# Patient Record
Sex: FEMALE | Race: WHITE | NOT HISPANIC OR LATINO | Employment: OTHER | URBAN - METROPOLITAN AREA
[De-identification: names, ages, dates, MRNs, and addresses within clinical notes are randomized per-mention and may not be internally consistent; named-entity substitution may affect disease eponyms.]

---

## 2018-06-25 ENCOUNTER — OFFICE VISIT CONVERTED (OUTPATIENT)
Dept: OTHER | Facility: HOSPITAL | Age: 68
End: 2018-06-25
Attending: INTERNAL MEDICINE

## 2018-08-23 ENCOUNTER — OFFICE VISIT CONVERTED (OUTPATIENT)
Dept: ORTHOPEDIC SURGERY | Facility: CLINIC | Age: 68
End: 2018-08-23
Attending: ORTHOPAEDIC SURGERY

## 2018-08-23 ENCOUNTER — CONVERSION ENCOUNTER (OUTPATIENT)
Dept: ORTHOPEDIC SURGERY | Facility: CLINIC | Age: 68
End: 2018-08-23

## 2018-11-15 ENCOUNTER — CONVERSION ENCOUNTER (OUTPATIENT)
Dept: GASTROENTEROLOGY | Facility: CLINIC | Age: 68
End: 2018-11-15

## 2018-11-15 ENCOUNTER — OFFICE VISIT CONVERTED (OUTPATIENT)
Dept: GASTROENTEROLOGY | Facility: CLINIC | Age: 68
End: 2018-11-15
Attending: NURSE PRACTITIONER

## 2018-12-18 ENCOUNTER — OFFICE VISIT CONVERTED (OUTPATIENT)
Dept: OTHER | Facility: HOSPITAL | Age: 68
End: 2018-12-18
Attending: INTERNAL MEDICINE

## 2019-01-15 ENCOUNTER — HOSPITAL ENCOUNTER (OUTPATIENT)
Dept: GENERAL RADIOLOGY | Facility: HOSPITAL | Age: 69
Discharge: HOME | End: 2019-01-15
Attending: INTERNAL MEDICINE

## 2019-01-15 LAB
CREAT BLD-MCNC: 0.7 MG/DL (ref 0.6–1.4)
GFR SERPLBLD BASED ON 1.73 SQ M-ARVRAT: >60 ML/MIN/{1.73_M2}

## 2019-01-21 ENCOUNTER — HOSPITAL ENCOUNTER (OUTPATIENT)
Dept: GASTROENTEROLOGY | Facility: HOSPITAL | Age: 69
Setting detail: HOSPITAL OUTPATIENT SURGERY
Discharge: HOME | End: 2019-01-21
Attending: INTERNAL MEDICINE

## 2019-01-21 LAB — GLUCOSE BLD-MCNC: 177 MG/DL (ref 65–99)

## 2019-01-22 ENCOUNTER — TELEPHONE (OUTPATIENT)
Dept: GENETICS | Facility: HOSPITAL | Age: 69
End: 2019-01-22

## 2019-02-11 ENCOUNTER — OFFICE VISIT CONVERTED (OUTPATIENT)
Dept: GASTROENTEROLOGY | Facility: CLINIC | Age: 69
End: 2019-02-11
Attending: NURSE PRACTITIONER

## 2019-02-11 ENCOUNTER — CONVERSION ENCOUNTER (OUTPATIENT)
Dept: GASTROENTEROLOGY | Facility: CLINIC | Age: 69
End: 2019-02-11

## 2019-02-25 ENCOUNTER — OFFICE VISIT CONVERTED (OUTPATIENT)
Dept: OTHER | Facility: HOSPITAL | Age: 69
End: 2019-02-25
Attending: INTERNAL MEDICINE

## 2019-04-03 ENCOUNTER — HOSPITAL ENCOUNTER (OUTPATIENT)
Dept: LAB | Facility: HOSPITAL | Age: 69
Discharge: HOME | End: 2019-04-03
Attending: INTERNAL MEDICINE

## 2019-04-03 LAB
25(OH)D3 SERPL-MCNC: 33 NG/ML (ref 30–100)
ALBUMIN SERPL-MCNC: 4.2 G/DL (ref 3.5–5)
ALBUMIN/GLOB SERPL: 2 {RATIO} (ref 1.4–2.6)
ALP SERPL-CCNC: 61 U/L (ref 43–160)
ALT SERPL-CCNC: 20 U/L (ref 10–40)
ANION GAP SERPL CALC-SCNC: 17 MMOL/L (ref 8–19)
AST SERPL-CCNC: 21 U/L (ref 15–50)
BILIRUB SERPL-MCNC: 0.51 MG/DL (ref 0.2–1.3)
BUN SERPL-MCNC: 19 MG/DL (ref 5–25)
BUN/CREAT SERPL: 26 {RATIO} (ref 6–20)
CALCIUM SERPL-MCNC: 8.8 MG/DL (ref 8.7–10.4)
CHLORIDE SERPL-SCNC: 104 MMOL/L (ref 99–111)
CHOLEST SERPL-MCNC: 135 MG/DL (ref 107–200)
CHOLEST/HDLC SERPL: 2.9 {RATIO} (ref 3–6)
CONV CO2: 25 MMOL/L (ref 22–32)
CONV TOTAL PROTEIN: 6.3 G/DL (ref 6.3–8.2)
CREAT UR-MCNC: 0.72 MG/DL (ref 0.5–0.9)
EST. AVERAGE GLUCOSE BLD GHB EST-MCNC: 169 MG/DL
GFR SERPLBLD BASED ON 1.73 SQ M-ARVRAT: >60 ML/MIN/{1.73_M2}
GLOBULIN UR ELPH-MCNC: 2.1 G/DL (ref 2–3.5)
GLUCOSE SERPL-MCNC: 151 MG/DL (ref 65–99)
HBA1C MFR BLD: 7.5 % (ref 3.5–5.7)
HDLC SERPL-MCNC: 47 MG/DL (ref 40–60)
LDLC SERPL CALC-MCNC: 55 MG/DL (ref 70–100)
OSMOLALITY SERPL CALC.SUM OF ELEC: 299 MOSM/KG (ref 273–304)
POTASSIUM SERPL-SCNC: 4.4 MMOL/L (ref 3.5–5.3)
SODIUM SERPL-SCNC: 142 MMOL/L (ref 135–147)
TRIGL SERPL-MCNC: 165 MG/DL (ref 40–150)
VLDLC SERPL-MCNC: 33 MG/DL (ref 5–37)

## 2019-06-13 ENCOUNTER — HOSPITAL ENCOUNTER (OUTPATIENT)
Dept: LAB | Facility: HOSPITAL | Age: 69
Discharge: HOME | End: 2019-06-13
Attending: INTERNAL MEDICINE

## 2019-06-13 LAB
ALBUMIN SERPL-MCNC: 4.4 G/DL (ref 3.5–5)
ALBUMIN/GLOB SERPL: 2.2 {RATIO} (ref 1.4–2.6)
ALP SERPL-CCNC: 60 U/L (ref 43–160)
ALT SERPL-CCNC: 31 U/L (ref 10–40)
ANION GAP SERPL CALC-SCNC: 18 MMOL/L (ref 8–19)
AST SERPL-CCNC: 28 U/L (ref 15–50)
BASOPHILS # BLD AUTO: 0.06 10*3/UL (ref 0–0.2)
BASOPHILS NFR BLD AUTO: 0.9 % (ref 0–3)
BILIRUB SERPL-MCNC: 0.45 MG/DL (ref 0.2–1.3)
BUN SERPL-MCNC: 19 MG/DL (ref 5–25)
BUN/CREAT SERPL: 23 {RATIO} (ref 6–20)
CALCIUM SERPL-MCNC: 8.7 MG/DL (ref 8.7–10.4)
CHLORIDE SERPL-SCNC: 101 MMOL/L (ref 99–111)
CONV ABS IMM GRAN: 0.02 10*3/UL (ref 0–0.2)
CONV CO2: 23 MMOL/L (ref 22–32)
CONV IMMATURE GRAN: 0.3 % (ref 0–1.8)
CONV TOTAL PROTEIN: 6.4 G/DL (ref 6.3–8.2)
CREAT UR-MCNC: 0.81 MG/DL (ref 0.5–0.9)
DEPRECATED RDW RBC AUTO: 48.6 FL (ref 36.4–46.3)
EOSINOPHIL # BLD AUTO: 0.12 10*3/UL (ref 0–0.7)
EOSINOPHIL # BLD AUTO: 1.8 % (ref 0–7)
ERYTHROCYTE [DISTWIDTH] IN BLOOD BY AUTOMATED COUNT: 15.9 % (ref 11.7–14.4)
GFR SERPLBLD BASED ON 1.73 SQ M-ARVRAT: >60 ML/MIN/{1.73_M2}
GLOBULIN UR ELPH-MCNC: 2 G/DL (ref 2–3.5)
GLUCOSE SERPL-MCNC: 179 MG/DL (ref 65–99)
HBA1C MFR BLD: 11.2 G/DL (ref 12–16)
HCT VFR BLD AUTO: 37.1 % (ref 37–47)
LYMPHOCYTES # BLD AUTO: 1.92 10*3/UL (ref 1–5)
MCH RBC QN AUTO: 25.5 PG (ref 27–31)
MCHC RBC AUTO-ENTMCNC: 30.2 G/DL (ref 33–37)
MCV RBC AUTO: 84.3 FL (ref 81–99)
MONOCYTES # BLD AUTO: 0.8 10*3/UL (ref 0.2–1.2)
MONOCYTES NFR BLD AUTO: 12 % (ref 3–10)
NEUTROPHILS # BLD AUTO: 3.73 10*3/UL (ref 2–8)
NEUTROPHILS NFR BLD AUTO: 56.1 % (ref 30–85)
NRBC CBCN: 0 % (ref 0–0.7)
OSMOLALITY SERPL CALC.SUM OF ELEC: 293 MOSM/KG (ref 273–304)
PLATELET # BLD AUTO: 382 10*3/UL (ref 130–400)
PMV BLD AUTO: 10.5 FL (ref 9.4–12.3)
POTASSIUM SERPL-SCNC: 4.2 MMOL/L (ref 3.5–5.3)
RBC # BLD AUTO: 4.4 10*6/UL (ref 4.2–5.4)
SODIUM SERPL-SCNC: 138 MMOL/L (ref 135–147)
VARIANT LYMPHS NFR BLD MANUAL: 28.9 % (ref 20–45)
WBC # BLD AUTO: 6.65 10*3/UL (ref 4.8–10.8)

## 2019-06-14 LAB — CANCER AG27-29 SERPL-ACNC: 16.5 U/ML (ref 0–38.6)

## 2019-06-24 ENCOUNTER — OFFICE VISIT CONVERTED (OUTPATIENT)
Dept: OTHER | Facility: HOSPITAL | Age: 69
End: 2019-06-24
Attending: INTERNAL MEDICINE

## 2019-07-09 ENCOUNTER — HOSPITAL ENCOUNTER (OUTPATIENT)
Dept: CT IMAGING | Facility: HOSPITAL | Age: 69
Discharge: HOME | End: 2019-07-09
Attending: INTERNAL MEDICINE

## 2019-07-11 ENCOUNTER — HOSPITAL ENCOUNTER (OUTPATIENT)
Dept: LAB | Facility: HOSPITAL | Age: 69
Discharge: HOME | End: 2019-07-11
Attending: INTERNAL MEDICINE

## 2019-07-11 LAB
ALBUMIN SERPL-MCNC: 4.2 G/DL (ref 3.5–5)
ANION GAP SERPL CALC-SCNC: 20 MMOL/L (ref 8–19)
BUN SERPL-MCNC: 19 MG/DL (ref 5–25)
BUN/CREAT SERPL: 24 {RATIO} (ref 6–20)
CALCIUM SERPL-MCNC: 8.6 MG/DL (ref 8.7–10.4)
CHLORIDE SERPL-SCNC: 101 MMOL/L (ref 99–111)
CONV CO2: 20 MMOL/L (ref 22–32)
CREAT UR-MCNC: 0.79 MG/DL (ref 0.5–0.9)
GFR SERPLBLD BASED ON 1.73 SQ M-ARVRAT: >60 ML/MIN/{1.73_M2}
GLUCOSE SERPL-MCNC: 230 MG/DL (ref 65–99)
PHOSPHATE SERPL-MCNC: 3.6 MG/DL (ref 2.4–4.5)
POTASSIUM SERPL-SCNC: 4.2 MMOL/L (ref 3.5–5.3)
SODIUM SERPL-SCNC: 137 MMOL/L (ref 135–147)

## 2019-07-25 ENCOUNTER — HOSPITAL ENCOUNTER (OUTPATIENT)
Dept: LAB | Facility: HOSPITAL | Age: 69
Discharge: HOME | End: 2019-07-25
Attending: INTERNAL MEDICINE

## 2019-07-25 LAB
25(OH)D3 SERPL-MCNC: 50.3 NG/ML (ref 30–100)
ANION GAP SERPL CALC-SCNC: 22 MMOL/L (ref 8–19)
BUN SERPL-MCNC: 19 MG/DL (ref 5–25)
BUN/CREAT SERPL: 26 {RATIO} (ref 6–20)
CALCIUM SERPL-MCNC: 9 MG/DL (ref 8.7–10.4)
CHLORIDE SERPL-SCNC: 104 MMOL/L (ref 99–111)
CONV CO2: 20 MMOL/L (ref 22–32)
CONV CREATININE URINE, RANDOM: 250.8 MG/DL (ref 10–300)
CONV MICROALBUM.,U,RANDOM: <12 MG/L (ref 0–20)
CREAT UR-MCNC: 0.73 MG/DL (ref 0.5–0.9)
EST. AVERAGE GLUCOSE BLD GHB EST-MCNC: 166 MG/DL
GFR SERPLBLD BASED ON 1.73 SQ M-ARVRAT: >60 ML/MIN/{1.73_M2}
GLUCOSE SERPL-MCNC: 146 MG/DL (ref 65–99)
HBA1C MFR BLD: 7.4 % (ref 3.5–5.7)
MICROALBUMIN/CREAT UR: 4.8 MG/G{CRE} (ref 0–35)
OSMOLALITY SERPL CALC.SUM OF ELEC: 297 MOSM/KG (ref 273–304)
POTASSIUM SERPL-SCNC: 4.6 MMOL/L (ref 3.5–5.3)
SODIUM SERPL-SCNC: 141 MMOL/L (ref 135–147)

## 2019-08-12 ENCOUNTER — OFFICE VISIT CONVERTED (OUTPATIENT)
Dept: OTHER | Facility: HOSPITAL | Age: 69
End: 2019-08-12
Attending: INTERNAL MEDICINE

## 2019-08-16 ENCOUNTER — HOSPITAL ENCOUNTER (OUTPATIENT)
Dept: OTHER | Facility: HOSPITAL | Age: 69
Discharge: HOME | End: 2019-08-16
Attending: INTERNAL MEDICINE

## 2019-08-16 LAB
APTT BLD: 23.5 S (ref 22.2–34.2)
BASOPHILS # BLD AUTO: 0.06 10*3/UL (ref 0–0.2)
BASOPHILS NFR BLD AUTO: 0.6 % (ref 0–3)
CONV ABS IMM GRAN: 0.04 10*3/UL (ref 0–0.2)
CONV IMMATURE GRAN: 0.4 % (ref 0–1.8)
DEPRECATED RDW RBC AUTO: 47.7 FL (ref 36.4–46.3)
EOSINOPHIL # BLD AUTO: 0.12 10*3/UL (ref 0–0.7)
EOSINOPHIL # BLD AUTO: 1.2 % (ref 0–7)
ERYTHROCYTE [DISTWIDTH] IN BLOOD BY AUTOMATED COUNT: 15.8 % (ref 11.7–14.4)
HCT VFR BLD AUTO: 37.1 % (ref 37–47)
HGB BLD-MCNC: 11.2 G/DL (ref 12–16)
INR PPP: 1.06 (ref 2–3)
LYMPHOCYTES # BLD AUTO: 2.41 10*3/UL (ref 1–5)
LYMPHOCYTES NFR BLD AUTO: 23.3 % (ref 20–45)
MCH RBC QN AUTO: 24.9 PG (ref 27–31)
MCHC RBC AUTO-ENTMCNC: 30.2 G/DL (ref 33–37)
MCV RBC AUTO: 82.4 FL (ref 81–99)
MONOCYTES # BLD AUTO: 1.17 10*3/UL (ref 0.2–1.2)
MONOCYTES NFR BLD AUTO: 11.3 % (ref 3–10)
NEUTROPHILS # BLD AUTO: 6.54 10*3/UL (ref 2–8)
NEUTROPHILS NFR BLD AUTO: 63.2 % (ref 30–85)
NRBC CBCN: 0 % (ref 0–0.7)
PLATELET # BLD AUTO: 412 10*3/UL (ref 130–400)
PMV BLD AUTO: 10.4 FL (ref 9.4–12.3)
PROTHROMBIN TIME: 10.9 S (ref 9.4–12)
RBC # BLD AUTO: 4.5 10*6/UL (ref 4.2–5.4)
WBC # BLD AUTO: 10.34 10*3/UL (ref 4.8–10.8)

## 2019-08-19 ENCOUNTER — HOSPITAL ENCOUNTER (OUTPATIENT)
Dept: CT IMAGING | Facility: HOSPITAL | Age: 69
Discharge: HOME | End: 2019-08-19
Attending: INTERNAL MEDICINE

## 2019-08-21 LAB — CONV LYMPHOMA/LEUKEMIA PROFILE SOLID TISSUE OR FLUID: NORMAL

## 2019-08-27 ENCOUNTER — OFFICE VISIT CONVERTED (OUTPATIENT)
Dept: OTHER | Facility: HOSPITAL | Age: 69
End: 2019-08-27
Attending: INTERNAL MEDICINE

## 2019-12-05 ENCOUNTER — HOSPITAL ENCOUNTER (OUTPATIENT)
Dept: LAB | Facility: HOSPITAL | Age: 69
Discharge: HOME | End: 2019-12-05
Attending: INTERNAL MEDICINE

## 2019-12-05 LAB
ALBUMIN SERPL-MCNC: 4.1 G/DL (ref 3.5–5)
ALBUMIN/GLOB SERPL: 1.6 {RATIO} (ref 1.4–2.6)
ALP SERPL-CCNC: 57 U/L (ref 43–160)
ALT SERPL-CCNC: 19 U/L (ref 10–40)
ANION GAP SERPL CALC-SCNC: 19 MMOL/L (ref 8–19)
AST SERPL-CCNC: 24 U/L (ref 15–50)
BILIRUB SERPL-MCNC: 0.28 MG/DL (ref 0.2–1.3)
BUN SERPL-MCNC: 14 MG/DL (ref 5–25)
BUN/CREAT SERPL: 21 {RATIO} (ref 6–20)
CALCIUM SERPL-MCNC: 9 MG/DL (ref 8.7–10.4)
CHLORIDE SERPL-SCNC: 102 MMOL/L (ref 99–111)
CHOLEST SERPL-MCNC: 124 MG/DL (ref 107–200)
CHOLEST/HDLC SERPL: 3.2 {RATIO} (ref 3–6)
CONV CO2: 24 MMOL/L (ref 22–32)
CONV TOTAL PROTEIN: 6.6 G/DL (ref 6.3–8.2)
CREAT UR-MCNC: 0.66 MG/DL (ref 0.5–0.9)
EST. AVERAGE GLUCOSE BLD GHB EST-MCNC: 148 MG/DL
GFR SERPLBLD BASED ON 1.73 SQ M-ARVRAT: >60 ML/MIN/{1.73_M2}
GLOBULIN UR ELPH-MCNC: 2.5 G/DL (ref 2–3.5)
GLUCOSE SERPL-MCNC: 151 MG/DL (ref 65–99)
HBA1C MFR BLD: 6.8 % (ref 3.5–5.7)
HDLC SERPL-MCNC: 39 MG/DL (ref 40–60)
LDLC SERPL CALC-MCNC: 57 MG/DL (ref 70–100)
OSMOLALITY SERPL CALC.SUM OF ELEC: 295 MOSM/KG (ref 273–304)
POTASSIUM SERPL-SCNC: 4.3 MMOL/L (ref 3.5–5.3)
SODIUM SERPL-SCNC: 141 MMOL/L (ref 135–147)
TRIGL SERPL-MCNC: 138 MG/DL (ref 40–150)
VLDLC SERPL-MCNC: 28 MG/DL (ref 5–37)

## 2020-03-31 ENCOUNTER — HOSPITAL ENCOUNTER (OUTPATIENT)
Dept: LAB | Facility: HOSPITAL | Age: 70
Discharge: HOME | End: 2020-03-31
Attending: INTERNAL MEDICINE

## 2020-03-31 LAB
25(OH)D3 SERPL-MCNC: 43.5 NG/ML (ref 30–100)
ANION GAP SERPL CALC-SCNC: 21 MMOL/L (ref 8–19)
BASOPHILS # BLD AUTO: 0.05 10*3/UL (ref 0–0.2)
BASOPHILS NFR BLD AUTO: 0.7 % (ref 0–3)
BUN SERPL-MCNC: 17 MG/DL (ref 5–25)
BUN/CREAT SERPL: 22 {RATIO} (ref 6–20)
CALCIUM SERPL-MCNC: 9.2 MG/DL (ref 8.7–10.4)
CHLORIDE SERPL-SCNC: 102 MMOL/L (ref 99–111)
CONV ABS IMM GRAN: 0.02 10*3/UL (ref 0–0.2)
CONV CO2: 22 MMOL/L (ref 22–32)
CONV IMMATURE GRAN: 0.3 % (ref 0–1.8)
CREAT UR-MCNC: 0.76 MG/DL (ref 0.5–0.9)
DEPRECATED RDW RBC AUTO: 51.2 FL (ref 36.4–46.3)
EOSINOPHIL # BLD AUTO: 0.18 10*3/UL (ref 0–0.7)
EOSINOPHIL # BLD AUTO: 2.5 % (ref 0–7)
ERYTHROCYTE [DISTWIDTH] IN BLOOD BY AUTOMATED COUNT: 16.7 % (ref 11.7–14.4)
EST. AVERAGE GLUCOSE BLD GHB EST-MCNC: 148 MG/DL
GFR SERPLBLD BASED ON 1.73 SQ M-ARVRAT: >60 ML/MIN/{1.73_M2}
GLUCOSE SERPL-MCNC: 155 MG/DL (ref 65–99)
HBA1C MFR BLD: 6.8 % (ref 3.5–5.7)
HCT VFR BLD AUTO: 39.1 % (ref 37–47)
HGB BLD-MCNC: 11.7 G/DL (ref 12–16)
LYMPHOCYTES # BLD AUTO: 1.84 10*3/UL (ref 1–5)
LYMPHOCYTES NFR BLD AUTO: 25.4 % (ref 20–45)
MCH RBC QN AUTO: 25.3 PG (ref 27–31)
MCHC RBC AUTO-ENTMCNC: 29.9 G/DL (ref 33–37)
MCV RBC AUTO: 84.4 FL (ref 81–99)
MONOCYTES # BLD AUTO: 0.78 10*3/UL (ref 0.2–1.2)
MONOCYTES NFR BLD AUTO: 10.8 % (ref 3–10)
NEUTROPHILS # BLD AUTO: 4.38 10*3/UL (ref 2–8)
NEUTROPHILS NFR BLD AUTO: 60.3 % (ref 30–85)
NRBC CBCN: 0 % (ref 0–0.7)
OSMOLALITY SERPL CALC.SUM OF ELEC: 295 MOSM/KG (ref 273–304)
PLATELET # BLD AUTO: 405 10*3/UL (ref 130–400)
PMV BLD AUTO: 10.1 FL (ref 9.4–12.3)
POTASSIUM SERPL-SCNC: 4.6 MMOL/L (ref 3.5–5.3)
RBC # BLD AUTO: 4.63 10*6/UL (ref 4.2–5.4)
SODIUM SERPL-SCNC: 140 MMOL/L (ref 135–147)
T4 FREE SERPL-MCNC: 1.2 NG/DL (ref 0.9–1.8)
TSH SERPL-ACNC: 4.38 M[IU]/L (ref 0.27–4.2)
WBC # BLD AUTO: 7.25 10*3/UL (ref 4.8–10.8)

## 2020-04-27 ENCOUNTER — HOSPITAL ENCOUNTER (OUTPATIENT)
Dept: GENERAL RADIOLOGY | Facility: HOSPITAL | Age: 70
Discharge: HOME | End: 2020-04-27
Attending: INTERNAL MEDICINE

## 2020-05-19 ENCOUNTER — HOSPITAL ENCOUNTER (OUTPATIENT)
Dept: LAB | Facility: HOSPITAL | Age: 70
Discharge: HOME | End: 2020-05-19
Attending: INTERNAL MEDICINE

## 2020-05-19 LAB
ALBUMIN SERPL-MCNC: 4.6 G/DL (ref 3.5–5)
ALBUMIN/GLOB SERPL: 2.1 {RATIO} (ref 1.4–2.6)
ALP SERPL-CCNC: 60 U/L (ref 43–160)
ALT SERPL-CCNC: 25 U/L (ref 10–40)
ANION GAP SERPL CALC-SCNC: 17 MMOL/L (ref 8–19)
AST SERPL-CCNC: 26 U/L (ref 15–50)
BASOPHILS # BLD AUTO: 0.05 10*3/UL (ref 0–0.2)
BASOPHILS NFR BLD AUTO: 0.5 % (ref 0–3)
BILIRUB SERPL-MCNC: 0.32 MG/DL (ref 0.2–1.3)
BUN SERPL-MCNC: 12 MG/DL (ref 5–25)
BUN/CREAT SERPL: 16 {RATIO} (ref 6–20)
CALCIUM SERPL-MCNC: 9.2 MG/DL (ref 8.7–10.4)
CHLORIDE SERPL-SCNC: 104 MMOL/L (ref 99–111)
CONV ABS IMM GRAN: 0.02 10*3/UL (ref 0–0.2)
CONV CO2: 21 MMOL/L (ref 22–32)
CONV IMMATURE GRAN: 0.2 % (ref 0–1.8)
CONV TOTAL PROTEIN: 6.8 G/DL (ref 6.3–8.2)
CREAT UR-MCNC: 0.75 MG/DL (ref 0.5–0.9)
DEPRECATED RDW RBC AUTO: 50.1 FL (ref 36.4–46.3)
EOSINOPHIL # BLD AUTO: 0.12 10*3/UL (ref 0–0.7)
EOSINOPHIL # BLD AUTO: 1.3 % (ref 0–7)
ERYTHROCYTE [DISTWIDTH] IN BLOOD BY AUTOMATED COUNT: 16.4 % (ref 11.7–14.4)
ERYTHROCYTE [SEDIMENTATION RATE] IN BLOOD: 7 MM/H (ref 0–30)
GFR SERPLBLD BASED ON 1.73 SQ M-ARVRAT: >60 ML/MIN/{1.73_M2}
GLOBULIN UR ELPH-MCNC: 2.2 G/DL (ref 2–3.5)
GLUCOSE SERPL-MCNC: 85 MG/DL (ref 65–99)
HCT VFR BLD AUTO: 38.7 % (ref 37–47)
HGB BLD-MCNC: 11.7 G/DL (ref 12–16)
LYMPHOCYTES # BLD AUTO: 2.02 10*3/UL (ref 1–5)
LYMPHOCYTES NFR BLD AUTO: 21.9 % (ref 20–45)
MCH RBC QN AUTO: 25.3 PG (ref 27–31)
MCHC RBC AUTO-ENTMCNC: 30.2 G/DL (ref 33–37)
MCV RBC AUTO: 83.6 FL (ref 81–99)
MONOCYTES # BLD AUTO: 0.99 10*3/UL (ref 0.2–1.2)
MONOCYTES NFR BLD AUTO: 10.7 % (ref 3–10)
NEUTROPHILS # BLD AUTO: 6.02 10*3/UL (ref 2–8)
NEUTROPHILS NFR BLD AUTO: 65.4 % (ref 30–85)
NRBC CBCN: 0 % (ref 0–0.7)
OSMOLALITY SERPL CALC.SUM OF ELEC: 283 MOSM/KG (ref 273–304)
PLATELET # BLD AUTO: 422 10*3/UL (ref 130–400)
PMV BLD AUTO: 10.3 FL (ref 9.4–12.3)
POTASSIUM SERPL-SCNC: 4.5 MMOL/L (ref 3.5–5.3)
RBC # BLD AUTO: 4.63 10*6/UL (ref 4.2–5.4)
SODIUM SERPL-SCNC: 137 MMOL/L (ref 135–147)
TSH SERPL-ACNC: 1.64 M[IU]/L (ref 0.27–4.2)
WBC # BLD AUTO: 9.22 10*3/UL (ref 4.8–10.8)

## 2020-05-21 LAB
ASO AB SERPL-ACNC: 42 [IU]/ML (ref 0–200)
CONV RHEUMATOID FACTOR IGM: <10 [IU]/ML (ref 0–14)
CRP SERPL-MCNC: 0.5 MG/L (ref 0–5)
DSDNA AB SER-ACNC: NEGATIVE [IU]/ML
ENA AB SER IA-ACNC: NEGATIVE {RATIO}
URATE SERPL-MCNC: 4.9 MG/DL (ref 2.5–7.5)

## 2020-06-23 ENCOUNTER — HOSPITAL ENCOUNTER (OUTPATIENT)
Dept: LAB | Facility: HOSPITAL | Age: 70
Discharge: HOME | End: 2020-06-23
Attending: INTERNAL MEDICINE

## 2020-06-23 LAB
ALBUMIN SERPL-MCNC: 4.5 G/DL (ref 3.5–5)
ALBUMIN/GLOB SERPL: 2.3 {RATIO} (ref 1.4–2.6)
ALP SERPL-CCNC: 65 U/L (ref 43–160)
ALT SERPL-CCNC: 22 U/L (ref 10–40)
ANION GAP SERPL CALC-SCNC: 17 MMOL/L (ref 8–19)
AST SERPL-CCNC: 24 U/L (ref 15–50)
BASOPHILS # BLD AUTO: 0.08 10*3/UL (ref 0–0.2)
BASOPHILS NFR BLD AUTO: 0.9 % (ref 0–3)
BILIRUB SERPL-MCNC: 0.23 MG/DL (ref 0.2–1.3)
BUN SERPL-MCNC: 21 MG/DL (ref 5–25)
BUN/CREAT SERPL: 29 {RATIO} (ref 6–20)
CALCIUM SERPL-MCNC: 9 MG/DL (ref 8.7–10.4)
CHLORIDE SERPL-SCNC: 104 MMOL/L (ref 99–111)
CONV ABS IMM GRAN: 0.03 10*3/UL (ref 0–0.2)
CONV CO2: 22 MMOL/L (ref 22–32)
CONV IMMATURE GRAN: 0.3 % (ref 0–1.8)
CONV TOTAL PROTEIN: 6.5 G/DL (ref 6.3–8.2)
CREAT UR-MCNC: 0.73 MG/DL (ref 0.5–0.9)
DEPRECATED RDW RBC AUTO: 49.6 FL (ref 36.4–46.3)
EOSINOPHIL # BLD AUTO: 0.15 10*3/UL (ref 0–0.7)
EOSINOPHIL # BLD AUTO: 1.6 % (ref 0–7)
ERYTHROCYTE [DISTWIDTH] IN BLOOD BY AUTOMATED COUNT: 16.2 % (ref 11.7–14.4)
GFR SERPLBLD BASED ON 1.73 SQ M-ARVRAT: >60 ML/MIN/{1.73_M2}
GLOBULIN UR ELPH-MCNC: 2 G/DL (ref 2–3.5)
GLUCOSE SERPL-MCNC: 88 MG/DL (ref 65–99)
HCT VFR BLD AUTO: 36.5 % (ref 37–47)
HGB BLD-MCNC: 10.8 G/DL (ref 12–16)
LYMPHOCYTES # BLD AUTO: 2.16 10*3/UL (ref 1–5)
LYMPHOCYTES NFR BLD AUTO: 23.6 % (ref 20–45)
MCH RBC QN AUTO: 24.9 PG (ref 27–31)
MCHC RBC AUTO-ENTMCNC: 29.6 G/DL (ref 33–37)
MCV RBC AUTO: 84.3 FL (ref 81–99)
MONOCYTES # BLD AUTO: 0.97 10*3/UL (ref 0.2–1.2)
MONOCYTES NFR BLD AUTO: 10.6 % (ref 3–10)
NEUTROPHILS # BLD AUTO: 5.75 10*3/UL (ref 2–8)
NEUTROPHILS NFR BLD AUTO: 63 % (ref 30–85)
NRBC CBCN: 0 % (ref 0–0.7)
OSMOLALITY SERPL CALC.SUM OF ELEC: 288 MOSM/KG (ref 273–304)
PLATELET # BLD AUTO: 407 10*3/UL (ref 130–400)
PMV BLD AUTO: 10.3 FL (ref 9.4–12.3)
POTASSIUM SERPL-SCNC: 4.6 MMOL/L (ref 3.5–5.3)
RBC # BLD AUTO: 4.33 10*6/UL (ref 4.2–5.4)
SODIUM SERPL-SCNC: 138 MMOL/L (ref 135–147)
WBC # BLD AUTO: 9.14 10*3/UL (ref 4.8–10.8)

## 2020-06-25 LAB — CANCER AG27-29 SERPL-ACNC: 13.5 U/ML (ref 0–38.6)

## 2020-06-30 ENCOUNTER — OFFICE VISIT CONVERTED (OUTPATIENT)
Dept: OTHER | Facility: HOSPITAL | Age: 70
End: 2020-06-30
Attending: INTERNAL MEDICINE

## 2020-07-06 ENCOUNTER — HOSPITAL ENCOUNTER (OUTPATIENT)
Dept: LAB | Facility: HOSPITAL | Age: 70
Discharge: HOME | End: 2020-07-06
Attending: INTERNAL MEDICINE

## 2020-07-06 LAB
ALBUMIN SERPL-MCNC: 4.6 G/DL (ref 3.5–5)
ALBUMIN/GLOB SERPL: 2.2 {RATIO} (ref 1.4–2.6)
ALP SERPL-CCNC: 65 U/L (ref 43–160)
ALT SERPL-CCNC: 25 U/L (ref 10–40)
ANION GAP SERPL CALC-SCNC: 19 MMOL/L (ref 8–19)
AST SERPL-CCNC: 24 U/L (ref 15–50)
BASOPHILS # BLD AUTO: 0.05 10*3/UL (ref 0–0.2)
BASOPHILS NFR BLD AUTO: 0.6 % (ref 0–3)
BILIRUB SERPL-MCNC: 0.46 MG/DL (ref 0.2–1.3)
BUN SERPL-MCNC: 14 MG/DL (ref 5–25)
BUN/CREAT SERPL: 16 {RATIO} (ref 6–20)
CALCIUM SERPL-MCNC: 9 MG/DL (ref 8.7–10.4)
CHLORIDE SERPL-SCNC: 103 MMOL/L (ref 99–111)
CHOLEST SERPL-MCNC: 134 MG/DL (ref 107–200)
CHOLEST/HDLC SERPL: 2.9 {RATIO} (ref 3–6)
CONV ABS IMM GRAN: 0.03 10*3/UL (ref 0–0.2)
CONV CO2: 21 MMOL/L (ref 22–32)
CONV IMMATURE GRAN: 0.3 % (ref 0–1.8)
CONV TOTAL PROTEIN: 6.7 G/DL (ref 6.3–8.2)
CREAT UR-MCNC: 0.9 MG/DL (ref 0.5–0.9)
DEPRECATED RDW RBC AUTO: 48.7 FL (ref 36.4–46.3)
EOSINOPHIL # BLD AUTO: 0.15 10*3/UL (ref 0–0.7)
EOSINOPHIL # BLD AUTO: 1.7 % (ref 0–7)
ERYTHROCYTE [DISTWIDTH] IN BLOOD BY AUTOMATED COUNT: 15.9 % (ref 11.7–14.4)
EST. AVERAGE GLUCOSE BLD GHB EST-MCNC: 154 MG/DL
GFR SERPLBLD BASED ON 1.73 SQ M-ARVRAT: >60 ML/MIN/{1.73_M2}
GLOBULIN UR ELPH-MCNC: 2.1 G/DL (ref 2–3.5)
GLUCOSE SERPL-MCNC: 158 MG/DL (ref 65–99)
HBA1C MFR BLD: 7 % (ref 3.5–5.7)
HCT VFR BLD AUTO: 37.6 % (ref 37–47)
HDLC SERPL-MCNC: 46 MG/DL (ref 40–60)
HGB BLD-MCNC: 11.2 G/DL (ref 12–16)
LDLC SERPL CALC-MCNC: 58 MG/DL (ref 70–100)
LYMPHOCYTES # BLD AUTO: 2.1 10*3/UL (ref 1–5)
LYMPHOCYTES NFR BLD AUTO: 24.2 % (ref 20–45)
MCH RBC QN AUTO: 25 PG (ref 27–31)
MCHC RBC AUTO-ENTMCNC: 29.8 G/DL (ref 33–37)
MCV RBC AUTO: 83.9 FL (ref 81–99)
MONOCYTES # BLD AUTO: 0.87 10*3/UL (ref 0.2–1.2)
MONOCYTES NFR BLD AUTO: 10 % (ref 3–10)
NEUTROPHILS # BLD AUTO: 5.49 10*3/UL (ref 2–8)
NEUTROPHILS NFR BLD AUTO: 63.2 % (ref 30–85)
NRBC CBCN: 0 % (ref 0–0.7)
OSMOLALITY SERPL CALC.SUM OF ELEC: 290 MOSM/KG (ref 273–304)
PLATELET # BLD AUTO: 435 10*3/UL (ref 130–400)
PMV BLD AUTO: 10.3 FL (ref 9.4–12.3)
POTASSIUM SERPL-SCNC: 4.8 MMOL/L (ref 3.5–5.3)
RBC # BLD AUTO: 4.48 10*6/UL (ref 4.2–5.4)
SODIUM SERPL-SCNC: 138 MMOL/L (ref 135–147)
TRIGL SERPL-MCNC: 152 MG/DL (ref 40–150)
TSH SERPL-ACNC: 1.5 M[IU]/L (ref 0.27–4.2)
VLDLC SERPL-MCNC: 30 MG/DL (ref 5–37)
WBC # BLD AUTO: 8.69 10*3/UL (ref 4.8–10.8)

## 2020-07-07 LAB
FERRITIN SERPL-MCNC: 8 NG/ML (ref 10–200)
FOLATE SERPL-MCNC: 12.7 NG/ML (ref 4.8–20)
IRON SATN MFR SERPL: 8 % (ref 20–55)
IRON SERPL-MCNC: 42 UG/DL (ref 60–170)
TIBC SERPL-MCNC: 535 UG/DL (ref 245–450)
TRANSFERRIN SERPL-MCNC: 374 MG/DL (ref 250–380)
VIT B12 SERPL-MCNC: 233 PG/ML (ref 211–911)

## 2020-07-08 ENCOUNTER — HOSPITAL ENCOUNTER (OUTPATIENT)
Dept: GENERAL RADIOLOGY | Facility: HOSPITAL | Age: 70
Discharge: HOME | End: 2020-07-08
Attending: INTERNAL MEDICINE

## 2020-07-14 ENCOUNTER — HOSPITAL ENCOUNTER (OUTPATIENT)
Dept: GENERAL RADIOLOGY | Facility: HOSPITAL | Age: 70
Discharge: HOME | End: 2020-07-14
Attending: INTERNAL MEDICINE

## 2020-10-06 ENCOUNTER — HOSPITAL ENCOUNTER (OUTPATIENT)
Dept: LAB | Facility: HOSPITAL | Age: 70
Discharge: HOME | End: 2020-10-06
Attending: INTERNAL MEDICINE

## 2020-10-06 LAB
ALBUMIN SERPL-MCNC: 4.3 G/DL (ref 3.5–5)
ALBUMIN/GLOB SERPL: 2.2 {RATIO} (ref 1.4–2.6)
ALP SERPL-CCNC: 63 U/L (ref 43–160)
ALT SERPL-CCNC: 27 U/L (ref 10–40)
ANION GAP SERPL CALC-SCNC: 19 MMOL/L (ref 8–19)
AST SERPL-CCNC: 29 U/L (ref 15–50)
BASOPHILS # BLD AUTO: 0.06 10*3/UL (ref 0–0.2)
BASOPHILS NFR BLD AUTO: 0.9 % (ref 0–3)
BILIRUB SERPL-MCNC: 0.34 MG/DL (ref 0.2–1.3)
BUN SERPL-MCNC: 16 MG/DL (ref 5–25)
BUN/CREAT SERPL: 26 {RATIO} (ref 6–20)
CALCIUM SERPL-MCNC: 8.7 MG/DL (ref 8.7–10.4)
CHLORIDE SERPL-SCNC: 102 MMOL/L (ref 99–111)
CHOLEST SERPL-MCNC: 143 MG/DL (ref 107–200)
CHOLEST/HDLC SERPL: 3 {RATIO} (ref 3–6)
CONV ABS IMM GRAN: 0.02 10*3/UL (ref 0–0.2)
CONV CO2: 20 MMOL/L (ref 22–32)
CONV CREATININE URINE, RANDOM: 392.7 MG/DL (ref 10–300)
CONV IMMATURE GRAN: 0.3 % (ref 0–1.8)
CONV MICROALBUM.,U,RANDOM: 31.8 MG/L (ref 0–20)
CONV TOTAL PROTEIN: 6.3 G/DL (ref 6.3–8.2)
CREAT UR-MCNC: 0.62 MG/DL (ref 0.5–0.9)
DEPRECATED RDW RBC AUTO: 54 FL (ref 36.4–46.3)
EOSINOPHIL # BLD AUTO: 0.2 10*3/UL (ref 0–0.7)
EOSINOPHIL # BLD AUTO: 2.9 % (ref 0–7)
ERYTHROCYTE [DISTWIDTH] IN BLOOD BY AUTOMATED COUNT: 16.5 % (ref 11.7–14.4)
EST. AVERAGE GLUCOSE BLD GHB EST-MCNC: 169 MG/DL
FERRITIN SERPL-MCNC: 18 NG/ML (ref 10–200)
GFR SERPLBLD BASED ON 1.73 SQ M-ARVRAT: >60 ML/MIN/{1.73_M2}
GLOBULIN UR ELPH-MCNC: 2 G/DL (ref 2–3.5)
GLUCOSE SERPL-MCNC: 176 MG/DL (ref 65–99)
HBA1C MFR BLD: 7.5 % (ref 3.5–5.7)
HCT VFR BLD AUTO: 41.9 % (ref 37–47)
HDLC SERPL-MCNC: 47 MG/DL (ref 40–60)
HGB BLD-MCNC: 12.8 G/DL (ref 12–16)
IRON SATN MFR SERPL: 13 % (ref 20–55)
IRON SERPL-MCNC: 60 UG/DL (ref 60–170)
LDLC SERPL CALC-MCNC: 57 MG/DL (ref 70–100)
LYMPHOCYTES # BLD AUTO: 1.76 10*3/UL (ref 1–5)
LYMPHOCYTES NFR BLD AUTO: 25.8 % (ref 20–45)
MCH RBC QN AUTO: 27.1 PG (ref 27–31)
MCHC RBC AUTO-ENTMCNC: 30.5 G/DL (ref 33–37)
MCV RBC AUTO: 88.8 FL (ref 81–99)
MICROALBUMIN/CREAT UR: 8.1 MG/G{CRE} (ref 0–35)
MONOCYTES # BLD AUTO: 0.73 10*3/UL (ref 0.2–1.2)
MONOCYTES NFR BLD AUTO: 10.7 % (ref 3–10)
NEUTROPHILS # BLD AUTO: 4.05 10*3/UL (ref 2–8)
NEUTROPHILS NFR BLD AUTO: 59.4 % (ref 30–85)
NRBC CBCN: 0 % (ref 0–0.7)
OSMOLALITY SERPL CALC.SUM OF ELEC: 287 MOSM/KG (ref 273–304)
PLATELET # BLD AUTO: 344 10*3/UL (ref 130–400)
PMV BLD AUTO: 10.5 FL (ref 9.4–12.3)
POTASSIUM SERPL-SCNC: 4.5 MMOL/L (ref 3.5–5.3)
RBC # BLD AUTO: 4.72 10*6/UL (ref 4.2–5.4)
SODIUM SERPL-SCNC: 136 MMOL/L (ref 135–147)
TIBC SERPL-MCNC: 449 UG/DL (ref 245–450)
TRANSFERRIN SERPL-MCNC: 314 MG/DL (ref 250–380)
TRIGL SERPL-MCNC: 193 MG/DL (ref 40–150)
VLDLC SERPL-MCNC: 39 MG/DL (ref 5–37)
WBC # BLD AUTO: 6.82 10*3/UL (ref 4.8–10.8)

## 2021-01-21 ENCOUNTER — HOSPITAL ENCOUNTER (OUTPATIENT)
Dept: LAB | Facility: HOSPITAL | Age: 71
Discharge: HOME | End: 2021-01-21
Attending: INTERNAL MEDICINE

## 2021-01-21 LAB
ANION GAP SERPL CALC-SCNC: 20 MMOL/L (ref 8–19)
BASOPHILS # BLD AUTO: 0.05 10*3/UL (ref 0–0.2)
BASOPHILS NFR BLD AUTO: 0.6 % (ref 0–3)
BUN SERPL-MCNC: 24 MG/DL (ref 5–25)
BUN/CREAT SERPL: 29 {RATIO} (ref 6–20)
CALCIUM SERPL-MCNC: 8.9 MG/DL (ref 8.7–10.4)
CHLORIDE SERPL-SCNC: 102 MMOL/L (ref 99–111)
CONV ABS IMM GRAN: 0.02 10*3/UL (ref 0–0.2)
CONV CO2: 20 MMOL/L (ref 22–32)
CONV IMMATURE GRAN: 0.2 % (ref 0–1.8)
CREAT UR-MCNC: 0.82 MG/DL (ref 0.5–0.9)
DEPRECATED RDW RBC AUTO: 47.4 FL (ref 36.4–46.3)
EOSINOPHIL # BLD AUTO: 0.18 10*3/UL (ref 0–0.7)
EOSINOPHIL # BLD AUTO: 2.2 % (ref 0–7)
ERYTHROCYTE [DISTWIDTH] IN BLOOD BY AUTOMATED COUNT: 14.2 % (ref 11.7–14.4)
EST. AVERAGE GLUCOSE BLD GHB EST-MCNC: 174 MG/DL
FERRITIN SERPL-MCNC: 36 NG/ML (ref 10–200)
GFR SERPLBLD BASED ON 1.73 SQ M-ARVRAT: >60 ML/MIN/{1.73_M2}
GLUCOSE SERPL-MCNC: 191 MG/DL (ref 65–99)
HBA1C MFR BLD: 7.7 % (ref 3.5–5.7)
HCT VFR BLD AUTO: 41.6 % (ref 37–47)
HGB BLD-MCNC: 13.1 G/DL (ref 12–16)
IRON SATN MFR SERPL: 12 % (ref 20–55)
IRON SERPL-MCNC: 54 UG/DL (ref 60–170)
LYMPHOCYTES # BLD AUTO: 1.82 10*3/UL (ref 1–5)
LYMPHOCYTES NFR BLD AUTO: 22.1 % (ref 20–45)
MCH RBC QN AUTO: 28.7 PG (ref 27–31)
MCHC RBC AUTO-ENTMCNC: 31.5 G/DL (ref 33–37)
MCV RBC AUTO: 91 FL (ref 81–99)
MONOCYTES # BLD AUTO: 0.89 10*3/UL (ref 0.2–1.2)
MONOCYTES NFR BLD AUTO: 10.8 % (ref 3–10)
NEUTROPHILS # BLD AUTO: 5.28 10*3/UL (ref 2–8)
NEUTROPHILS NFR BLD AUTO: 64.1 % (ref 30–85)
NRBC CBCN: 0 % (ref 0–0.7)
OSMOLALITY SERPL CALC.SUM OF ELEC: 293 MOSM/KG (ref 273–304)
PLATELET # BLD AUTO: 339 10*3/UL (ref 130–400)
PMV BLD AUTO: 10.5 FL (ref 9.4–12.3)
POTASSIUM SERPL-SCNC: 4.7 MMOL/L (ref 3.5–5.3)
RBC # BLD AUTO: 4.57 10*6/UL (ref 4.2–5.4)
SODIUM SERPL-SCNC: 137 MMOL/L (ref 135–147)
TIBC SERPL-MCNC: 459 UG/DL (ref 245–450)
TRANSFERRIN SERPL-MCNC: 321 MG/DL (ref 250–380)
TSH SERPL-ACNC: 2.33 M[IU]/L (ref 0.27–4.2)
WBC # BLD AUTO: 8.24 10*3/UL (ref 4.8–10.8)

## 2021-04-08 ENCOUNTER — HOSPITAL ENCOUNTER (OUTPATIENT)
Dept: LAB | Facility: HOSPITAL | Age: 71
Discharge: HOME | End: 2021-04-08
Attending: INTERNAL MEDICINE

## 2021-04-08 LAB
25(OH)D3 SERPL-MCNC: 51.6 NG/ML (ref 30–100)
ALBUMIN SERPL-MCNC: 4 G/DL (ref 3.5–5)
ALBUMIN/GLOB SERPL: 1.8 {RATIO} (ref 1.4–2.6)
ALP SERPL-CCNC: 59 U/L (ref 43–160)
ALT SERPL-CCNC: 28 U/L (ref 10–40)
ANION GAP SERPL CALC-SCNC: 19 MMOL/L (ref 8–19)
AST SERPL-CCNC: 33 U/L (ref 15–50)
BASOPHILS # BLD AUTO: 0.06 10*3/UL (ref 0–0.2)
BASOPHILS NFR BLD AUTO: 0.9 % (ref 0–3)
BILIRUB SERPL-MCNC: 0.39 MG/DL (ref 0.2–1.3)
BUN SERPL-MCNC: 12 MG/DL (ref 5–25)
BUN/CREAT SERPL: 17 {RATIO} (ref 6–20)
CALCIUM SERPL-MCNC: 8.1 MG/DL (ref 8.7–10.4)
CHLORIDE SERPL-SCNC: 102 MMOL/L (ref 99–111)
CHOLEST SERPL-MCNC: 123 MG/DL (ref 107–200)
CHOLEST/HDLC SERPL: 3.1 {RATIO} (ref 3–6)
CONV ABS IMM GRAN: 0.03 10*3/UL (ref 0–0.2)
CONV CO2: 20 MMOL/L (ref 22–32)
CONV CREATININE URINE, RANDOM: 159.4 MG/DL (ref 10–300)
CONV IMMATURE GRAN: 0.5 % (ref 0–1.8)
CONV MICROALBUM.,U,RANDOM: 29.5 MG/L (ref 0–20)
CONV TOTAL PROTEIN: 6.2 G/DL (ref 6.3–8.2)
CREAT UR-MCNC: 0.72 MG/DL (ref 0.5–0.9)
DEPRECATED RDW RBC AUTO: 45 FL (ref 36.4–46.3)
EOSINOPHIL # BLD AUTO: 0.26 10*3/UL (ref 0–0.7)
EOSINOPHIL # BLD AUTO: 4 % (ref 0–7)
ERYTHROCYTE [DISTWIDTH] IN BLOOD BY AUTOMATED COUNT: 13.3 % (ref 11.7–14.4)
EST. AVERAGE GLUCOSE BLD GHB EST-MCNC: 212 MG/DL
FERRITIN SERPL-MCNC: 37 NG/ML (ref 10–200)
GFR SERPLBLD BASED ON 1.73 SQ M-ARVRAT: >60 ML/MIN/{1.73_M2}
GLOBULIN UR ELPH-MCNC: 2.2 G/DL (ref 2–3.5)
GLUCOSE SERPL-MCNC: 208 MG/DL (ref 65–99)
HBA1C MFR BLD: 9 % (ref 3.5–5.7)
HCT VFR BLD AUTO: 40.7 % (ref 37–47)
HDLC SERPL-MCNC: 40 MG/DL (ref 40–60)
HGB BLD-MCNC: 12.9 G/DL (ref 12–16)
IRON SATN MFR SERPL: 17 % (ref 20–55)
IRON SERPL-MCNC: 67 UG/DL (ref 60–170)
LDLC SERPL CALC-MCNC: 53 MG/DL (ref 70–100)
LYMPHOCYTES # BLD AUTO: 1.81 10*3/UL (ref 1–5)
LYMPHOCYTES NFR BLD AUTO: 27.5 % (ref 20–45)
MCH RBC QN AUTO: 29.1 PG (ref 27–31)
MCHC RBC AUTO-ENTMCNC: 31.7 G/DL (ref 33–37)
MCV RBC AUTO: 91.7 FL (ref 81–99)
MICROALBUMIN/CREAT UR: 18.5 MG/G{CRE} (ref 0–35)
MONOCYTES # BLD AUTO: 0.72 10*3/UL (ref 0.2–1.2)
MONOCYTES NFR BLD AUTO: 11 % (ref 3–10)
NEUTROPHILS # BLD AUTO: 3.69 10*3/UL (ref 2–8)
NEUTROPHILS NFR BLD AUTO: 56.1 % (ref 30–85)
NRBC CBCN: 0 % (ref 0–0.7)
OSMOLALITY SERPL CALC.SUM OF ELEC: 290 MOSM/KG (ref 273–304)
PLATELET # BLD AUTO: 299 10*3/UL (ref 130–400)
PMV BLD AUTO: 10.5 FL (ref 9.4–12.3)
POTASSIUM SERPL-SCNC: 4.4 MMOL/L (ref 3.5–5.3)
RBC # BLD AUTO: 4.44 10*6/UL (ref 4.2–5.4)
SODIUM SERPL-SCNC: 137 MMOL/L (ref 135–147)
TIBC SERPL-MCNC: 405 UG/DL (ref 245–450)
TRANSFERRIN SERPL-MCNC: 283 MG/DL (ref 250–380)
TRIGL SERPL-MCNC: 152 MG/DL (ref 40–150)
VLDLC SERPL-MCNC: 30 MG/DL (ref 5–37)
WBC # BLD AUTO: 6.57 10*3/UL (ref 4.8–10.8)

## 2021-05-16 VITALS — BODY MASS INDEX: 27.23 KG/M2 | BODY MASS INDEX: 27.07 KG/M2 | WEIGHT: 148 LBS | HEIGHT: 62 IN | HEIGHT: 62 IN

## 2021-05-16 VITALS
HEART RATE: 80 BPM | TEMPERATURE: 98.8 F | HEIGHT: 62 IN | DIASTOLIC BLOOD PRESSURE: 70 MMHG | BODY MASS INDEX: 29.63 KG/M2 | SYSTOLIC BLOOD PRESSURE: 131 MMHG | WEIGHT: 161 LBS

## 2021-05-16 VITALS
WEIGHT: 166 LBS | SYSTOLIC BLOOD PRESSURE: 126 MMHG | TEMPERATURE: 97.6 F | HEART RATE: 76 BPM | HEIGHT: 62 IN | DIASTOLIC BLOOD PRESSURE: 61 MMHG | BODY MASS INDEX: 30.55 KG/M2

## 2021-05-28 VITALS
OXYGEN SATURATION: 97 % | HEART RATE: 78 BPM | SYSTOLIC BLOOD PRESSURE: 119 MMHG | OXYGEN SATURATION: 97 % | RESPIRATION RATE: 16 BRPM | RESPIRATION RATE: 16 BRPM | TEMPERATURE: 98.5 F | TEMPERATURE: 98.4 F | WEIGHT: 167.12 LBS | OXYGEN SATURATION: 98 % | RESPIRATION RATE: 16 BRPM | HEART RATE: 91 BPM | SYSTOLIC BLOOD PRESSURE: 125 MMHG | HEIGHT: 62 IN | BODY MASS INDEX: 31.12 KG/M2 | WEIGHT: 172.8 LBS | WEIGHT: 168.6 LBS | HEART RATE: 85 BPM | SYSTOLIC BLOOD PRESSURE: 110 MMHG | DIASTOLIC BLOOD PRESSURE: 65 MMHG | HEIGHT: 62 IN | HEART RATE: 92 BPM | DIASTOLIC BLOOD PRESSURE: 62 MMHG | BODY MASS INDEX: 31.8 KG/M2 | DIASTOLIC BLOOD PRESSURE: 69 MMHG | SYSTOLIC BLOOD PRESSURE: 109 MMHG | DIASTOLIC BLOOD PRESSURE: 85 MMHG | RESPIRATION RATE: 16 BRPM | WEIGHT: 169.12 LBS | RESPIRATION RATE: 20 BRPM | SYSTOLIC BLOOD PRESSURE: 115 MMHG | OXYGEN SATURATION: 95 % | HEART RATE: 77 BPM | HEIGHT: 62 IN | SYSTOLIC BLOOD PRESSURE: 116 MMHG | OXYGEN SATURATION: 97 % | BODY MASS INDEX: 31.03 KG/M2 | BODY MASS INDEX: 30.75 KG/M2 | DIASTOLIC BLOOD PRESSURE: 63 MMHG | OXYGEN SATURATION: 98 % | WEIGHT: 144.5 LBS | DIASTOLIC BLOOD PRESSURE: 70 MMHG | DIASTOLIC BLOOD PRESSURE: 74 MMHG | TEMPERATURE: 98.1 F | HEIGHT: 62 IN | TEMPERATURE: 98.3 F | BODY MASS INDEX: 31.17 KG/M2 | BODY MASS INDEX: 26.59 KG/M2 | OXYGEN SATURATION: 97 % | HEART RATE: 82 BPM | HEIGHT: 62 IN | TEMPERATURE: 98.3 F | BODY MASS INDEX: 30.14 KG/M2 | HEIGHT: 62 IN | WEIGHT: 169.37 LBS | HEIGHT: 62 IN | TEMPERATURE: 97.5 F | SYSTOLIC BLOOD PRESSURE: 128 MMHG | RESPIRATION RATE: 20 BRPM | WEIGHT: 163.8 LBS | RESPIRATION RATE: 18 BRPM | HEART RATE: 84 BPM | TEMPERATURE: 97.9 F

## 2021-05-28 NOTE — PROGRESS NOTES
Patient: CORY GUADALUPE     Acct: [id number]     Report: #[id number]  UNIT #: [id number]     : 1950    Encounter Date:2018  PRIMARY CARE: JAIDA MONTEMAYOR  ***Signed***  --------------------------------------------------------------------------------------------------------------------  Progress Note      DATE: 18      Primary Care Provider:  JAIDA MONTEMAYOR      Referring Provider:  COOPER SAM      Chief Complaint      Per request of Dr. Sam -  pelvic lymphadenopathy            Subjective      68-year-old white female was referred by Dr. Sam because of pelvic     lymphadenopathy which was found on CAT scan of the abdomen and pelvis.      According to history patient claims that she was started on Bydureon last April     for her diabetes mellitus and in May she developed right mid quadrant pain and     diarrhea.  She thought that she was having pancreatitis and had consulted with     Dr. Romo.  A CAT scan of the abdomen and pelvis obtained showed pelvic     lymphadenopathy.  Lymph nodes measure slightly larger than on exam from 2017     findings could be consistent with a chronic infectious or inflammatory process     however lymphoma or metastatic disease cannot be excluded.  MRI of the abdomen     with and without contrast post MRCP came back negative.            Patient had a bout of bronchitis and was put on methylprednisolone Dosepak and     cefuroxime.  According to her her diarrhea improved.  Diarrhea initially was 7 7    times a day occasionally watery.  There was no blood nor mucus.            Past Med/Surg History            Past Med/Surg History:   Hypertension             Diabetes Mellitus             Cancer (breast cancer status post bilateral mastectomies)             Other (hyperlipidemia, asthma, vitamin D deficiency)            Social History      Social History:  No Tobacco Use, No Alcohol Use, No Recreational Drug use            Allergies      Coded  Allergies:             SULFA (SULFONAMIDE ANTIBIOTICS) (Verified  Allergy, Severe, SWELLING,     6/25/18)           GABAPENTIN (Verified  Allergy, Unknown, OCULAR MIGRAINES, 6/25/18)           PAROXETINE (Verified  Allergy, Unknown, ITCHING, 6/25/18)           QUINOLONES (Verified  Allergy, Unknown, ITCHING, 6/25/18)            Medications      Medications    Last Reconciled on 12/18/18 17:45 by SANTOS DE LA FUENTE MD      Metronidazole (Flagyl*) 500 Mg Tablet      500 MG PO TID for 7 Days, #21 TAB 0 Refills         Prov: Luna De La Fuente         12/18/18       Atorvastatin (Atorvastatin) 20 Mg Tablet      20 MG PO HS, #30 TAB 0 Refills         Reported         12/18/18       amLODIPine (amLODIPine) 5 Mg Tablet      5 MG PO QDAY, #30 TAB         Reported         12/18/18       Cholecalciferol (Vitamin D3) 50,000 Unit Capsule      43211 UNITS PO Q7D, CAP         Reported         6/25/18       Glimepiride (Glimepiride*) 4 Mg Tablet      4 MG PO QDAY, TAB         Reported         6/25/18       Fluocinonide (Lidex 0.05% Cr 15 Gm) 15 Gm Cream.gm.      1 APL TOP BID PRN for ITCHING, APL         Reported         4/20/16       Albuterol (Proair HFA) 8.5 Gm Inh      2 PUFFS INH RTQ4H PRN for SHORTNESS OF BREATH, #1 INH 0 Refills         Reported         4/20/16       Ibuprofen (Ibuprofen) 200 Mg Tab      600 MG PO Q8, #100 TAB 0 Refills         Reported         4/20/16       Metformin HCl (Glucophage) 1,000 Mg Tablet      1000 MG PO BID, TAB 0 Refills         Reported         8/24/10       Metoprolol Succinate (Toprol XL*) 100 Mg Tabcr      100 MG PO DAILY, 0 Refills         Reported         8/24/10       Acetaminophen (Tylenol Extra-Strength 500 Mg) 500 Mg Tab      2 TAB PO Q6H PRN         Reported         6/12/08       Fluticasone/Salmeterol (Advair 500/50 Diskus*) 28 Puff/Inh Inh      1 PUFF INH BID         Reported         6/12/08       Pantoprazole (Protonix*) 40 Mg Tablet      40 MG PO QDAY         Reported         6/12/08        Montelukast Sodium (Singulair*) 10 Mg Tab      10 MG PO QDAY         Reported         6/12/08       Aspirin (Aspirin Low-Strength 81 Mg) 81 Mg Tab      81 TAB PO QDAY         Reported         6/12/08      Current Medications      Current Medications Reviewed 12/18/18            Pain Assessment      Pain Intensity:  2 (right upper abdomen)      Description:  Aching, Sore      Duration:  Intermittent            Review of Systems      General:  Fatigue Scale: (3), Pain Scale: (2)      HEENT:  No Dysphagia, No Hearing Changes, No Visual Changes; Other (Cataracts     removed in past)      Respiratory:  Cough; No Shortness of Air, No Wheezing; Other (Bronchitis)      Cardiovascular:  No Chest Pain, No Palpitations      Gastrointestinal:  No Nausea, No Vomiting, No Constipation; Appetite Good      Genitourinary:  No Nocturia, No Dysuria      Musculoskeletal:  No Aches; Pains      Endocrine:  No Heat Intolerance; Fatigue      Hematologic:  No Bleeding, No Bruising      Allergic/Immunologic:  No Hives, No Nasal drip      Psychological:  No Anxiety, No Depression      Neurological:  No Headaches, No Dizziness      Skin:  No Rash, No Edema            Vitals      Height 5 ft 2 in / 157.48 cm      Weight 167 lbs 2 oz / 75.08526 kg      BSA 1.77 m2      BMI 30.6 kg/m2      Temperature 98.4 F / 36.89 C      Pulse 85      Respirations 16      Blood Pressure 119/65      Pulse Oximetry 97%            Exam      Constitutional:  No acute distress, Conversant, Pleasant; No Weakness      Eyes:  Anicteric sclerae, Palpebral Conjunctivae (Pink)      HENT:  Oropharynx clear; No Erythema; Buccal mucosae (Pink)      Neck:  Supple, Full Range of Motion      Cardiovascular:  RRR; No Murmurs; Normal PMI; No Peripheral Edema      Lungs:  Clear to Ausculation, Normal Respiratory Effort; No Rhonchi      Abdomen:  Soft, NABS; No Masses, No Tenderness; Other (Obese)      Chest:  Other (Obese symmetrical and equal)      Lymphatic:  No Neck       Extremities:  No digital cyanosis, No digital ischemia, Normal gait, Other (No     deformity)      Neurological:  Cranial Nerve II-XII; No Focal Sensory deficits      Psychological:  Appropriate affect, Appropriate mood, Intact judgement, Alert      Skin:  Normal temperature, Other (No dermatosis)            Lab Results      Laboratory Tests      11/15/18 13:18            Laboratory Tests            Test       11/1/18      14:01 11/15/18      13:18 11/17/18      21:56             Bedside Creatinine       0.6 mg/dL      (0.6-1.4)                           Bedside Glomerular Filtration      Rate >60      mL/min/{1.73_m2}                           Sodium Level              139 mmol/L      (135-147)                    Potassium Level              4.4 mmol/L      (3.5-5.3)                    Chloride Level              101 mmol/L      ()                    Carbon Dioxide Level              24 mmol/L      (22-32)                    Anion Gap              18 mmol/L      (8-19)                    Blood Urea Nitrogen              22 mg/dL      (5-25)                    Creatinine              0.64 mg/dL      (0.50-0.90)                    Glomerular Filtration Rate      Calc        >60      mL/min/{1.73_m2}                    BUN/Creatinine Ratio              34 {ratio}      (6-20)                    Glucose Level              83 mg/dL      (65-99)                    Hemoglobin A1c              6.8 %      (3.5-5.7)                    Estimated Average Glucose      (eAG)        148 mg/dL                           Serum Osmolality  290 (273-304)               Calcium Level              9.2 mg/dL      (8.7-10.4)                    Ionized Calcium (Measured)              5.0 mg/dL      (4.5-5.6)                    Triglycerides Level              161 mg/dL      ()                    Amylase Level              78 U/L      ()                    Lipase  39 U/L (5-51)               Immunoglobulin G               344 mg/dL      (700-1600)                    Immunoglobulin G1              249 mg/dL      (248-810)                    Immunoglobulin G2              49 mg/dL      (130-555)                    Immunoglobulin G3              18 mg/dL      ()                    Immunoglobulin G4              <1 mg/dL      (2-96)                    Lab Scanned Report              LAB FINAL      CUMULATIVES -            Radiology Impressions      MRI of the abdomen with MRCP-negative      CT scan of the abdomen and pelvis with and without contrast showed: Pelvic     lymphadenopathy , incomplete distention of the distal colon and rectum which     limits evaluation.  No definite acute colonic abnormality is seen,  fatty liver,     coronary artery and aortic atherosclerosis, small hiatal hernia            Impression/Problem List      Right breast carcinoma status post bilateral mastectomies and reconstructions,     status post hormonal treatment      Bronchial asthma      Diabetes mellitus       Hypertension      Hyperlipidemia      Vitamin D deficiency      Status post bilateral laparoscopic oophorectomies      Pelvic lymphadenopathy may be infectious in etiology.      Diarrhea may be infectious in etiology since it has gotten better with an    tibiotics.      Notes      New Medications      * amLODIPine 5 MG TABLET: 5 MG PO QDAY #30      * Atorvastatin 20 MG TABLET: 20 MG PO HS #30      * Metronidazole (Flagyl*) 500 MG TABLET: 500 MG PO TID 7 Days #21            Plan      Patient will continue her cefuroxime.      We will add Flagyl 500 mg 3 times a day for 7 days for possible intra-abdominal     infection that could be responsible for her pelvic lymphadenopathy.      Repeat CAT scan of the abdomen and pelvis about a week or 2 after antibiotics is    finished.            Patient Education:        DI for Acute Pain -- Adult            PREVENTION      Hx Influenza Vaccination:  Yes      2 or More Falls Past Year?:  No      Fall Past  Year with Injury?:  No      Hx Pneumococcal Vaccination:  Yes (2003)      Encouraged to follow-up with:  PCP regarding preventative exams.      Chart initiated by      BAUDLIIO Prado RN                 Disclaimer: Converted document may not contain table formatting or lab diagrams. Please see [facility] for the authenticated document.

## 2021-05-28 NOTE — PROGRESS NOTES
Patient: CORY GUADALUPE     Acct: [id number]     Report: #[id number]  UNIT #: [id number]     : 1950    Encounter Date:2019  PRIMARY CARE: JAIDA MONTEMAYOR  ***Signed***  --------------------------------------------------------------------------------------------------------------------  Progress Note      DATE: 19      Primary Care Provider:  JAIDA MONTEMAYOR      Referring Provider:  COOPER SAM      Chief Complaint      FU Pelvic Lymphadenopathy, (R) Breast Cancer            Subjective      68-year-old white female with a history of right breast carcinoma with BRCA1     mutation.  Patient had a prophylactic bilateral mastectomy as well as     prophylactic laparoscopic bilateral oophorectomy.  Patient had finished hormonal    treatment utilizing Arimidex for 5 years.      Patient was referred earlier because of pelvic lymphadenopathy on last visit.      Patient was given cefuroxime to which Flagy was added.  Repeat CAT scan was     obtained.  .            Patient had a recent colonoscopy on  which showed tubular adenomas she    also had an endoscopy that showed mild glandular dilatation suggestive of     nascent fundic gland permission and gastric body mucosa and reactive gastropathy    and gastric antral mucosa.            Patient was advised to have repeat genetic testing which she is hesitant to do.            Past Med/Surg History            Past Med/Surg History:   Hypertension             Diabetes Mellitus             Cancer (breast cancer status post bilateral mastectomies)             Other (hyperlipidemia, asthma, vitamin D deficiency)            Social History      Social History:  No Tobacco Use, No Alcohol Use, No Recreational Drug use            Allergies      Coded Allergies:             SULFA (SULFONAMIDE ANTIBIOTICS) (Verified  Allergy, Severe, SWELLING,     19)           EMPAGLIFLOZIN (Verified  Allergy, Unknown, UTI, DIGESTIVE ISSUES, 19)            GABAPENTIN (Verified  Allergy, Unknown, OCULAR MIGRAINES, 1/21/19)           PAROXETINE (Verified  Allergy, Unknown, ITCHING, 1/21/19)           QUINOLONES (Verified  Allergy, Unknown, ITCHING, 1/21/19)           EXENATIDE (Verified  Adverse Reaction, Unknown, BELCHING, INDIGESTION, ABD.    PAIN, 1/21/19)            Medications      Medications    Last Reconciled on 2/26/19 13:43 by SANTOS JACKSON MD      Moreno-Fluticasone (Fluticasone 50 mcg) 16 Gm Spray.susp      1 PUFFS NARE EACH QDAY, #1 BOTTLE 0 Refills         Reported         2/25/19       sitaGLIPtin (Januvia*) 50 Mg Tablet      50 MG PO QDAY, TAB         Reported         2/25/19       Naproxen Sodium (Aleve) 220 Mg Tablet      220 MG PO BID PRN for PAIN/CRAMPS, #100 TAB 0 Refills         Reported         2/25/19       Glimepiride (Glimepiride*) 2 Mg Tablet      2 MG PO QPM, TAB         Reported         1/18/19       Atorvastatin (Atorvastatin) 20 Mg Tablet      20 MG PO QDAY, #30 TAB 0 Refills         Reported         12/18/18       amLODIPine (amLODIPine) 5 Mg Tablet      5 MG PO QDAY, #30 TAB         Reported         12/18/18       Cholecalciferol (Vitamin D3) 50,000 Unit Capsule      73875 UNITS PO Q7D, CAP         Reported         6/25/18       Glimepiride (Glimepiride*) 4 Mg Tablet      4 MG PO QAM, TAB         Reported         6/25/18       Fluocinonide (Lidex 0.05% Cr 15 Gm) 15 Gm Cream.gm.      1 APL TOP BID PRN for ITCHING, APL         Reported         4/20/16       Albuterol (Proair HFA) 8.5 Gm Inh      2 PUFFS INH RTQ4H PRN for SHORTNESS OF BREATH, #1 INH 0 Refills         Reported         4/20/16       Ibuprofen (Ibuprofen) 200 Mg Tab      600 MG PO Q8, #100 TAB 0 Refills         Reported         4/20/16       Metformin HCl (Glucophage) 1,000 Mg Tablet      1000 MG PO BID, TAB 0 Refills         Reported         8/24/10       Metoprolol Succinate (Toprol XL*) 100 Mg Tabcr      100 MG PO DAILY, 0 Refills         Reported         8/24/10        Acetaminophen (Tylenol Extra-Strength 500 Mg) 500 Mg Tab      2 TAB PO Q6H PRN         Reported         6/12/08       Fluticasone/Salmeterol (Advair 500/50 Diskus*) 28 Puff/Inh Inh      1 PUFF INH BID         Reported         6/12/08       Pantoprazole (Protonix*) 40 Mg Tablet      40 MG PO QDAY         Reported         6/12/08       Montelukast Sodium (Singulair*) 10 Mg Tab      10 MG PO QDAY         Reported         6/12/08       Aspirin (Aspirin Low-Strength 81 Mg) 81 Mg Tab      81 TAB PO QDAY         Reported         6/12/08      Current Medications      Current Medications Reviewed 2/25/19            Pain Assessment      Pain Intensity:  2 (right upper abdomen)      Description:  Aching, Sore      Duration:  Intermittent            Review of Systems      General:  No Anxiety; Fatigue Scale: (4), Pain Scale: (2); No Fever      HEENT:  No Dysphagia      Respiratory:  No Cough; Shortness of Air      Cardiovascular:  No Chest Pain      Gastrointestinal:  No Nausea, No Vomiting; Appetite Fair (fair)      Genitourinary:  No Nocturia      Musculoskeletal:  No Joint Effusions; Aches, Pains ((R) lower abdomen, (R) Knee)      Endocrine:  No Heat Intolerance, No Cold Intolerance      Hematologic:  No Bleeding      Allergic/Immunologic:  No Hives      Psychological:  No Anxiety, No Depression      Neurological:  No Headaches      Skin:  No Rash            Vitals      Height 5 ft 2 in / 157.48 cm      Weight 168 lbs 9.6 oz / 76.093273 kg      BSA 1.78 m2      BMI 30.8 kg/m2      Temperature 98.3 F / 36.83 C      Pulse 78      Respirations 20      Blood Pressure 116/69      Pulse Oximetry 97%            Exam      Constitutional:  No acute distress, Conversant, Pleasant      Eyes:  Anicteric sclerae, Palpebral Conjunctivae (Pink), MIRYAM      HENT:  Oropharynx clear; No Erythema; Buccal mucosae (Pain)      Neck:  Supple, Full Range of Motion      Cardiovascular:  RRR; No Murmurs; Normal PMI; No Peripheral Edema      Lungs:   Clear to Ausculation, Normal Respiratory Effort      Abdomen:  Soft, NABS; No Masses, No Tenderness      Chest:  Other (Symmetrical)      Breast:  No Masses, No Tenderness, No Drainage      Lymphatic:  Other (1 cm mobile nontender left supraclavicular lymph node)      Extremities:  No digital cyanosis, No digital ischemia, Normal gait, Other (No     deformity)      Neurological:  Cranial Nerve II-XII (Intact); No Focal Sensory deficits      Psychological:  Appropriate affect, Appropriate mood, Intact judgement, Alert      Skin:  Other (No dermatosis)            Lab Results      Laboratory Tests      11/15/18 13:18            Laboratory Tests            Test       11/15/18      13:18 1/15/19      15:03 1/21/19      13:08 1/23/19      21:56             Sodium Level       139 mmol/L      (135-147)                           Potassium Level       4.4 mmol/L      (3.5-5.3)                           Chloride Level       101 mmol/L      ()                           Carbon Dioxide Level       24 mmol/L      (22-32)                           Anion Gap       18 mmol/L      (8-19)                           Blood Urea Nitrogen       22 mg/dL      (5-25)                           Creatinine       0.64 mg/dL      (0.50-0.90)                           Glomerular Filtration Rate      Calc >60      mL/min/{1.73_m2}                           BUN/Creatinine Ratio       34 {ratio}      (6-20)                           Glucose Level       83 mg/dL      (65-99)                           Hemoglobin A1c       6.8 %      (3.5-5.7)                           Estimated Average Glucose      (eAG) 148 mg/dL                           Serum Osmolality 290 (273-304)                 Calcium Level       9.2 mg/dL      (8.7-10.4)                           Ionized Calcium (Measured)       5.0 mg/dL      (4.5-5.6)                           Triglycerides Level       161 mg/dL      ()                           Amylase Level       78 U/L       ()                           Lipase 39 U/L (5-51)                 Immunoglobulin G       344 mg/dL      (700-1600)                           Immunoglobulin G1       249 mg/dL      (248-810)                           Immunoglobulin G2       49 mg/dL      (130-555)                           Immunoglobulin G3       18 mg/dL      ()                           Immunoglobulin G4       <1 mg/dL      (2-96)                           Bedside Creatinine              0.7 mg/dL      (0.6-1.4)                           Bedside Glomerular Filtration      Rate        >60      mL/min/{1.73_m2}                           Capillary Blood Glucose (Chem)              177 mg/dL      (65-99)                    Lab Scanned Report              LAB FINAL      CUMULATIVES -            Radiology Impressions      CT scan of the abdomen and pelvis done on January 15, 2019 showed the pelvic     lymphadenopathy to be smaller.  Patient also has hepatic steatosis            Impression/Problem List      Right breast carcinoma status post bilateral mastectomies and reconstructions,     status post hormonal treatment BRCA1 positive      Bronchial asthma      Diabetes mellitus       Hypertension      Hyperlipidemia      Vitamin D deficiency      Status post bilateral laparoscopic oophorectomies      Pelvic lymphadenopathy, improved      Notes      New Medications      * NAPROXEN SODIUM (Aleve) 220 MG TABLET: 220 MG PO BID PRN PAIN/CRAMPS #100      * sitaGLIPtin (Januvia*) 50 MG TABLET: 50 MG PO QDAY      * Moreno-Fluticasone (Fluticasone 50 mcg) 16 GM SPRAY.SUSP: 1 PUFFS NARE EACH QDAY       #1            Plan      For supraclavicular lymphadenopathy patient was instructed to come back should     it start getting better.      Discussed hepatic steatosis and diet      Discussed genetic testing.  Encouraged to have it done especially if the     insurance is going to pay for it.  Otherwise patient to do all necessary     screening procedures for  neoplasm.      Return in 4 months with CBC, CMP, CA-27-29.            Carbon Copy:      Copies To 2:   COOPER SAM ;            Patient Education:        Breast Cancer in Women            PREVENTION      2 or More Falls Past Year?:  No      Fall Past Year with Injury?:  No      Encouraged to follow-up with:  PCP regarding preventative exams.      Chart initiated by      PRASANTH Ibrahim MA                 Disclaimer: Converted document may not contain table formatting or lab diagrams. Please see [facility] for the authenticated document.

## 2021-05-28 NOTE — PROGRESS NOTES
Patient: CORY GUADALUPE     Acct: [id number]     Report: #[id number]  UNIT #: [id number]     : 1950    Encounter Date:2018  PRIMARY CARE: JAIDA MONTEMAYOR  ***Signed***  --------------------------------------------------------------------------------------------------------------------  Progress Note      DATE: 18      Primary Care Provider:  JAIDA MONTEMAYOR      Referring Provider:  JAIDA MONTEMAYOR      Chief Complaint      Follow up Right Breast Cancer            Subjective      68-year-old white female comes every year for checkup for right breast cancer     which was diagnosed in .  Patient had a BRCA1 mutation which prompted her     to have prophylactic bilateral mastectomy and prophylactic laparoscopic     bilateral oophorectomy.  Patient finished 5 year course of Arimidex.      Unfortunately daughter had the same problem and underwent same procedures.      Patient has visible weight loss which she attributes to her hypoglycemic     medications i.e. Jardiance  and Bydureon.            Past Med/Surg History            Past Med/Surg History:   Hypertension             Diabetes Mellitus             Cancer (breast cancer status post bilateral mastectomies)             Other (hyperlipidemia, asthma, vitamin D deficiency)            Social History      Social History:  No Tobacco Use, No Alcohol Use, No Recreational Drug use            Allergies      Coded Allergies:             SULFA (SULFONAMIDE ANTIBIOTICS) (Verified  Allergy, Severe, SWELLING, )           GABAPENTIN (Verified  Allergy, Unknown, OCULAR MIGRAINES, 18)           PAROXETINE (Verified  Allergy, Unknown, ITCHING, 18)           QUINOLONES (Verified  Allergy, Unknown, ITCHING, 18)            Medications      Medications    Last Reconciled on 18 17:22 by SANTOS JACKSON MD      Cholecalciferol (Vitamin D3) 50,000 Unit Capsule      58693 UNITS PO Q7D, CAP         Reported         18        Glimepiride (Glimepiride*) 4 Mg Tablet      4 MG PO QDAY, TAB         Reported         6/25/18       Fluocinonide (Lidex 0.05% Cr 15 Gm) 15 Gm Cream.gm.      1 APL TOP BID Y for ITCHING, APL         Reported         4/20/16       Albuterol (Proair HFA*) 8.5 Gm Inh      2 PUFFS INH RTQ4H Y for SHORTNESS OF BREATH, #1 INH 0 Refills         Reported         4/20/16       Repaglinide (Prandin) 2 Mg Tablet      4 MG PO TID, #180 TAB 0 Refills         Reported         4/20/16       sitaGLIPtin Phosphate (Januvia *) 100 Mg Tablet      100 MG PO QDAY, #30 TAB 0 Refills         Reported         4/20/16       Amlodipine/Atorvastatin (Amlodipine/Atorvastatin 5/20 Mg) 1 Tab Tab      1 TAB PO QDAY, #30 TAB         Reported         4/20/16       Ibuprofen (Ibuprofen) 200 Mg Tab      600 MG PO Q8, #100 TAB 0 Refills         Reported         4/20/16       Hydrocodone/Acetaminophen 7.5/300 MG (Xodol 7.5/300 Mg) 1 Tab Tab      0.5 TAB PO Q4HR Y, TAB         Reported         4/20/16       Cholecalciferol (Vitamin D3*) 1,000 Unit Tab      1000 UNIT PO BID         Reported         11/2/10       Moreno-Fluticasone (Flonase*) 16 Gm Naspr      2 PUFF NA QDAY, EA 0 Refills         Reported         8/24/10       metFORMIN HCl (Glucophage*) 1,000 Mg Tablet      1000 MG PO BID, TAB 0 Refills         Reported         8/24/10       Metoprolol Succinate (Toprol XL*) 100 Mg Tabcr      100 MG PO DAILY, 0 Refills         Reported         8/24/10       Acetaminophen (Tylenol Extra-Strength 500 Mg) 500 Mg Tab      2 TAB PO Q6H Y         Reported         6/12/08       Fluticasone/Salmeterol (Advair 500/50 Diskus*) 28 Puff/Inh Inh      1 PUFF INH BID         Reported         6/12/08       Pantoprazole (Protonix*) 40 Mg Tablet      40 MG PO QDAY         Reported         6/12/08       Montelukast Sodium (Singulair*) 10 Mg Tab      10 MG PO QDAY         Reported         6/12/08       Aspirin (Aspirin Low-Strength 81 Mg) 81 Mg Tab      81 TAB PO QDAY          Reported         6/12/08      Current Medications      Current Medications Reviewed 6/25/18            Pain Assessment      Pain Intensity:  3 (right side)            Review of Systems      General:  No Anxiety, Fatigue Scale: (2), Pain Scale: (3), No Fever, No Other      HEENT:  No Dysphagia, No Hearing Changes, No Rhinorrhea, No Tinnitus, No Visual     Changes, No Nasal Congestion, No Epistaxis, No Other      Respiratory:  No Cough, No Shortness of Air, No Sputum Changes, No Wheezing, No     Hemoptysis, No Congestion, No Other      Cardiovascular:  No Chest Pain, No Pedal Edema, No Orthopnea, No Palpitations,     No Chest Pressure, No Dizziness, No Other      Gastrointestinal:  Nausea, No Vomiting, No Dysphagia, No Constipation, Diarrhea    , No Appetite Good, No Appetite Fair, Appetite Poor, No Early Satiety, No Other      Genitourinary:  No Nocturia, No Dysuria, No Other      Musculoskeletal:  No Joint Effusions, Joint Tenderness, Joint Stiffness, No     Myalgias, Aches, Pains, No Other      Endocrine:  No Heat Intolerance, No Cold Intolerance, No Fatigue, No Blood     Sugar Control, No Other      Hematologic:  No Bleeding, Bruising, No Swollen Glands, No Other      Allergic/Immunologic:  No Hives, No Throat closing off, No Nasal drip, No Itchy     eyes, No Hay fever, No Other      Psychological:  No Anxiety, No Depression, No Other      Neurological:  No Headaches, Dizziness, No Weakness, No Numbness, No Other      Skin:  No Rash, No Open Wounds, No Edema, No Other            Vitals      Height 5 ft 2 in / 157.48 cm      Weight 144 lbs 8 oz / 65.885956 kg      BSA 1.71 m2      BMI 26.4 kg/m2      Temperature 98.1 F / 36.72 C - Oral      Pulse 91      Respirations 18      Blood Pressure 115/63, Left Arm      Pulse Oximetry 97%, room air            Exam      Constitutional:  No acute distress, Conversant, Pleasant      Eyes:  Anicteric sclerae, Palpebral Conjunctivae (pink), MIRYAM      HENT:  Oropharynx clear, No  Erythema, Buccal mucosae      Neck:  Supple, Full Range of Motion, No Masses      Cardiovascular:  RRR, Normal PMI, No Peripheral Edema      Lungs:  Clear to Ausculation, Normal Respiratory Effort      Abdomen:  NABS, No Masses, No Tenderness      Chest:  Other (symmetrical and equal)      Breast:  Other (negative nodules)      Lymphatic:  No Neck, No Axillae      Extremities:  No digital cyanosis, Normal gait, Other      Neurological:  Cranial Nerve II-XII (no deformity, no mutation range of motion)    , No Focal Sensory deficits      Psychological:  Appropriate affect, Intact judgement, Alert      Skin:  Normal temperature, Other (no dermatosis)            Lab Results      Laboratory Tests      5/29/18 11:43            Laboratory Tests            Test        3/6/18      15:39 5/29/18      11:43 5/31/18      21:56             Urine Collection Type Clean Catch NA                Urine Color YELLOW NA                Urine Appearance TURBID NA                Urine pH        5.0 (pH)      (5.0-8.0)                      Urine Specific Gravity        1.042 NA      (1.005-1.030)                      Urine Protein        NEGATIVE mg/dL      (NEGATIVE)                      Urine Glucose (UA)        >=1000 mg/dL      (NEGATIVE)                      Urine Ketones        TRACE mg/dL      (NEGATIVE)                      Urine Occult Blood        NEGATIVE NA      (NEGATIVE)                      Urine Nitrite        NEGATIVE NA      (NEGATIVE)                      Urine Bilirubin        NEGATIVE NA      (NEGATIVE)                      Urine Urobilinogen        0.2      {EhrlichU}/dL                      Urine Leukocyte Esterase        NEGATIVE NA      (NEGATIVE)                      Urine RBC        OCC(2-5)      /(HPF)                      Urine WBC        MANY(21-50)      /(HPF)                      Urine Bacteria NEGATIVE NA                Urine Hyaline Casts 2-5 NA                White Blood Count                8.22 10*3/uL       (4.80-10.80)              Red Blood Count                4.85 10*6/uL      (4.20-5.40)              Hemoglobin                12.60 g/dL      (12.00-16.00)              Hematocrit                40.6 %      (37.0-47.0)              Mean Corpuscular Volume                83.7 fL      (81.0-99.0)              Mean Corpuscular Hemoglobin                26.1 pg      (27.0-31.0)              Mean Corpuscular Hemoglobin      Concent         31.1 %      (33.0-37.0)              Red Cell Distribution Width                17.1 %      (11.5-14.5)              Platelet Count                336.00 10*3/uL      (130..00)              Mean Platelet Volume                8.0 fL      (7.4-10.4)              Granulocytes (%)                59.9 %      (30.0-85.0)              Lymphocytes (%) (Auto)                26.70 %      (20.00-45.00)              Monocytes (%) (Auto)                9.02 %      (3.00-10.00)              Eosinophils (%) (Auto)                3.53 %      (0.00-7.00)              Basophils (%) (Auto)                0.87 %      (0.00-3.00)              Granulocytes #                4.93 10*3/uL      (2.00-8.00)              Lymphocytes # (Auto)                2.19 10*3/uL      (1.00-5.00)              Monocytes # (Auto)                0.74 10*3/uL      (0.20-1.20)              Eosinophils # (Auto)                0.29 10*3/uL      (0.00-0.70)              Basophils # (Auto)                0.07 10*3/uL      (0.00-0.20)              Nucleated Red Blood Cells %                0.00 %      (0.00-0.01)              Sodium Level                137 mmol/L      (135-147)              Potassium Level                4.2 mmol/L      (3.5-5.3)              Chloride Level                98 mmol/L      ()              Carbon Dioxide Level                21 mmol/L      (22-32)              Anion Gap                22 mmol/L      (8-19)              Blood Urea Nitrogen                16 mg/dL      (5-25)               Creatinine                0.97 mg/dL      (0.50-0.90)              Glomerular Filtration Rate      Calc         59      mL/min/{1.73_m2}              BUN/Creatinine Ratio                16 {ratio}      (6-20)              Glucose Level                88 mg/dL      (65-99)              Serum Osmolality  285 (273-304)               Calcium Level                9.4 mg/dL      (8.7-10.4)              Total Bilirubin                0.40 mg/dL      (0.20-1.30)              Aspartate Amino Transf      (AST/SGOT)         22 U/L (15-50)                      Alanine Aminotransferase      (ALT/SGPT)         16 U/L (10-40)                      Alkaline Phosphatase                62 U/L      ()              Total Protein                6.8 g/dL      (6.3-8.2)              Albumin                4.4 g/dL      (3.5-5.0)              Globulin                2.4 g/dL      (2.0-3.5)              Albumin/Globulin Ratio                1.8 {ratio}      (1.4-2.6)              CA 27.29                9.9 U/mL      (0.0-38.6)              Lab Scanned Report                LAB FINAL      CUMULATIVES -            Impression/Problem List      Right breast carcinoma status post bilateral mastectomies and reconstructions,     status post hormonal treatment      Bronchial asthma      Diabetes mellitus       Hypertension      Hyperlipidemia      Vitamin D deficiency      Status post bilateral laparoscopic oophorectomies      Notes      New Medications      * Glimepiride (Glimepiride*) 4 MG TABLET: 4 MG PO QDAY      * Cholecalciferol (Vitamin D3) 50,000 UNIT CAPSULE: 50,000 UNITS PO Q7D            Plan      Annual checkup with a CBC CMP CA-27-29      DEXA scan this year            Patient Education:        Breast Cancer in Women            PREVENTION      Hx Influenza Vaccination:  Yes (2015)      2 or More Falls Past Year?:  No      Fall Past Year with Injury?:  No      Hx Pneumococcal Vaccination:  Yes (2003)      Encouraged to follow-up  with:  PCP regarding preventative exams.      Chart initiated by      Mary Dimas MA                 Disclaimer: Converted document may not contain table formatting or lab diagrams. Please see [employer] for the authenticated document.

## 2021-05-28 NOTE — PROGRESS NOTES
Patient: CORY GUADALUPE     Acct: [id number]     Report: #[id number]  UNIT #: [id number]     : 1950    Encounter Date:2019  PRIMARY CARE: JAIDA MONTEMAYOR  ***Signed***  --------------------------------------------------------------------------------------------------------------------  Progress Note      DATE: 19      Primary Care Provider:  JAIDA MONTEMAYOR      Referring Provider:  COOPER SAM      Chief Complaint      Pelvic Lymphadenopathy, Right Breast Ca. Follow-up            Subjective      69-year-old white female had nonspecific left lower quadrant abdominal pains and    CAT scan of the abdomen pelvis had shown some pelvic lymphadenopathy.  This is     been followed up since last January.  She had a CAT scan of the abdomen and     pelvis in January 15, 2019 that showed that her lymphadenopathy was improving     i.e. they were getting smaller.            Patient had a repeat CAT scan of the abdomen and pelvis  and is here for     results.            Past Med/Surg History            Past Med/Surg History:   Hypertension             Diabetes Mellitus             Cancer (breast cancer status post bilateral mastectomies)             Other (hyperlipidemia, asthma, vitamin D deficiency)            Social History      Social History:  No Tobacco Use, No Alcohol Use, No Recreational Drug use            Allergies      Coded Allergies:             SULFA (SULFONAMIDE ANTIBIOTICS) (Verified  Allergy, Severe, SWELLING,     19)           EMPAGLIFLOZIN (Verified  Allergy, Unknown, UTI, DIGESTIVE ISSUES, 19)           GABAPENTIN (Verified  Allergy, Unknown, OCULAR MIGRAINES, 19)           PAROXETINE (Verified  Allergy, Unknown, ITCHING, 19)           QUINOLONES (Verified  Allergy, Unknown, ITCHING, 19)           EXENATIDE (Verified  Adverse Reaction, Unknown, BELCHING, INDIGESTION, ABD.    PAIN, 19)            Medications      Medications    Last Reconciled  on 8/12/19 18:06 by SANTOS JACKSON MD      Moreno-Fluticasone (Fluticasone 50 mcg) 16 Gm Spray.susp      1 PUFFS NARE EACH QDAY, #1 BOTTLE 0 Refills         Reported         2/25/19       sitaGLIPtin (Januvia*) 50 Mg Tablet      50 MG PO QDAY, TAB         Reported         2/25/19       Naproxen Sodium (Aleve) 220 Mg Tablet      220 MG PO BID PRN for PAIN/CRAMPS, #100 TAB 0 Refills         Reported         2/25/19       Glimepiride (Glimepiride*) 2 Mg Tablet      2 MG PO QPM, TAB         Reported         1/18/19       Atorvastatin (Atorvastatin) 20 Mg Tablet      20 MG PO QDAY, #30 TAB 0 Refills         Reported         12/18/18       amLODIPine (amLODIPine) 5 Mg Tablet      5 MG PO QDAY, #30 TAB         Reported         12/18/18       Cholecalciferol (Vitamin D3) 50,000 Unit Capsule      18505 UNITS PO 3x/mo., CAP         Reported         6/25/18       Glimepiride (Glimepiride*) 4 Mg Tablet      4 MG PO QAM, TAB         Reported         6/25/18       Fluocinonide (Lidex 0.05% Cr 15 Gm) 15 Gm Cream.gm.      1 APL TOP BID PRN for ITCHING, APL         Reported         4/20/16       Albuterol (Proair HFA) 8.5 Gm Inh      2 PUFFS INH RTQ4H PRN for SHORTNESS OF BREATH, #1 INH 0 Refills         Reported         4/20/16       Metformin HCl (Glucophage) 1,000 Mg Tablet      1000 MG PO BID, TAB 0 Refills         Reported         8/24/10       Metoprolol Succinate (Metoprolol Succinate) 100 Mg Tabcr      100 MG PO DAILY, 0 Refills         Reported         8/24/10       Acetaminophen (Tylenol Extra-Strength 500 Mg) 500 Mg Tab      2 TAB PO Q6H PRN         Reported         6/12/08       Fluticasone/Salmeterol (Advair 500/50 Diskus*) 28 Puff/Inh Inh      1 PUFF INH BID         Reported         6/12/08       Pantoprazole (Protonix*) 40 Mg Tablet      40 MG PO QDAY         Reported         6/12/08       Montelukast Sodium (Singulair*) 10 Mg Tab      10 MG PO QDAY         Reported         6/12/08       Aspirin (Aspirin Low-Strength 81 Mg)  81 Mg Tab      81 TAB PO QDAY         Reported         6/12/08      Current Medications      Current Medications Reviewed 8/12/19            Pain Assessment      Pain Intensity:  0      Description:  None            Review of Systems      General:  Fatigue Scale: (5); No Pain Scale:      HEENT:  No Dysphagia, No Visual Changes; Other (Asthma)      Respiratory:  No Cough, No Shortness of Air      Cardiovascular:  No Chest Pain, No Palpitations      Gastrointestinal:  No Nausea, No Vomiting, No Constipation, No Diarrhea;     Appetite Good      Genitourinary:  No Nocturia, No Dysuria      Musculoskeletal:  Aches, Pains      Endocrine:  No Heat Intolerance; Fatigue      Hematologic:  No Bleeding, No Bruising      Allergic/Immunologic:  No Hives, No Nasal drip      Psychological:  No Anxiety, No Depression      Neurological:  No Headaches, No Dizziness      Skin:  No Rash, No Edema            Vitals      Height 5 ft 2 in / 157.48 cm      Weight 169 lbs 2 oz / 76.749174 kg      BSA 1.78 m2      BMI 30.9 kg/m2      Temperature 98.3 F / 36.83 C      Pulse 82      Respirations 20      Blood Pressure 109/62      Pulse Oximetry 97%            Exam      Constitutional:  No acute distress, Conversant      Psychological:  Appropriate affect, Appropriate mood, Intact judgement, Alert            Lab Results      Laboratory Tests      6/13/19 14:06            7/25/19 07:38            Laboratory Tests            Test       6/13/19      14:06 7/11/19      15:20 7/25/19      07:38 7/25/19      08:06             White Blood Count       6.65 10*3/uL      (4.80-10.80)                           Red Blood Count       4.40 10*6/uL      (4.20-5.40)                           Hemoglobin       11.20 g/dL      (12.00-16.00)                           Hematocrit       37.1 %      (37.0-47.0)                           Mean Corpuscular Volume       84.3 fL      (81.0-99.0)                           Mean Corpuscular Hemoglobin       25.5 pg       (27.0-31.0)                           Mean Corpuscular Hemoglobin      Concent 30.2      (33.0-37.0)                           Red Cell Distribution Width       15.9 %      (11.7-14.4)                           RDW Standard Deviation       48.6 fL      (36.4-46.3)                           Platelet Count       382.00 10*3/uL      (130..00)                           Mean Platelet Volume       10.5 fL      (9.4-12.3)                           Granulocytes (%)       56.1 %      (30.0-85.0)                           Lymphocytes (%) (Auto)       28.90 %      (20.00-45.00)                           Monocytes (%) (Auto)       12.00 %      (3.00-10.00)                           Eosinophils (%) (Auto)       1.80 %      (0.00-7.00)                           Basophils (%) (Auto)       0.90 %      (0.00-3.00)                           Granulocytes #       3.73 10*3/uL      (2.00-8.00)                           Lymphocytes # (Auto)       1.92 10*3/uL      (1.00-5.00)                           Monocytes # (Auto)       0.80 10*3/uL      (0.20-1.20)                           Eosinophils # (Auto)       0.12 10*3/uL      (0.00-0.70)                           Basophils # (Auto)       0.06 10*3/uL      (0.00-0.20)                           Immature Granulocytes %       0.3 %      (0.0-1.8)                           Nucleated Red Blood Cells %       0.00 %      (0.00-0.70)                           Immature Granulocytes #       0.02 10*3/uL      (0.00-0.20)                           Total Bilirubin       0.45 mg/dL      (0.20-1.30)                           Aspartate Amino Transf      (AST/SGOT) 28 U/L (15-50)                           Alanine Aminotransferase      (ALT/SGPT) 31 U/L (10-40)                           Alkaline Phosphatase       60 U/L      ()                           Total Protein       6.4 g/dL      (6.3-8.2)                           Globulin       2.0 g/dL      (2.0-3.5)                            Albumin/Globulin Ratio       2.2 {ratio}      (1.4-2.6)                           CA 27.29       16.5 U/mL      (0.0-38.6)                           Phosphorus Level              3.6 mg/dL      (2.4-4.5)                           Albumin              4.2 g/dL      (3.5-5.0)                           Sodium Level              141 mmol/L      (135-147)                    Potassium Level              4.6 mmol/L      (3.5-5.3)                    Chloride Level              104 mmol/L      ()                    Carbon Dioxide Level              20 mmol/L      (22-32)                    Anion Gap              22 mmol/L      (8-19)                    Blood Urea Nitrogen              19 mg/dL      (5-25)                    Creatinine              0.73 mg/dL      (0.50-0.90)                    Glomerular Filtration Rate      Calc               >60      mL/min/{1.73_m2}                    BUN/Creatinine Ratio              26 {ratio}      (6-20)                    Glucose Level              146 mg/dL      (65-99)                    Hemoglobin A1c              7.4 %      (3.5-5.7)                    Estimated Average Glucose      (eAG)               166 mg/dL                           Serum Osmolality   297 (273-304)               Calcium Level              9.0 mg/dL      (8.7-10.4)                    Vitamin D 25-Hydroxy              50.3 ng/mL      (30.0-100.0)                    Urine Random Microalbumin              <12.0 mg/L      (0.0-20.0)             Urine Creatinine              250.8 mg/dL      (10.0-300.0)             Urine Microalbumin/Creatinine      Ratio               4.8 mg/g{Cre}      (0.0-35.0)             Test       7/27/19      21:56                           Lab Scanned Report       LAB FINAL      CUMULATIVES -                    Radiology Impressions      CAT scan of the abdomen and pelvis done on January 9, 2019 showed fatty     infiltration of the liver.  No adenopathy in the upper abdomen.       High-grade central stenosis at L4-L5.      Slight interval increase in the right common iliac chain adenopathy.  Adenopathy    in the internal iliac chains is increased in size measuring up to 2.9 cm x 1.5     cm with no evidence of colitis.            Impression/Problem List      Right breast carcinoma status post bilateral mastectomies and reconstructions,     status post hormonal treatment BRCA1 positive      Bronchial asthma      Diabetes mellitus       Hypertension      Hyperlipidemia      Vitamin D deficiency      Status post bilateral laparoscopic oophorectomies      Pelvic lymphadenopathy - lymph nodes are slightly bigger than that of January 2019.      Notes      Changed Medications      * Cholecalciferol (Vitamin D3) 50,000 UNIT CAPSULE: 50,000 UNITS PO 3x/mo.            Plan      Discussed findings of the CAT scan of the abdomen and pelvis with Zaira.      Bottom-line we have agreed to get a biopsy of that lymph node under CT guidance.     This is been discussed with Dr. Ricketts and he agreed to the plan.  Patient will     return after biopsy is performed.            Patient Education:        DI for Fatigue            PREVENTION      Hx Influenza Vaccination:  Yes      Influenza Vaccine Declined:  No      2 or More Falls Past Year?:  No      Fall Past Year with Injury?:  No      Encouraged to follow-up with:  PCP regarding preventative exams.      Chart initiated by      BAUDILIO Prado RN                 Disclaimer: Converted document may not contain table formatting or lab diagrams. Please see [employer] for the authenticated document.

## 2021-05-28 NOTE — PROGRESS NOTES
Patient: CORY GUADALUPE     Acct: [id number]     Report: #[id number]  UNIT #: [id number]     : 1950    Encounter Date:2019  PRIMARY CARE: JAIDA MONTEMAYOR  ***Signed***  --------------------------------------------------------------------------------------------------------------------  Progress Note      DATE: 19      Primary Care Provider:  JAIDA MONTEMAYOR      Referring Provider:  COOPER SAM      Chief Complaint      FU Pelvic Lymphadenopathy, (R) Breast Cancer            Subjective      69-year-old white female has been followed up in my office for right breast     adenocarcinoma annually until this past December when she was referred for     pelvic lymphadenopathy by Dr. Sam.            Patient had colonoscopy 2019 at which time 2 polyps were removed     that did not show any cancer.  This will be repeated in 3 months.      She also had an endoscopy that showed a hiatal hernia and biopsies showed no H.     pylori.            Patient continues to complain of pain described as a.  Feeling of achiness in     the right lower quadrant, sharp to dull pain on the right upper quadrant.  This     was the reason for CAT scan.            Past Med/Surg History            Past Med/Surg History:   Hypertension             Diabetes Mellitus             Cancer (breast cancer status post bilateral mastectomies)             Other (hyperlipidemia, asthma, vitamin D deficiency)            Social History      Social History:  No Tobacco Use, No Alcohol Use, No Recreational Drug use            Allergies      Coded Allergies:             SULFA (SULFONAMIDE ANTIBIOTICS) (Verified  Allergy, Severe, SWELLING,     19)           EMPAGLIFLOZIN (Verified  Allergy, Unknown, UTI, DIGESTIVE ISSUES, 19)           GABAPENTIN (Verified  Allergy, Unknown, OCULAR MIGRAINES, 19)           PAROXETINE (Verified  Allergy, Unknown, ITCHING, 19)           QUINOLONES (Verified  Allergy,  Unknown, ITCHING, 1/21/19)           EXENATIDE (Verified  Adverse Reaction, Unknown, BELCHING, INDIGESTION, ABD.    PAIN, 1/21/19)            Medications      Medications    Last Reconciled on 6/25/19 10:18 by SANTOS JACKSON MD      Moreno-Fluticasone (Fluticasone 50 mcg) 16 Gm Spray.susp      1 PUFFS NARE EACH QDAY, #1 BOTTLE 0 Refills         Reported         2/25/19       sitaGLIPtin (Januvia*) 50 Mg Tablet      50 MG PO QDAY, TAB         Reported         2/25/19       Naproxen Sodium (Aleve) 220 Mg Tablet      220 MG PO BID PRN for PAIN/CRAMPS, #100 TAB 0 Refills         Reported         2/25/19       Glimepiride (Glimepiride*) 2 Mg Tablet      2 MG PO QPM, TAB         Reported         1/18/19       Atorvastatin (Atorvastatin) 20 Mg Tablet      20 MG PO QDAY, #30 TAB 0 Refills         Reported         12/18/18       amLODIPine (amLODIPine) 5 Mg Tablet      5 MG PO QDAY, #30 TAB         Reported         12/18/18       Cholecalciferol (Vitamin D3) 50,000 Unit Capsule      65942 UNITS PO ASDIR, CAP         Reported         6/25/18       Glimepiride (Glimepiride*) 4 Mg Tablet      4 MG PO QAM, TAB         Reported         6/25/18       Fluocinonide (Lidex 0.05% Cr 15 Gm) 15 Gm Cream.gm.      1 APL TOP BID PRN for ITCHING, APL         Reported         4/20/16       Albuterol (Proair HFA) 8.5 Gm Inh      2 PUFFS INH RTQ4H PRN for SHORTNESS OF BREATH, #1 INH 0 Refills         Reported         4/20/16       Ibuprofen (Ibuprofen) 200 Mg Tab      600 MG PO Q8 PRN for PAIN/CRAMPS, #100 TAB 0 Refills         Reported         4/20/16       Metformin HCl (Glucophage) 1,000 Mg Tablet      1000 MG PO BID, TAB 0 Refills         Reported         8/24/10       Metoprolol Succinate (Metoprolol Succinate) 100 Mg Tabcr      100 MG PO DAILY, 0 Refills         Reported         8/24/10       Acetaminophen (Tylenol Extra-Strength 500 Mg) 500 Mg Tab      2 TAB PO Q6H PRN         Reported         6/12/08       Fluticasone/Salmeterol (Advair  500/50 Diskus*) 28 Puff/Inh Inh      1 PUFF INH BID         Reported         6/12/08       Pantoprazole (Protonix*) 40 Mg Tablet      40 MG PO QDAY         Reported         6/12/08       Montelukast Sodium (Singulair*) 10 Mg Tab      10 MG PO QDAY         Reported         6/12/08       Aspirin (Aspirin Low-Strength 81 Mg) 81 Mg Tab      81 TAB PO QDAY         Reported         6/12/08      Current Medications      Current Medications Reviewed 6/24/19            Pain Assessment      Pain Intensity:  2 (right upper abdomen)      Description:  Aching, Sore      Duration:  Intermittent            Review of Systems      General:  No Anxiety; Fatigue Scale: (7), Pain Scale: (2)      HEENT:  No Dysphagia      Respiratory:  Cough (dry), Shortness of Air, Wheezing      Cardiovascular:  No Chest Pain, No Pedal Edema      Gastrointestinal:  No Nausea; Appetite Good (good)      Genitourinary:  No Nocturia      Musculoskeletal:  No Joint Effusions; Myalgias, Aches, Pains (Arthritis, (R)     side of abdomen)      Endocrine:  No Heat Intolerance      Hematologic:  No Bleeding      Allergic/Immunologic:  No Hives      Psychological:  No Anxiety      Neurological:  No Headaches; Dizziness, Weakness, Numbness ((R) Foot)      Skin:  No Rash            Vitals      Height 5 ft 2 in / 157.48 cm      Weight 172 lbs 12.8 oz / 78.650043 kg      BSA 1.80 m2      BMI 31.6 kg/m2      Temperature 97.9 F / 36.61 C      Pulse 84      Respirations 16      Blood Pressure 125/74      Pulse Oximetry 98%            Exam      Constitutional:  No acute distress, Conversant, Pleasant      Eyes:  Anicteric sclerae, Palpebral Conjunctivae (Pink), MIRYAM      HENT:  Oropharynx clear; No Erythema      Neck:  Supple, Full Range of Motion; No Masses      Cardiovascular:  RRR; No Murmurs; Normal PMI; No Peripheral Edema      Lungs:  Clear to Ausculation, Normal Respiratory Effort      Abdomen:  Soft, NABS; No Masses, No Tenderness      Chest:  Other (Symmetrical)       Breast:  Other (Bilateral reconstruction)      Lymphatic:  No Neck, No Axillae, No Groin      Extremities:  No digital cyanosis, No digital ischemia, Normal gait, Other (No     deformity)      Neurological:  Cranial Nerve II-XII (Intact); No Focal Sensory deficits      Psychological:  Appropriate affect, Appropriate mood, Intact judgement, Alert      Skin:  Other (No dermatoses)            Lab Results      Laboratory Tests      6/13/19 14:06            Laboratory Tests            Test       4/3/19      09:33 6/13/19      14:06 6/15/19      21:56             Hemoglobin A1c       7.5 %      (3.5-5.7)                           Estimated Average Glucose      (eAG) 169 mg/dL                           Triglycerides Level       165 mg/dL      ()                           Cholesterol Level       135 mg/dL      (107-200)                           LDL Cholesterol       55 mg/dL      ()                           VLDL Cholesterol       33 mg/dL      (5-37)                           HDL Cholesterol       47 mg/dL      (40-60)                           Cholesterol/HDL Ratio       2.9 NA      (3.0-6.0)                           Vitamin D 25-Hydroxy       33.0 ng/mL      (30.0-100.0)                           White Blood Count              6.65 10*3/uL      (4.80-10.80)                    Red Blood Count              4.40 10*6/uL      (4.20-5.40)                    Hemoglobin              11.20 g/dL      (12.00-16.00)                    Hematocrit              37.1 %      (37.0-47.0)                    Mean Corpuscular Volume              84.3 fL      (81.0-99.0)                    Mean Corpuscular Hemoglobin              25.5 pg      (27.0-31.0)                    Mean Corpuscular Hemoglobin      Concent        30.2      (33.0-37.0)                    Red Cell Distribution Width              15.9 %      (11.7-14.4)                    RDW Standard Deviation              48.6 fL      (36.4-46.3)                     Platelet Count              382.00 10*3/uL      (130..00)                    Mean Platelet Volume              10.5 fL      (9.4-12.3)                    Granulocytes (%)              56.1 %      (30.0-85.0)                    Lymphocytes (%) (Auto)              28.90 %      (20.00-45.00)                    Monocytes (%) (Auto)              12.00 %      (3.00-10.00)                    Eosinophils (%) (Auto)              1.80 %      (0.00-7.00)                    Basophils (%) (Auto)              0.90 %      (0.00-3.00)                    Granulocytes #              3.73 10*3/uL      (2.00-8.00)                    Lymphocytes # (Auto)              1.92 10*3/uL      (1.00-5.00)                    Monocytes # (Auto)              0.80 10*3/uL      (0.20-1.20)                    Eosinophils # (Auto)              0.12 10*3/uL      (0.00-0.70)                    Basophils # (Auto)              0.06 10*3/uL      (0.00-0.20)                    Immature Granulocytes %              0.3 %      (0.0-1.8)                    Nucleated Red Blood Cells %              0.00 %      (0.00-0.70)                    Immature Granulocytes #              0.02 10*3/uL      (0.00-0.20)                    Sodium Level              138 mmol/L      (135-147)                    Potassium Level              4.2 mmol/L      (3.5-5.3)                    Chloride Level              101 mmol/L      ()                    Carbon Dioxide Level              23 mmol/L      (22-32)                    Anion Gap              18 mmol/L      (8-19)                    Blood Urea Nitrogen              19 mg/dL      (5-25)                    Creatinine              0.81 mg/dL      (0.50-0.90)                    Glomerular Filtration Rate      Calc        >60      mL/min/{1.73_m2}                    BUN/Creatinine Ratio              23 {ratio}      (6-20)                    Glucose Level              179 mg/dL      (65-99)                    Serum  Osmolality  293 (273-304)               Calcium Level              8.7 mg/dL      (8.7-10.4)                    Total Bilirubin              0.45 mg/dL      (0.20-1.30)                    Aspartate Amino Transf      (AST/SGOT)        28 U/L (15-50)                           Alanine Aminotransferase      (ALT/SGPT)        31 U/L (10-40)                           Alkaline Phosphatase              60 U/L      ()                    Total Protein              6.4 g/dL      (6.3-8.2)                    Albumin              4.4 g/dL      (3.5-5.0)                    Globulin              2.0 g/dL      (2.0-3.5)                    Albumin/Globulin Ratio              2.2 {ratio}      (1.4-2.6)                    CA 27.29              16.5 U/mL      (0.0-38.6)                    Lab Scanned Report              LAB FINAL      CUMULATIVES -            Impression/Problem List      Right breast carcinoma status post bilateral mastectomies and reconstructions,     status post hormonal treatment BRCA1 positive      Bronchial asthma      Diabetes mellitus       Hypertension      Hyperlipidemia      Vitamin D deficiency      Status post bilateral laparoscopic oophorectomies      Pelvic lymphadenopathy, improved on last PET scan of January 15, 2019      Notes      Changed Medications      * Ibuprofen 200 MG TAB: 600 MG PO Q8 PRN PAIN/CRAMPS #100         Instructions: 3 TABS      * Cholecalciferol (Vitamin D3) 50,000 UNIT CAPSULE: 50,000 UNITS PO ASDIR            Plan      Because of complaints of fatigue patient was advised to take her Toprol at     nighttime.        CAT scan of the abdomen and pelvis      Return to clinic in 1 year with a CBC, CMP, CA-27-29            Patient Education:        Breast Cancer in Women            PREVENTION      2 or More Falls Past Year?:  No      Fall Past Year with Injury?:  No      Encouraged to follow-up with:  PCP regarding preventative exams.      Chart initiated by      PRASANTH Ibrahim MA                  Disclaimer: Converted document may not contain table formatting or lab diagrams. Please see [employer] for the authenticated document.

## 2021-05-28 NOTE — PROGRESS NOTES
Patient: CORY GUADALUPE     Acct: [id number]     Report: #[id number]  UNIT #: [id number]     : 1950    Encounter Date:2019  PRIMARY CARE: JAIDA MONTEMAYOR  ***Signed***  --------------------------------------------------------------------------------------------------------------------  Progress Note      DATE: 19      Primary Care Provider:  JAIDA MONTEMAYOR      Referring Provider:  COOPER SAM      Chief Complaint      Pelvic Lymphadenopathy, Right Breast Ca Follow-up            Subjective      69-year-old white female was initially followed on my office for right breast     carcinoma.  Patient has been coming every year.  Patient has been seen earlier     because review of her records show that in 2018 patient had enlarged lymph     nodes.  A repeat one was performed January 15, 2019 which showed that the lymph     nodes were smaller.  In 2019 she had a repeat CAT scan of the abdomen     and pelvis and this time her pelvic lymphadenopathy increased to 2.9 cm x 1.5     cm.  Patient had wanted a biopsy and this was done on 2019 and     patient and  is here for results.            Past Med/Surg History            Past Med/Surg History:   Hypertension             Diabetes Mellitus             Cancer (breast cancer status post bilateral mastectomies)             Other (hyperlipidemia, asthma, vitamin D deficiency)            Social History      Social History:  No Tobacco Use, No Alcohol Use, No Recreational Drug use            Allergies      Coded Allergies:             SULFA (SULFONAMIDE ANTIBIOTICS) (Verified  Allergy, Severe, SWELLING,     19)           EMPAGLIFLOZIN (Verified  Allergy, Unknown, UTI, DIGESTIVE ISSUES, 19)           GABAPENTIN (Verified  Allergy, Unknown, OCULAR MIGRAINES, 19)           PAROXETINE (Verified  Allergy, Unknown, ITCHING, 19)           QUINOLONES (Verified  Allergy, Unknown, ITCHING, 19)            EXENATIDE (Verified  Adverse Reaction, Unknown, BELCHING, INDIGESTION, ABD.    PAIN, 1/21/19)            Medications      Medications    Last Reconciled on 8/27/19 18:19 by SANTOS JACKSON MD      Moreno-Fluticasone (Fluticasone 50 mcg) 16 Gm Spray.susp      1 PUFFS NARE EACH QDAY, #1 BOTTLE 0 Refills         Reported         2/25/19       sitaGLIPtin (Januvia*) 50 Mg Tablet      50 MG PO QDAY, TAB         Reported         2/25/19       Naproxen Sodium (Aleve) 220 Mg Tablet      220 MG PO BID PRN for PAIN/CRAMPS, #100 TAB 0 Refills         Reported         2/25/19       Glimepiride (Glimepiride*) 2 Mg Tablet      2 MG PO QPM, TAB         Reported         1/18/19       Atorvastatin (Atorvastatin) 20 Mg Tablet      20 MG PO QDAY, #30 TAB 0 Refills         Reported         12/18/18       amLODIPine (amLODIPine) 5 Mg Tablet      5 MG PO QDAY, #30 TAB         Reported         12/18/18       Cholecalciferol (Vitamin D3) 50,000 Unit Capsule      77296 UNITS PO 3x/mo., CAP         Reported         6/25/18       Glimepiride (Glimepiride*) 4 Mg Tablet      4 MG PO QAM, TAB         Reported         6/25/18       Fluocinonide (Lidex 0.05% Cr 15 Gm) 15 Gm Cream.gm.      1 APL TOP BID PRN for ITCHING, APL         Reported         4/20/16       Albuterol (Proair HFA) 8.5 Gm Inh      2 PUFFS INH RTQ4H PRN for SHORTNESS OF BREATH, #1 INH 0 Refills         Reported         4/20/16       Metformin HCl (Glucophage) 1,000 Mg Tablet      1000 MG PO BID, TAB 0 Refills         Reported         8/24/10       Metoprolol Succinate (Metoprolol Succinate) 100 Mg Tabcr      100 MG PO DAILY, 0 Refills         Reported         8/24/10       Acetaminophen (Tylenol Extra-Strength 500 Mg) 500 Mg Tab      2 TAB PO Q6H PRN         Reported         6/12/08       Fluticasone/Salmeterol (Advair 500/50 Diskus*) 28 Puff/Inh Inh      1 PUFF INH BID         Reported         6/12/08       Pantoprazole (Protonix*) 40 Mg Tablet      40 MG PO QDAY         Reported          6/12/08       Montelukast Sodium (Singulair*) 10 Mg Tab      10 MG PO QDAY         Reported         6/12/08       Aspirin (Aspirin Low-Strength 81 Mg) 81 Mg Tab      81 TAB PO QDAY         Reported         6/12/08      Current Medications      Current Medications Reviewed 8/27/19            Pain Assessment      Pain Intensity:  3 (Right lower abd.)      Description:  Aching      Duration:  Intermittent            Review of Systems      General:  No Fatigue Scale:; Pain Scale: (3)      HEENT:  No Dysphagia, No Hearing Changes, No Visual Changes      Respiratory:  No Cough, No Shortness of Air      Cardiovascular:  No Chest Pain, No Palpitations      Gastrointestinal:  No Nausea, No Vomiting, No Constipation, No Diarrhea;     Appetite Good      Genitourinary:  No Nocturia, No Dysuria      Musculoskeletal:  Aches, Pains      Endocrine:  No Heat Intolerance, No Fatigue      Hematologic:  No Bleeding, No Bruising      Allergic/Immunologic:  No Hives, No Itchy eyes      Psychological:  No Anxiety, No Depression      Neurological:  No Headaches, No Dizziness      Skin:  No Rash, No Edema            Vitals      Height 5 ft 2 in / 157.48 cm      Weight 169 lbs 6 oz / 76.841357 kg      BSA 1.78 m2      BMI 31.0 kg/m2      Temperature 98.5 F / 36.94 C      Pulse 92      Respirations 16      Blood Pressure 110/70      Pulse Oximetry 98%            Exam      Constitutional:  No acute distress, Conversant, Pleasant      Cardiovascular:  RRR; No Murmurs; Normal PMI; No Peripheral Edema      Lungs:  Clear to Ausculation, Normal Respiratory Effort      Psychological:  Appropriate affect, Appropriate mood, Intact judgement, Alert      Exam      Conference done            Lab Results      Laboratory Tests      7/25/19 07:38            8/16/19 15:01            Laboratory Tests            Test       6/13/19      14:06 7/11/19      15:20 7/25/19      07:38 7/25/19      08:06             Lymphocytes (%) (Auto)       28.90 %       (20.00-45.00)                           Monocytes (%) (Auto)       12.00 %      (3.00-10.00)                           Eosinophils (%) (Auto)       1.80 %      (0.00-7.00)                           Basophils (%) (Auto)       0.90 %      (0.00-3.00)                           Total Bilirubin       0.45 mg/dL      (0.20-1.30)                           Aspartate Amino Transf      (AST/SGOT) 28 U/L (15-50)                           Alanine Aminotransferase      (ALT/SGPT) 31 U/L (10-40)                           Alkaline Phosphatase       60 U/L      ()                           Total Protein       6.4 g/dL      (6.3-8.2)                           Globulin       2.0 g/dL      (2.0-3.5)                           Albumin/Globulin Ratio       2.2 {ratio}      (1.4-2.6)                           CA 27.29       16.5 U/mL      (0.0-38.6)                           Phosphorus Level              3.6 mg/dL      (2.4-4.5)                           Albumin              4.2 g/dL      (3.5-5.0)                           Sodium Level              141 mmol/L      (135-147)                    Potassium Level              4.6 mmol/L      (3.5-5.3)                    Chloride Level              104 mmol/L      ()                    Carbon Dioxide Level              20 mmol/L      (22-32)                    Anion Gap              22 mmol/L      (8-19)                    Blood Urea Nitrogen              19 mg/dL      (5-25)                    Creatinine              0.73 mg/dL      (0.50-0.90)                    Glomerular Filtration Rate      Calc               >60      mL/min/{1.73_m2}                    BUN/Creatinine Ratio              26 {ratio}      (6-20)                    Glucose Level              146 mg/dL      (65-99)                    Hemoglobin A1c              7.4 %      (3.5-5.7)                    Estimated Average Glucose      (eAG)               166 mg/dL                           Serum Osmolality   297  (273-304)               Calcium Level              9.0 mg/dL      (8.7-10.4)                    Vitamin D 25-Hydroxy              50.3 ng/mL      (30.0-100.0)                    Urine Random Microalbumin              <12.0 mg/L      (0.0-20.0)             Urine Creatinine              250.8 mg/dL      (10.0-300.0)             Urine Microalbumin/Creatinine      Ratio               4.8 mg/g{Cre}      (0.0-35.0)             Test       8/16/19      15:01 8/19/19      10:19 8/21/19      21:55                    White Blood Count       10.34 10*3/uL      (4.80-10.80)                           Red Blood Count       4.50 10*6/uL      (4.20-5.40)                           Hemoglobin       11.2 g/dL      (12.0-16.0)                           Hematocrit       37.1 %      (37.0-47.0)                           Mean Corpuscular Volume       82.4 fL      (81.0-99.0)                           Mean Corpuscular Hemoglobin       24.9 pg      (27.0-31.0)                           Mean Corpuscular Hemoglobin      Concent 30.2      (33.0-37.0)                           Red Cell Distribution Width       15.8 %      (11.7-14.4)                           RDW Standard Deviation       47.7 fL      (36.4-46.3)                           Platelet Count       412 10*3/uL      (130-400)                           Mean Platelet Volume       10.4 fL      (9.4-12.3)                           Granulocytes (%)       63.2 %      (30.0-85.0)                           Granulocytes #       6.54 10*3/uL      (2.00-8.00)                           Lymphocytes # (Auto)       2.41 10*3/uL      (1.00-5.00)                           Monocytes # (Auto)       1.17 10*3/uL      (0.20-1.20)                           Eosinophils # (Auto)       0.12 10*3/uL      (0.00-0.70)                           Basophils # (Auto)       0.06 10*3/uL      (0.00-0.20)                           Immature Granulocytes %       0.4 %      (0.0-1.8)                           Lymphocytes %        23.3 %      (20.0-45.0)                           Monocytes %       11.3 %      (3.0-10.0)                           Eosinophils %       1.2 %      (0.0-7.0)                           Basophils %       0.6 %      (0.0-3.0)                           Nucleated Red Blood Cells %       0.00 %      (0.00-0.70)                           Immature Granulocytes #       0.04 10*3/uL      (0.00-0.20)                           Prothrombin Time       10.9 s      (9.4-12.0)                           International Ratio (Anticoag      Ther) 1.06 NA      (2.00-3.00)                           Activated Partial      Thromboplast Time 23.5      (22.2-34.2)                           Lymphoma Panel  SEE BELOW NA                Lab Scanned Report              LAB FINAL      CUMULATIVES -             Impression/Problem List      Right breast carcinoma status post bilateral mastectomies and reconstructions,     status post hormonal treatment, BRCA1 positive      Bronchial asthma      Diabetes mellitus       Hypertension      Hyperlipidemia      Vitamin D deficiency      Status post bilateral laparoscopic oophorectomies      Pelvic lymphadenopathy -benign            Plan      CT-guided needle biopsy of the lymph node done on 8/19/2019 showed benign-    appearing lymph node tissue with no evidence of metastatic carcinoma.  No     granuloma seen.  Flow cytometry showed no B-cell clone detected in the meta-    analysis.      Patient was instructed to come see me if she has any palpable lymphadenopathy.      Return to clinic in 1 year's time.            Patient Education:        DI for Abdominal Pain-Adult            PREVENTION      Hx Influenza Vaccination:  Yes      Influenza Vaccine Declined:  No      2 or More Falls Past Year?:  No      Fall Past Year with Injury?:  No      Encouraged to follow-up with:  PCP regarding preventative exams.      Chart initiated by      BAUDILIO Prado RN                 Disclaimer: Converted document may  not contain table formatting or lab diagrams. Please see [employer] for the authenticated document.

## 2021-05-28 NOTE — PROGRESS NOTES
Patient: CORY GUADALUPE     Acct: [id number]     Report: #[id number]  UNIT #: [id number]     : 1950    Encounter Date:2020  PRIMARY CARE: JAIDA MONTEMAYOR  ***Signed***  --------------------------------------------------------------------------------------------------------------------  Progress Note      DATE: 20      Primary Care Provider:  JAIDA MONTEMAYOR      Referring Provider:  COOPER SAM      Chief Complaint      FU Pelvic Lymphadenopathy, (R) Breast Cancer            Subjective      70-year-old white female is here for her yearly follow-up for her right breast     cancer.  During this past year patient has been complaining of being tired.  She    was seen by her family physician who diagnosed her with hypothyroidism.      She was diagnosed with squamous cell carcinoma of her skin in the right side of     her neck that was operated on this past .      She has persistent right upper quadrant pain that has been worked up in the past    but etiology is not found.            Past Med/Surg History            Past Med/Surg History:   Hypertension             Diabetes Mellitus             Cancer (breast cancer status post bilateral mastectomies)             Other (hyperlipidemia, asthma, vitamin D deficiency)            Social History      Social History:  No Tobacco Use, No Alcohol Use, No Recreational Drug use            Allergies      Coded Allergies:             SULFA (SULFONAMIDE ANTIBIOTICS) (Verified  Allergy, Severe, SWELLING,     19)           EMPAGLIFLOZIN (Verified  Allergy, Unknown, UTI, DIGESTIVE ISSUES, 19)           GABAPENTIN (Verified  Allergy, Unknown, OCULAR MIGRAINES, 19)           PAROXETINE (Verified  Allergy, Unknown, ITCHING, 19)           QUINOLONES (Verified  Allergy, Unknown, ITCHING, 19)           EXENATIDE (Verified  Adverse Reaction, Unknown, BELCHING, INDIGESTION, ABD.    PAIN, 19)            Medications       Medications    Last Reconciled on 6/30/20 18:34 by SANTOS JACKSON MD      Levothyroxine (Levothyroxine) 0.025 Mg Tablet      0.025 MG PO QDAY@07, #30 TAB 0 Refills         Reported         6/30/20       Meloxicam (Meloxicam*) 7.5 Mg Tablet      7.5 MG PO QDAY, #30 TAB 0 Refills         Reported         6/30/20       Moreno-Fluticasone (Fluticasone 50 mcg) 16 Gm Spray.susp      1 PUFFS NARE EACH QDAY, #1 BOTTLE 0 Refills         Reported         2/25/19       SITagliptin (Januvia) 50 Mg Tablet      50 MG PO QDAY, TAB         Reported         2/25/19       Atorvastatin (Atorvastatin) 20 Mg Tablet      20 MG PO QDAY, #30 TAB 0 Refills         Reported         12/18/18       amLODIPine (amLODIPine) 5 Mg Tablet      5 MG PO QDAY, #30 TAB         Reported         12/18/18       Cholecalciferol (Vitamin D3) 50,000 Unit Capsule      80411 UNITS PO 3x/mo., CAP         Reported         6/25/18       Glimepiride (Glimepiride*) 4 Mg Tablet      4 MG PO ASDIR, TAB         Reported         6/25/18       Fluocinonide (Lidex 0.05% Cr 15 Gm) 15 Gm Cream.gm.      1 APL TOP BID PRN for ITCHING, APL         Reported         4/20/16       Albuterol (Proair HFA) 8.5 Gm Inh      2 PUFFS INH RTQ4H PRN for SHORTNESS OF BREATH, #1 INH 0 Refills         Reported         4/20/16       metFORMIN HCl (Glucophage) 1,000 Mg Tablet      1000 MG PO BID, TAB 0 Refills         Reported         8/24/10       Metoprolol Succinate (Metoprolol Succinate) 100 Mg Tabcr      100 MG PO DAILY, 0 Refills         Reported         8/24/10       Acetaminophen (Tylenol Extra-Strength 500 Mg) 500 Mg Tab      2 TAB PO Q6H PRN         Reported         6/12/08       Fluticasone/Salmeterol (Advair 500/50 Diskus*) 28 Puff/Inh Inh      1 PUFF INH QDAY         Reported         6/12/08       Pantoprazole (Protonix) 40 Mg Tablet      40 MG PO QDAY         Reported         6/12/08       Montelukast Sodium (Singulair*) 10 Mg Tab      10 MG PO QDAY         Reported         6/12/08        Aspirin (Aspirin Low-Strength 81 Mg) 81 Mg Tab      81 TAB PO QDAY         Reported         6/12/08      Current Medications      Current Medications Reviewed 6/30/20            Pain Assessment      Pain Intensity:  3 (Behind (R) Ear)      Description:  Sore      Duration:  Continuous            Review of Systems      General:  No Anxiety; Fatigue Scale: (3), Pain Scale: (3)      HEENT:  No Dysphagia      Respiratory:  No Cough; Shortness of Air      Cardiovascular:  No Chest Pain      Gastrointestinal:  No Nausea; Diarrhea, Appetite Good (Good)      Genitourinary:  No Nocturia, No Dysuria      Musculoskeletal:  No Joint Effusions; Aches, Pains (Behind (R) Ear)      Endocrine:  No Heat Intolerance, No Cold Intolerance      Hematologic:  No Bleeding      Allergic/Immunologic:  No Hives      Psychological:  No Anxiety, No Depression      Neurological:  No Headaches; Numbness ((R) Foot)      Skin:  No Rash, No Open Wounds            Vitals      Height 5 ft 2 in / 157.48 cm      Weight 163 lbs 12.8 oz / 74.994460 kg      BSA 1.76 m2      BMI 30.0 kg/m2      Temperature 97.5 F / 36.39 C      Pulse 77      Respirations 16      Blood Pressure 128/85      Pulse Oximetry 95%            Exam      Constitutional:  No acute distress, Conversant, Pleasant      Eyes:  Anicteric sclerae, Palpebral Conjunctivae, MIRYAM      Neck:  Supple      Cardiovascular:  RRR; No Murmurs; Normal PMI; No Peripheral Edema      Lungs:  Clear to Ausculation, Normal Respiratory Effort      Abdomen:  Soft; No Masses, No Tenderness; Other (Obese)      Chest:  Other (Symmetrical and equal)      Lymphatic:  No Neck      Extremities:  No digital cyanosis, Normal gait, Other (No deformity)      Neurological:  Cranial Nerve II-XII (Intact); No Focal Sensory deficits      Psychological:  Appropriate affect, Appropriate mood, Intact judgement, Alert      Skin:  Other (No dermatosis)            Lab Results      Laboratory Tests      6/23/20 15:00             6/23/20 15:09            Laboratory Tests            Test       3/31/20      11:50 5/19/20      15:01 6/23/20      15:00 6/23/20      15:09             Hemoglobin A1c       6.8 %      (3.5-5.7)                           Estimated Average Glucose      (eAG) 148 mg/dL                           Vitamin D 25-Hydroxy       43.5 ng/mL      (30.0-100.0)                           Free Thyroxine       1.2 ng/dL      (0.9-1.8)                           Erythrocyte Sedimentation Rate  7 mm/h (0-30)                Uric Acid              4.9 mg/dL      (2.5-7.5)                           C-Reactive Protein              0.50 mg/L      (0.00-5.00)                           Thyroid Stimulating Hormone      (TSH)        1.640 m(iU)/L      (0.270-4.200)                           Rheumatoid Factor IgM Antibody              <10.0 (IU)/mL      (0.0-14.0)                           Anti-Nuclear Antibody Screen              NEGATIVE      {ratio}                           Anti-Double Strand DNA      Antibody        NEGATIVE      (IU)/mL                           Anti-Streptolysin O Antibody              42.00 (IU)/mL      (0..00)                           White Blood Count              9.14 10*3/uL      (4.80-10.80)                    Red Blood Count              4.33 10*6/uL      (4.20-5.40)                    Hemoglobin              10.8 g/dL      (12.0-16.0)                    Hematocrit              36.5 %      (37.0-47.0)                    Mean Corpuscular Volume              84.3 fL      (81.0-99.0)                    Mean Corpuscular Hemoglobin              24.9 pg      (27.0-31.0)                    Mean Corpuscular Hemoglobin      Concent               29.6      (33.0-37.0)                    Red Cell Distribution Width              16.2 %      (11.7-14.4)                    RDW Standard Deviation              49.6 fL      (36.4-46.3)                    Platelet Count              407 10*3/uL      (130-400)                     Mean Platelet Volume              10.3 fL      (9.4-12.3)                    Granulocytes (%)              63.0 %      (30.0-85.0)                    Granulocytes #              5.75 10*3/uL      (2.00-8.00)                    Lymphocytes # (Auto)              2.16 10*3/uL      (1.00-5.00)                    Monocytes # (Auto)              0.97 10*3/uL      (0.20-1.20)                    Eosinophils # (Auto)              0.15 10*3/uL      (0.00-0.70)                    Basophils # (Auto)              0.08 10*3/uL      (0.00-0.20)                    Immature Granulocytes %              0.3 %      (0.0-1.8)                    Lymphocytes %              23.6 %      (20.0-45.0)                    Monocytes %              10.6 %      (3.0-10.0)                    Eosinophils %              1.6 %      (0.0-7.0)                    Basophils %              0.9 %      (0.0-3.0)                    Nucleated Red Blood Cells %              0.00 %      (0.00-0.70)                    Immature Granulocytes #              0.03 10*3/uL      (0.00-0.20)                    Sodium Level              138 mmol/L      (135-147)             Potassium Level              4.6 mmol/L      (3.5-5.3)             Chloride Level              104 mmol/L      ()             Carbon Dioxide Level              22 mmol/L      (22-32)             Anion Gap              17 mmol/L      (8-19)             Blood Urea Nitrogen              21 mg/dL      (5-25)             Creatinine              0.73 mg/dL      (0.50-0.90)             Glomerular Filtration Rate      Calc               >60      mL/min/{1.73_m2}             BUN/Creatinine Ratio              29 {ratio}      (6-20)             Glucose Level              88 mg/dL      (65-99)             Serum Osmolality    288 (273-304)              Calcium Level              9.0 mg/dL      (8.7-10.4)             Total Bilirubin              0.23 mg/dL      (0.20-1.30)             Aspartate Amino Transf       (AST/SGOT)               24 U/L (15-50)                    Alanine Aminotransferase      (ALT/SGPT)               22 U/L (10-40)                    Alkaline Phosphatase              65 U/L      ()             Total Protein              6.5 g/dL      (6.3-8.2)             Albumin              4.5 g/dL      (3.5-5.0)             Globulin              2.0 g/dL      (2.0-3.5)             Albumin/Globulin Ratio              2.3 {ratio}      (1.4-2.6)             Test       6/23/20      15:19 6/25/20      21:56                           CA 27.29       13.5 U/mL      (0.0-38.6)                           Lab Scanned Report              LAB FINAL      CUMULATIVES -                    Impression/Problem List      Right breast carcinoma status post bilateral mastectomies and reconstructions,     status post hormonal treatment, BRCA1 positive      Bronchial asthma      Diabetes mellitus       Hypertension      Hyperlipidemia      Vitamin D deficiency      Status post bilateral laparoscopic oophorectomies      Pelvic lymphadenopathy -benign      Diabetes mellitus, type 2 - E11.9            Hypertension - I10            Hypothyroidism, unspecified - E03.9            History of cancer of right breast - Z85.3            Squamous cell carcinoma of skin - C44.92            Notes      New Medications      * Meloxicam (Meloxicam*) 7.5 MG TABLET: 7.5 MG PO QDAY #30      * Levothyroxine 0.025 MG TABLET: 0.025 MG PO QDAY@07 #30      Changed Medications      * Fluticasone/Salmeterol (Advair 500/50 Diskus*) 28 PUFF/INH INH: 1 PUFF INH       QDAY      * Glimepiride (Glimepiride*) 4 MG TABLET: 4 MG PO ASDIR         Instructions: 1 tab am 2 tab pm      Problems:        (1) Squamous cell carcinoma of skin      (2) History of cancer of right breast      (3) Hypothyroidism, unspecified      (4) Hypertension      Qualifiers:           Qualified Codes:  I10 - Essential (primary) hypertension      (5) Diabetes mellitus, type 2       Qualifiers:                       Plan      Pelvic CT to follow-up lymphadenopathy.        Patient claims that Dr. Noguera has started work-up for her newly found anemia.      1 year CBC, CMP, CA-27-29            Patient Education:        Breast Cancer in Women            PREVENTION      Hx Influenza Vaccination:  Yes      Influenza Vaccine Declined:  No      2 or More Falls Past Year?:  No      Fall Past Year with Injury?:  No      Hx Pneumococcal Vaccination:  Yes      Encouraged to follow-up with:  PCP regarding preventative exams.      Chart initiated by      PRASANTH Barrientos MA            Electronically signed by Luna De La Fuente  06/30/2020 18:34       Disclaimer: Converted document may not contain table formatting or lab diagrams. Please see "[employer] System for the authenticated document.

## 2021-06-04 ENCOUNTER — TRANSCRIBE ORDERS (OUTPATIENT)
Dept: MRI IMAGING | Facility: HOSPITAL | Age: 71
End: 2021-06-04

## 2021-06-04 ENCOUNTER — CONVERSION ENCOUNTER (OUTPATIENT)
Dept: ONCOLOGY | Facility: HOSPITAL | Age: 71
End: 2021-06-04

## 2021-06-04 ENCOUNTER — HOSPITAL ENCOUNTER (OUTPATIENT)
Dept: ONCOLOGY | Facility: HOSPITAL | Age: 71
Discharge: HOME | End: 2021-06-04
Attending: INTERNAL MEDICINE

## 2021-06-04 DIAGNOSIS — Z85.3 PERSONAL HISTORY OF BREAST CANCER: Primary | ICD-10-CM

## 2021-06-04 DIAGNOSIS — N65.1 BREAST RECONSTRUCTION DISPROPORTION: ICD-10-CM

## 2021-06-05 LAB — CANCER AG27-29 SERPL-ACNC: 18.6 U/ML (ref 0–38.6)

## 2021-06-06 VITALS
OXYGEN SATURATION: 97 % | HEIGHT: 62 IN | BODY MASS INDEX: 29.49 KG/M2 | WEIGHT: 160.27 LBS | RESPIRATION RATE: 18 BRPM | TEMPERATURE: 98 F | DIASTOLIC BLOOD PRESSURE: 65 MMHG | SYSTOLIC BLOOD PRESSURE: 131 MMHG | HEART RATE: 85 BPM

## 2021-07-06 ENCOUNTER — HOSPITAL ENCOUNTER (OUTPATIENT)
Dept: MRI IMAGING | Facility: HOSPITAL | Age: 71
Discharge: HOME | End: 2021-07-06
Admitting: INTERNAL MEDICINE

## 2021-07-06 DIAGNOSIS — N65.1 BREAST RECONSTRUCTION DISPROPORTION: ICD-10-CM

## 2021-07-06 DIAGNOSIS — Z85.3 PERSONAL HISTORY OF BREAST CANCER: ICD-10-CM

## 2021-07-06 LAB — CREAT BLDA-MCNC: 0.7 MG/DL

## 2021-07-06 PROCEDURE — C8937 CAD BREAST MRI: HCPCS

## 2021-07-06 PROCEDURE — 82565 ASSAY OF CREATININE: CPT

## 2021-07-06 PROCEDURE — 0 GADOBUTROL 1 MMOL/ML SOLUTION: Performed by: INTERNAL MEDICINE

## 2021-07-06 PROCEDURE — 77049 MRI BREAST C-+ W/CAD BI: CPT | Performed by: RADIOLOGY

## 2021-07-06 PROCEDURE — A9585 GADOBUTROL INJECTION: HCPCS | Performed by: INTERNAL MEDICINE

## 2021-07-06 PROCEDURE — C8908 MRI W/O FOL W/CONT, BREAST,: HCPCS

## 2021-07-06 RX ADMIN — GADOBUTROL 10 ML: 604.72 INJECTION INTRAVENOUS at 10:31

## 2021-07-19 ENCOUNTER — OFFICE VISIT (OUTPATIENT)
Dept: PLASTIC SURGERY | Facility: CLINIC | Age: 71
End: 2021-07-19

## 2021-07-19 VITALS
OXYGEN SATURATION: 97 % | HEART RATE: 75 BPM | TEMPERATURE: 97.1 F | SYSTOLIC BLOOD PRESSURE: 144 MMHG | DIASTOLIC BLOOD PRESSURE: 83 MMHG

## 2021-07-19 DIAGNOSIS — R59.0 AXILLARY ADENOPATHY: Primary | ICD-10-CM

## 2021-07-19 DIAGNOSIS — C50.919 MALIGNANT NEOPLASM OF FEMALE BREAST, UNSPECIFIED ESTROGEN RECEPTOR STATUS, UNSPECIFIED LATERALITY, UNSPECIFIED SITE OF BREAST (HCC): ICD-10-CM

## 2021-07-19 PROCEDURE — 99202 OFFICE O/P NEW SF 15 MIN: CPT | Performed by: NURSE PRACTITIONER

## 2021-07-19 RX ORDER — METFORMIN HYDROCHLORIDE 500 MG/1
TABLET, EXTENDED RELEASE ORAL
COMMUNITY
Start: 2021-05-16 | End: 2021-07-29 | Stop reason: SDUPTHER

## 2021-07-19 RX ORDER — AMLODIPINE BESYLATE 5 MG/1
5 TABLET ORAL DAILY
COMMUNITY
Start: 2021-06-09 | End: 2022-06-06

## 2021-07-19 RX ORDER — MELOXICAM 7.5 MG/1
TABLET ORAL
COMMUNITY
Start: 2021-06-05 | End: 2021-09-16

## 2021-07-19 RX ORDER — MONTELUKAST SODIUM 10 MG/1
TABLET ORAL
COMMUNITY
End: 2021-07-29 | Stop reason: SDUPTHER

## 2021-07-19 RX ORDER — ERGOCALCIFEROL 1.25 MG/1
CAPSULE ORAL
COMMUNITY
End: 2021-08-03 | Stop reason: SDUPTHER

## 2021-07-19 RX ORDER — FLUTICASONE PROPIONATE 50 MCG
SPRAY, SUSPENSION (ML) NASAL
COMMUNITY
End: 2022-03-09

## 2021-07-19 RX ORDER — AMLODIPINE BESYLATE 5 MG/1
TABLET ORAL
COMMUNITY
End: 2021-07-29 | Stop reason: SDUPTHER

## 2021-07-19 RX ORDER — ASPIRIN 81 MG/1
81 TABLET ORAL DAILY
COMMUNITY

## 2021-07-19 RX ORDER — LEVOTHYROXINE SODIUM 25 MCG
TABLET ORAL
COMMUNITY
Start: 2021-05-26 | End: 2021-07-20

## 2021-07-19 RX ORDER — METOPROLOL SUCCINATE 100 MG/1
TABLET, EXTENDED RELEASE ORAL
COMMUNITY
End: 2021-12-07 | Stop reason: SDUPTHER

## 2021-07-19 RX ORDER — ATORVASTATIN CALCIUM 20 MG/1
TABLET, FILM COATED ORAL
COMMUNITY
End: 2021-07-29 | Stop reason: SDUPTHER

## 2021-07-19 RX ORDER — ATORVASTATIN CALCIUM 20 MG/1
20 TABLET, FILM COATED ORAL DAILY
COMMUNITY
Start: 2021-06-07 | End: 2022-02-07 | Stop reason: SDUPTHER

## 2021-07-19 RX ORDER — MONTELUKAST SODIUM 10 MG/1
10 TABLET ORAL NIGHTLY
COMMUNITY
Start: 2021-05-14 | End: 2022-05-09

## 2021-07-19 RX ORDER — PEN NEEDLE, DIABETIC 32GX 5/32"
NEEDLE, DISPOSABLE MISCELLANEOUS
COMMUNITY
Start: 2021-07-15 | End: 2022-01-26

## 2021-07-19 RX ORDER — PANTOPRAZOLE SODIUM 40 MG/1
TABLET, DELAYED RELEASE ORAL
COMMUNITY
End: 2021-07-29 | Stop reason: SDUPTHER

## 2021-07-19 RX ORDER — INSULIN GLARGINE 100 [IU]/ML
45 INJECTION, SOLUTION SUBCUTANEOUS NIGHTLY
COMMUNITY
Start: 2021-07-15 | End: 2021-12-20

## 2021-07-19 RX ORDER — ALBUTEROL SULFATE 90 UG/1
AEROSOL, METERED RESPIRATORY (INHALATION)
COMMUNITY
End: 2022-01-31

## 2021-07-19 RX ORDER — GLIMEPIRIDE 4 MG/1
TABLET ORAL
COMMUNITY
End: 2021-07-29 | Stop reason: SDUPTHER

## 2021-07-19 RX ORDER — PANTOPRAZOLE SODIUM 40 MG/1
40 TABLET, DELAYED RELEASE ORAL DAILY
COMMUNITY
Start: 2021-05-14 | End: 2022-04-18

## 2021-07-19 RX ORDER — METOPROLOL SUCCINATE 100 MG/1
TABLET, EXTENDED RELEASE ORAL
COMMUNITY
Start: 2021-06-14 | End: 2021-07-29 | Stop reason: SDUPTHER

## 2021-07-19 RX ORDER — SITAGLIPTIN 100 MG/1
TABLET, FILM COATED ORAL
COMMUNITY
Start: 2021-04-30 | End: 2021-10-13

## 2021-07-19 RX ORDER — GLIMEPIRIDE 4 MG/1
4 TABLET ORAL 2 TIMES DAILY
COMMUNITY
Start: 2021-07-13 | End: 2021-10-07
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
## 2021-07-20 ENCOUNTER — TELEPHONE (OUTPATIENT)
Dept: ONCOLOGY | Facility: HOSPITAL | Age: 71
End: 2021-07-20

## 2021-07-20 DIAGNOSIS — R59.0 LYMPHADENOPATHY, AXILLARY: Primary | ICD-10-CM

## 2021-07-20 RX ORDER — LEVOTHYROXINE SODIUM 0.03 MG/1
TABLET ORAL
Qty: 90 TABLET | Refills: 0 | Status: SHIPPED | OUTPATIENT
Start: 2021-07-20 | End: 2021-10-19

## 2021-07-20 NOTE — TELEPHONE ENCOUNTER
PATIENT CALLED BACK AND STATED SHE HAS HER ULTRA SOUND SCHEDULED AND WILL HAVE RESULTS SENT OVER TO DR CHAPARRO ONCE ULTRA SOUND IS COMPLETED

## 2021-07-20 NOTE — TELEPHONE ENCOUNTER
PT HAS FOUND A LUMP IN HER LEFT ARM PIT AND HAS LYMPH NODES  SWOLLEN , SHE IS HAVING SOME TEST RAN ON THAT PROBLEM BUT SHE WANTS TO KNOW IF YOU WANT TO ADD ANYTHING ELSE TO HER LAB WORK? SHE IS HAVING THEM DRAWN TOMORROW. [phone]

## 2021-07-20 NOTE — TELEPHONE ENCOUNTER
Left message for patient: no additional labwork needed, but I wanted to confirm if someone scheduled an ultrasound of the lymph node as recommended on the MRI.

## 2021-07-20 NOTE — TELEPHONE ENCOUNTER
Caller: CORY    Relationship: PATIENT    Best call back number: [phone]     What is the best time to reach you: ASAP    Who are you requesting to speak with (clinical staff, provider,  specific staff member): DACIA OSARES OR DR CHAPARRO'S ASSISTANT    What was the call regarding: SHE IS GOING TO HAVE LABS DONE TOMORROW @ [facility]. SHE'D LIKE TO KNOW IF DR CHAPARRO WANT HER TO HAVE ANY LABS DONE WHILE SHE'S THERE. SHE RECENTLY HAD AN MRI THAT SHOWED A SWOLLEN LYMPH NODE AND POSSIBLE LUMP.     Do you require a callback: YES

## 2021-07-21 ENCOUNTER — TRANSCRIBE ORDERS (OUTPATIENT)
Dept: LAB | Facility: HOSPITAL | Age: 71
End: 2021-07-21

## 2021-07-21 ENCOUNTER — LAB (OUTPATIENT)
Dept: LAB | Facility: HOSPITAL | Age: 71
End: 2021-07-21

## 2021-07-21 DIAGNOSIS — E11.65 UNCONTROLLED TYPE 2 DIABETES MELLITUS WITH HYPERGLYCEMIA (HCC): ICD-10-CM

## 2021-07-21 DIAGNOSIS — E55.9 VITAMIN D DEFICIENCY, UNSPECIFIED: ICD-10-CM

## 2021-07-21 DIAGNOSIS — R59.0 LYMPHADENOPATHY, AXILLARY: ICD-10-CM

## 2021-07-21 DIAGNOSIS — E03.4 ATROPHY OF THYROID (ACQUIRED): ICD-10-CM

## 2021-07-21 DIAGNOSIS — E11.65 UNCONTROLLED TYPE 2 DIABETES MELLITUS WITH HYPERGLYCEMIA (HCC): Primary | ICD-10-CM

## 2021-07-21 PROBLEM — M13.0 POLYARTHRITIS: Status: ACTIVE | Noted: 2021-07-21

## 2021-07-21 PROBLEM — K21.9 ESOPHAGEAL REFLUX: Status: ACTIVE | Noted: 2021-07-21

## 2021-07-21 PROBLEM — C80.1 CANCER (HCC): Status: ACTIVE | Noted: 2021-07-21

## 2021-07-21 PROBLEM — M79.89 LIMB SWELLING: Status: ACTIVE | Noted: 2021-07-21

## 2021-07-21 PROBLEM — M19.90 ARTHRITIS: Status: ACTIVE | Noted: 2021-07-21

## 2021-07-21 PROBLEM — D50.9 IRON DEFICIENCY ANEMIA: Status: ACTIVE | Noted: 2021-07-21

## 2021-07-21 PROBLEM — R06.02 SHORTNESS OF BREATH: Status: ACTIVE | Noted: 2021-07-21

## 2021-07-21 PROBLEM — K63.5 COLON POLYP: Status: ACTIVE | Noted: 2021-07-21

## 2021-07-21 PROBLEM — J45.909 ASTHMA: Status: ACTIVE | Noted: 2021-07-21

## 2021-07-21 PROBLEM — S46.919A STRAIN OF SHOULDER: Status: ACTIVE | Noted: 2017-09-11

## 2021-07-21 PROBLEM — K76.0 FATTY LIVER: Status: ACTIVE | Noted: 2021-07-21

## 2021-07-21 PROBLEM — M81.0 AGE RELATED OSTEOPOROSIS: Status: ACTIVE | Noted: 2021-07-21

## 2021-07-21 PROBLEM — E78.5 HYPERLIPEMIA: Status: ACTIVE | Noted: 2021-07-21

## 2021-07-21 PROBLEM — E11.9 DIABETES (HCC): Status: ACTIVE | Noted: 2021-07-21

## 2021-07-21 PROBLEM — J30.2 SEASONAL ALLERGIC RHINITIS: Status: ACTIVE | Noted: 2021-07-21

## 2021-07-21 LAB
ANION GAP SERPL CALCULATED.3IONS-SCNC: 14 MMOL/L (ref 5–15)
BASOPHILS # BLD AUTO: 0.05 10*3/MM3 (ref 0–0.2)
BASOPHILS NFR BLD AUTO: 0.6 % (ref 0–1.5)
BUN SERPL-MCNC: 21 MG/DL (ref 8–23)
BUN/CREAT SERPL: 31.8 (ref 7–25)
CALCIUM SPEC-SCNC: 8.8 MG/DL (ref 8.6–10.5)
CHLORIDE SERPL-SCNC: 106 MMOL/L (ref 98–107)
CO2 SERPL-SCNC: 19 MMOL/L (ref 22–29)
CREAT SERPL-MCNC: 0.66 MG/DL (ref 0.57–1)
DEPRECATED RDW RBC AUTO: 43.3 FL (ref 37–54)
EOSINOPHIL # BLD AUTO: 0.16 10*3/MM3 (ref 0–0.4)
EOSINOPHIL NFR BLD AUTO: 2 % (ref 0.3–6.2)
ERYTHROCYTE [DISTWIDTH] IN BLOOD BY AUTOMATED COUNT: 13.1 % (ref 12.3–15.4)
GFR SERPL CREATININE-BSD FRML MDRD: 88 ML/MIN/1.73
GLUCOSE SERPL-MCNC: 193 MG/DL (ref 65–99)
HBA1C MFR BLD: 7.2 % (ref 4.8–5.6)
HCT VFR BLD AUTO: 38.7 % (ref 34–46.6)
HGB BLD-MCNC: 12.6 G/DL (ref 12–15.9)
IMM GRANULOCYTES # BLD AUTO: 0.02 10*3/MM3 (ref 0–0.05)
IMM GRANULOCYTES NFR BLD AUTO: 0.3 % (ref 0–0.5)
LDH SERPL-CCNC: 230 U/L (ref 135–214)
LYMPHOCYTES # BLD AUTO: 1.91 10*3/MM3 (ref 0.7–3.1)
LYMPHOCYTES NFR BLD AUTO: 24.3 % (ref 19.6–45.3)
MCH RBC QN AUTO: 29.6 PG (ref 26.6–33)
MCHC RBC AUTO-ENTMCNC: 32.6 G/DL (ref 31.5–35.7)
MCV RBC AUTO: 91.1 FL (ref 79–97)
MONOCYTES # BLD AUTO: 0.76 10*3/MM3 (ref 0.1–0.9)
MONOCYTES NFR BLD AUTO: 9.7 % (ref 5–12)
NEUTROPHILS NFR BLD AUTO: 4.95 10*3/MM3 (ref 1.7–7)
NEUTROPHILS NFR BLD AUTO: 63.1 % (ref 42.7–76)
NRBC BLD AUTO-RTO: 0 /100 WBC (ref 0–0.2)
PLATELET # BLD AUTO: 325 10*3/MM3 (ref 140–450)
PMV BLD AUTO: 10.4 FL (ref 6–12)
POTASSIUM SERPL-SCNC: 4.7 MMOL/L (ref 3.5–5.2)
RBC # BLD AUTO: 4.25 10*6/MM3 (ref 3.77–5.28)
SODIUM SERPL-SCNC: 139 MMOL/L (ref 136–145)
TSH SERPL DL<=0.05 MIU/L-ACNC: 1.93 UIU/ML (ref 0.27–4.2)
URATE SERPL-MCNC: 5.2 MG/DL (ref 2.4–5.7)
WBC # BLD AUTO: 7.85 10*3/MM3 (ref 3.4–10.8)

## 2021-07-21 PROCEDURE — 84443 ASSAY THYROID STIM HORMONE: CPT

## 2021-07-21 PROCEDURE — 84681 ASSAY OF C-PEPTIDE: CPT

## 2021-07-21 PROCEDURE — 85025 COMPLETE CBC W/AUTO DIFF WBC: CPT

## 2021-07-21 PROCEDURE — 80048 BASIC METABOLIC PNL TOTAL CA: CPT

## 2021-07-21 PROCEDURE — 84550 ASSAY OF BLOOD/URIC ACID: CPT

## 2021-07-21 PROCEDURE — 83036 HEMOGLOBIN GLYCOSYLATED A1C: CPT

## 2021-07-21 PROCEDURE — 83615 LACTATE (LD) (LDH) ENZYME: CPT

## 2021-07-21 PROCEDURE — 36415 COLL VENOUS BLD VENIPUNCTURE: CPT

## 2021-07-22 ENCOUNTER — HOSPITAL ENCOUNTER (OUTPATIENT)
Dept: ULTRASOUND IMAGING | Facility: HOSPITAL | Age: 71
Discharge: HOME | End: 2021-07-22
Admitting: NURSE PRACTITIONER

## 2021-07-22 LAB
C PEPTIDE SERPL-MCNC: 10.4 NG/ML (ref 1.1–4.4)
LAB AP CASE REPORT: NORMAL
PATH REPORT.FINAL DX SPEC: NORMAL
PATH REPORT.GROSS SPEC: NORMAL
PATHOLOGY REVIEW: YES

## 2021-07-22 PROCEDURE — 76882 US LMTD JT/FCL EVL NVASC XTR: CPT

## 2021-07-24 PROBLEM — R06.02 SHORTNESS OF BREATH: Status: RESOLVED | Noted: 2021-07-21 | Resolved: 2021-07-24

## 2021-07-24 PROBLEM — R59.0 AXILLARY ADENOPATHY: Status: RESOLVED | Noted: 2021-07-19 | Resolved: 2021-07-24

## 2021-07-24 PROBLEM — S46.919A STRAIN OF SHOULDER: Status: RESOLVED | Noted: 2017-09-11 | Resolved: 2021-07-24

## 2021-07-24 PROBLEM — E11.65 TYPE 2 DIABETES MELLITUS WITH HYPERGLYCEMIA, WITHOUT LONG-TERM CURRENT USE OF INSULIN (HCC): Status: ACTIVE | Noted: 2021-07-24

## 2021-07-24 PROBLEM — D50.9 IRON DEFICIENCY ANEMIA: Status: ACTIVE | Noted: 2021-07-24

## 2021-07-24 PROBLEM — M79.89 LIMB SWELLING: Status: RESOLVED | Noted: 2021-07-21 | Resolved: 2021-07-24

## 2021-07-24 PROBLEM — E78.2 MIXED HYPERLIPIDEMIA: Status: ACTIVE | Noted: 2021-07-24

## 2021-07-24 PROBLEM — E03.4 ATROPHY OF THYROID: Status: ACTIVE | Noted: 2021-07-24
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
## 2021-07-27 DIAGNOSIS — R59.9 LYMPH NODES ENLARGED: Primary | ICD-10-CM

## 2021-07-29 ENCOUNTER — OFFICE VISIT (OUTPATIENT)
Dept: INTERNAL MEDICINE | Facility: CLINIC | Age: 71
End: 2021-07-29

## 2021-07-29 VITALS
DIASTOLIC BLOOD PRESSURE: 82 MMHG | HEIGHT: 62 IN | OXYGEN SATURATION: 96 % | SYSTOLIC BLOOD PRESSURE: 128 MMHG | WEIGHT: 166.2 LBS | BODY MASS INDEX: 30.59 KG/M2 | HEART RATE: 86 BPM | TEMPERATURE: 97.3 F

## 2021-07-29 DIAGNOSIS — D50.9 IRON DEFICIENCY ANEMIA, UNSPECIFIED IRON DEFICIENCY ANEMIA TYPE: ICD-10-CM

## 2021-07-29 DIAGNOSIS — I10 ESSENTIAL HYPERTENSION: ICD-10-CM

## 2021-07-29 DIAGNOSIS — E11.65 TYPE 2 DIABETES MELLITUS WITH HYPERGLYCEMIA, WITHOUT LONG-TERM CURRENT USE OF INSULIN (HCC): Primary | ICD-10-CM

## 2021-07-29 DIAGNOSIS — K52.9 CHRONIC DIARRHEA: ICD-10-CM

## 2021-07-29 DIAGNOSIS — E78.2 MIXED HYPERLIPIDEMIA: ICD-10-CM

## 2021-07-29 DIAGNOSIS — R92.8 OTHER ABNORMAL AND INCONCLUSIVE FINDINGS ON DIAGNOSTIC IMAGING OF BREAST: ICD-10-CM

## 2021-07-29 DIAGNOSIS — E03.4 ATROPHY OF THYROID: ICD-10-CM

## 2021-07-29 DIAGNOSIS — K21.9 GASTROESOPHAGEAL REFLUX DISEASE WITHOUT ESOPHAGITIS: ICD-10-CM

## 2021-07-29 DIAGNOSIS — R59.0 ENLARGED LYMPH NODES IN ARMPIT: Primary | ICD-10-CM

## 2021-07-29 PROCEDURE — 99214 OFFICE O/P EST MOD 30 MIN: CPT | Performed by: INTERNAL MEDICINE

## 2021-07-29 RX ORDER — METFORMIN HYDROCHLORIDE 500 MG/1
1000 TABLET, EXTENDED RELEASE ORAL
COMMUNITY
End: 2021-11-03 | Stop reason: SINTOL

## 2021-07-29 RX ORDER — DICYCLOMINE HYDROCHLORIDE 10 MG/1
10 CAPSULE ORAL
Qty: 120 CAPSULE | Refills: 0 | Status: SHIPPED | OUTPATIENT
Start: 2021-07-29 | End: 2021-12-07

## 2021-07-29 RX ORDER — SENNOSIDES 8.6 MG
650 CAPSULE ORAL 2 TIMES DAILY
COMMUNITY

## 2021-08-03 RX ORDER — CHOLECALCIFEROL (VITAMIN D3) 1250 MCG
CAPSULE ORAL
Qty: 12 CAPSULE | Refills: 0 | Status: SHIPPED | OUTPATIENT
Start: 2021-08-03 | End: 2021-11-18

## 2021-08-10 ENCOUNTER — DOCUMENTATION (OUTPATIENT)
Dept: MAMMOGRAPHY | Facility: HOSPITAL | Age: 71
End: 2021-08-10

## 2021-08-10 ENCOUNTER — HOSPITAL ENCOUNTER (OUTPATIENT)
Dept: MAMMOGRAPHY | Facility: HOSPITAL | Age: 71
Discharge: HOME | End: 2021-08-10

## 2021-08-10 DIAGNOSIS — R59.0 ENLARGED LYMPH NODES IN ARMPIT: ICD-10-CM

## 2021-08-10 DIAGNOSIS — R92.8 OTHER ABNORMAL AND INCONCLUSIVE FINDINGS ON DIAGNOSTIC IMAGING OF BREAST: ICD-10-CM

## 2021-08-10 PROCEDURE — 88342 IMHCHEM/IMCYTCHM 1ST ANTB: CPT | Performed by: NURSE PRACTITIONER

## 2021-08-10 PROCEDURE — 88305 TISSUE EXAM BY PATHOLOGIST: CPT | Performed by: NURSE PRACTITIONER

## 2021-08-10 PROCEDURE — 88341 IMHCHEM/IMCYTCHM EA ADD ANTB: CPT | Performed by: NURSE PRACTITIONER

## 2021-08-10 PROCEDURE — A4648 IMPLANTABLE TISSUE MARKER: HCPCS

## 2021-08-10 PROCEDURE — 25010000003 LIDOCAINE 1 % SOLUTION: Performed by: NURSE PRACTITIONER

## 2021-08-10 PROCEDURE — 38505 NEEDLE BIOPSY LYMPH NODES: CPT | Performed by: RADIOLOGY

## 2021-08-10 RX ORDER — LIDOCAINE HYDROCHLORIDE 10 MG/ML
10 INJECTION, SOLUTION INFILTRATION; PERINEURAL ONCE
Status: COMPLETED | OUTPATIENT
Start: 2021-08-10 | End: 2021-08-10

## 2021-08-10 RX ORDER — LIDOCAINE HYDROCHLORIDE AND EPINEPHRINE 10; 10 MG/ML; UG/ML
10 INJECTION, SOLUTION INFILTRATION; PERINEURAL ONCE
Status: COMPLETED | OUTPATIENT
Start: 2021-08-10 | End: 2021-08-10

## 2021-08-10 RX ADMIN — LIDOCAINE HYDROCHLORIDE,EPINEPHRINE BITARTRATE 10 ML: 10; .01 INJECTION, SOLUTION INFILTRATION; PERINEURAL at 14:45

## 2021-08-10 RX ADMIN — LIDOCAINE HYDROCHLORIDE 10 ML: 10 INJECTION, SOLUTION INFILTRATION; PERINEURAL at 14:45

## 2021-08-10 NOTE — PROGRESS NOTES
S/W PATIENT AFTER HER BIOPSY AND DISCUSSED DISCHARGE INSTRUCTIONS WITH [employer] FOR PAIN CONTROL AND PT V/U. PT REPORTED THAT SHE WAS HAVING HER LEFT IMPLANT CHECKED AND PLASTICS SENT HER TO HAVE HER LYMPH NODE CHECKED AND THEN NEEDED A BIOPSY. SHE FIRST HAD A MRI. PT HAD A JOSELINE MASTECTOMY WITH RECON IN 2004. SHE HAD A HIGH AMOUNT OF FAMILY HISTORY WITH MOTHER, SISTER AND DAUGHTER AND SHE WAS POSITIVE FOR BRCA 1. HER DAUGHTER WAS ALSO POSITIVE FOR THE MUTATION. PT'S CANCER WAS HORMONE POS AND SHE TOOK ARIMIDEX FOR 5 YEARS. I GAVE PATIENT MY CARD WITH MY CONTACT INFORMATION AND ENCOURAGED HER TO CALL WITH ANY QUESTIONS OR CONCERNS AND SHE V/U

## 2021-08-13 LAB
CYTO UR: NORMAL
LAB AP CASE REPORT: NORMAL
LAB AP CLINICAL INFORMATION: NORMAL
LAB AP SPECIAL STAINS: NORMAL
PATH REPORT.FINAL DX SPEC: NORMAL
PATH REPORT.GROSS SPEC: NORMAL

## 2021-08-19 ENCOUNTER — TELEPHONE (OUTPATIENT)
Dept: INTERNAL MEDICINE | Facility: CLINIC | Age: 71
End: 2021-08-19

## 2021-08-19 NOTE — TELEPHONE ENCOUNTER
PATIENT WANTS GEOVANNA TO CALL HER.  DID NOT LEAVE ANY INFORMATION.     INFORMED PT. GEOVANNA IS OUT THE REST OF THE WEEK.    PATIENT HAS BEEN HAVING FREQUENT DIARRHEA FOR 2 MONTHS. WAS GIVEN MEDICINE BY DR. MONTEMAYOR A FEW MONTHS BACK BUT IT IS NOT WORKING.  STILL HAVING FREQUENT DIARRHEA. TAKES METFORMIN - WONDERING IF THAT'S THE CULPRIT.  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 WOULD LIKE TO GET THIS UNDER CONTROL.

## 2021-08-20 ENCOUNTER — TELEPHONE (OUTPATIENT)
Dept: INTERNAL MEDICINE | Facility: CLINIC | Age: 71
End: 2021-08-20

## 2021-08-20 NOTE — TELEPHONE ENCOUNTER
Stop Metformin.  Stop Protonix  Start Pepcid 20 mg bid.  Start Florastor 250 mg bid (it's OTC).  Stop the bentyl/dicyclomine since no benefit.  Use immodium OTC q 4 hrs PRN.

## 2021-08-23 NOTE — TELEPHONE ENCOUNTER
I have let pt know of this, but she wanted you to know that her fasting Blood sugar on Friday was 91, Sat 102, Sun 131 and today was 166. Please advise.

## 2021-08-23 NOTE — TELEPHONE ENCOUNTER
Nothing else to advise.  Not sure what her concerns are about those sugars.  What matters is what the sugars are to be when we stopped the Metformin.  Get back with us in 2 weeks in regards to that.

## 2021-09-01 ENCOUNTER — TELEPHONE (OUTPATIENT)
Dept: INTERNAL MEDICINE | Facility: CLINIC | Age: 71
End: 2021-09-01

## 2021-09-01 RX ORDER — DULAGLUTIDE 0.75 MG/.5ML
0.75 INJECTION, SOLUTION SUBCUTANEOUS WEEKLY
Qty: 4 PEN | Refills: 2 | Status: SHIPPED | OUTPATIENT
Start: 2021-09-01 | End: 2021-09-08 | Stop reason: SDUPTHER

## 2021-09-01 NOTE — TELEPHONE ENCOUNTER
I agree with the increased dose of Lantus, and she can continue to increase this as long as her morning sugars remain above 140.  The only medication to to try is something in the Bydureon class, we may as well try something other than by your on, since we know she did not tolerate it last time.  I sent a prescription for Trulicity over to the [facility] in [place].  She takes this once a week just like she did with the Bydureon.  We can double the dose in 2 weeks if she is tolerating it and there is no significant improvement in her sugars.  The only other option is to go on short acting insulin with each meal.    She should stay on the Pepcid for now, but she can stop Florastor in 2 weeks.

## 2021-09-01 NOTE — TELEPHONE ENCOUNTER
Dr. Noguera there is a list of Blood sugars on your book case. Please advise, she was not happy in the last message that she left. Thanks.

## 2021-09-01 NOTE — TELEPHONE ENCOUNTER
pts BS was 268 at bedtime and 248 this am.She has been off of the Metformin for 2 weeks. She said her sugars have been running in the 200's for over a week now.Please advise? [phone]

## 2021-09-08 RX ORDER — DULAGLUTIDE 0.75 MG/.5ML
0.75 INJECTION, SOLUTION SUBCUTANEOUS WEEKLY
Qty: 4 PEN | Refills: 2 | Status: SHIPPED | OUTPATIENT
Start: 2021-09-08 | End: 2021-09-21 | Stop reason: SDUPTHER

## 2021-09-14 ENCOUNTER — TELEPHONE (OUTPATIENT)
Dept: INTERNAL MEDICINE | Facility: CLINIC | Age: 71
End: 2021-09-14

## 2021-09-14 NOTE — TELEPHONE ENCOUNTER
Pt states that she hasn't started Trulicity yet, it is to arrive today, she had to get it from mail order to the cost.    She states that since being off Metformin her blood sugars are all over the place.     She is having positional vertigo, her L ear is hurting.     She was states that Pepcid alone is not controlling her acid reflux, she is wanting up at time with this.     She has went to a bland diet as well. She didn't know if she needed to come in to discuss all of this with you. Please advise.

## 2021-09-16 ENCOUNTER — OFFICE VISIT (OUTPATIENT)
Dept: INTERNAL MEDICINE | Facility: CLINIC | Age: 71
End: 2021-09-16

## 2021-09-16 VITALS
SYSTOLIC BLOOD PRESSURE: 104 MMHG | HEIGHT: 62 IN | WEIGHT: 163 LBS | BODY MASS INDEX: 30 KG/M2 | HEART RATE: 69 BPM | TEMPERATURE: 97.2 F | OXYGEN SATURATION: 96 % | DIASTOLIC BLOOD PRESSURE: 74 MMHG

## 2021-09-16 DIAGNOSIS — I10 ESSENTIAL HYPERTENSION: Primary | ICD-10-CM

## 2021-09-16 DIAGNOSIS — E11.65 TYPE 2 DIABETES MELLITUS WITH HYPERGLYCEMIA, WITHOUT LONG-TERM CURRENT USE OF INSULIN (HCC): ICD-10-CM

## 2021-09-16 DIAGNOSIS — K21.9 GASTROESOPHAGEAL REFLUX DISEASE WITHOUT ESOPHAGITIS: ICD-10-CM

## 2021-09-16 DIAGNOSIS — R42 VERTIGO: ICD-10-CM

## 2021-09-16 PROCEDURE — 99214 OFFICE O/P EST MOD 30 MIN: CPT | Performed by: INTERNAL MEDICINE

## 2021-09-16 RX ORDER — KETOCONAZOLE 20 MG/ML
SHAMPOO TOPICAL
COMMUNITY
Start: 2021-08-30

## 2021-09-16 RX ORDER — BETAMETHASONE DIPROPIONATE 0.5 MG/G
LOTION TOPICAL
COMMUNITY
Start: 2021-08-31
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
## 2021-09-20 ENCOUNTER — LAB (OUTPATIENT)
Dept: LAB | Facility: HOSPITAL | Age: 71
End: 2021-09-20

## 2021-09-20 ENCOUNTER — TRANSCRIBE ORDERS (OUTPATIENT)
Dept: LAB | Facility: HOSPITAL | Age: 71
End: 2021-09-20

## 2021-09-20 DIAGNOSIS — Z00.00 ROUTINE GENERAL MEDICAL EXAMINATION AT A HEALTH CARE FACILITY: Primary | ICD-10-CM

## 2021-09-20 DIAGNOSIS — Z00.00 ROUTINE GENERAL MEDICAL EXAMINATION AT A HEALTH CARE FACILITY: ICD-10-CM

## 2021-09-20 PROCEDURE — C9803 HOPD COVID-19 SPEC COLLECT: HCPCS

## 2021-09-20 PROCEDURE — U0004 COV-19 TEST NON-CDC HGH THRU: HCPCS

## 2021-09-21 ENCOUNTER — TELEPHONE (OUTPATIENT)
Dept: INTERNAL MEDICINE | Facility: CLINIC | Age: 71
End: 2021-09-21

## 2021-09-21 LAB — SARS-COV-2 RNA NOSE QL NAA+PROBE: DETECTED

## 2021-09-21 RX ORDER — DULAGLUTIDE 0.75 MG/.5ML
0.75 INJECTION, SOLUTION SUBCUTANEOUS WEEKLY
Qty: 4 PEN | Refills: 1 | Status: SHIPPED | OUTPATIENT
Start: 2021-09-21 | End: 2021-10-13

## 2021-09-21 NOTE — TELEPHONE ENCOUNTER
Pt's COVID test is positive and she is wanting to know if you will set her up with the Antibody infusion.

## 2021-09-22 ENCOUNTER — TELEPHONE (OUTPATIENT)
Dept: INTERNAL MEDICINE | Facility: CLINIC | Age: 71
End: 2021-09-22

## 2021-09-22 ENCOUNTER — TRANSCRIBE ORDERS (OUTPATIENT)
Dept: ADMINISTRATIVE | Facility: HOSPITAL | Age: 71
End: 2021-09-22

## 2021-09-22 DIAGNOSIS — U07.1 COVID-19: Primary | ICD-10-CM

## 2021-09-22 RX ORDER — DIPHENHYDRAMINE HYDROCHLORIDE 50 MG/ML
50 INJECTION INTRAMUSCULAR; INTRAVENOUS AS NEEDED
Status: DISCONTINUED | OUTPATIENT
Start: 2021-09-23 | End: 2021-09-25 | Stop reason: HOSPADM

## 2021-09-22 RX ORDER — EPINEPHRINE 1 MG/ML
0.3 INJECTION, SOLUTION, CONCENTRATE INTRAVENOUS AS NEEDED
Status: DISCONTINUED | OUTPATIENT
Start: 2021-09-23 | End: 2021-09-25 | Stop reason: HOSPADM

## 2021-09-22 NOTE — TELEPHONE ENCOUNTER
So, it is a viral sinus infection, so unfortunately antibiotics would not be beneficial.  Additionally, steroids or not in her best interest given her underlying diabetes mellitus.  She can use Flonase, Claritin, Zyrtec, Benadryl etc. over-the-counter.  Hope she is feeling better soon.

## 2021-09-22 NOTE — TELEPHONE ENCOUNTER
Pt states that she has a sinus infection, she knows that she is positive COVID, and I have faxed over order for antibody infusion. She didn't know if she needed to do anything else. Please advise.

## 2021-09-23 ENCOUNTER — HOSPITAL ENCOUNTER (OUTPATIENT)
Dept: INFUSION THERAPY | Facility: HOSPITAL | Age: 71
Discharge: HOME | End: 2021-09-23
Admitting: INTERNAL MEDICINE

## 2021-09-23 VITALS
TEMPERATURE: 97.1 F | OXYGEN SATURATION: 98 % | HEART RATE: 113 BPM | DIASTOLIC BLOOD PRESSURE: 86 MMHG | SYSTOLIC BLOOD PRESSURE: 142 MMHG

## 2021-09-23 DIAGNOSIS — U07.1 COVID-19: Primary | ICD-10-CM

## 2021-09-23 PROCEDURE — M0243 CASIRIVI AND IMDEVI INFUSION: HCPCS | Performed by: INTERNAL MEDICINE

## 2021-09-23 PROCEDURE — 25010000006 INJECTION, CASIRIVIMAB AND IMDEVIMAB, 1200 MG: Performed by: INTERNAL MEDICINE

## 2021-09-23 RX ADMIN — CASIRIVIMAB AND IMDEVIMAB 300 MG: 600; 600 INJECTION, SOLUTION, CONCENTRATE INTRAVENOUS at 10:55

## 2021-09-23 NOTE — ADDENDUM NOTE
Encounter addended by: Rama Yang RN on: 9/23/2021 11:54 AM   Actions taken: Vitals modified, Clinical Note Signed

## 2021-09-30 ENCOUNTER — OFFICE VISIT (OUTPATIENT)
Dept: INTERNAL MEDICINE | Facility: CLINIC | Age: 71
End: 2021-09-30

## 2021-09-30 VITALS
OXYGEN SATURATION: 97 % | HEART RATE: 79 BPM | SYSTOLIC BLOOD PRESSURE: 135 MMHG | BODY MASS INDEX: 29.92 KG/M2 | DIASTOLIC BLOOD PRESSURE: 68 MMHG | HEIGHT: 62 IN | WEIGHT: 162.6 LBS | TEMPERATURE: 97.8 F

## 2021-09-30 DIAGNOSIS — I10 ESSENTIAL HYPERTENSION: Primary | ICD-10-CM

## 2021-09-30 PROCEDURE — 99213 OFFICE O/P EST LOW 20 MIN: CPT

## 2021-09-30 RX ORDER — BUSPIRONE HYDROCHLORIDE 5 MG/1
5 TABLET ORAL 3 TIMES DAILY PRN
Qty: 15 TABLET | Refills: 0 | Status: SHIPPED | OUTPATIENT
Start: 2021-09-30 | End: 2021-12-07

## 2021-09-30 NOTE — ASSESSMENT & PLAN NOTE
Patient has had a couple of elevated blood pressure readings at home.  Her blood pressures at home was 153/107 before medication and, 136/100 randomly at home on her automatic cuff.  She did not bring that with her today.  When I checked it manually I did get 130s over 70.  I feel like this is probably situational.  She did just lose her .  She is also just got over COVID-19.  She denies any chest pain or shortness of breath.  She does admit to having a couple of tachycardic episodes on 2 occasions.  She states she was doing something when these occurred.  Her heart rate did get up to approximately 107.  Heart rate and rhythm are regular, without murmur.  Her blood pressure is 135/68 today in the office.  Plan: I do believe that this is situational.  I will have her continue checking her blood pressures at home and keeping a log.  I will see her back in approximately 10 days to check in on her.  She is to call with any new symptoms or continued elevated blood pressure readings.  I have also advised her to bring her cuff in so we can compare it with ours at her next visit.    I did discuss with her some possible medication for the next few days for the .  She has never been on medication before.  I did discuss with her about BuSpar that she can try and just take it as needed.  She would like to have it on hand just in case.  I told her to call if she needed anything else.

## 2021-09-30 NOTE — PROGRESS NOTES
"Chief Complaint  Hypertension (last 2 days recoreded high bp. about a month ago recorded high bp while at dentist office.) and Irregular Heart Beat (*heart rate went up to 105 yesterday. maybe some other times as well.)    Subjective          History of Present Illness  Jyoti Alvarez presents to [facility]   Is here today for hypertension.  Patient recently lost her .  She is preparing for the .  It is tomorrow and Saturday.  She states that over the last couple days she has had higher blood pressure readings.  She uses an automatic wrist cuff.  It is not with her today.  She states she is also noticed some tachycardic episodes after doing things. A couple of readings she had from the last two days are 153/107,136/100.  She also was diagnosed with COVID-19 approximately 10 days ago.  She is been dealing with that as well.  She states she is doing as well as she can.  She has never been on any anxiety or depression medicine before.  She does not really want to be on anything.   No chest pain or shortness of breath.     Objective   Vital Signs:   /68 (BP Location: Left arm, Patient Position: Sitting, Cuff Size: Large Adult)   Pulse 79   Temp 97.8 °F (36.6 °C) (Temporal)   Ht 157.5 cm (62\")   Wt 73.8 kg (162 lb 9.6 oz)   SpO2 97%   BMI 29.74 kg/m²     Physical Exam  Constitutional:       Appearance: Normal appearance. She is normal weight.   Eyes:      Extraocular Movements: Extraocular movements intact.      Conjunctiva/sclera: Conjunctivae normal.   Cardiovascular:      Rate and Rhythm: Normal rate and regular rhythm.      Pulses: Normal pulses.      Heart sounds: Normal heart sounds.   Pulmonary:      Effort: Pulmonary effort is normal.      Breath sounds: Normal breath sounds. No wheezing, rhonchi or rales.   Musculoskeletal:      Cervical back: Normal range of motion.   Skin:     General: Skin is warm and dry.   Neurological:      General: No " focal deficit present.   Psychiatric:         Mood and Affect: Mood normal.         Behavior: Behavior normal.         Thought Content: Thought content normal.         Judgment: Judgment normal.        Result Review :                 Assessment and Plan    Diagnoses and all orders for this visit:    1. Essential hypertension (Primary)  Assessment & Plan:  Patient has had a couple of elevated blood pressure readings at home.  Her blood pressures at home was 153/107 before medication and, 136/100 randomly at home on her automatic cuff.  She did not bring that with her today.  When I checked it manually I did get 130s over 70.  I feel like this is probably situational.  She did just lose her .  She is also just got over COVID-19.  She denies any chest pain or shortness of breath.  She does admit to having a couple of tachycardic episodes on 2 occasions.  She states she was doing something when these occurred.  Her heart rate did get up to approximately 107.  Heart rate and rhythm are regular, without murmur.  Her blood pressure is 135/68 today in the office.  Plan: I do believe that this is situational.  I will have her continue checking her blood pressures at home and keeping a log.  I will see her back in approximately 10 days to check in on her.  She is to call with any new symptoms or continued elevated blood pressure readings.  I have also advised her to bring her cuff in so we can compare it with ours at her next visit.    I did discuss with her some possible medication for the next few days for the .  She has never been on medication before.  I did discuss with her about BuSpar that she can try and just take it as needed.  She would like to have it on hand just in case.  I told her to call if she needed anything else.      Other orders  -     busPIRone (BUSPAR) 5 MG tablet; Take 1 tablet by mouth 3 (Three) Times a Day As Needed (anxiety).  Dispense: 15 tablet; Refill: 0      Follow Up   Return in  about 10 days (around 10/10/2021).  Patient was given instructions and counseling regarding her condition or for health maintenance advice. Please see specific information pulled into the AVS if appropriate.

## 2021-10-07 RX ORDER — GLIMEPIRIDE 4 MG/1
TABLET ORAL
Qty: 180 TABLET | Refills: 0 | Status: SHIPPED | OUTPATIENT
Start: 2021-10-07 | End: 2022-01-05

## 2021-10-11 ENCOUNTER — OFFICE VISIT (OUTPATIENT)
Dept: INTERNAL MEDICINE | Facility: CLINIC | Age: 71
End: 2021-10-11

## 2021-10-11 VITALS
WEIGHT: 162.4 LBS | HEART RATE: 87 BPM | RESPIRATION RATE: 16 BRPM | OXYGEN SATURATION: 99 % | BODY MASS INDEX: 29.88 KG/M2 | TEMPERATURE: 97.6 F | HEIGHT: 62 IN | SYSTOLIC BLOOD PRESSURE: 120 MMHG | DIASTOLIC BLOOD PRESSURE: 78 MMHG

## 2021-10-11 DIAGNOSIS — I10 ESSENTIAL HYPERTENSION: Primary | ICD-10-CM

## 2021-10-11 PROCEDURE — 99213 OFFICE O/P EST LOW 20 MIN: CPT

## 2021-10-11 NOTE — ASSESSMENT & PLAN NOTE
Hypertension is stable.  It is 120/78 today in the office.  She did bring a few readings that she got at home with her wrist cuff.  There were a couple elevated readings, however, she states that those are mostly taken before she took her medications.  I compared her wrist cuff with our blood pressure cuff today, there were similar readings.  She is going to continue checking her blood pressure.  She is to call if her diastolic blood pressure remains elevated after medication.  She is on amlodipine 5 mg daily and metoprolol 100 mg daily.  Heart rate and rhythm are regular.  Plan: Continue checking blood pressures and bring a log for Dr. Noguera.  Continue amlodipine 5 mg and metoprolol 100 mg daily.  Call the office if blood pressure readings are elevated after medication.  She did show some readings a day of her 's  and they were obviously elevated.  I think this is situational.  She will continue to monitor her blood pressures and follow-up with Dr. Noguera next month.

## 2021-10-11 NOTE — PROGRESS NOTES
"Chief Complaint  Follow-up (*COVID, BLOOD PRESSURE)    Subjective          History of Present Illness  Jyoti Alvarez presents to [facility]  To follow-up on her blood pressure.  A few readings that she brought for me today include 124/85, 138/95 (in am before medication), 121/78 at home with wrist cuff.  124/82 in the office with her cuff, compare to our 120/78.    She states that her sleep habits are off and her 's passing.    She seems to be doing okay.  She is going through the grief process.  She states that she did notice that she did have some tachycardic episodes while she was preparing things for the  etc.  She denies any irregular heartbeat, chest pain or shortness of breath.    Objective   Vital Signs:   /78 (BP Location: Left arm, Patient Position: Sitting, Cuff Size: Large Adult)   Pulse 87   Temp 97.6 °F (36.4 °C) (Temporal)   Resp 16   Ht 157.5 cm (62\")   Wt 73.7 kg (162 lb 6.4 oz)   SpO2 99%   BMI 29.70 kg/m²     Physical Exam  HENT:      Head: Normocephalic.   Eyes:      Extraocular Movements: Extraocular movements intact.      Conjunctiva/sclera: Conjunctivae normal.   Cardiovascular:      Rate and Rhythm: Normal rate and regular rhythm.      Heart sounds: Normal heart sounds.   Pulmonary:      Effort: Pulmonary effort is normal. No respiratory distress.      Breath sounds: Normal breath sounds. No stridor. No wheezing, rhonchi or rales.   Abdominal:      General: Bowel sounds are normal.      Palpations: Abdomen is soft.   Musculoskeletal:         General: Normal range of motion.      Cervical back: Normal range of motion.   Skin:     General: Skin is warm and dry.   Neurological:      Mental Status: She is alert.   Psychiatric:         Mood and Affect: Mood normal.         Behavior: Behavior normal.         Thought Content: Thought content normal.         Judgment: Judgment normal.        Result Review :               "   Assessment and Plan    Diagnoses and all orders for this visit:    1. Essential hypertension (Primary)  Assessment & Plan:  Hypertension is stable.  It is 120/78 today in the office.  She did bring a few readings that she got at home with her wrist cuff.  There were a couple elevated readings, however, she states that those are mostly taken before she took her medications.  I compared her wrist cuff with our blood pressure cuff today, there were similar readings.  She is going to continue checking her blood pressure.  She is to call if her diastolic blood pressure remains elevated after medication.  She is on amlodipine 5 mg daily and metoprolol 100 mg daily.  Heart rate and rhythm are regular.  Plan: Continue checking blood pressures and bring a log for Dr. Noguera.  Continue amlodipine 5 mg and metoprolol 100 mg daily.  Call the office if blood pressure readings are elevated after medication.  She did show some readings a day of her 's  and they were obviously elevated.  I think this is situational.  She will continue to monitor her blood pressures and follow-up with Dr. Noguera next month.        Follow Up   No follow-ups on file.  Patient was given instructions and counseling regarding her condition or for health maintenance advice. Please see specific information pulled into the AVS if appropriate.

## 2021-10-13 ENCOUNTER — TELEPHONE (OUTPATIENT)
Dept: INTERNAL MEDICINE | Facility: CLINIC | Age: 71
End: 2021-10-13

## 2021-10-13 RX ORDER — DULAGLUTIDE 1.5 MG/.5ML
1.5 INJECTION, SOLUTION SUBCUTANEOUS WEEKLY
Qty: 12 PEN | Refills: 1 | Status: SHIPPED | OUTPATIENT
Start: 2021-10-13 | End: 2021-10-13

## 2021-10-13 RX ORDER — DULAGLUTIDE 1.5 MG/.5ML
1.5 INJECTION, SOLUTION SUBCUTANEOUS WEEKLY
Qty: 12 PEN | Refills: 1 | Status: SHIPPED | OUTPATIENT
Start: 2021-10-13 | End: 2022-02-23

## 2021-10-13 NOTE — TELEPHONE ENCOUNTER
Pt would like to know if you would like her to take 2 trulicity shots this Saturday ? Also would like to know if you could send a 3 month supply to pharmacy . Pt would like to know if she can start meloxicam as well. Please advis

## 2021-10-13 NOTE — TELEPHONE ENCOUNTER
Please let patient know I agree with increasing to 2 shots now.  She is on the 0.75 mg dose, so I sent a prescription of the 1.5 mg dose to her pharmacy.  I sent this to the local pharmacy, so please get back with me with a mail order pharmacy if that is where it was supposed to go.  Tell her I would use Tylenol arthritis 2 tablets 2-3 times daily in place of the Mobic for now.

## 2021-10-19 RX ORDER — LEVOTHYROXINE SODIUM 0.03 MG/1
TABLET ORAL
Qty: 90 TABLET | Refills: 1 | Status: SHIPPED | OUTPATIENT
Start: 2021-10-19 | End: 2022-04-18

## 2021-10-27 ENCOUNTER — LAB (OUTPATIENT)
Dept: LAB | Facility: HOSPITAL | Age: 71
End: 2021-10-27

## 2021-10-27 DIAGNOSIS — E11.65 TYPE 2 DIABETES MELLITUS WITH HYPERGLYCEMIA, WITHOUT LONG-TERM CURRENT USE OF INSULIN (HCC): ICD-10-CM

## 2021-10-27 DIAGNOSIS — E78.2 MIXED HYPERLIPIDEMIA: ICD-10-CM

## 2021-10-27 DIAGNOSIS — D50.9 IRON DEFICIENCY ANEMIA, UNSPECIFIED IRON DEFICIENCY ANEMIA TYPE: ICD-10-CM

## 2021-10-27 DIAGNOSIS — E03.4 ATROPHY OF THYROID: ICD-10-CM

## 2021-10-27 LAB
ALBUMIN SERPL-MCNC: 4.8 G/DL (ref 3.5–5.2)
ALBUMIN/GLOB SERPL: 2.4 G/DL
ALP SERPL-CCNC: 58 U/L (ref 39–117)
ALT SERPL W P-5'-P-CCNC: 21 U/L (ref 1–33)
ANION GAP SERPL CALCULATED.3IONS-SCNC: 15.3 MMOL/L (ref 5–15)
AST SERPL-CCNC: 31 U/L (ref 1–32)
BASOPHILS # BLD AUTO: 0.04 10*3/MM3 (ref 0–0.2)
BASOPHILS NFR BLD AUTO: 0.5 % (ref 0–1.5)
BILIRUB SERPL-MCNC: 0.4 MG/DL (ref 0–1.2)
BUN SERPL-MCNC: 17 MG/DL (ref 8–23)
BUN/CREAT SERPL: 28.8 (ref 7–25)
CALCIUM SPEC-SCNC: 9.7 MG/DL (ref 8.6–10.5)
CHLORIDE SERPL-SCNC: 103 MMOL/L (ref 98–107)
CHOLEST SERPL-MCNC: 166 MG/DL (ref 0–200)
CO2 SERPL-SCNC: 23.7 MMOL/L (ref 22–29)
CREAT SERPL-MCNC: 0.59 MG/DL (ref 0.57–1)
DEPRECATED RDW RBC AUTO: 44.8 FL (ref 37–54)
EOSINOPHIL # BLD AUTO: 0.16 10*3/MM3 (ref 0–0.4)
EOSINOPHIL NFR BLD AUTO: 2.1 % (ref 0.3–6.2)
ERYTHROCYTE [DISTWIDTH] IN BLOOD BY AUTOMATED COUNT: 13.4 % (ref 12.3–15.4)
FERRITIN SERPL-MCNC: 58.1 NG/ML (ref 13–150)
GFR SERPL CREATININE-BSD FRML MDRD: 100 ML/MIN/1.73
GLOBULIN UR ELPH-MCNC: 2 GM/DL
GLUCOSE SERPL-MCNC: 95 MG/DL (ref 65–99)
HBA1C MFR BLD: 7.62 % (ref 4.8–5.6)
HCT VFR BLD AUTO: 42.6 % (ref 34–46.6)
HDLC SERPL-MCNC: 39 MG/DL (ref 40–60)
HGB BLD-MCNC: 13.9 G/DL (ref 12–15.9)
IMM GRANULOCYTES # BLD AUTO: 0.02 10*3/MM3 (ref 0–0.05)
IMM GRANULOCYTES NFR BLD AUTO: 0.3 % (ref 0–0.5)
IRON 24H UR-MRATE: 116 MCG/DL (ref 37–145)
IRON SATN MFR SERPL: 28 % (ref 20–50)
LDLC SERPL CALC-MCNC: 86 MG/DL (ref 0–100)
LDLC/HDLC SERPL: 2 {RATIO}
LYMPHOCYTES # BLD AUTO: 1.81 10*3/MM3 (ref 0.7–3.1)
LYMPHOCYTES NFR BLD AUTO: 24.2 % (ref 19.6–45.3)
MCH RBC QN AUTO: 29.8 PG (ref 26.6–33)
MCHC RBC AUTO-ENTMCNC: 32.6 G/DL (ref 31.5–35.7)
MCV RBC AUTO: 91.4 FL (ref 79–97)
MONOCYTES # BLD AUTO: 0.76 10*3/MM3 (ref 0.1–0.9)
MONOCYTES NFR BLD AUTO: 10.2 % (ref 5–12)
NEUTROPHILS NFR BLD AUTO: 4.68 10*3/MM3 (ref 1.7–7)
NEUTROPHILS NFR BLD AUTO: 62.7 % (ref 42.7–76)
NRBC BLD AUTO-RTO: 0 /100 WBC (ref 0–0.2)
PLATELET # BLD AUTO: 320 10*3/MM3 (ref 140–450)
PMV BLD AUTO: 10.5 FL (ref 6–12)
POTASSIUM SERPL-SCNC: 4.3 MMOL/L (ref 3.5–5.2)
PROT SERPL-MCNC: 6.8 G/DL (ref 6–8.5)
RBC # BLD AUTO: 4.66 10*6/MM3 (ref 3.77–5.28)
SODIUM SERPL-SCNC: 142 MMOL/L (ref 136–145)
T4 FREE SERPL-MCNC: 1.29 NG/DL (ref 0.93–1.7)
TIBC SERPL-MCNC: 422 MCG/DL (ref 298–536)
TRANSFERRIN SERPL-MCNC: 283 MG/DL (ref 200–360)
TRIGL SERPL-MCNC: 245 MG/DL (ref 0–150)
TSH SERPL DL<=0.05 MIU/L-ACNC: 1.27 UIU/ML (ref 0.27–4.2)
VLDLC SERPL-MCNC: 41 MG/DL (ref 5–40)
WBC # BLD AUTO: 7.47 10*3/MM3 (ref 3.4–10.8)

## 2021-10-27 PROCEDURE — 82728 ASSAY OF FERRITIN: CPT

## 2021-10-27 PROCEDURE — 36415 COLL VENOUS BLD VENIPUNCTURE: CPT

## 2021-10-27 PROCEDURE — 84439 ASSAY OF FREE THYROXINE: CPT

## 2021-10-27 PROCEDURE — 80061 LIPID PANEL: CPT

## 2021-10-27 PROCEDURE — 84443 ASSAY THYROID STIM HORMONE: CPT

## 2021-10-27 PROCEDURE — 83036 HEMOGLOBIN GLYCOSYLATED A1C: CPT

## 2021-10-27 PROCEDURE — 85025 COMPLETE CBC W/AUTO DIFF WBC: CPT

## 2021-10-27 PROCEDURE — 84466 ASSAY OF TRANSFERRIN: CPT

## 2021-10-27 PROCEDURE — 83540 ASSAY OF IRON: CPT

## 2021-10-27 PROCEDURE — 80053 COMPREHEN METABOLIC PANEL: CPT

## 2021-11-01 ENCOUNTER — TELEPHONE (OUTPATIENT)
Dept: PLASTIC SURGERY | Facility: CLINIC | Age: 71
End: 2021-11-01
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
## 2021-11-01 NOTE — TELEPHONE ENCOUNTER
Patient called and notes she saw Dr. Shaw in [place] and had planned revision of breast due to implant malposition. Patients   one month ago from Covid so she did not have surgery. She would like to proceed with surgery but with Dr. Quezada here in [place]. Patient notes surgery was approved with insurance. Ms. Alvarez has appt with her PCP in 2 days and will get surgery clearance. She has not saw cardiology in 20 years. She will get referral to cardiology. RTC with Dr. Quezada in December. Patient would like to have surgery before January.

## 2021-11-03 ENCOUNTER — OFFICE VISIT (OUTPATIENT)
Dept: INTERNAL MEDICINE | Facility: CLINIC | Age: 71
End: 2021-11-03

## 2021-11-03 VITALS
SYSTOLIC BLOOD PRESSURE: 148 MMHG | TEMPERATURE: 97.5 F | BODY MASS INDEX: 29.26 KG/M2 | DIASTOLIC BLOOD PRESSURE: 81 MMHG | OXYGEN SATURATION: 96 % | WEIGHT: 159 LBS | HEART RATE: 88 BPM | HEIGHT: 62 IN

## 2021-11-03 DIAGNOSIS — K21.9 GASTROESOPHAGEAL REFLUX DISEASE WITHOUT ESOPHAGITIS: ICD-10-CM

## 2021-11-03 DIAGNOSIS — D50.9 IRON DEFICIENCY ANEMIA, UNSPECIFIED IRON DEFICIENCY ANEMIA TYPE: ICD-10-CM

## 2021-11-03 DIAGNOSIS — Z23 NEED FOR VACCINATION: ICD-10-CM

## 2021-11-03 DIAGNOSIS — I10 ESSENTIAL HYPERTENSION: ICD-10-CM

## 2021-11-03 DIAGNOSIS — E78.2 MIXED HYPERLIPIDEMIA: ICD-10-CM

## 2021-11-03 DIAGNOSIS — E55.9 VITAMIN D DEFICIENCY: ICD-10-CM

## 2021-11-03 DIAGNOSIS — M54.2 NECK PAIN ON RIGHT SIDE: ICD-10-CM

## 2021-11-03 DIAGNOSIS — K52.9 CHRONIC DIARRHEA: ICD-10-CM

## 2021-11-03 DIAGNOSIS — E03.4 ATROPHY OF THYROID: Primary | ICD-10-CM

## 2021-11-03 DIAGNOSIS — E11.65 TYPE 2 DIABETES MELLITUS WITH HYPERGLYCEMIA, WITHOUT LONG-TERM CURRENT USE OF INSULIN (HCC): ICD-10-CM

## 2021-11-03 DIAGNOSIS — R06.09 DYSPNEA ON EXERTION: ICD-10-CM

## 2021-11-03 DIAGNOSIS — E53.8 VITAMIN B12 DEFICIENCY: ICD-10-CM

## 2021-11-03 DIAGNOSIS — U07.1 COVID-19 VIRUS INFECTION: ICD-10-CM

## 2021-11-03 PROCEDURE — 99214 OFFICE O/P EST MOD 30 MIN: CPT | Performed by: INTERNAL MEDICINE

## 2021-11-03 PROCEDURE — 90662 IIV NO PRSV INCREASED AG IM: CPT | Performed by: INTERNAL MEDICINE

## 2021-11-03 PROCEDURE — G0008 ADMIN INFLUENZA VIRUS VAC: HCPCS | Performed by: INTERNAL MEDICINE

## 2021-11-03 RX ORDER — LOSARTAN POTASSIUM 25 MG/1
25 TABLET ORAL DAILY
Qty: 90 TABLET | Refills: 1 | Status: SHIPPED | OUTPATIENT
Start: 2021-11-03 | End: 2022-01-28 | Stop reason: SDUPTHER

## 2021-11-04 ENCOUNTER — DOCUMENTATION (OUTPATIENT)
Dept: PLASTIC SURGERY | Facility: CLINIC | Age: 71
End: 2021-11-04

## 2021-11-18 RX ORDER — CHOLECALCIFEROL (VITAMIN D3) 1250 MCG
CAPSULE ORAL
Qty: 12 CAPSULE | Refills: 1 | Status: SHIPPED | OUTPATIENT
Start: 2021-11-18 | End: 2022-07-08

## 2021-11-24 ENCOUNTER — HOSPITAL ENCOUNTER (OUTPATIENT)
Dept: ULTRASOUND IMAGING | Facility: HOSPITAL | Age: 71
Discharge: HOME | End: 2021-11-24
Admitting: INTERNAL MEDICINE

## 2021-11-24 DIAGNOSIS — E04.1 THYROID NODULE: Primary | ICD-10-CM

## 2021-11-24 PROCEDURE — 76536 US EXAM OF HEAD AND NECK: CPT

## 2021-12-03 ENCOUNTER — HOSPITAL ENCOUNTER (OUTPATIENT)
Dept: NUCLEAR MEDICINE | Facility: HOSPITAL | Age: 71
Discharge: HOME | End: 2021-12-03

## 2021-12-03 DIAGNOSIS — R06.09 DYSPNEA ON EXERTION: ICD-10-CM

## 2021-12-03 DIAGNOSIS — E11.65 TYPE 2 DIABETES MELLITUS WITH HYPERGLYCEMIA, WITHOUT LONG-TERM CURRENT USE OF INSULIN (HCC): ICD-10-CM

## 2021-12-03 PROCEDURE — 93017 CV STRESS TEST TRACING ONLY: CPT

## 2021-12-03 PROCEDURE — 93016 CV STRESS TEST SUPVJ ONLY: CPT | Performed by: NURSE PRACTITIONER

## 2021-12-03 PROCEDURE — A9502 TC99M TETROFOSMIN: HCPCS | Performed by: INTERNAL MEDICINE

## 2021-12-03 PROCEDURE — 0 TECHNETIUM TETROFOSMIN KIT: Performed by: INTERNAL MEDICINE

## 2021-12-03 PROCEDURE — 78452 HT MUSCLE IMAGE SPECT MULT: CPT

## 2021-12-03 PROCEDURE — 78452 HT MUSCLE IMAGE SPECT MULT: CPT | Performed by: INTERNAL MEDICINE

## 2021-12-03 PROCEDURE — 93018 CV STRESS TEST I&R ONLY: CPT | Performed by: INTERNAL MEDICINE

## 2021-12-03 RX ADMIN — TETROFOSMIN 1 DOSE: 1.38 INJECTION, POWDER, LYOPHILIZED, FOR SOLUTION INTRAVENOUS at 08:57

## 2021-12-03 RX ADMIN — TETROFOSMIN 1 DOSE: 1.38 INJECTION, POWDER, LYOPHILIZED, FOR SOLUTION INTRAVENOUS at 10:36

## 2021-12-04 LAB
BH CV IMMEDIATE POST RECOVERY TECH DATA SYMPTOMS: NORMAL
BH CV IMMEDIATE POST TECH DATA BLOOD PRESSURE: NORMAL MMHG
BH CV IMMEDIATE POST TECH DATA HEART RATE: 120 BPM
BH CV IMMEDIATE POST TECH DATA OXYGEN SATS: 96 %
BH CV REST NUCLEAR ISOTOPE DOSE: 9 MCI
BH CV SIX MINUTE RECOVERY TECH DATA BLOOD PRESSURE: NORMAL
BH CV SIX MINUTE RECOVERY TECH DATA HEART RATE: 98 BPM
BH CV SIX MINUTE RECOVERY TECH DATA OXYGEN SATURATION: 97 %
BH CV SIX MINUTE RECOVERY TECH DATA SYMPTOMS: NORMAL
BH CV STRESS BP STAGE 1: NORMAL
BH CV STRESS BP STAGE 2: NORMAL
BH CV STRESS DURATION MIN STAGE 1: 3
BH CV STRESS DURATION MIN STAGE 2: 3
BH CV STRESS DURATION SEC STAGE 1: 0
BH CV STRESS DURATION SEC STAGE 2: 0
BH CV STRESS GRADE STAGE 1: 5
BH CV STRESS GRADE STAGE 2: 3
BH CV STRESS HR STAGE 1: 126
BH CV STRESS HR STAGE 2: 143
BH CV STRESS METS STAGE 1: 5
BH CV STRESS METS STAGE 2: 7.5
BH CV STRESS NUCLEAR ISOTOPE DOSE: 35.6 MCI
BH CV STRESS O2 STAGE 1: 95
BH CV STRESS O2 STAGE 2: 97
BH CV STRESS PROTOCOL 1: NORMAL
BH CV STRESS RECOVERY BP: NORMAL MMHG
BH CV STRESS RECOVERY HR: 98 BPM
BH CV STRESS RECOVERY O2: 97 %
BH CV STRESS SPEED STAGE 1: 1.7
BH CV STRESS SPEED STAGE 2: 1.7
BH CV STRESS STAGE 1: 1
BH CV STRESS STAGE 2: 2
BH CV THREE MINUTE POST TECH DATA BLOOD PRESSURE: NORMAL MMHG
BH CV THREE MINUTE POST TECH DATA HEART RATE: 102 BPM
BH CV THREE MINUTE POST TECH DATA OXYGEN SATURATION: 97 %
LV EF NUC BP: 80 %
MAXIMAL PREDICTED HEART RATE: 148 BPM
PERCENT MAX PREDICTED HR: 96.62 %
STRESS BASELINE BP: NORMAL MMHG
STRESS BASELINE HR: 82 BPM
STRESS O2 SAT REST: 94 %
STRESS PERCENT HR: 114 %
STRESS POST ESTIMATED WORKLOAD: 3.5 METS
STRESS POST EXERCISE DUR MIN: 5 MIN
STRESS POST EXERCISE DUR SEC: 0 SEC
STRESS POST O2 SAT PEAK: 97 %
STRESS POST PEAK BP: NORMAL MMHG
STRESS POST PEAK HR: 143 BPM
STRESS TARGET HR: 126 BPM

## 2021-12-06 ENCOUNTER — TELEPHONE (OUTPATIENT)
Dept: INTERNAL MEDICINE | Facility: CLINIC | Age: 71
End: 2021-12-06

## 2021-12-06 RX ORDER — MELOXICAM 7.5 MG/1
TABLET ORAL
Qty: 90 TABLET | Refills: 0 | Status: SHIPPED | OUTPATIENT
Start: 2021-12-06 | End: 2021-12-07

## 2021-12-06 NOTE — TELEPHONE ENCOUNTER
Patient called and said that she feels like she has a regular cold and has coughed up something the color yellow and would like something sent in and does not want to wait till the end of the day to get it
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
## 2021-12-06 NOTE — PROGRESS NOTES
Follow-up (Consult for bilateral breast reconstruction)       {Problem List  Visit Diagnosis   Encounters  Notes  Medications  Labs  Result Review Imaging  Media :23}     History of Present Illness  Jyoti Alvarez is a 71 y.o. female who presents to [facility] as a consult from Comment:  {Ref:[id number]}   to discuss breast reconstructive options.  She wears *** bra size and would like to be***  Subjective      Gabapentin, Jardiance [empagliflozin], Sulfa antibiotics, Exenatide, Fluoxetine, and Levaquin [levofloxacin]  Allergies Reconciled.    Review of Systems     Objective     There were no vitals taken for this visit.    There is no height or weight on file to calculate BMI.    Physical Exam  Bilateral breast with ***  breast larger than ***, grade ** nipple ptosis, no palpable masses or tenderness   Axillary Lymphadenopathy: No axillary lymphadenopathy  SN-N (Right Breast):   SN-N (Left Breast):  N-IMF (Right Breast):  N-IMF (Left Breast):  Base Width (Right Breast):  Base Width (Left Breast):      Result Review :{Labs  Result Review  Imaging  Med Tab  Media :23}   {The following data was reviewed by (Optional):[id number]}           Assessment and Plan {CC Problem List  Visit Diagnosis  ROS  Review (Popup)  [facility] Maintenance  Quality  BestPractice  Medications  SmartSets  SnapShot Encounters  Media :23}     There are no diagnoses linked to this encounter.    Additional Order(s):       Plan:  • Breast reconstruction options (Tissue Expander/Implant versus Autologous) were all discussed with the patient at length.  In addition, we discussed options for symmetry procedures and nipple reconstruction.  The patient understands that her reconstructed breast will not have the same sensation as a natural breast and that visible incision lines will always be present.  In addition, patient understand that breast reconstruction is a multi-stage  procedure that can take months to years to complete.   • We will send information for prior authorization.  Photos were obtained.   • Patient was given the breast reconstruction pamphlet as well as directed to the ASPS website for additional information.   • The patient was made aware that the FDA has discovered rare occurrences between an uncommon form of lymphoma (anaplastic large cell) and women with breast implants.  While the incidence is quite low, the risk of development is real; therefore, any unusual symptoms or signs (most commonly a late fluid collection years later) should be brought to the attention of your physician.  Patient also counseled on risks and benefits of saline versus silicone implants, lifting restrictions, scar placement, risk of capsular contracture, animation deformity, need for likely revision of breast implants at some point in her life, FDA recommendation of MRI every three years to monitor for rupture, and continued mammography for breast cancer surveillance.   • We will have patient obtain pre-op clearance from their PCP.  • We had a long discussion with the patient and her family today regarding her reconstructive options.  These include no reconstruction, reconstruction at the time of mastectomy or lumpectomy, and finally delayed reconstruction.  We discussed specific types of reconstruction, including: tissue expander and/or implants, and autologous reconstruction with either pedicled or free flap.  We also covered the later stages of reconstruction, including nipple reconstruction and areola tattooing, fat grafting, and symmetry procedures if indicated or desired.   • I described the typical armando-operative course of each procedure, including the nature of the procedure, hospital stay, necessity for drains, post-operative activity restriction, and postoperative visits (including those for tissue expansion).  We also discussed the time frame for reconstruction.  I shared  information about saline vs. silicone implants, including their differences, risk of capsular contracture, rippling, or leak/rupture, and safety/monitoring issues.  I described Alloderm and its use for implant reconstruction. We discussed that radiation therapy, if she ultimately requires it, may alter the reconstructive options.     • We reviewed surgical risks including, but not limited to, infection, bleeding, hematoma, seroma, pain, scarring, numbness, skin or nipple loss, wound healing problems, flap failures including partial or complete flap loss, asymmetry, need for revisions or further surgeries,  and risks associated with anesthesia.   • Additional risks with autologous reconstruction include donor wound morbidities, such as hernias or bulges, wound healing problems, muscle weakness, partial or complete flap loss, and typical surgical risks as listed above.   • The use of biological mesh is not for soft tissue reinforcement. The use of mesh is necessary because the mastectomy dissection pocket is larger than the implant itself. The intention of the mesh is to restrain the displacement of the implant to the axilla, which is a very common complication requiring revision. In my experience, the use of mesh avoids that specific complication and very often avoids a second surgery. The use of mesh also allows me to perform a safer procedure since it holds the implant in place and alleviates the pressure over the skin that depends only on the subdermal blood supply. Without the mesh, I believe I wouldn't be able to perform direct implant reconstruction and give the optimal result that patient desires and deserves.   •  Specifically on her case,    • Consent:  .   • Target implant size: *** cc  •   • I spent 90 minutes explaining to patient and her  about her procedure and after care.   •   • CPT codes:   • 52076-76 - immediate insertion of breast prosthesis following reconstruction   • 33042-47   implantation of biologic implant  • 53877- intravenous injection of agent (SPY)  • 29553- breast reduction  · 97325 - fat graft to the breast  · 32375- additional 50 cc   · 47134-57  implantation of biologic implant  · 19370- capsulotomy   · 19371-  capsulectomy  · 10666- delayed insertion of breast prosthesis following reconstruction  · 95249 -replacement of tissue expander with permanent prosthesis  · 49587-open umbilical hernia repair  - incarcerated   • 99735 tissue expander placement  • 35187- port removal   •   • Implants:  • Sientra Smooth round gel implant  HP 58043-860, 535, 565 x2  • Expander: [id number] x2  • Single use sizer [id number] x1  •   · Implants:  · Allergan [id number], 520, 560 x 3  Alloderm select perforated rectangular 16x20 cm [id number] x2   · Expander: [id number] x2  · Single use sizer [id number] x1    OR time:     • Romain Quezada MD, PhD  • NPI: [id number]    I spent 60 minutes caring for Jyoti on this date of service. This time includes time spent by me in the following activities:performing a medically appropriate examination and/or evaluation , counseling and educating the patient/family/caregiver, documenting information in the medical record, and care coordination    Follow Up {Instructions Charge Capture  Follow-up Communications :23}    No follow-ups on file.    Patient was given instructions and counseling regarding her condition. Please see specific information pulled into the AVS if appropriate.     Rama Felix  12/09/2021

## 2021-12-06 NOTE — TELEPHONE ENCOUNTER
The regular cold is a virus, and there is no prescription medication for it.  The patient can use Mucinex over-the-counter to help continue to clear the sputum.  Yellow sputum does not indicate bacterial infection.  If desired, the patient can be seen by Kenya or Annette today, perhaps just with a televisit.

## 2021-12-07 ENCOUNTER — TELEMEDICINE (OUTPATIENT)
Dept: INTERNAL MEDICINE | Facility: CLINIC | Age: 71
End: 2021-12-07

## 2021-12-07 DIAGNOSIS — J06.9 ACUTE URI: Primary | ICD-10-CM

## 2021-12-07 PROCEDURE — 99213 OFFICE O/P EST LOW 20 MIN: CPT

## 2021-12-07 RX ORDER — METOPROLOL SUCCINATE 100 MG/1
100 TABLET, EXTENDED RELEASE ORAL DAILY
Qty: 90 TABLET | Refills: 0 | Status: SHIPPED | OUTPATIENT
Start: 2021-12-07 | End: 2021-12-13

## 2021-12-07 NOTE — PROGRESS NOTES
Mode of Visit: Video  Location of patient: home  You have chosen to receive care through a telehealth visit.  Does the patient consent to use a video/audio connection for your medical care today? Yes  The visit included audio and video interaction. No technical issues occurred during this visit.     Chief Complaint  Cough (yellow flem. making her self couch. ) and Nasal Congestion (patient believes it part of allergies.)    Subjective          Jyoti Lilia Alvarez presents to [facility]  History of Present Illness   She complains of congestion and cough with yellow sputum production. This has been going on for about 3 days. She was not sure what she should take with that. She started taking Mucinex. She does not feel that bad.   She denies any fevers, chills or body aches. Denies wheezing, shortness of breath, denies sinus pressure, or headaches. She is using flonase and singulair.   Symptoms are improved since starting the mucinex.    BP-138/83, HR 80  BG-131  WT-162.4    Has had both vaccines. Hx of COVID in Sept.    Objective   Vital Signs:   There were no vitals taken for this visit.    Physical Exam   Constitutional: She appears well-developed and well-nourished.   HENT:   Head: Normocephalic.   Pulmonary/Chest: Effort normal.  No respiratory distress.  Neurological: She is alert.   Skin: Skin is warm and dry.   Psychiatric: She has a normal mood and affect.     Result Review :                 Assessment and Plan    Diagnoses and all orders for this visit:    1. Acute URI (Primary)  Assessment & Plan:  Patient complains of cough and congestion for about 3 days. She does have some yellow sputum production. She is taking singulair and flonase already. She also has albuterol and advair PRN. She called the office and was instructed to start mucinex. She denies any fever, chills, body aches, chest tightness, SOA or wheezing. She states that since starting the mucinex she does  feel a little bit better. She has a history of COVID in September.   Plan: Continue current regimen. Add zyrtec. If she feels like she is not continuing to improve or she develops any shortness of breath, wheezing, worsening cough, fever, or any other symptoms she is to let us know.         Follow Up   Return for Next scheduled follow up.  Patient was given instructions and counseling regarding her condition or for health maintenance advice. Please see specific information pulled into the AVS if appropriate.

## 2021-12-07 NOTE — ASSESSMENT & PLAN NOTE
Patient complains of cough and congestion for about 3 days. She does have some yellow sputum production. She is taking singulair and flonase already. She also has albuterol and advair PRN. She called the office and was instructed to start mucinex. She denies any fever, chills, body aches, chest tightness, SOA or wheezing. She states that since starting the mucinex she does feel a little bit better. She has a history of COVID in September.   Plan: Continue current regimen. Add zyrtec. If she feels like she is not continuing to improve or she develops any shortness of breath, wheezing, worsening cough, fever, or any other symptoms she is to let us know.

## 2021-12-09 ENCOUNTER — PREP FOR SURGERY (OUTPATIENT)
Dept: OTHER | Facility: HOSPITAL | Age: 71
End: 2021-12-09

## 2021-12-09 ENCOUNTER — OFFICE VISIT (OUTPATIENT)
Dept: PLASTIC SURGERY | Facility: CLINIC | Age: 71
End: 2021-12-09

## 2021-12-09 VITALS
SYSTOLIC BLOOD PRESSURE: 123 MMHG | OXYGEN SATURATION: 95 % | TEMPERATURE: 98.4 F | BODY MASS INDEX: 30.36 KG/M2 | HEART RATE: 91 BPM | HEIGHT: 62 IN | WEIGHT: 165 LBS | DIASTOLIC BLOOD PRESSURE: 81 MMHG

## 2021-12-09 DIAGNOSIS — N65.0 DEFORMITY AND DISPROPORTION OF RECONSTRUCTED BREAST: Primary | ICD-10-CM

## 2021-12-09 DIAGNOSIS — N65.1 DEFORMITY AND DISPROPORTION OF RECONSTRUCTED BREAST: Primary | ICD-10-CM

## 2021-12-09 PROCEDURE — 99214 OFFICE O/P EST MOD 30 MIN: CPT | Performed by: SURGERY

## 2021-12-09 RX ORDER — CEFAZOLIN SODIUM 2 G/100ML
2 INJECTION, SOLUTION INTRAVENOUS ONCE
Status: CANCELLED | OUTPATIENT
Start: 2021-12-10 | End: 2021-12-10

## 2021-12-13 RX ORDER — METOPROLOL SUCCINATE 100 MG/1
TABLET, EXTENDED RELEASE ORAL
Qty: 90 TABLET | Refills: 1 | Status: SHIPPED | OUTPATIENT
Start: 2021-12-13 | End: 2022-03-09

## 2021-12-16 ENCOUNTER — OFFICE VISIT (OUTPATIENT)
Dept: OTOLARYNGOLOGY | Facility: CLINIC | Age: 71
End: 2021-12-16

## 2021-12-16 VITALS — HEIGHT: 62 IN | BODY MASS INDEX: 31.28 KG/M2 | WEIGHT: 170 LBS | TEMPERATURE: 97.8 F

## 2021-12-16 DIAGNOSIS — H61.23 BILATERAL IMPACTED CERUMEN: ICD-10-CM

## 2021-12-16 DIAGNOSIS — H90.3 SENSORINEURAL HEARING LOSS (SNHL) OF BOTH EARS: ICD-10-CM

## 2021-12-16 DIAGNOSIS — E04.1 THYROID NODULE: Primary | ICD-10-CM

## 2021-12-16 PROCEDURE — 99204 OFFICE O/P NEW MOD 45 MIN: CPT | Performed by: OTOLARYNGOLOGY

## 2021-12-16 PROCEDURE — 69210 REMOVE IMPACTED EAR WAX UNI: CPT | Performed by: OTOLARYNGOLOGY

## 2021-12-16 NOTE — PROGRESS NOTES
Patient Name: Jyoti Alvarez   Visit Date: 12/16/2021   Patient ID: [id number]  Provider: Dayo Dick MD    Sex: female  Location: [facility]   YOB: 1950  Location Address: [address] [place] [address], [address] [place] [address],?[id number]    Primary Care Provider Ramo Noguera MD  Location Phone: [phone]    Referring Provider: Ramo Noguera MD        Chief Complaint  Other (thyroid nodule ear check )    Subjective    History of Present Illness  Jyoti Alvarez is a 71 y.o. female who presents to [facility] today as a consult from Ramo Noguera MD.    She presents the clinic today for evaluation of recently discovered thyroid nodule on ultrasound, sensorineural hearing loss, and issues with cerumen impaction.  She notes having some right-sided lower neck pain for which she sought the care of her primary care physician.  An ultrasound was then obtained and reveals the thyroid to be of normal size.  There was a right-sided thyroid nodule that is small, 7 mm, hypoechoic, and rated as a T RADS 4 lesion.  There is also a small 5 mm cyst along the left thyroid lobe noted to be a colloid cyst.  There is no family history of thyroid cancer, and she has never had radiation exposure to her neck.  Since then the right-sided neck pain has improved.  She denies any voice changes or throat pain.    She has longstanding sensorineural hearing loss and has used hearing aids for 1 year.  She does have issues with cerumen.  Denies any other ear symptoms.    Past Medical History:   Diagnosis Date   • Acute URI    • Arthritis    • Asthma    • Breast cancer (HCC)    • Colon polyp    • Dental disease    • Diabetes (HCC)    • Diabetes (HCC)    • Disease of thyroid gland Hypothyroidism   • Fatty liver    • Gastroesophageal reflux    • HL (hearing loss) Hearing aids 2021   • Hyperlipemia    • Hypertension    • Limb swelling    • Seasonal allergic rhinitis    •  Seasonal allergies    • Shortness of breath    • Skin cancer    • Strain of latissimus dorsi muscle 2017    Right   • Toe pain     patient had a pin placed in her toe   • Vertigo        Past Surgical History:   Procedure Laterality Date   • BREAST AUGMENTATION BILATERAL MASTOPEXY     • BREAST SURGERY      Bilateral mastectomies and reconstruction   • CATARACT EXTRACTION, BILATERAL     •  SECTION     • COLONOSCOPY     • COSMETIC SURGERY      Bilateral mastectomies and reconstructio   • ENDOSCOPY  2019   • EYE SURGERY  Not sure    Cateracts   • INTRAOCULAR LENS INSERTION      yes   • OOPHORECTOMY     • OTHER SURGICAL HISTORY      Joint surgery   • SKIN CANCER EXCISION Right 2020    neck   • TONSILLECTOMY           Current Outpatient Medications:   •  acetaminophen (TYLENOL) 650 MG 8 hr tablet, Take 650 mg by mouth 2 (two) times a day., Disp: , Rfl:   •  albuterol sulfate HFA (ProAir HFA) 108 (90 Base) MCG/ACT inhaler, , Disp: , Rfl:   •  amLODIPine (NORVASC) 5 MG tablet, , Disp: , Rfl:   •  aspirin (aspirin) 81 MG EC tablet, aspirin 81 mg oral tablet,delayed release (DR/EC) take 1 tablet (81 mg) by oral route once daily   Active, Disp: , Rfl:   •  atorvastatin (LIPITOR) 20 MG tablet, , Disp: , Rfl:   •  BD Pen Needle Ca U/F 32G X 4 MM misc, , Disp: , Rfl:   •  betamethasone dipropionate (DIPROLENE) 0.05 % lotion, APPLY TO SCALP TWICE DAILY AS NEEDED FOR DISCOMFORT, Disp: , Rfl:   •  Cholecalciferol (Vitamin D3) 1.25 MG (37802 UT) capsule, TAKE ONE CAPSULE BY MOUTH ONCE A WEEK, Disp: 12 capsule, Rfl: 1  •  Dulaglutide (Trulicity) 1.5 MG/0.5ML solution pen-injector, Inject 1.5 mg under the skin into the appropriate area as directed 1 (One) Time Per Week., Disp: 12 pen, Rfl: 1  •  fluocinonide (LIDEX) 0.05 % cream, fluocinonide 0.05 % topical cream apply to affected area(s) by topical route As needed   Active, Disp: , Rfl:   •  fluticasone (FLONASE) 50 MCG/ACT nasal spray, fluticasone 50  mcg/actuation nasal spray,suspension spray 1 spray (50 mcg) in each nostril by intranasal route once daily   Active, Disp: , Rfl:   •  fluticasone-salmeterol (Advair Diskus) 500-50 MCG/DOSE DISKUS, Inhale 1 puff 2 (Two) Times a Day., Disp: 3 each, Rfl: 1  •  glimepiride (AMARYL) 4 MG tablet, TAKE 1 TABLET TWICE A DAY, Disp: 180 tablet, Rfl: 0  •  hydrocortisone 2.5 % cream, APPLY TO SCALY AREAS ON THE EARS TWICE DAILY AS NEEDED FLARES., Disp: , Rfl:   •  ketoconazole (NIZORAL) 2 % shampoo, WASH FACE AND SCALP TWO TO THREE TIMES WEEKLY. LEAVE ON FOR 5 MINUTES BEFORE RINSING, Disp: , Rfl:   •  Lantus SoloStar 100 UNIT/ML injection pen, Inject 45 Units under the skin into the appropriate area as directed Every Night., Disp: , Rfl:   •  levothyroxine (SYNTHROID, LEVOTHROID) 25 MCG tablet, TAKE 1 TABLET DAILY IN THE MORNING ON AN EMPTY STOMACH, Disp: 90 tablet, Rfl: 1  •  losartan (COZAAR) 25 MG tablet, Take 1 tablet by mouth Daily., Disp: 90 tablet, Rfl: 1  •  metoprolol succinate XL (TOPROL-XL) 100 MG 24 hr tablet, TAKE 1 TABLET DAILY, Disp: 90 tablet, Rfl: 1  •  montelukast (SINGULAIR) 10 MG tablet, , Disp: , Rfl:   •  pantoprazole (PROTONIX) 40 MG EC tablet, , Disp: , Rfl:      Allergies   Allergen Reactions   • Gabapentin Headache     Occular Miaigraines   • Jardiance [Empagliflozin] Other (See Comments)     UTI   • Sulfa Antibiotics Swelling   • Exenatide Other (See Comments)     Weight loss and pain in the right side. Bydereon.   • Fluoxetine Hives   • Levaquin [Levofloxacin] Hives       Family History   Problem Relation Age of Onset   • Heart disease Mother    • Cancer Mother         Breast cancer age 45 and 60   • Diabetes Mother    • Arthritis Mother    • Osteoporosis Mother    • Heart failure Mother         Heart attack age 60. Age 87. Cause of death.   • Hypertension Mother    • Osteoarthritis Mother    • Heart disease Father    • Cancer Father         Skin cancer   • Heart failure Father         Age 79   "cause of death.   • Cancer Sister         Breast cancer age 27 and 52. Ovarian cancer age 53 caused death at 56.   • Diabetes Sister    • Ovarian cancer Sister 53   • Arthritis Sister    • Cancer Brother         Prostate cancer age about 58.   • Prostate cancer Brother         unsure age   • Breast cancer Other    • Skin cancer Other    • Cancer Daughter    • Cancer Maternal Aunt         Breast cancer   • Cancer Maternal Aunt          with ovarian cancer age mid 40s        Social History     Social History Narrative   • Not on file       Objective     Vital Signs:   Temp 97.8 °F (36.6 °C) (Temporal)   Ht 157.5 cm (62\")   Wt 77.1 kg (170 lb)   BMI 31.09 kg/m²       Physical Exam    Ear Cerumen Removal    Date/Time: 2021 3:10 PM  Performed by: Dayo Dick MD  Authorized by: Dayo iDck MD     Anesthesia:  Local Anesthetic: none  Location details: left ear and right ear  Procedure type: instrumentation, curette   Sedation:  Patient sedated: no            Constitutional   Appearance  · : well developed, well-nourished, alert and in no acute distress, voice clear and strong    Head  Inspection  · : no deformities or lesions  Face  Inspection  · : No facial lesions; House-Brackmann I/VI bilaterally  Palpation  · : No TMJ crepitus nor  muscle tenderness bilaterally    Eyes  Vision  Visual Fields  · : Extraocular movements are intact. No spontaneous or gaze-induced nystagmus.  Conjunctivae  · : clear  Sclerae  · : clear  Pupils and Irises  · : pupils equal, round, and reactive to light.     Ears, Nose, Mouth and Throat    Ears    External Ears  · : appearance within normal limits, no lesions present  Otoscopic Examination  · : Cerumen impactions, cleared with curettes as above, tympanic membrane appearance within normal limits bilaterally without perforations, well-aerated middle ears  Hearing  · : Difficulty hearing without hearing aids  Tunning fork testing:     :    Nose    External " Nose  · : appearance normal  Intranasal Exam  · : mucosa within normal limits, vestibules normal, no intranasal lesions present, septum midline, sinuses non tender to percussion  Oral Cavity    Oral Mucosa  · : oral mucosa normal without pallor or cyanosis  Lips  · : lip appearance normal  Teeth  · : normal dentition for age  Gums  · : gums pink, non-swollen, no bleeding present  Tongue  · : tongue appearance normal; normal mobility  Palate  · : hard palate normal, soft palate appearance normal with symmetric mobility    Throat    Oropharynx  · : no inflammation or lesions present, tonsils within normal limits  Hypopharynx  · : appearance within normal limits, superior epiglottis within normal limits  Larynx  · : appearance within normal limits, vocal cords within normal limits, no lesions present    Neck  Inspection/Palpation  · : normal appearance, no masses or tenderness, trachea midline; thyroid size normal, nontender, thick neck, difficult to palpate the nodule    Respiratory  Respiratory Effort  · : breathing unlabored  Inspection of Chest  · : normal appearance, no retractions    Cardiovascular  Heart  · : regular rate and rhythm    Lymphatic  Neck  · : no lymphadenopathy present  Supraclavicular Nodes  · : no lymphadenopathy present  Preauricular Nodes  · : no lymphadenopathy present    Skin and Subcutaneous Tissue  General Inspection  · : Regarding face and neck - there are no rashes present, no lesions present, and no areas of discoloration    Neurologic  Cranial Nerves  · : cranial nerves II-XII are grossly intact bilaterally  Gait and Station  · : normal gait, able to stand without diffculty    Psychiatric  Judgement and Insight  · : judgment and insight intact  Mood and Affect  · : mood normal, affect appropriate          Assessment and Plan    Diagnoses and all orders for this visit:    1. Thyroid nodule (Primary)    2. Sensorineural hearing loss (SNHL) of both ears    3. Bilateral impacted  cerumen    Examination today revealed bilateral cerumen impactions which were cleared.  Her ears look otherwise normal.  Thyroid is normal on palpation, and I was not able to palpate the right-sided nodule due to small size.  I have recommended repeating ultrasound in 1 year, or sooner should there be any symptoms.  I would like for her to come back to my clinic should there be any changes on the ultrasound.    Follow Up   No follow-ups on file.  Patient was given instructions and counseling regarding her condition or for health maintenance advice. Please see specific information pulled into the AVS if appropriate.

## 2021-12-20 RX ORDER — INSULIN GLARGINE 100 [IU]/ML
INJECTION, SOLUTION SUBCUTANEOUS
Qty: 75 ML | Refills: 3 | Status: SHIPPED | OUTPATIENT
Start: 2021-12-20 | End: 2023-01-04

## 2021-12-21 ENCOUNTER — TELEPHONE (OUTPATIENT)
Dept: PLASTIC SURGERY | Facility: CLINIC | Age: 71
End: 2021-12-21
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
## 2022-01-05 RX ORDER — GLIMEPIRIDE 4 MG/1
TABLET ORAL
Qty: 180 TABLET | Refills: 1 | Status: SHIPPED | OUTPATIENT
Start: 2022-01-05 | End: 2022-04-28 | Stop reason: SDUPTHER

## 2022-01-12 ENCOUNTER — TRANSCRIBE ORDERS (OUTPATIENT)
Dept: LAB | Facility: HOSPITAL | Age: 72
End: 2022-01-12

## 2022-01-12 ENCOUNTER — LAB (OUTPATIENT)
Dept: LAB | Facility: HOSPITAL | Age: 72
End: 2022-01-12

## 2022-01-12 ENCOUNTER — OFFICE VISIT (OUTPATIENT)
Dept: PLASTIC SURGERY | Facility: CLINIC | Age: 72
End: 2022-01-12

## 2022-01-12 VITALS
WEIGHT: 169 LBS | BODY MASS INDEX: 31.1 KG/M2 | OXYGEN SATURATION: 100 % | DIASTOLIC BLOOD PRESSURE: 91 MMHG | HEIGHT: 62 IN | HEART RATE: 93 BPM | TEMPERATURE: 98.1 F | SYSTOLIC BLOOD PRESSURE: 154 MMHG

## 2022-01-12 DIAGNOSIS — Z01.818 PRE-OP TESTING: Primary | ICD-10-CM

## 2022-01-12 DIAGNOSIS — N65.1 DEFORMITY AND DISPROPORTION OF RECONSTRUCTED BREAST: Primary | ICD-10-CM

## 2022-01-12 DIAGNOSIS — N65.0 DEFORMITY AND DISPROPORTION OF RECONSTRUCTED BREAST: Primary | ICD-10-CM

## 2022-01-12 DIAGNOSIS — Z01.818 PRE-OP TESTING: ICD-10-CM

## 2022-01-12 PROCEDURE — U0004 COV-19 TEST NON-CDC HGH THRU: HCPCS

## 2022-01-12 PROCEDURE — 99214 OFFICE O/P EST MOD 30 MIN: CPT | Performed by: SURGERY

## 2022-01-12 PROCEDURE — C9803 HOPD COVID-19 SPEC COLLECT: HCPCS

## 2022-01-12 RX ORDER — PROMETHAZINE HYDROCHLORIDE 12.5 MG/1
12.5 TABLET ORAL EVERY 6 HOURS PRN
Qty: 20 TABLET | Refills: 0 | Status: SHIPPED | OUTPATIENT
Start: 2022-01-12 | End: 2022-02-07

## 2022-01-12 RX ORDER — CEPHALEXIN 500 MG/1
500 CAPSULE ORAL 4 TIMES DAILY
Qty: 12 CAPSULE | Refills: 0 | Status: SHIPPED | OUTPATIENT
Start: 2022-01-12 | End: 2022-01-15

## 2022-01-12 RX ORDER — OXYCODONE HYDROCHLORIDE AND ACETAMINOPHEN 5; 325 MG/1; MG/1
1 TABLET ORAL EVERY 6 HOURS PRN
Qty: 30 TABLET | Refills: 0 | Status: SHIPPED | OUTPATIENT
Start: 2022-01-12 | End: 2022-02-07

## 2022-01-13 LAB — SARS-COV-2 RNA PNL SPEC NAA+PROBE: NOT DETECTED

## 2022-01-26 ENCOUNTER — PRE-ADMISSION TESTING (OUTPATIENT)
Dept: PREADMISSION TESTING | Facility: HOSPITAL | Age: 72
End: 2022-01-26

## 2022-01-26 ENCOUNTER — TELEPHONE (OUTPATIENT)
Dept: PLASTIC SURGERY | Facility: CLINIC | Age: 72
End: 2022-01-26

## 2022-01-26 ENCOUNTER — TELEPHONE (OUTPATIENT)
Dept: INTERNAL MEDICINE | Facility: CLINIC | Age: 72
End: 2022-01-26

## 2022-01-26 VITALS — HEIGHT: 62 IN | BODY MASS INDEX: 31.48 KG/M2 | WEIGHT: 171.08 LBS

## 2022-01-26 DIAGNOSIS — E11.65 TYPE 2 DIABETES MELLITUS WITH HYPERGLYCEMIA, WITHOUT LONG-TERM CURRENT USE OF INSULIN: Primary | ICD-10-CM

## 2022-01-26 LAB
ANION GAP SERPL CALCULATED.3IONS-SCNC: 13.3 MMOL/L (ref 5–15)
BUN SERPL-MCNC: 14 MG/DL (ref 8–23)
BUN/CREAT SERPL: 14.3 (ref 7–25)
CALCIUM SPEC-SCNC: 10.5 MG/DL (ref 8.6–10.5)
CHLORIDE SERPL-SCNC: 98 MMOL/L (ref 98–107)
CO2 SERPL-SCNC: 25.7 MMOL/L (ref 22–29)
CREAT SERPL-MCNC: 0.98 MG/DL (ref 0.57–1)
GFR SERPL CREATININE-BSD FRML MDRD: 56 ML/MIN/1.73
GLUCOSE SERPL-MCNC: 175 MG/DL (ref 65–99)
POTASSIUM SERPL-SCNC: 4.5 MMOL/L (ref 3.5–5.2)
SODIUM SERPL-SCNC: 137 MMOL/L (ref 136–145)

## 2022-01-26 PROCEDURE — 36415 COLL VENOUS BLD VENIPUNCTURE: CPT

## 2022-01-26 PROCEDURE — 80048 BASIC METABOLIC PNL TOTAL CA: CPT

## 2022-01-26 RX ORDER — FERROUS SULFATE 325(65) MG
325 TABLET ORAL
COMMUNITY

## 2022-01-26 RX ORDER — CHOLECALCIFEROL (VITAMIN D3) 125 MCG
500 CAPSULE ORAL DAILY
COMMUNITY

## 2022-01-26 NOTE — TELEPHONE ENCOUNTER
Spoke to Dr. Noguera and he feels it is appropriate for patient to stop ASA for up coming surgery. I will notify patient to stop ASA today.

## 2022-01-26 NOTE — DISCHARGE INSTRUCTIONS
IMPORTANT INSTRUCTIONS - PRE-ADMISSION TESTING  1. DO NOT EAT OR CHEW anything after midnight the night before your procedure.    2. You may have CLEAR liquids up to _2_ hours prior to ARRIVAL time.   3. Take the following medications the morning of your procedure with JUST A SIP OF WATER:METOPROLOL, LEVOTHYROXINE, TYLENOL IF NEEDED, AMLODIPINE, ATROVASTATIN AND FLONASE. BRING INHALER    4. DO NOT BRING your medications to the hospital with you, UNLESS something has changed since your PRE-Admission Testing appointment.  5. Hold all vitamins, supplements, and NSAIDS (Non- steroidal anti-inflammatory meds) for one week prior to surgery (you MAY take Tylenol or Acetaminophen).  6. If you are diabetic, check your blood sugar the morning of your procedure. If it is less than 70 or if you are feeling symptomatic, call the following number for further instructions: [phone]_.  7. Use your inhalers/nebulizers as usual, the morning of your procedure. BRING YOUR INHALERS with you.   8. Bring your CPAP or BIPAP to hospital, ONLY IF YOU WILL BE SPENDING THE NIGHT.   9. Make sure you have a ride home and have someone who will stay with you the day of your procedure after you go home.  10. If you have any questions, please call your Pre-Admission Testing NurseKELSI_ at [phone]___.   11. Per anesthesia request, do not smoke for 24 hours before your procedure or as instructed by your surgeon.

## 2022-01-27 ENCOUNTER — LAB (OUTPATIENT)
Dept: LAB | Facility: HOSPITAL | Age: 72
End: 2022-01-27

## 2022-01-27 ENCOUNTER — ANESTHESIA EVENT (OUTPATIENT)
Dept: PERIOP | Facility: HOSPITAL | Age: 72
End: 2022-01-27

## 2022-01-27 DIAGNOSIS — E78.2 MIXED HYPERLIPIDEMIA: ICD-10-CM

## 2022-01-27 DIAGNOSIS — E11.65 TYPE 2 DIABETES MELLITUS WITH HYPERGLYCEMIA, WITHOUT LONG-TERM CURRENT USE OF INSULIN: ICD-10-CM

## 2022-01-27 DIAGNOSIS — E53.8 VITAMIN B12 DEFICIENCY: ICD-10-CM

## 2022-01-27 DIAGNOSIS — E55.9 VITAMIN D DEFICIENCY: ICD-10-CM

## 2022-01-27 DIAGNOSIS — N65.1 DEFORMITY AND DISPROPORTION OF RECONSTRUCTED BREAST: ICD-10-CM

## 2022-01-27 DIAGNOSIS — I10 ESSENTIAL HYPERTENSION: ICD-10-CM

## 2022-01-27 DIAGNOSIS — N65.0 DEFORMITY AND DISPROPORTION OF RECONSTRUCTED BREAST: ICD-10-CM

## 2022-01-27 LAB
FOLATE SERPL-MCNC: 11.7 NG/ML (ref 4.78–24.2)
HBA1C MFR BLD: 8.02 % (ref 4.8–5.6)
VIT B12 BLD-MCNC: 732 PG/ML (ref 211–946)

## 2022-01-27 PROCEDURE — 83036 HEMOGLOBIN GLYCOSYLATED A1C: CPT

## 2022-01-27 PROCEDURE — 82746 ASSAY OF FOLIC ACID SERUM: CPT

## 2022-01-27 PROCEDURE — 80061 LIPID PANEL: CPT

## 2022-01-27 PROCEDURE — 82607 VITAMIN B-12: CPT

## 2022-01-27 PROCEDURE — 80053 COMPREHEN METABOLIC PANEL: CPT

## 2022-01-27 PROCEDURE — U0004 COV-19 TEST NON-CDC HGH THRU: HCPCS

## 2022-01-27 PROCEDURE — 82652 VIT D 1 25-DIHYDROXY: CPT

## 2022-01-28 ENCOUNTER — TELEPHONE (OUTPATIENT)
Dept: INTERNAL MEDICINE | Facility: CLINIC | Age: 72
End: 2022-01-28

## 2022-01-28 LAB
ALBUMIN SERPL-MCNC: 4.2 G/DL (ref 3.5–5.2)
ALBUMIN/GLOB SERPL: 1.7 G/DL
ALP SERPL-CCNC: 73 U/L (ref 39–117)
ALT SERPL W P-5'-P-CCNC: 31 U/L (ref 1–33)
ANION GAP SERPL CALCULATED.3IONS-SCNC: 11 MMOL/L (ref 5–15)
AST SERPL-CCNC: 26 U/L (ref 1–32)
BILIRUB SERPL-MCNC: 0.4 MG/DL (ref 0–1.2)
BUN SERPL-MCNC: 13 MG/DL (ref 8–23)
BUN/CREAT SERPL: 13.1 (ref 7–25)
CALCIUM SPEC-SCNC: 9.6 MG/DL (ref 8.6–10.5)
CHLORIDE SERPL-SCNC: 103 MMOL/L (ref 98–107)
CHOLEST SERPL-MCNC: 174 MG/DL (ref 0–200)
CO2 SERPL-SCNC: 23 MMOL/L (ref 22–29)
CREAT SERPL-MCNC: 0.99 MG/DL (ref 0.57–1)
GFR SERPL CREATININE-BSD FRML MDRD: 55 ML/MIN/1.73
GLOBULIN UR ELPH-MCNC: 2.5 GM/DL
GLUCOSE SERPL-MCNC: 169 MG/DL (ref 65–99)
HDLC SERPL-MCNC: 46 MG/DL (ref 40–60)
LDLC SERPL CALC-MCNC: 105 MG/DL (ref 0–100)
LDLC/HDLC SERPL: 2.21 {RATIO}
POTASSIUM SERPL-SCNC: 4.4 MMOL/L (ref 3.5–5.2)
PROT SERPL-MCNC: 6.7 G/DL (ref 6–8.5)
SARS-COV-2 RNA PNL SPEC NAA+PROBE: NOT DETECTED
SODIUM SERPL-SCNC: 137 MMOL/L (ref 136–145)
TRIGL SERPL-MCNC: 131 MG/DL (ref 0–150)
VLDLC SERPL-MCNC: 23 MG/DL (ref 5–40)

## 2022-01-28 RX ORDER — LOSARTAN POTASSIUM 50 MG/1
50 TABLET ORAL DAILY
Qty: 90 TABLET | Refills: 1 | Status: SHIPPED | OUTPATIENT
Start: 2022-01-28 | End: 2022-04-28 | Stop reason: SDUPTHER

## 2022-01-28 NOTE — TELEPHONE ENCOUNTER
Patient's blood pressure chart was reviewed, her diastolic blood pressure is high way too often.  She is only on low-dose losartan, 25 mg, so we need to increase that to 50 mg daily, I sent a prescription to her mail order.  Assuming she has enough of the 25 mg on hand, tell her start taking 2 of those once a day until the 50 mg dose shows up.  Thanks.

## 2022-01-31 LAB — 1,25(OH)2D SERPL-MCNC: 34.4 PG/ML (ref 19.9–79.3)

## 2022-01-31 RX ORDER — ALBUTEROL SULFATE 90 UG/1
AEROSOL, METERED RESPIRATORY (INHALATION)
Qty: 51 G | Refills: 2 | Status: SHIPPED | OUTPATIENT
Start: 2022-01-31 | End: 2023-03-06

## 2022-02-01 ENCOUNTER — HOSPITAL ENCOUNTER (OUTPATIENT)
Facility: HOSPITAL | Age: 72
Setting detail: SURGERY ADMIT
Discharge: HOME | End: 2022-02-01
Attending: SURGERY | Admitting: SURGERY

## 2022-02-01 ENCOUNTER — ANESTHESIA (OUTPATIENT)
Dept: PERIOP | Facility: HOSPITAL | Age: 72
End: 2022-02-01

## 2022-02-01 VITALS
HEIGHT: 62 IN | DIASTOLIC BLOOD PRESSURE: 75 MMHG | TEMPERATURE: 97.9 F | OXYGEN SATURATION: 94 % | RESPIRATION RATE: 17 BRPM | WEIGHT: 168.43 LBS | HEART RATE: 86 BPM | BODY MASS INDEX: 31 KG/M2 | SYSTOLIC BLOOD PRESSURE: 142 MMHG

## 2022-02-01 DIAGNOSIS — N65.1 DEFORMITY AND DISPROPORTION OF RECONSTRUCTED BREAST: ICD-10-CM

## 2022-02-01 DIAGNOSIS — N65.0 DEFORMITY AND DISPROPORTION OF RECONSTRUCTED BREAST: ICD-10-CM

## 2022-02-01 LAB
GLUCOSE BLDC GLUCOMTR-MCNC: 159 MG/DL (ref 70–99)
GLUCOSE BLDC GLUCOMTR-MCNC: 180 MG/DL (ref 70–99)

## 2022-02-01 PROCEDURE — ANES15: Performed by: SURGERY

## 2022-02-01 PROCEDURE — 25010000002 GENTAMICIN PER 80 MG: Performed by: SURGERY

## 2022-02-01 PROCEDURE — 25010000002 HYDROMORPHONE PER 4 MG: Performed by: NURSE ANESTHETIST, CERTIFIED REGISTERED

## 2022-02-01 PROCEDURE — 25010000002 PROPOFOL 10 MG/ML EMULSION: Performed by: NURSE ANESTHETIST, CERTIFIED REGISTERED

## 2022-02-01 PROCEDURE — 25010000002 NALOXONE PER 1 MG: Performed by: NURSE ANESTHETIST, CERTIFIED REGISTERED

## 2022-02-01 PROCEDURE — 25010000002 ONDANSETRON PER 1 MG: Performed by: NURSE ANESTHETIST, CERTIFIED REGISTERED

## 2022-02-01 PROCEDURE — 19342 INSJ/RPLCMT BRST IMPLT SEP D: CPT | Performed by: SURGERY

## 2022-02-01 PROCEDURE — 19380 REVJ RECONSTRUCTED BREAST: CPT | Performed by: SURGERY

## 2022-02-01 PROCEDURE — 25010000002 EPINEPHRINE PER 0.1 MG: Performed by: SURGERY

## 2022-02-01 PROCEDURE — 0 CEFAZOLIN IN DEXTROSE 2-4 GM/100ML-% SOLUTION: Performed by: SURGERY

## 2022-02-01 PROCEDURE — 25010000002 MIDAZOLAM PER 1 MG: Performed by: ANESTHESIOLOGY

## 2022-02-01 PROCEDURE — 88302 TISSUE EXAM BY PATHOLOGIST: CPT | Performed by: SURGERY

## 2022-02-01 PROCEDURE — 82962 GLUCOSE BLOOD TEST: CPT

## 2022-02-01 PROCEDURE — COS111: Performed by: SURGERY

## 2022-02-01 PROCEDURE — 25010000002 FENTANYL CITRATE (PF) 50 MCG/ML SOLUTION: Performed by: NURSE ANESTHETIST, CERTIFIED REGISTERED

## 2022-02-01 PROCEDURE — 15777 ACELLULAR DERM MATRIX IMPLT: CPT | Performed by: SURGERY

## 2022-02-01 PROCEDURE — C1789 PROSTHESIS, BREAST, IMP: HCPCS | Performed by: SURGERY

## 2022-02-01 PROCEDURE — 0 CEFAZOLIN PER 500 MG: Performed by: SURGERY

## 2022-02-01 PROCEDURE — 0 MEPERIDINE PER 100 MG: Performed by: NURSE ANESTHETIST, CERTIFIED REGISTERED

## 2022-02-01 DEVICE — GRFT TISS ALLODERM SELCT RTM CONTR PERF MD/THK 200CM/SQ XL: Type: IMPLANTABLE DEVICE | Site: BREAST | Status: FUNCTIONAL

## 2022-02-01 DEVICE — BRST GEL NATRELLE INSPIRA SMOTH COHESIVE XF/P 800CC: Type: IMPLANTABLE DEVICE | Site: BREAST | Status: FUNCTIONAL

## 2022-02-01 RX ORDER — ONDANSETRON 2 MG/ML
4 INJECTION INTRAMUSCULAR; INTRAVENOUS ONCE AS NEEDED
Status: DISCONTINUED | OUTPATIENT
Start: 2022-02-01 | End: 2022-02-01 | Stop reason: HOSPADM

## 2022-02-01 RX ORDER — SCOLOPAMINE TRANSDERMAL SYSTEM 1 MG/1
1 PATCH, EXTENDED RELEASE TRANSDERMAL
Status: DISCONTINUED | OUTPATIENT
Start: 2022-02-01 | End: 2022-02-01 | Stop reason: HOSPADM

## 2022-02-01 RX ORDER — ROCURONIUM BROMIDE 10 MG/ML
INJECTION, SOLUTION INTRAVENOUS AS NEEDED
Status: DISCONTINUED | OUTPATIENT
Start: 2022-02-01 | End: 2022-02-01 | Stop reason: SURG

## 2022-02-01 RX ORDER — EPINEPHRINE 1 MG/ML
INJECTION, SOLUTION, CONCENTRATE INTRAVENOUS AS NEEDED
Status: DISCONTINUED | OUTPATIENT
Start: 2022-02-01 | End: 2022-02-01 | Stop reason: HOSPADM

## 2022-02-01 RX ORDER — LIDOCAINE HYDROCHLORIDE 20 MG/ML
INJECTION, SOLUTION INFILTRATION; PERINEURAL AS NEEDED
Status: DISCONTINUED | OUTPATIENT
Start: 2022-02-01 | End: 2022-02-01 | Stop reason: SURG

## 2022-02-01 RX ORDER — PROMETHAZINE HYDROCHLORIDE 25 MG/1
25 SUPPOSITORY RECTAL ONCE AS NEEDED
Status: DISCONTINUED | OUTPATIENT
Start: 2022-02-01 | End: 2022-02-01 | Stop reason: HOSPADM

## 2022-02-01 RX ORDER — HYDROMORPHONE HCL 110MG/55ML
PATIENT CONTROLLED ANALGESIA SYRINGE INTRAVENOUS AS NEEDED
Status: DISCONTINUED | OUTPATIENT
Start: 2022-02-01 | End: 2022-02-01 | Stop reason: SURG

## 2022-02-01 RX ORDER — OXYCODONE HYDROCHLORIDE 5 MG/1
5 TABLET ORAL
Status: DISCONTINUED | OUTPATIENT
Start: 2022-02-01 | End: 2022-02-01 | Stop reason: HOSPADM

## 2022-02-01 RX ORDER — SODIUM CHLORIDE, SODIUM LACTATE, POTASSIUM CHLORIDE, AND CALCIUM CHLORIDE .6; .31; .03; .02 G/100ML; G/100ML; G/100ML; G/100ML
INJECTION, SOLUTION INTRAVENOUS AS NEEDED
Status: DISCONTINUED | OUTPATIENT
Start: 2022-02-01 | End: 2022-02-01 | Stop reason: HOSPADM

## 2022-02-01 RX ORDER — ACETAMINOPHEN 500 MG
1000 TABLET ORAL ONCE
Status: COMPLETED | OUTPATIENT
Start: 2022-02-01 | End: 2022-02-01

## 2022-02-01 RX ORDER — FENTANYL CITRATE 50 UG/ML
INJECTION, SOLUTION INTRAMUSCULAR; INTRAVENOUS AS NEEDED
Status: DISCONTINUED | OUTPATIENT
Start: 2022-02-01 | End: 2022-02-01 | Stop reason: SURG

## 2022-02-01 RX ORDER — DEXMEDETOMIDINE HYDROCHLORIDE 100 UG/ML
INJECTION, SOLUTION INTRAVENOUS AS NEEDED
Status: DISCONTINUED | OUTPATIENT
Start: 2022-02-01 | End: 2022-02-01 | Stop reason: SURG

## 2022-02-01 RX ORDER — SODIUM CHLORIDE, SODIUM LACTATE, POTASSIUM CHLORIDE, CALCIUM CHLORIDE 600; 310; 30; 20 MG/100ML; MG/100ML; MG/100ML; MG/100ML
9 INJECTION, SOLUTION INTRAVENOUS CONTINUOUS PRN
Status: DISCONTINUED | OUTPATIENT
Start: 2022-02-01 | End: 2022-02-01 | Stop reason: HOSPADM

## 2022-02-01 RX ORDER — PHENYLEPHRINE HCL IN 0.9% NACL 1 MG/10 ML
SYRINGE (ML) INTRAVENOUS AS NEEDED
Status: DISCONTINUED | OUTPATIENT
Start: 2022-02-01 | End: 2022-02-01 | Stop reason: SURG

## 2022-02-01 RX ORDER — PROPOFOL 10 MG/ML
VIAL (ML) INTRAVENOUS AS NEEDED
Status: DISCONTINUED | OUTPATIENT
Start: 2022-02-01 | End: 2022-02-01 | Stop reason: SURG

## 2022-02-01 RX ORDER — MIDAZOLAM HYDROCHLORIDE 1 MG/ML
2 INJECTION INTRAMUSCULAR; INTRAVENOUS ONCE
Status: COMPLETED | OUTPATIENT
Start: 2022-02-01 | End: 2022-02-01

## 2022-02-01 RX ORDER — MEPERIDINE HYDROCHLORIDE 25 MG/ML
12.5 INJECTION INTRAMUSCULAR; INTRAVENOUS; SUBCUTANEOUS
Status: DISCONTINUED | OUTPATIENT
Start: 2022-02-01 | End: 2022-02-01 | Stop reason: HOSPADM

## 2022-02-01 RX ORDER — PROMETHAZINE HYDROCHLORIDE 12.5 MG/1
25 TABLET ORAL ONCE AS NEEDED
Status: DISCONTINUED | OUTPATIENT
Start: 2022-02-01 | End: 2022-02-01 | Stop reason: HOSPADM

## 2022-02-01 RX ORDER — CEFAZOLIN SODIUM 2 G/100ML
2 INJECTION, SOLUTION INTRAVENOUS ONCE
Status: COMPLETED | OUTPATIENT
Start: 2022-02-01 | End: 2022-02-01

## 2022-02-01 RX ORDER — NALOXONE HYDROCHLORIDE 0.4 MG/ML
INJECTION, SOLUTION INTRAMUSCULAR; INTRAVENOUS; SUBCUTANEOUS AS NEEDED
Status: DISCONTINUED | OUTPATIENT
Start: 2022-02-01 | End: 2022-02-01 | Stop reason: SURG

## 2022-02-01 RX ORDER — ONDANSETRON 2 MG/ML
INJECTION INTRAMUSCULAR; INTRAVENOUS AS NEEDED
Status: DISCONTINUED | OUTPATIENT
Start: 2022-02-01 | End: 2022-02-01 | Stop reason: SURG

## 2022-02-01 RX ADMIN — LIDOCAINE HYDROCHLORIDE 80 MG: 20 INJECTION, SOLUTION INFILTRATION; PERINEURAL at 07:36

## 2022-02-01 RX ADMIN — Medication 100 MCG: at 09:25

## 2022-02-01 RX ADMIN — MIDAZOLAM HYDROCHLORIDE 2 MG: 1 INJECTION, SOLUTION INTRAMUSCULAR; INTRAVENOUS at 07:20

## 2022-02-01 RX ADMIN — ACETAMINOPHEN 1000 MG: 500 TABLET ORAL at 07:19

## 2022-02-01 RX ADMIN — DEXMEDETOMIDINE 10 MCG: 100 INJECTION, SOLUTION, CONCENTRATE INTRAVENOUS at 07:56

## 2022-02-01 RX ADMIN — DEXMEDETOMIDINE 5 MCG: 100 INJECTION, SOLUTION, CONCENTRATE INTRAVENOUS at 08:31

## 2022-02-01 RX ADMIN — NALOXONE HYDROCHLORIDE 0.04 MG: 0.4 INJECTION, SOLUTION INTRAMUSCULAR; INTRAVENOUS; SUBCUTANEOUS at 10:16

## 2022-02-01 RX ADMIN — PROPOFOL 150 MG: 10 INJECTION, EMULSION INTRAVENOUS at 07:36

## 2022-02-01 RX ADMIN — Medication 100 MCG: at 08:31

## 2022-02-01 RX ADMIN — SODIUM CHLORIDE, POTASSIUM CHLORIDE, SODIUM LACTATE AND CALCIUM CHLORIDE 9 ML/HR: 600; 310; 30; 20 INJECTION, SOLUTION INTRAVENOUS at 07:19

## 2022-02-01 RX ADMIN — SCOPALAMINE 1 PATCH: 1 PATCH, EXTENDED RELEASE TRANSDERMAL at 07:20

## 2022-02-01 RX ADMIN — Medication 150 MCG: at 08:21

## 2022-02-01 RX ADMIN — HYDROMORPHONE HYDROCHLORIDE 1 MG: 2 INJECTION, SOLUTION INTRAMUSCULAR; INTRAVENOUS; SUBCUTANEOUS at 07:56

## 2022-02-01 RX ADMIN — HYDROMORPHONE HYDROCHLORIDE 0.5 MG: 2 INJECTION, SOLUTION INTRAMUSCULAR; INTRAVENOUS; SUBCUTANEOUS at 09:01

## 2022-02-01 RX ADMIN — ROCURONIUM BROMIDE 20 MG: 10 INJECTION INTRAVENOUS at 08:56

## 2022-02-01 RX ADMIN — OXYCODONE HYDROCHLORIDE 5 MG: 5 TABLET ORAL at 11:26

## 2022-02-01 RX ADMIN — ROCURONIUM BROMIDE 20 MG: 10 INJECTION INTRAVENOUS at 08:48

## 2022-02-01 RX ADMIN — SODIUM CHLORIDE, POTASSIUM CHLORIDE, SODIUM LACTATE AND CALCIUM CHLORIDE: 600; 310; 30; 20 INJECTION, SOLUTION INTRAVENOUS at 09:04

## 2022-02-01 RX ADMIN — FENTANYL CITRATE 25 MCG: 50 INJECTION, SOLUTION INTRAMUSCULAR; INTRAVENOUS at 07:57

## 2022-02-01 RX ADMIN — NALOXONE HYDROCHLORIDE 0.04 MG: 0.4 INJECTION, SOLUTION INTRAMUSCULAR; INTRAVENOUS; SUBCUTANEOUS at 10:20

## 2022-02-01 RX ADMIN — ROCURONIUM BROMIDE 20 MG: 10 INJECTION INTRAVENOUS at 08:13

## 2022-02-01 RX ADMIN — FENTANYL CITRATE 50 MCG: 50 INJECTION, SOLUTION INTRAMUSCULAR; INTRAVENOUS at 07:36

## 2022-02-01 RX ADMIN — FENTANYL CITRATE 25 MCG: 50 INJECTION, SOLUTION INTRAMUSCULAR; INTRAVENOUS at 09:01

## 2022-02-01 RX ADMIN — DEXMEDETOMIDINE 10 MCG: 100 INJECTION, SOLUTION, CONCENTRATE INTRAVENOUS at 08:13

## 2022-02-01 RX ADMIN — SUGAMMADEX 200 MG: 100 INJECTION, SOLUTION INTRAVENOUS at 10:03

## 2022-02-01 RX ADMIN — LIDOCAINE HYDROCHLORIDE 80 MG: 20 INJECTION, SOLUTION INFILTRATION; PERINEURAL at 10:00

## 2022-02-01 RX ADMIN — ROCURONIUM BROMIDE 40 MG: 10 INJECTION INTRAVENOUS at 07:37

## 2022-02-01 RX ADMIN — ONDANSETRON 4 MG: 2 INJECTION INTRAMUSCULAR; INTRAVENOUS at 09:59

## 2022-02-01 RX ADMIN — Medication 150 MCG: at 09:07

## 2022-02-01 RX ADMIN — DEXMEDETOMIDINE 15 MCG: 100 INJECTION, SOLUTION, CONCENTRATE INTRAVENOUS at 08:06

## 2022-02-01 RX ADMIN — CEFAZOLIN SODIUM 2 G: 2 INJECTION, SOLUTION INTRAVENOUS at 07:35

## 2022-02-01 RX ADMIN — MEPERIDINE HYDROCHLORIDE 12.5 MG: 25 INJECTION INTRAMUSCULAR; INTRAVENOUS; SUBCUTANEOUS at 11:05

## 2022-02-01 RX ADMIN — Medication 100 MCG: at 08:48

## 2022-02-01 RX ADMIN — DEXMEDETOMIDINE 10 MCG: 100 INJECTION, SOLUTION, CONCENTRATE INTRAVENOUS at 07:49

## 2022-02-01 RX ADMIN — Medication 100 MCG: at 07:47

## 2022-02-01 RX ADMIN — PROPOFOL 200 MCG/KG/MIN: 10 INJECTION, EMULSION INTRAVENOUS at 07:42

## 2022-02-01 NOTE — ANESTHESIA PREPROCEDURE EVALUATION
Anesthesia Evaluation     Patient summary reviewed and Nursing notes reviewed   no history of anesthetic complications:  NPO Solid Status: > 8 hours  NPO Liquid Status: > 2 hours           Airway   Mallampati: II  TM distance: >3 FB  Neck ROM: full  No difficulty expected  Dental      Pulmonary - normal exam    breath sounds clear to auscultation  (+) asthma,  Cardiovascular - normal exam  Exercise tolerance: good (4-7 METS)    Rhythm: regular  Rate: normal    (+) hypertension,     ROS comment: >4METS, ACTIVE. NEG STRESS TEST 11/21, EF 70%. MED CLEARANCE on chart    Neuro/Psych- negative ROS  GI/Hepatic/Renal/Endo    (+)  GERD,  liver disease fatty liver disease, diabetes mellitus, thyroid problem     Musculoskeletal     Abdominal    Substance History - negative use     OB/GYN negative ob/gyn ROS         Other   arthritis,    history of cancer                    Anesthesia Plan    ASA 3     general       Anesthetic plan, all risks, benefits, and alternatives have been provided, discussed and informed consent has been obtained with: patient.        CODE STATUS:

## 2022-02-01 NOTE — ANESTHESIA POSTPROCEDURE EVALUATION
Patient: Jyoti Alvarez    Procedure Summary     Date: 02/01/22 Room / Location: [facility] OR  / [facility]    Anesthesia Start: 0729 Anesthesia Stop: 1025    Procedure: BILATERAL BREAST RECONSTRUCTION WITH REMOVAL AND REPLACEMENT OF IMPLANTS, MESH PLACEMENT, CHEST WALL SKIN RESECTION, CHEST LIPOSUCTION (Bilateral Breast) Diagnosis:       Deformity and disproportion of reconstructed breast      (Deformity and disproportion of reconstructed breast [N65.0, N65.1])    Surgeons: Romain Quezada MD Provider: Norm Causey MD    Anesthesia Type: general ASA Status: 3          Anesthesia Type: general    Vitals  Vitals Value Taken Time   /84 02/01/22 1055   Temp 36.3 °C (97.4 °F) 02/01/22 1023   Pulse 79 02/01/22 1105   Resp 13 02/01/22 1038   SpO2 92 % 02/01/22 1105   Vitals shown include unvalidated device data.        Post Anesthesia Care and Evaluation    Patient location during evaluation: bedside  Patient participation: complete - patient participated  Level of consciousness: awake  Pain management: adequate  Airway patency: patent  Anesthetic complications: No anesthetic complications  PONV Status: none  Cardiovascular status: acceptable and stable  Respiratory status: acceptable  Hydration status: acceptable    Comments: An Anesthesiologist personally participated in the most demanding procedures (including induction and emergence if applicable) in the anesthesia plan, monitored the course of anesthesia administration at frequent intervals and remained physically present and available for immediate diagnosis and treatment of emergencies.

## 2022-02-01 NOTE — DISCHARGE INSTRUCTIONS
DISCHARGE INSTRUCTIONS  SURGICAL / AMBULATORY  PROCEDURES      ? For your surgery you had:  ? General anesthesia (you may have a sore throat for the first 24 hours)  ? IV sedation.  ? You received a medicated patch for nausea prevention today (behind your ear). It is recommended that you remove it 24-48 hours post-operatively. It must be removed within 72 hours.   ? You may experience dizziness, drowsiness, or light-headedness for several hours following surgery/procedure.  ? Do not stay alone today or tonight.  ? Limit your activity for 24 hours.  ? Resume your diet slowly.  Follow whatever special dietary instructions you may have been given by your doctor.  ? You should not drive or operate machinery, drink alcohol, or sign legally binding documents for 24 hours or while you are taking pain medication.  Last dose of pain medication was given at:   .  NOTIFY YOUR DOCTOR IF YOU EXPERIENCE ANY OF THE FOLLOWING:  ? Temperature greater than 101 degrees Fahrenheit  ? Shaking Chills  ? Redness or excessive drainage from incision  ? Nausea, vomiting and/or pain that is not controlled by prescribed medications  ? Increase in bleeding or bleeding that is excessive  ? Unable to urinate in 6 hours after surgery  ? If unable to reach your doctor, please go to the closest Emergency Room  ? You may remove Band-Aid/dressing tomorrow.  ? You may shower tomorrow .  ? Medications per physician instructions as indicated on Discharge Medication Information Sheet.  You should see   for follow-up care   on   .  Phone number:       SPECIAL INSTRUCTIONS:     Take stool softener while on pain medicine                              Ok for regular diet  Do not drive for next 2 weeks  Ok to shower but be aware you are a fall risk so have someone with you or place a chair in the shower. It is ok to wet the breast. Wash the drain site with water and liquid soap everyday. No sponge or cloths. Wash the drain site before other parts of the  body  Strip drains q 8 hours and record drain output daily. Wash hands or wear gloves when manipulating drains.  Do not exercise for the next 2 weeks.  Sleep in an upright position  No bra for 1 week. After that, wear a comfortable soft bra  No exercise  Ok to move arms slowly to the shoulder level (brushing hair movement)

## 2022-02-02 ENCOUNTER — TELEPHONE (OUTPATIENT)
Dept: INTERNAL MEDICINE | Facility: CLINIC | Age: 72
End: 2022-02-02

## 2022-02-02 ENCOUNTER — TELEPHONE (OUTPATIENT)
Dept: CALL CENTER | Facility: HOSPITAL | Age: 72
End: 2022-02-02

## 2022-02-02 ENCOUNTER — NURSE TRIAGE (OUTPATIENT)
Dept: CALL CENTER | Facility: HOSPITAL | Age: 72
End: 2022-02-02

## 2022-02-02 LAB
CYTO UR: NORMAL
LAB AP CASE REPORT: NORMAL
LAB AP CLINICAL INFORMATION: NORMAL
PATH REPORT.FINAL DX SPEC: NORMAL
PATH REPORT.GROSS SPEC: NORMAL

## 2022-02-02 NOTE — DISCHARGE SUMMARY
Physician Discharge Summary  Patient Identification:  Name: Jyoti Alvarez  Age: 71 y.o.  Sex: female  :  1950  MRN: [id number]    Admit date: 2022    Discharge date and time: 2022    Admitting Physician: Romain Quezada MD     Discharge Physician: Romain Quezada MD    Admission Diagnoses: Deformity and disproportion of reconstructed breast [N65.0, N65.1]    Discharge Diagnoses: Deformity and disproportion of reconstructed breast [N65.0, N65.1]    Discharged Condition: good    Admission HPI:    Procedures:  Procedure(s) (LRB):  BILATERAL BREAST RECONSTRUCTION WITH REMOVAL AND REPLACEMENT OF IMPLANTS, MESH PLACEMENT, CHEST WALL SKIN RESECTION, CHEST LIPOSUCTION (Bilateral)    Hospital Course:   The pt was brought to the OR on 2022 for the above procedure. Patient  tolerated the procedure well, see dictated operative note for detailed summary. At the time of discharge patient was tolerating a regular diet and having bowel movements. Also, at the time of discharge  pain was controlled on oral medication and patient was ambulating without assistance.     Consults:   None    Significant Diagnostic Studies: labs and radiology    Discharge Exam:  GEN: no acute distress, resting quietly   HEENT: NCAT, EOMI, MMM   HEART: normal rate, regular rhythm   LUNGS: respirations non-labored   ABD: soft, nondistended, nontender   EXT: moves all extremities, no edema    Disposition:  Home    Patient Instructions:   [unfilled]   Follow-up Information     Ramo Noguera MD .    Specialty: Internal Medicine  Contact information:  [address]  [address] [id number] [phone]                             Deformity and disproportion of reconstructed breast      Signed:  Romain Adam MD, PhD  Plastic and reconstructive surgery   2022

## 2022-02-02 NOTE — OP NOTE
Plastic Surgery Op Note  BILATERAL CHEST LIPOSUCTION WITH SKIN RESECTION     Jyoti Alvarez  2/1/2022    Pre-op Diagnosis:   Deformity and disproportion of reconstructed breast [N65.0, N65.1]    Post-Op Diagnosis Codes:     * Deformity and disproportion of reconstructed breast [N65.0, N65.1]    Anesthesia: General    Staff:   Circulator: Delmi Desir RN  Scrub Person: Bren García  Assistant: Kelly Lynn  Other: Favian Vargas    Surgeon: Dr Quezada    Estimated Blood Loss: minimal    Specimens:   Order Name Source Comment Collection Info Order Time   TISSUE PATHOLOGY EXAM Breast, Right MCGHAN STYLE 68HP  800CC  LOT#[id number]  - GROSS EXAM ONLY Collected By: Romain Quezada MD 2/1/2022  9:13 AM     Release to patient   Immediate              Drains: * No LDAs found *    Findings:      1 L of tumescent  1 L of lipoaspirate    Complications: none       Date: 2/2/2022  Time: 04:15 EST  After risks and benefits were discussed with patient and inform consent was signed, patient was taken to the OR and positioned supine. The surgical site was then prepped in a sterile fashion way and a time out was performed confirming patient's name and procedure to be performed. All surgical team was introduced by name.   The skin excess was marked at the pre op, then I injected the tumescent in the lateral chest wall. With the use of a cannula #4, I obtained 1L of lipoaspirate from both chest walls, then the additional skin was resected and the skin closed in two layers of 3-0 vicryl. A sterile dressing was applied.     Patient tolerated procedure well, there were no complications, all instruments were checked and patient was awaken and taken to PACU in stable condition.  I was present for the entire procedure.   Romain Quezada MD  02/02/22  04:15 EST

## 2022-02-02 NOTE — TELEPHONE ENCOUNTER
Caller: Jyoti Alvarez    Relationship to patient: Self    Best call back number: [phone]    Type of visit: FOLLOW UP    If rescheduling, when is the original appointment: 02/08/2022    Additional notes: PATIENT HAS TO RESCHEDULE HER APPOINTMENT WITH [facility] ON 02/08/2022, BUT SHE IS NOT ABLE TO DRIVE AT THE MOMENT DUE TO SURGERY UNTIL SHE GETS THE CLEARANCE TO DO SO. HER DAUGHTER IS HER TRANSPORTATION AT THE MOMENT, AND HER DAUGHTER WORKS Monday, Wednesday, AND Friday MORNING UNTIL 4:00 PM AND GOES IN TO WORK ON Tuesday AND Thursday AT 12:00 PM. HUB IS UNABLE TO LOCATE AN APPOINTMENT THAT WOULD BE SUITABLE TO PATIENT'S SCHEDULE AND PROVIDER'S SCHEDULE. SHE IS REQUESTING A CALL BACK TO POSSIBLY RESCHEDULE WITHIN THE TIMEFRAME THAT SHE PREFERS.

## 2022-02-02 NOTE — OP NOTE
Plastic Surgery Op Note    BILATERAL BREAST RECONSTRUCTION REVISION WITH OLD SALINE IMPLANT REPLACEMENT WITH NEW IMPLANT SILICONE GEL, BILATERAL CAPSULECTOMY AND BILATERAL ALLODERM MESH PLACEMENT.    Jyoti Alvarez  2/1/2022    Pre-op Diagnosis:   Deformity and disproportion of reconstructed breast [N65.0, N65.1]    Post-Op Diagnosis Codes:     * Deformity and disproportion of reconstructed breast [N65.0, N65.1]    Anesthesia: General    Staff:   Circulator: Delmi Desir RN  Scrub Person: Bren García  Assistant: Kelly Lynn  Other: Favian Vargas    Surgeon: Dr Quezada    Estimated Blood Loss: minimal    Specimens:   Order Name Source Comment Collection Info Order Time   TISSUE PATHOLOGY EXAM Breast, Right SHAHNAZ STYLE 68HP  800CC  LOT#[id number]  - GROSS EXAM ONLY Collected By: Romain Quezada MD 2/1/2022  9:13 AM     Release to patient   Immediate              Drains: * No LDAs found *    Findings:   Bilateral capsular contracture with extensive calcification. Left worse than right , bilateral intact saline implants.      Implants:      Bilateral allergan    Bilateral extra large thick contour allergan alloderm      Complications: none       Date: 2/2/2022  Time: 04:05 EST  After risks and benefits were discussed with patient and inform consent was signed, patient was taken to the OR and positioned supine. The surgical site was then prepped in a sterile fashion way and a time out was performed confirming patient's name and procedure to be performed. All surgical team was introduced by name.   Markings were performed in the pre op area and consisted on the resection of the lateral inferior portion of the lateral breasts. I started on the right  Incising the skin at the marking and accessed the breast tissue from the lateral aspect of the breast. I observed the capsule that had calcification and some degree of contracture. That was resected in block. The capsule was then opened  and the implant was intact. The same was performed on the contralateral side. There was a lot more contracture and calcifications but the implant was also intact. Capsule were sent for pathology. Then, I evaluated the skin and noticed that it was very thin so I ratified the use of mesh. Keeping the implants in the supbectoral plane, I placed the contour alloderm in the chest wall and placed the new implants. The skin was then closed in 3 layers. No drains were used and sterile dressings were applied.     Patient tolerated procedure well, there were no complications, all instruments were checked and patient was awaken and taken to PACU in stable condition.  I was present for the entire procedure.   Romain Quezada MD  02/02/22  04:05 EST

## 2022-02-03 ENCOUNTER — TELEMEDICINE (OUTPATIENT)
Dept: INTERNAL MEDICINE | Facility: CLINIC | Age: 72
End: 2022-02-03

## 2022-02-03 ENCOUNTER — TELEPHONE (OUTPATIENT)
Dept: INTERNAL MEDICINE | Facility: CLINIC | Age: 72
End: 2022-02-03

## 2022-02-03 VITALS
DIASTOLIC BLOOD PRESSURE: 59 MMHG | HEART RATE: 79 BPM | RESPIRATION RATE: 18 BRPM | TEMPERATURE: 98.1 F | SYSTOLIC BLOOD PRESSURE: 104 MMHG

## 2022-02-03 DIAGNOSIS — R50.82 POST-PROCEDURAL FEVER: Primary | ICD-10-CM

## 2022-02-03 DIAGNOSIS — E11.65 TYPE 2 DIABETES MELLITUS WITH HYPERGLYCEMIA, WITHOUT LONG-TERM CURRENT USE OF INSULIN: ICD-10-CM

## 2022-02-03 DIAGNOSIS — I10 ESSENTIAL HYPERTENSION: ICD-10-CM

## 2022-02-03 DIAGNOSIS — R06.81 APNEA SPELL: ICD-10-CM

## 2022-02-03 PROCEDURE — 99213 OFFICE O/P EST LOW 20 MIN: CPT | Performed by: INTERNAL MEDICINE

## 2022-02-03 NOTE — TELEPHONE ENCOUNTER
Daughter thinks she is having a reaction to her oxycodone that she was given after surgery.  Dr. Mercado has stopped this and she is doing better.  She was having a higher temp and confused.  Her Fasting blood sugar this morning was 67 at 8:11 and after about 6 oz of root beer it was 85 at 8:22.  At 8:35 it was 100.  Her blood pressure was 7:53 am was 81/43 and HR was 73.  She checked it three different times and they all ranged in that area.  Evenings her blood sugars are fluctuating a lot.  Will bring a log of all of this to her appointment to see you Monday.    *She did not take her morning meds today except for tylenol and ibuprofen until she hears back from you today.

## 2022-02-03 NOTE — ASSESSMENT & PLAN NOTE
Pt had temps just yesterday, and they reviewed with surgeon.   No sign of infection presently.  Pt instructed to call if recurs.

## 2022-02-03 NOTE — ASSESSMENT & PLAN NOTE
This was reported to me by daughter.  It occurred post-op.  Given pt's body habitus, I would rec eval for SHALONDA when she has fully recovered.

## 2022-02-03 NOTE — ASSESSMENT & PLAN NOTE
Pt with BS this AM of only 67.  She did take her amaryl this AM and f/u BS was 174.  I d/w her to only take 45 units tonight and adjust based on tomorrow AM's reading.  D/W to bring BP log to appt on Monday.

## 2022-02-03 NOTE — TELEPHONE ENCOUNTER
Called back and spoke with Lizett, pts daughter, had her recheck pts temp on the wrist d/t large discrepancy in other 2 temps given previously, on wrist temp 99.6.  Reviewed with her AVS instructions to notify provider if temp >101, given provider number.    Caller states pt had surgery yesterday, assisted her to walk and thought she felt warm, checked temp at temporal area and was 98.1, rechecked on cheek it was 102.3, unsure if temp correct. Reports pt was started on cephalexin. Has oxycodone/apap prn pain. Pt is using incentive spirometer while awake. No other symptoms or complaints. Instructed to monitor temp, encourage fluids, continue using incentive spirometer while awake and cough/deep breathing exercises, continue taking abx as ordered and home meds per AVS. Call back for any concerns or changes or proceed to ER. Call provider in am.    Reason for Disposition  • Other post-op symptom or question    Additional Information  • Negative: Sounds like a life-threatening emergency to the triager  • Negative: Chest pain  • Negative: Difficulty breathing  • Negative: Acting confused (e.g., disoriented, slurred speech) or excessively sleepy  • Negative: Surgical incision symptoms and questions  • Negative: [1] Discomfort (pain, burning or stinging) when passing urine AND [2] male  • Negative: [1] Discomfort (pain, burning or stinging) when passing urine AND [2] female  • Negative: Constipation  • Negative: New or worsening leg (calf, thigh) pain  • Negative: New or worsening leg swelling  • Negative: Dizziness is severe, or persists > 24 hours after surgery  • Negative: Pain, redness, swelling, or pus at IV Site  • Negative: Symptoms arising from use of a urinary catheter (Allison or Coude)  • Negative: Cast problems or questions  • Negative: Medication question  • Negative: [1] Widespread rash AND [2] bright red, sunburn-like  • Negative: [1] SEVERE headache AND [2] after spinal (epidural) anesthesia  • Negative: [1]  "Vomiting AND [2] persists > 4 hours  • Negative: [1] Vomiting AND [2] abdomen looks much more swollen than usual  • Negative: [1] Drinking very little AND [2] dehydration suspected (e.g., no urine > 12 hours, very dry mouth, very lightheaded)  • Negative: Patient sounds very sick or weak to the triager  • Negative: Sounds like a serious complication to the triager  • Negative: [1] SEVERE post-op pain (e.g., excruciating, pain scale 8-10) AND [2] not controlled with pain medications  • Negative: [1] Caller has URGENT question AND [2] triager unable to answer question  • Negative: [1] Headache AND [2] after spinal (epidural) anesthesia AND [3] not severe  • Negative: Fever present > 3 days (72 hours)  • Negative: [1] MILD-MODERATE post-op pain (e.g., pain scale 1-7) AND [2] not controlled with pain medications  • Negative: [1] Caller has NON-URGENT question AND [2] triager unable to answer question  • Negative: General activity, questions about  • Negative: Resuming driving, questions about  • Negative: Resuming sexual relations, questions about  • Negative: Getting the incision wet, questions about  • Negative: Throat pain after surgery, questions about  • Negative: [1] Vomiting AND [2] present < 4 hours  • Negative: Fever > 100.4 F (38.0 C)    Answer Assessment - Initial Assessment Questions  1. SYMPTOM: \"What's the main symptom you're concerned about?\" (e.g., pain, fever, vomiting)      Temp 102.3  2. ONSET: \"When did fever  start?\"      Just noticed  3. SURGERY: \"What surgery was performed?\"      Breast implants, lipo  4. DATE of SURGERY: \"When was surgery performed?\"       2/1/22  5. ANESTHESIA: \" What type of anesthesia did you have?\" (e.g., general, spinal, epidural, local)      Not sure  6. PAIN: \"Is there any pain?\" If Yes, ask: \"How bad is it?\"  (Scale 1-10; or mild, moderate, severe)      moderate  7. FEVER: \"Do you have a fever?\" If Yes, ask: \"What is your temperature, how was it measured, and when did it " "start?\"      Yes 102.3, infrared  8. VOMITING: \"Is there any vomiting?\" If yes, ask: \"How many times?\"      no  9. BLEEDING: \"Is there any bleeding?\" If Yes, ask: \"How much?\" and \"Where?\"      no  10. OTHER SYMPTOMS: \"Do you have any other symptoms?\" (e.g., drainage from wound, painful urination, constipation)        no    Protocols used: POST-OP SYMPTOMS AND QUESTIONS-ADULT-      "
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
## 2022-02-03 NOTE — TELEPHONE ENCOUNTER
The pts daughter called and sttaed that the pts BP has been running really low. This morning she has checked it 3 times and the readings were as follows, 81/43, 87/44, 85/41. The daughter wanted to know if there needed to be an increase of medication.

## 2022-02-03 NOTE — ASSESSMENT & PLAN NOTE
SBP only in the 80's this AM, so she correctly held all 3 of her meds.  F/U BP was 104/59 this afternoon.  I d/w her the order of importance in regards to resuming meds tomorrow, but d/w hold norvasc regardless unless over 160/90.  She has f/u next week.
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
## 2022-02-07 ENCOUNTER — OFFICE VISIT (OUTPATIENT)
Dept: INTERNAL MEDICINE | Facility: CLINIC | Age: 72
End: 2022-02-07

## 2022-02-07 VITALS
SYSTOLIC BLOOD PRESSURE: 135 MMHG | HEART RATE: 81 BPM | DIASTOLIC BLOOD PRESSURE: 88 MMHG | WEIGHT: 170.8 LBS | OXYGEN SATURATION: 98 % | HEIGHT: 62 IN | BODY MASS INDEX: 31.43 KG/M2 | TEMPERATURE: 97.5 F

## 2022-02-07 DIAGNOSIS — E78.2 MIXED HYPERLIPIDEMIA: ICD-10-CM

## 2022-02-07 DIAGNOSIS — R06.81 APNEA SPELL: ICD-10-CM

## 2022-02-07 DIAGNOSIS — E53.8 VITAMIN B12 DEFICIENCY: ICD-10-CM

## 2022-02-07 DIAGNOSIS — E03.4 ATROPHY OF THYROID: ICD-10-CM

## 2022-02-07 DIAGNOSIS — D12.6 ADENOMATOUS POLYP OF COLON, UNSPECIFIED PART OF COLON: ICD-10-CM

## 2022-02-07 DIAGNOSIS — I10 ESSENTIAL HYPERTENSION: Primary | ICD-10-CM

## 2022-02-07 DIAGNOSIS — E11.65 TYPE 2 DIABETES MELLITUS WITH HYPERGLYCEMIA, WITHOUT LONG-TERM CURRENT USE OF INSULIN: ICD-10-CM

## 2022-02-07 DIAGNOSIS — E55.9 VITAMIN D DEFICIENCY: ICD-10-CM

## 2022-02-07 DIAGNOSIS — N18.31 STAGE 3A CHRONIC KIDNEY DISEASE: ICD-10-CM

## 2022-02-07 PROCEDURE — 99214 OFFICE O/P EST MOD 30 MIN: CPT | Performed by: INTERNAL MEDICINE

## 2022-02-07 RX ORDER — ATORVASTATIN CALCIUM 40 MG/1
40 TABLET, FILM COATED ORAL NIGHTLY
Qty: 90 TABLET | Refills: 1 | Status: SHIPPED | OUTPATIENT
Start: 2022-02-07 | End: 2022-07-19

## 2022-02-07 NOTE — PROGRESS NOTES
"Chief Complaint  Follow-up (1 week post op )    Subjective          History of Present Illness  Jyoti Alvarez is a 71 y.o. female who presents to [facility] for Postoperative Follow-Up of Bilateral breast revision. Patient stated that she is feeling okay just alittle sore . Follow up in 1 month.    Allergies: Exenatide, Fluoxetine, Gabapentin, Jardiance [empagliflozin], Levaquin [levofloxacin], Nickel, and Sulfa antibiotics  Allergies Reconciled.    Review of Systems     Objective     /84 (BP Location: Left arm, Patient Position: Sitting)   Pulse 91   Temp 98.6 °F (37 °C) (Temporal)   Ht 157.5 cm (62\")   Wt 75.8 kg (167 lb)   SpO2 98%   BMI 30.54 kg/m²     Body mass index is 30.54 kg/m².    Physical Exam  General Inspection: Incision healing well, no swelling, no erythema, no drainage.    Result Review :                Assessment and Plan      Diagnoses and all orders for this visit:    1. Postoperative follow-up (Primary)        Plan:  • Ok to drive, I will refer her to lymphedema therapy for her chest     I spent 15 minutes caring for Jyoti on this date of service. This time includes time spent by me in the following activities:ordering medications, tests, or procedures, referring and communicating with other health care professionals  and documenting information in the medical record    Follow Up     No follow-ups on file.    Patient was given instructions and counseling regarding her condition. Please see specific information pulled into the AVS if appropriate.     Romain Quezada MD  02/10/2022  Answers for HPI/ROS submitted by the patient on 2/5/2022  Please describe your symptoms.: Post operation visit. Surgery was Feb. 1,2022.  Have you had these symptoms before?: No  How long have you been having these symptoms?: 5-7 days  Please list any medications you are currently taking for this condition.: Post operation meds have been " finished or stopped per Dr. bolton.  Please describe any probable cause for these symptoms. : Surgery  What is the primary reason for your visit?: Other

## 2022-02-07 NOTE — ASSESSMENT & PLAN NOTE
TSH is normal as of 1121 office visit.  Patient is stable on very low-dose Synthroid, continue same.---> Patient is clinically euthyroid on just 25 mcg of Synthroid. Continue same, level on return to office in the spring.

## 2022-02-07 NOTE — ASSESSMENT & PLAN NOTE
Blood pressure is reasonable as of 2/22 office visit. The patient is taking metoprolol 100 daily, and losartan presently 25 to 50 mg daily. Amlodipine is on hold and that is certainly appropriate based on her home readings. We may not resume this medicine and titrate losartan further in the future.

## 2022-02-07 NOTE — ASSESSMENT & PLAN NOTE
Vitamin D level was 52 in 4/21, we will repeated on return to office.  Patient stable to continue prescription strength vitamin D every 10 days.---> This continues as of 2/22 office visit. Her vitamin D level was little low at 34, but that still very reasonable, continue same dosing for now.

## 2022-02-07 NOTE — ASSESSMENT & PLAN NOTE
"This is a big difference as of her 2/22 office visit. GFR was previously in the  ballpark as of 11/21, the last 2 labs have it being in the mid 50's. Concerning that she does not look particularly dehydrated. Of note, patient was started on an ARB during this timeframe. We will give it a little longer, check a urinalysis, and then consider listing this as a \"allergy\".  "

## 2022-02-07 NOTE — ASSESSMENT & PLAN NOTE
Patient's A1c is going sideways as of 2/22, it is 8.0. She is actually having to back off on Lantus presently due to hypoglycemia in the a.m., reducing it from 63 units down to 25 units presently. Most recently sugars are improving on average, she is postop and that bumped them for a couple of days in the evenings at least. She is maxed out on glimepiride, we are not ready for regular insulin just yet. We will get her in with the diabetes educator, and follow-up of fructosamine in about 3 weeks. No changes to meds at this time otherwise.

## 2022-02-07 NOTE — ASSESSMENT & PLAN NOTE
LDL is up further to 105 as of 2/22 office visit. We will titrate her atorvastatin now from 20 to 40 mg.

## 2022-02-07 NOTE — ASSESSMENT & PLAN NOTE
Patient is on 500 mcg daily over-the-counter, will repeat level return to office---> B12 level was in the mid seven hundreds as of 2/22, so patient stable continue current dose of over-the-counter supplementation..

## 2022-02-10 ENCOUNTER — OFFICE VISIT (OUTPATIENT)
Dept: PLASTIC SURGERY | Facility: CLINIC | Age: 72
End: 2022-02-10

## 2022-02-10 VITALS
DIASTOLIC BLOOD PRESSURE: 84 MMHG | OXYGEN SATURATION: 98 % | SYSTOLIC BLOOD PRESSURE: 135 MMHG | WEIGHT: 167 LBS | BODY MASS INDEX: 30.73 KG/M2 | TEMPERATURE: 98.6 F | HEIGHT: 62 IN | HEART RATE: 91 BPM

## 2022-02-10 DIAGNOSIS — Z09 POSTOPERATIVE FOLLOW-UP: Primary | ICD-10-CM

## 2022-02-10 PROCEDURE — COS64: Performed by: SURGERY

## 2022-02-22 ENCOUNTER — HOSPITAL ENCOUNTER (OUTPATIENT)
Dept: OCCUPATIONAL THERAPY | Facility: HOSPITAL | Age: 72
Discharge: HOME | End: 2022-02-22
Admitting: SURGERY

## 2022-02-22 DIAGNOSIS — I89.0 LYMPHEDEMA: Primary | ICD-10-CM

## 2022-02-22 DIAGNOSIS — L90.5 SCAR TISSUE: ICD-10-CM

## 2022-02-22 PROCEDURE — 97535 SELF CARE MNGMENT TRAINING: CPT

## 2022-02-22 PROCEDURE — 93702 BIS XTRACELL FLUID ANALYSIS: CPT

## 2022-02-22 PROCEDURE — L8015 EXT BREASTPROSTHESIS GARMENT: HCPCS

## 2022-02-22 NOTE — THERAPY EVALUATION
Outpatient Occupational Therapy Lymphedema Initial Evaluation  RJ Chanel     Patient Name: Jyoti Alvarez  : 1950  MRN: [id number]  Today's Date: 2022      Visit Date: 2022    Patient Active Problem List   Diagnosis   • Breast cancer (HCC)   • Age related osteoporosis   • Osteoarthritis   • Asthma   • Colon polyp   • Gastroesophageal reflux disease without esophagitis   • Essential hypertension   • Fatty liver   • Lumbar radiculopathy   • Mild intermittent asthma   • Personal history of malignant neoplasm of breast   • Seasonal allergic rhinitis   • Vitamin D deficiency   • Type 2 diabetes mellitus with hyperglycemia, without long-term current use of insulin (HCC)   • Mixed hyperlipidemia   • Atrophy of thyroid   • Iron deficiency anemia   • Chronic diarrhea   • Vertigo   • COVID-19 virus infection   • Vitamin B12 deficiency   • Acute URI   • Deformity and disproportion of reconstructed breast   • Post-procedural fever   • Apnea spell   • Stage 3a chronic kidney disease (HCC)   • Postoperative follow-up        Past Medical History:   Diagnosis Date   • Arthritis    • Asthma    • Breast cancer (HCC)     RIGHT   • Colon polyp    • Diabetes (HCC)     BS IN THE A.M.    • Diabetes (HCC)    • Disease of thyroid gland Hypothyroidism   • Fatty liver    • Gastroesophageal reflux    • HL (hearing loss) Hearing aids    • Hyperlipemia    • Hypertension    • Limb swelling    • Seasonal allergies    • Skin cancer    • Strain of latissimus dorsi muscle 2017    Right   • Vertigo         Past Surgical History:   Procedure Laterality Date   • BREAST AUGMENTATION BILATERAL MASTOPEXY     • BREAST RECONSTRUCTION Bilateral 2022    Procedure: BILATERAL BREAST RECONSTRUCTION WITH REMOVAL AND REPLACEMENT OF IMPLANTS, MESH PLACEMENT, CHEST WALL SKIN RESECTION, CHEST LIPOSUCTION;  Surgeon: Romain Quezada MD;  Location: [facility] OR [facility] [place];  Service: Plastics;  Laterality: Bilateral;   • BREAST  SURGERY      Bilateral mastectomies and reconstruction   •  SECTION     • COLONOSCOPY  2019   • COSMETIC SURGERY      Bilateral mastectomies and reconstructio   • ENDOSCOPY  2019   • EYE SURGERY  Not sure    Cateracts   • LIPOSUCTION N/A 2022    Procedure: CHEST WALL LIPOSUCTION AND CHEST WALL RESECTION;  Surgeon: Romain Quezada MD;  Location: [facility] OR [facility] [place];  Service: Plastics;  Laterality: N/A;  TIMES BASED ON PREVIOUS EXPERIENCE   • MASTECTOMY COMPLETE / SIMPLE Bilateral    • OOPHORECTOMY     • OTHER SURGICAL HISTORY      Joint surgery,  RIGHT FOOT SECOND  TOE WITH A PIN   • SKIN CANCER EXCISION Right 2020    neck   • TOE SURGERY Right     2nd digit pin inserted   • TONSILLECTOMY           Visit Dx:     ICD-10-CM ICD-9-CM   1. Lymphedema  I89.0 457.1   2. Scar tissue  L90.5 709.2        Patient History     Row Name 22 1400             History    Type of Pain Ankle pain; Chest pain; Foot pain; Hand pain; Knee pain; Other pain  -MP      Other Type of Pain Surgical incisions. Arm pits. Etc.  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  -MP      Are you or can you be pregnant No  -MP              Fall Risk Assessment    Any falls in the past year: No  -MP      Does patient have a fear of falling No  -MP              Services    Prior Rehab/Home Health Experiences No  -MP      Are you currently receiving Home Health services No  -MP              Daily Activities    Primary Language English  -MP      Are you able to read Yes  -MP      Are you able to write Yes  -MP    "   How does patient learn best? Demonstration; Pictures/Video  -MP              Safety    Are you being hurt, hit, or frightened by anyone at home or in your life? No  -MP      Are you being neglected by a caregiver No  -MP      Have you had any of the following issues with Anxiety  -MP            User Key  (r) = Recorded By, (t) = Taken By, (c) = Cosigned By    Initials Name Provider Type    MP Val Spicer OT Occupational Therapist                 Lymphedema     Row Name 02/22/22 1400             Subjective Pain    Able to rate subjective pain? yes  -MP      Pre-Treatment Pain Level 2  -MP      Post-Treatment Pain Level 2  -MP      Subjective Pain Comment Achiness, soreness and occassional shorting pain  -MP              Subjective Comments    Subjective Comments Patient is a 71 years old female who breast cancer in 2004, with surgery at . Bilateral Mastectomy with implants placed and overies removed. She is BRCA1 positive. She is seen today after implact removal and replacement with silicone  -MP              Lymphedema Assessment    Lymph Nodes Removed # 15  -MP      Positive Lymph Nodes # 0  -MP      Stage of Cancer Stage I  -MP      Chemo Received no  -MP      Radiation Therapy Received no  -MP      Infections or Cellulitis? no  -MP              Physical Concerns    The amount of limb heaviness associated with my lymphedema is: 0  -MP      The amount of skin tightness associated with my lymphedema is: 1  -MP      The size of my swollen limb(s) seems: 2  -MP      Lymphedema affects the movement of my swollen limb(s): 0  -MP      The strength in my swollen limb(s) is: 1  -MP              Psychosocial Concerns    Lymphedema affects my body image (i.e., \"how I think I look\"). 0  -MP      Lymphedema affects my socializing with others. 0  -MP      Lymphedema affects my intimate relations with spouse or partner (rate 0 if not applicable 0  -MP      Lymphedema \"gets me down\" (i.e., depression, frustration, or " anger) 0  -MP      I must rely on others for help due to my lymphedema. 0  -MP      I know what to do to manage my lymphedema 3  -MP              Functional Concerns    Lymphedema affects my ability to perform self-care activities (i.e. eating, dressing, hygiene) 0  -MP      Lymphedema affects my ability to perform routine home or work-related activities. 1  -MP      Lymphedema affects my performance of preferred leisure activities. 0  -MP      Lymphedema affects proper fit of clothing/shoes 0  -MP      Lymphedema affects my sleep 1  -MP              General ROM    RT Upper Ext Rt Shoulder Flexion  -MP      LT Upper Ext Lt Shoulder Flexion  -MP              Right Upper Ext    Rt Shoulder Flexion AROM 140  -MP              Left Upper Ext    Lt Shoulder Flexion AROM 140  -MP              Lymphedema Edema Assessment    Edema Assessment Comment sub axillary region L trunk is obviously swollen vs the right. as well as at the medial elbow. Pt is wearing a sports-type bra in today.  -MP              Skin Changes/Observations    Location/Assessment Upper Quadrant  -MP      Upper Quadrant Conditions bilateral:; clean; dry; intact; scab(s)  -MP      Upper Quadrant Color/Pigment erythema  -MP              Lymphedema Sensation    Lymphedema Sensation Comments no concerns per pt  -MP              Lymphedema Measurements    Measurement Type(s) Circumferential; Volumetric  -MP      Volumetric Areas Upper extremities; Trunk  -MP      Lymphedema Measurements Comments see attached  -MP              L-Dex Bioimpedence Screening    L-Dex Measurement Extremity RUE; LUE  -MP      L-Dex Patient Position Standing  -MP      L-Dex UE Dominate Side Right  -MP      L-Dex UE At Risk Side Left  -MP      L-Dex UE Pre Surgical Value No  -MP      L-Dex UE Score -3  -MP      L-Dex UE Comment no pre surgery score taken  -MP      $ L-Dex Charge yes  -MP              Lymphedema Life Impact Scale Totals    A.  Total Q1 - Q17 (Do not include Q18) 9  -MP       B.  Total number of questions answered (Q1-Q17) 16  -MP      C. Divide A by B 0.56  -MP      D. Multiple C by 25 14  -MP            User Key  (r) = Recorded By, (t) = Taken By, (c) = Cosigned By    Initials Name Provider Type    Val Becerra OT Occupational Therapist                        Therapy Education  Education Details: OT provided education in etiology and treatment for lymphedema.  Patient was instructed in signs and symptoms of infection as well. Patient was educated in use of compression for lymphedema management. She was issued 2 comprression garments, to allow washing  (Elsie xip-ST-H, size 44/46), and patient was instructed in proper donning and wearing schedule (day time only). Pt return demonstrated proper donning.  Given: Symptoms/condition management, Other (comment)  Program: New  How Provided: Verbal, Demonstration  Provided to: Patient  23490 - OT Self Care/Mgmt Minutes: 15         OT Goals     Row Name 02/22/22 7188          Time Calculation    OT Goal Re-Cert Due Date 03/24/22  -MP           User Key  (r) = Recorded By, (t) = Taken By, (c) = Cosigned By    Initials Name Provider Type    Val Becerra OT Occupational Therapist                 OT Assessment/Plan     Row Name 02/22/22 0434          OT Assessment    Functional Limitations Limitation in home management; Performance in work activities  -MP     Impairments Impaired lymphatic circulation; Range of motion; Pain; Integumentary integrity  -MP     Assessment Comments Ms. Alvarez is a 71 year old female who is referred to lymphedema clinic after breast implant revision.  Patient is open to use of compression, understands treatment needed for lymphedema and states motivation to participate. She has increase volume per localized circumferential measurement, with increased volume at the left verses the right.  She is appropriate for lymphedema treatment.  -MP     OT Diagnosis Lymphedema  -MP     OT Rehab Potential Excellent   -MP     Patient/caregiver participated in establishment of treatment plan and goals Yes  -MP     Patient would benefit from skilled therapy intervention Yes  -MP            OT Plan    OT Frequency 2x/week  -MP     Predicted Duration of Therapy Intervention (OT) 12 weeks  -MP     Planned CPT's? OT EVAL MOD COMPLEXITY: 79414; OT THER PROC EA 15 MIN: [id number]; OT SELF CARE/MGMT/TRAIN 15 MIN: 13326; OT MANUAL THERAPY EA 15 MIN: 96312; OT ORTHOTIC MGMT/TRAIN EA 15 MIN: 88757; OT WC MGMT EA 15 MIN: 18063  -MP     Planned Therapy Interventions (Optional Details) joint mobilization; manual therapy techniques; orthotic fitting/training; patient/family education; ROM (Range of Motion); strengthening; wound care  -MP           User Key  (r) = Recorded By, (t) = Taken By, (c) = Cosigned By    Initials Name Provider Type    MP Val Spicer OT Occupational Therapist              * Patient will  Benefit from Self Lymphatic drainage technique instruction, manual lymphatic drainage performed by OT, HEP instruction, scar management, possible compression wrapping and assist in ordering and instruction in donning and schedule of appropriate compression garment.     Circumferential Measurements:        Baseline: R: --ml L: -- ml  % difference in volume: R:--%   L: -- %        Time Calculation:   Timed Charges  31472 - OT Self Care/Mgmt Minutes: 15  Total Minutes  Timed Charges Total Minutes: 15   Total Minutes: 15     Therapy Charges for Today     Code Description Service Date Service Provider Modifiers Qty    90376663167 HC PT BIS XTRACELL FLUID ANALYSIS 2/22/2022 Val Spicer OT  1    [id number]  OT SELF CARE/MGMT/TRAIN EA 15 MIN 2/22/2022 Val Spicer OT GO 1    [id number]  BRA POST/SURG RUPINDER ALL SZ/COLOR AMOENA 2/22/2022 Val Spicer OT  2        UQLymphgoals:   1. Uncontrolled lymphedema of Left upper extremity and upper quadrant.  LTG1:  90 days:  Volumetric measurements of Left upper extremity/upper  quadrant will be decreased by 10 % or until plateau in reduction is achieved to improve functional mobility.           STATUS:  New   STG1a:  30 days:  Volumetric measurements of left upper extremity and upper quadrant will be decreased by5** % bilaterally to improve functional mobility.    STATUS:  New  TREATMENT:  Manual Therapy, Therapeutic exercise, ADL/self-care therapy, Bioimpedence Fluid Analysis, Orthotic management and training, Therapeutic Activity, wound dressing as needed, Modalities: TENS, NMES, Ultrasound, Fluidotherapy, and Patient and family/caregiver education.    2. Patient with decreased ADL/Self-Care routine for lymphedema management.   LTG 2:  90 days:  Patient/caregiver will independently verbalize/demonstrate ADL/Self-Care routine for lymphedema management.           STATUS:  New   STG 2a:  30 days:  Patient/caregiver will independently perform or verbally dictate compression wrapping technique of the upper extremity/upper quadrant.           STATUS:  New   STG2b:  30 days:  Patient will independently verbalize signs and symptoms of infection.                    STATUS:  New   STG2c:  30 days:  Patient will independently demonstrate/verbalize proper skin care routine to prevent infection and or wound development.            STATUS:  New  STG2d:  30 days:  Patient will independently perform teach back of HEP routine.           STATUS: New  STG2e:  30 days:  Patient/caregiver will obtain properly fitting compression orthosis, will demonstrate don/doff skill of compression orthosis with modified independence, and independently verbalize wear schedule.          STATUS: New   STG2f: 30 days: Patient/caregiver will independently perform self-manual lymphatic drainage of the upper extremity/upper quadrant.          STATUS: New  TREATMENT:  Therapeutic Activity, Patient/Caregiver Education, ADL retraining, Manual Therapy, Orthotic Management and training, Modalities: TENS, NMES, Ultrasound,  Fluidotherapy Wound dressing if necessary, Therapeutic Exercise, Modalities    3. Self-Care Limitations     LTG 3: 90 days:  The patient will demonstrate improved quality of life by achieving a score of  5 on the Lymphedema Life Impact Scale.           STATUS:  New   STG 3a: 30 days:  The patient will demonstrate improved quality of life by achieving a score of 10 on the Lymphedema Life Impact Scale.           STATUS:  New  TREATMENT:  Manual therapy, therapeutic exercise, therapeutic activity, ADL retraining, Patient/caregiver education, Modalities: TENS, NMES, Ultrasound, Fluidotherapy, Orthotic Management and Training, wound dressing as needed.        OT eval complexity:   Examination:   Performance Deficits include:  Health management, Home management,    # deficits affecting performance:  2  Decision Making:   Treatment Options Considered : Health promotion, remediation/restoration, maintain, prevent  # Treatment options considered : 4  Modification Made for: None  Level of Task Modification:NA  Comorbidity Affect Performance: NA  How is performance affected: NA  Evaluation Level Determined:  3      Val Spicer, OT  2/22/2022

## 2022-02-23 DIAGNOSIS — E11.65 TYPE 2 DIABETES MELLITUS WITH HYPERGLYCEMIA, WITHOUT LONG-TERM CURRENT USE OF INSULIN: Primary | ICD-10-CM

## 2022-02-23 RX ORDER — DULAGLUTIDE 1.5 MG/.5ML
INJECTION, SOLUTION SUBCUTANEOUS
Qty: 6 ML | Refills: 3 | Status: SHIPPED | OUTPATIENT
Start: 2022-02-23 | End: 2022-10-20

## 2022-02-24 ENCOUNTER — LAB (OUTPATIENT)
Dept: LAB | Facility: HOSPITAL | Age: 72
End: 2022-02-24

## 2022-02-24 DIAGNOSIS — N18.31 STAGE 3A CHRONIC KIDNEY DISEASE: ICD-10-CM

## 2022-02-24 DIAGNOSIS — I10 ESSENTIAL HYPERTENSION: ICD-10-CM

## 2022-02-24 DIAGNOSIS — E11.65 TYPE 2 DIABETES MELLITUS WITH HYPERGLYCEMIA, WITHOUT LONG-TERM CURRENT USE OF INSULIN: ICD-10-CM

## 2022-02-24 LAB
ANION GAP SERPL CALCULATED.3IONS-SCNC: 13.5 MMOL/L (ref 5–15)
BILIRUB UR QL STRIP: NEGATIVE
BUN SERPL-MCNC: 14 MG/DL (ref 8–23)
BUN/CREAT SERPL: 15.9 (ref 7–25)
CALCIUM SPEC-SCNC: 9.9 MG/DL (ref 8.6–10.5)
CHLORIDE SERPL-SCNC: 100 MMOL/L (ref 98–107)
CLARITY UR: ABNORMAL
CO2 SERPL-SCNC: 24.5 MMOL/L (ref 22–29)
COLOR UR: ABNORMAL
CREAT SERPL-MCNC: 0.88 MG/DL (ref 0.57–1)
GFR SERPL CREATININE-BSD FRML MDRD: 63 ML/MIN/1.73
GLUCOSE SERPL-MCNC: 161 MG/DL (ref 65–99)
GLUCOSE UR STRIP-MCNC: NEGATIVE MG/DL
HBA1C MFR BLD: 6.9 % (ref 4.8–5.6)
HGB UR QL STRIP.AUTO: NEGATIVE
KETONES UR QL STRIP: ABNORMAL
LEUKOCYTE ESTERASE UR QL STRIP.AUTO: ABNORMAL
NITRITE UR QL STRIP: NEGATIVE
PH UR STRIP.AUTO: 5.5 [PH] (ref 5–8)
POTASSIUM SERPL-SCNC: 4.4 MMOL/L (ref 3.5–5.2)
PROT UR QL STRIP: ABNORMAL
SODIUM SERPL-SCNC: 138 MMOL/L (ref 136–145)
SP GR UR STRIP: >=1.03 (ref 1–1.03)
UROBILINOGEN UR QL STRIP: ABNORMAL

## 2022-02-24 PROCEDURE — 81001 URINALYSIS AUTO W/SCOPE: CPT

## 2022-02-24 PROCEDURE — 80048 BASIC METABOLIC PNL TOTAL CA: CPT

## 2022-02-24 PROCEDURE — 36415 COLL VENOUS BLD VENIPUNCTURE: CPT

## 2022-02-24 PROCEDURE — 83036 HEMOGLOBIN GLYCOSYLATED A1C: CPT

## 2022-02-24 PROCEDURE — 82985 ASSAY OF GLYCATED PROTEIN: CPT

## 2022-02-25 LAB
BACTERIA UR QL AUTO: ABNORMAL /HPF
FRUCTOSAMINE SERPL-SCNC: 235 UMOL/L (ref 0–285)
HYALINE CASTS UR QL AUTO: ABNORMAL /LPF
RBC # UR STRIP: ABNORMAL /HPF
REF LAB TEST METHOD: ABNORMAL
SQUAMOUS #/AREA URNS HPF: ABNORMAL /HPF
WBC # UR STRIP: ABNORMAL /HPF

## 2022-02-28 ENCOUNTER — OFFICE VISIT (OUTPATIENT)
Dept: INTERNAL MEDICINE | Facility: CLINIC | Age: 72
End: 2022-02-28

## 2022-02-28 VITALS
BODY MASS INDEX: 30.8 KG/M2 | WEIGHT: 167.4 LBS | HEIGHT: 62 IN | OXYGEN SATURATION: 92 % | HEART RATE: 81 BPM | SYSTOLIC BLOOD PRESSURE: 109 MMHG | DIASTOLIC BLOOD PRESSURE: 76 MMHG | TEMPERATURE: 97.3 F

## 2022-02-28 DIAGNOSIS — N18.31 STAGE 3A CHRONIC KIDNEY DISEASE: ICD-10-CM

## 2022-02-28 DIAGNOSIS — E11.65 TYPE 2 DIABETES MELLITUS WITH HYPERGLYCEMIA, WITHOUT LONG-TERM CURRENT USE OF INSULIN: ICD-10-CM

## 2022-02-28 DIAGNOSIS — E03.4 ATROPHY OF THYROID: ICD-10-CM

## 2022-02-28 DIAGNOSIS — E78.2 MIXED HYPERLIPIDEMIA: ICD-10-CM

## 2022-02-28 DIAGNOSIS — I10 ESSENTIAL HYPERTENSION: Primary | ICD-10-CM

## 2022-02-28 PROCEDURE — 99214 OFFICE O/P EST MOD 30 MIN: CPT | Performed by: INTERNAL MEDICINE

## 2022-02-28 NOTE — ASSESSMENT & PLAN NOTE
GFR 63 as of her 2/28/2022 follow-up.  This is an improvement, her urinalysis was fairly bland as well.  I think it safe to continue with current dose of losartan for now, potassium is well normal as well, and hopefully will continue to improve as patient recovers from the Covid and as her blood sugars continue to improve as well.

## 2022-02-28 NOTE — ASSESSMENT & PLAN NOTE
Blood pressure with excellent control in the office as of her 2/28/2022 office visit, and she is generally well controlled at home.  She has not needed to resume the amlodipine, and based on her potassium and her renal function, we may end up titrating losartan in the future instead.  Patient stable to continue with 100 mg metoprolol and 50 mg of losartan for now.
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
## 2022-02-28 NOTE — ASSESSMENT & PLAN NOTE
Patient has titrated Lantus back up some, she is currently on 40 units daily.  Her morning blood sugars are improving, they are 140 or better on average.  Additionally her fructosamine of 235 is apparently equal to an A1c of less than 6.  Additionally her A1c is 6.9 and that is only 1 month from it being 8.0.  There is evidence of excellent control of her sugars, do not think anything needs to be advanced at this time, continue to follow closely.  She has appointment with diabetes educator soon as well.

## 2022-03-02 ENCOUNTER — HOSPITAL ENCOUNTER (OUTPATIENT)
Dept: OCCUPATIONAL THERAPY | Facility: HOSPITAL | Age: 72
Setting detail: THERAPIES SERIES
Discharge: HOME | End: 2022-03-02

## 2022-03-02 ENCOUNTER — CLINICAL SUPPORT (OUTPATIENT)
Dept: GASTROENTEROLOGY | Facility: CLINIC | Age: 72
End: 2022-03-02

## 2022-03-02 ENCOUNTER — TELEPHONE (OUTPATIENT)
Dept: GASTROENTEROLOGY | Facility: CLINIC | Age: 72
End: 2022-03-02

## 2022-03-02 ENCOUNTER — PREP FOR SURGERY (OUTPATIENT)
Dept: OTHER | Facility: HOSPITAL | Age: 72
End: 2022-03-02

## 2022-03-02 DIAGNOSIS — Z86.010 HISTORY OF COLON POLYPS: Primary | ICD-10-CM

## 2022-03-02 DIAGNOSIS — I89.0 LYMPHEDEMA: Primary | ICD-10-CM

## 2022-03-02 DIAGNOSIS — L90.5 SCAR TISSUE: ICD-10-CM

## 2022-03-02 PROBLEM — Z86.0100 HISTORY OF COLON POLYPS: Status: ACTIVE | Noted: 2022-03-02

## 2022-03-02 PROCEDURE — 97535 SELF CARE MNGMENT TRAINING: CPT

## 2022-03-02 PROCEDURE — 97140 MANUAL THERAPY 1/> REGIONS: CPT

## 2022-03-02 NOTE — TELEPHONE ENCOUNTER
Jyoti Alvarez  REASON FOR CALL Colon Screening  SENT IN [facility] fadi   Past Medical History:   Diagnosis Date   • Arthritis    • Asthma    • Breast cancer (HCC)     RIGHT   • Colon polyp    • Diabetes (HCC)     BS IN THE A.M.    • Diabetes (HCC)    • Disease of thyroid gland Hypothyroidism   • Fatty liver    • Gastroesophageal reflux    • HL (hearing loss) Hearing aids    • Hyperlipemia    • Hypertension    • Limb swelling    • Seasonal allergies    • Skin cancer    • Strain of latissimus dorsi muscle 2017    Right   • Vertigo      Allergies   Allergen Reactions   • Exenatide Unknown - High Severity     Weight loss and pain in the right side. Bydereon.   • Fluoxetine Hives   • Gabapentin Headache     Occular Miaigraines   • Jardiance [Empagliflozin] Other (See Comments)     UTI   • Levaquin [Levofloxacin] Hives   • Nickel Itching and Rash   • Sulfa Antibiotics Swelling     Past Surgical History:   Procedure Laterality Date   • BREAST AUGMENTATION BILATERAL MASTOPEXY     • BREAST RECONSTRUCTION Bilateral 2022    Procedure: BILATERAL BREAST RECONSTRUCTION WITH REMOVAL AND REPLACEMENT OF IMPLANTS, MESH PLACEMENT, CHEST WALL SKIN RESECTION, CHEST LIPOSUCTION;  Surgeon: Romain Quezada MD;  Location: [facility];  Service: Plastics;  Laterality: Bilateral;   • BREAST SURGERY  2004    Bilateral mastectomies and reconstruction   •  SECTION     • COLONOSCOPY     • COSMETIC SURGERY  2004    Bilateral mastectomies and reconstructio   • ENDOSCOPY  2019   • EYE SURGERY  Not sure    Cateracts   • LIPOSUCTION N/A 2022    Procedure: CHEST WALL LIPOSUCTION AND CHEST WALL RESECTION;  Surgeon: Romain Quezada MD;  Location: [facility] [place];  Service: Plastics;  Laterality: N/A;  TIMES BASED ON PREVIOUS EXPERIENCE   • MASTECTOMY COMPLETE / SIMPLE Bilateral    • OOPHORECTOMY     • OTHER SURGICAL HISTORY      Joint surgery,  RIGHT FOOT SECOND  TOE WITH A PIN   • SKIN CANCER  EXCISION Right 2020    neck   • TOE SURGERY Right     2nd digit pin inserted   • TONSILLECTOMY       Social History     Socioeconomic History   • Marital status:    Tobacco Use   • Smoking status: Never Smoker   • Smokeless tobacco: Never Used   Vaping Use   • Vaping Use: Never used   Substance and Sexual Activity   • Alcohol use: Never   • Drug use: Never   • Sexual activity: Defer     Family History   Problem Relation Age of Onset   • Heart disease Mother    • Cancer Mother         Breast cancer age 45 and 60   • Diabetes Mother    • Arthritis Mother    • Osteoporosis Mother    • Heart failure Mother         Heart attack age 60. Age 87. Cause of death.   • Hypertension Mother    • Osteoarthritis Mother    • Heart disease Father    • Cancer Father         Skin cancer   • Heart failure Father         Age 79  cause of death.   • Cancer Sister         Breast cancer age 27 and 52. Ovarian cancer age 53 caused death at 56.   • Diabetes Sister    • Ovarian cancer Sister 53   • Arthritis Sister    • Cancer Brother         Prostate cancer age about 58.   • Prostate cancer Brother         unsure age   • Breast cancer Other    • Skin cancer Other    • Cancer Daughter    • Cancer Maternal Aunt         Breast cancer   • Cancer Maternal Aunt          with ovarian cancer age mid 40s   • Malig Hyperthermia Neg Hx    • Colon cancer Neg Hx        Current Outpatient Medications:   •  acetaminophen (TYLENOL) 650 MG 8 hr tablet, Take 650 mg by mouth 2 (two) times a day., Disp: , Rfl:   •  albuterol sulfate  (90 Base) MCG/ACT inhaler, USE 2 INHALATIONS EVERY 6 HOURS AS NEEDED (Patient taking differently: Inhale 2 puffs Every 6 (Six) Hours As Needed.), Disp: 51 g, Rfl: 2  •  amLODIPine (NORVASC) 5 MG tablet, Take 5 mg by mouth Daily., Disp: , Rfl:   •  aspirin (aspirin) 81 MG EC tablet, Take 81 mg by mouth Daily. LAST DOSE TODAY PER DR. HAYES (BACILIO), OKAYED WITH DR. MONTEMAYOR. BACILIO STATED SHE WOULD NOTIFY  PATIENT, Disp: , Rfl:   •  atorvastatin (LIPITOR) 40 MG tablet, Take 1 tablet by mouth Every Night., Disp: 90 tablet, Rfl: 1  •  betamethasone dipropionate (DIPROLENE) 0.05 % lotion, APPLY TO SCALP TWICE DAILY AS NEEDED FOR DISCOMFORT, Disp: , Rfl:   •  Cholecalciferol (Vitamin D3) 1.25 MG (81133 UT) capsule, TAKE ONE CAPSULE BY MOUTH ONCE A WEEK (Patient taking differently: 50,000 Units Every 7 (Seven) Days.), Disp: 12 capsule, Rfl: 1  •  ferrous sulfate 325 (65 FE) MG tablet, Take 325 mg by mouth Daily With Breakfast., Disp: , Rfl:   •  fluocinonide (LIDEX) 0.05 % cream, fluocinonide 0.05 % topical cream apply to affected area(s) by topical route As needed   Active, Disp: , Rfl:   •  fluticasone (FLONASE) 50 MCG/ACT nasal spray, fluticasone 50 mcg/actuation nasal spray,suspension spray 1 spray (50 mcg) in each nostril by intranasal route once daily   Active, Disp: , Rfl:   •  fluticasone-salmeterol (Advair Diskus) 500-50 MCG/DOSE DISKUS, Inhale 1 puff 2 (Two) Times a Day., Disp: 3 each, Rfl: 1  •  glimepiride (AMARYL) 4 MG tablet, TAKE 1 TABLET TWICE A DAY (Patient taking differently: Take 4 mg by mouth Every Morning Before Breakfast.), Disp: 180 tablet, Rfl: 1  •  hydrocortisone 2.5 % cream, APPLY TO SCALY AREAS ON THE EARS TWICE DAILY AS NEEDED FLARES., Disp: , Rfl:   •  ketoconazole (NIZORAL) 2 % shampoo, WASH FACE AND SCALP TWO TO THREE TIMES WEEKLY. LEAVE ON FOR 5 MINUTES BEFORE RINSING, Disp: , Rfl:   •  Lantus SoloStar 100 UNIT/ML injection pen, INJECT 20 UNITS ONCE A DAY AND INCREASE TO 50 UNITS AS INDICATED (Patient taking differently: Inject 63 Units under the skin into the appropriate area as directed Every Night.), Disp: 75 mL, Rfl: 3  •  levothyroxine (SYNTHROID, LEVOTHROID) 25 MCG tablet, TAKE 1 TABLET DAILY IN THE MORNING ON AN EMPTY STOMACH (Patient taking differently: Take 25 mcg by mouth Daily.), Disp: 90 tablet, Rfl: 1  •  losartan (COZAAR) 50 MG tablet, Take 1 tablet by mouth Daily., Disp: 90  tablet, Rfl: 1  •  metoprolol succinate XL (TOPROL-XL) 100 MG 24 hr tablet, TAKE 1 TABLET DAILY (Patient taking differently: Take 100 mg by mouth Daily.), Disp: 90 tablet, Rfl: 1  •  montelukast (SINGULAIR) 10 MG tablet, Take 10 mg by mouth Every Night., Disp: , Rfl:   •  pantoprazole (PROTONIX) 40 MG EC tablet, Take 40 mg by mouth Daily., Disp: , Rfl:   •  Trulicity 1.5 MG/0.5ML solution pen-injector, INJECT 1.5 MG UNDER THE SKIN INTO APPROPRIATE AREA AS DIRECTED ONCE A WEEK, Disp: 6 mL, Rfl: 3  •  vitamin B-12 (CYANOCOBALAMIN) 500 MCG tablet, Take 500 mcg by mouth Daily., Disp: , Rfl:

## 2022-03-02 NOTE — THERAPY TREATMENT NOTE
Outpatient Occupational Therapy Lymphedema Treatment Note   [facility]     Patient Name: Jyoti Alvarez  : 1950  MRN: [id number]  Today's Date: 3/3/2022      Visit Date: 2022    Patient Active Problem List   Diagnosis   • Breast cancer (HCC)   • Age related osteoporosis   • Osteoarthritis   • Asthma   • Colon polyp   • Gastroesophageal reflux disease without esophagitis   • Essential hypertension   • Fatty liver   • Lumbar radiculopathy   • Mild intermittent asthma   • Personal history of malignant neoplasm of breast   • Seasonal allergic rhinitis   • Vitamin D deficiency   • Type 2 diabetes mellitus with hyperglycemia, without long-term current use of insulin (HCC)   • Mixed hyperlipidemia   • Atrophy of thyroid   • Iron deficiency anemia   • Chronic diarrhea   • Vertigo   • COVID-19 virus infection   • Vitamin B12 deficiency   • Acute URI   • Deformity and disproportion of reconstructed breast   • Post-procedural fever   • Apnea spell   • Stage 3a chronic kidney disease (HCC)   • Postoperative follow-up   • History of colon polyps        Past Medical History:   Diagnosis Date   • Arthritis    • Asthma    • Breast cancer (HCC)     RIGHT   • Colon polyp    • Diabetes (HCC)     BS IN THE A.M.    • Diabetes (HCC)    • Disease of thyroid gland Hypothyroidism   • Fatty liver    • Gastroesophageal reflux    • HL (hearing loss) Hearing aids    • Hyperlipemia    • Hypertension    • Limb swelling    • Seasonal allergies    • Skin cancer    • Strain of latissimus dorsi muscle 2017    Right   • Vertigo         Past Surgical History:   Procedure Laterality Date   • BREAST AUGMENTATION BILATERAL MASTOPEXY     • BREAST RECONSTRUCTION Bilateral 2022    Procedure: BILATERAL BREAST RECONSTRUCTION WITH REMOVAL AND REPLACEMENT OF IMPLANTS, MESH PLACEMENT, CHEST WALL SKIN RESECTION, CHEST LIPOSUCTION;  Surgeon: Romain Quezada MD;  Location: [facility] OR [facility] [place];  Service: Plastics;  Laterality:  Bilateral;   • BREAST SURGERY      Bilateral mastectomies and reconstruction   •  SECTION     • COLONOSCOPY  2019   • COSMETIC SURGERY      Bilateral mastectomies and reconstructio   • ENDOSCOPY  2019   • EYE SURGERY  Not sure    Cateracts   • LIPOSUCTION N/A 2022    Procedure: CHEST WALL LIPOSUCTION AND CHEST WALL RESECTION;  Surgeon: Romain Quezada MD;  [place] [facility] [place] [facility] [place]  Service: Plastics;  Laterality: N/A;  TIMES BASED ON PREVIOUS EXPERIENCE   • MASTECTOMY COMPLETE / SIMPLE Bilateral    • OOPHORECTOMY     • OTHER SURGICAL HISTORY      Joint surgery,  RIGHT FOOT SECOND  TOE WITH A PIN   • SKIN CANCER EXCISION Right 2020    neck   • TOE SURGERY Right     2nd digit pin inserted   • TONSILLECTOMY           Visit Dx:      ICD-10-CM ICD-9-CM   1. Lymphedema  I89.0 457.1   2. Scar tissue  L90.5 709.2        Lymphedema     Row Name 22 1700             Subjective Pain    Able to rate subjective pain? yes  -SC      Pre-Treatment Pain Level 0  -SC      Post-Treatment Pain Level 0  -SC              Manual Lymphatic Drainage    Manual Lymphatic Drainage initial sequence; opened regional lymph nodes; opened anastamoses; extremity treatment  -SC      Initial Sequence full neck; cervical; supraclavicular; shoulder collectors; abdomen; diaphragmatic breathing  -SC      Opened Regional Lymph Nodes axillary; inguinal; paraspinal; other lymph nodes  parasternal  -SC      Opened Anastamoses anterior axillo-axillary; axillo-inguinal  -SC      Extremity Treatment MLD to proximal limb only  -SC      MLD to Proximal Limb Only B UE  -SC            User Key  (r) = Recorded By, (t) = Taken By, (c) = Cosigned By    Initials Name Provider Type    SC Leticia Ledbetter COTA Occupational Therapy Assistant                                Manual Rx (last 36 hours)     Manual Treatments     Row Name 22 1719             Total Minutes    37437 - OT Manual Therapy Minutes 30  -SC            User Key   (r) = Recorded By, (t) = Taken By, (c) = Cosigned By    Initials Name Provider Type    SC Leticia Ledbetter COTA Occupational Therapy Assistant                  Therapy Education  Given: Symptoms/condition management  Program: New  How Provided: Verbal, Demonstration, Written  Provided to: Patient  Level of Understanding: Teach back education performed, Verbalized, Demonstrated  36108 - OT Self Care/Mgmt Minutes: 30  Patient provided with education on a simple self-manual lymphatic drainage technique.  Handouts provided.   Patient exhibited fair return demonstration of skill.  Patient will benefit from continued education to ensure carryover skill.  Patient provided with the LeAP lymphedema action plan stoplight to recovery handout (Rehabilitation Oncology: July 2019 - Volume 37 - Issue 3 - p 122-127 doi: 10.1097/01.[id number]) to improve patient's ability to identify lymph exacerbation and provide guidance on appropriate action to be taken to control symptoms. Therapist did discuss with pt regarding being more than 5 years out of sx and not needing to be in the lymphedema surveillance program however therapist did want pt to have the education for signs and symptoms to be aware of. Pt educated on signs and symptoms of infection. Therapist urging pt to seek doctor evaluation if signs and symptoms occur and to remove any compression. Therapist educating pt regarding pt must be on antibiotic for 72 hours if there is an infection prior to continuing compression to prevent the spread of infection.                     Time Calculation:   Timed Charges  18468 - OT Manual Therapy Minutes: 30  22949 - OT Self Care/Mgmt Minutes: 30  Total Minutes  Timed Charges Total Minutes: 60   Total Minutes: 60     Therapy Charges for Today     Code Description Service Date Service Provider Modifiers Qty    44832416580 HC OT MANUAL THERAPY EA 15 MIN 3/2/2022 Leticia Ledbetter COTA GO 2    91950509042 HC OT SELF CARE/MGMT/TRAIN  EA 15 MIN 3/2/2022 Leticia Ledbetter COTA GO 2                      ANA Akers  3/3/2022

## 2022-03-03 ENCOUNTER — APPOINTMENT (OUTPATIENT)
Dept: DIABETES SERVICES | Facility: HOSPITAL | Age: 72
End: 2022-03-03

## 2022-03-03 ENCOUNTER — EDUCATION (OUTPATIENT)
Dept: DIABETES SERVICES | Facility: HOSPITAL | Age: 72
End: 2022-03-03

## 2022-03-03 DIAGNOSIS — IMO0002 DIABETES MELLITUS TYPE 2, UNCONTROLLED, WITH COMPLICATIONS: Primary | ICD-10-CM

## 2022-03-03 PROCEDURE — G0108 DIAB MANAGE TRN  PER INDIV: HCPCS

## 2022-03-03 NOTE — PROGRESS NOTES
Initial Visit and Education Plan:  Jyoti Alvarez 72 y.o. presented to [facility] for initial diabetes self-management education and training.  Patient states the reason for their visit is to learn more about diabetes.  Patient is referred by Ramo Noguera MD.  Patient states her  just recently  from Covid.  Ht: 62 inches tall    Wt: Stated weight 168 pounds    Allergies   Allergen Reactions   • Exenatide Unknown - High Severity     Weight loss and pain in the right side. Bydereon.   • Fluoxetine Hives   • Gabapentin Headache     Occular Miaigraines   • Jardiance [Empagliflozin] Other (See Comments)     UTI   • Levaquin [Levofloxacin] Hives   • Nickel Itching and Rash   • Sulfa Antibiotics Swelling        LABS:  Lab Results (most recent)      Latest Reference Range & Units 22 10:36 22 14:35   Glucose 65 - 99 mg/dL 180 (H) [1] 161 (H)   Sodium 136 - 145 mmol/L  138   Potassium 3.5 - 5.2 mmol/L  4.4   CO2 22.0 - 29.0 mmol/L  24.5   Chloride 98 - 107 mmol/L  100   Anion Gap 5.0 - 15.0 mmol/L  13.5   Creatinine 0.57 - 1.00 mg/dL  0.88   BUN 8 - 23 mg/dL  14   BUN/Creatinine Ratio 7.0 - 25.0   15.9   Calcium 8.6 - 10.5 mg/dL  9.9   EGFR Non African Am >60 mL/min/1.73  63   Fructosamine 0 - 285 umol/L  235 [2]   Hemoglobin A1C 4.80 - 5.60 %  6.90 (H)   (H): Data is abnormally high  [1] Serial Number: [id number]:  [id number]  [2] Published reference interval for apparently healthy subjects   between age 20 and 60 is 205 - 285 umol/L and in a poorly   controlled diabetic population is 228 - 563 umol/L with a   mean of 396 umol/L.         Labs reviewed with patient.    Past Medical History:   Diagnosis Date   • Arthritis    • Asthma    • Breast cancer (HCC)     RIGHT   • Colon polyp    • Diabetes (HCC)     BS IN THE A.M.    • Diabetes (HCC)    • Disease of thyroid gland Hypothyroidism   • Fatty liver    • Gastroesophageal reflux    • HL (hearing loss)  Hearing aids    • Hyperlipemia    • Hypertension    • Limb swelling    • Seasonal allergies    • Skin cancer    • Strain of latissimus dorsi muscle 2017    Right   • Vertigo         Past Surgical History:   • BREAST AUGMENTATION BILATERAL MASTOPEXY   • BREAST RECONSTRUCTION    Procedure: BILATERAL BREAST RECONSTRUCTION WITH REMOVAL AND REPLACEMENT OF IMPLANTS, MESH PLACEMENT, CHEST WALL SKIN RESECTION, CHEST LIPOSUCTION;  Surgeon: Romain Quezada MD;  [place] [facility] [place] [facility] [place]  Service: Plastics;  Laterality: Bilateral;   • BREAST SURGERY    Bilateral mastectomies and reconstruction   •  SECTION   • COLONOSCOPY   • COSMETIC SURGERY    Bilateral mastectomies and reconstructio   • ENDOSCOPY   • EYE SURGERY    Cateracts   • LIPOSUCTION    Procedure: CHEST WALL LIPOSUCTION AND CHEST WALL RESECTION;  Surgeon: Romain Quezada MD;  [place] [facility] [place] [facility] [place]  Service: Plastics;  Laterality: N/A;  TIMES BASED ON PREVIOUS EXPERIENCE   • MASTECTOMY COMPLETE / SIMPLE   • OOPHORECTOMY   • OTHER SURGICAL HISTORY    Joint surgery,  RIGHT FOOT SECOND  TOE WITH A PIN   • SKIN CANCER EXCISION    neck   • TOE SURGERY    2nd digit pin inserted   • TONSILLECTOMY        Social History     Tobacco Use   • Smoking status: Never Smoker   • Smokeless tobacco: Never Used   Vaping Use   • Vaping Use: Never used   Substance Use Topics   • Alcohol use: Never   • Drug use: Never        Family History   Problem Relation Age of Onset   • Heart disease Mother    • Cancer Mother         Breast cancer age 45 and 60   • Diabetes Mother    • Arthritis Mother    • Osteoporosis Mother    • Heart failure Mother         Heart attack age 60. Age 87. Cause of death.   • Hypertension Mother    • Osteoarthritis Mother    • Heart disease Father    • Cancer Father         Skin cancer   • Heart failure Father         Age 79  cause of death.   • Cancer Sister         Breast cancer age 27 and 52. Ovarian cancer age 53 caused death at  56.   • Diabetes Sister    • Ovarian cancer Sister 53   • Arthritis Sister    • Cancer Brother         Prostate cancer age about 58.   • Prostate cancer Brother         unsure age   • Breast cancer Other    • Skin cancer Other    • Cancer Daughter    • Cancer Maternal Aunt         Breast cancer   • Cancer Maternal Aunt          with ovarian cancer age mid 40s   • Malig Hyperthermia Neg Hx    • Colon cancer Neg Hx         Education Plan as follows:      Blood Glucose Monitoring Instructions and Plan:   We reviewed blood glucose testing and ADA recommended blood glucose level for fasting, pre and post meals. Patient demonstrates understanding and importance of testing blood glucose 1-3 times a day; Patient will log BG results. Patient was given written material including blood glucose log.     Initial Nutrition Instructions and Plan:   Patient was instructed and demonstrated reading a food label. Serving size and carbohydrate amounts were emphasized.   30-45 carbs per meal 3 meals a day  15-20 carbs per snacks 3 snacks per day.   Patient has not formally seen a dietitian. Patient was given written materials including Nutrition in the [employer].   [employer] Luis Antonio explained and demonstrated to patient.     Medication Instructions and Plan:     Current Outpatient Medications:   •  acetaminophen (TYLENOL) 650 MG 8 hr tablet, Take 650 mg by mouth 2 (two) times a day., Disp: , Rfl:   •  albuterol sulfate  (90 Base) MCG/ACT inhaler, USE 2 INHALATIONS EVERY 6 HOURS AS NEEDED (Patient taking differently: Inhale 2 puffs Every 6 (Six) Hours As Needed.), Disp: 51 g, Rfl: 2  •  amLODIPine (NORVASC) 5 MG tablet, Take 5 mg by mouth Daily., Disp: , Rfl:   •  aspirin (aspirin) 81 MG EC tablet, Take 81 mg by mouth Daily. LAST DOSE TODAY PER DR. HAYES (BACILIO), OKAYED WITH DR. MONTEMAYOR. BACILIO STATED SHE WOULD NOTIFY PATIENT, Disp: , Rfl:   •  atorvastatin (LIPITOR) 40 MG tablet, Take 1 tablet by mouth Every Night.,  Disp: 90 tablet, Rfl: 1  •  betamethasone dipropionate (DIPROLENE) 0.05 % lotion, APPLY TO SCALP TWICE DAILY AS NEEDED FOR DISCOMFORT, Disp: , Rfl:   •  Cholecalciferol (Vitamin D3) 1.25 MG (28103 UT) capsule, TAKE ONE CAPSULE BY MOUTH ONCE A WEEK (Patient taking differently: 50,000 Units Every 7 (Seven) Days.), Disp: 12 capsule, Rfl: 1  •  ferrous sulfate 325 (65 FE) MG tablet, Take 325 mg by mouth Daily With Breakfast., Disp: , Rfl:   •  fluocinonide (LIDEX) 0.05 % cream, fluocinonide 0.05 % topical cream apply to affected area(s) by topical route As needed   Active, Disp: , Rfl:   •  fluticasone (FLONASE) 50 MCG/ACT nasal spray, fluticasone 50 mcg/actuation nasal spray,suspension spray 1 spray (50 mcg) in each nostril by intranasal route once daily   Active, Disp: , Rfl:   •  fluticasone-salmeterol (Advair Diskus) 500-50 MCG/DOSE DISKUS, Inhale 1 puff 2 (Two) Times a Day., Disp: 3 each, Rfl: 1  •  glimepiride (AMARYL) 4 MG tablet, TAKE 1 TABLET TWICE A DAY (Patient taking differently: Take 4 mg by mouth Every Morning Before Breakfast.), Disp: 180 tablet, Rfl: 1  •  hydrocortisone 2.5 % cream, APPLY TO SCALY AREAS ON THE EARS TWICE DAILY AS NEEDED FLARES., Disp: , Rfl:   •  ketoconazole (NIZORAL) 2 % shampoo, WASH FACE AND SCALP TWO TO THREE TIMES WEEKLY. LEAVE ON FOR 5 MINUTES BEFORE RINSING, Disp: , Rfl:   •  Lantus SoloStar 100 UNIT/ML injection pen, INJECT 20 UNITS ONCE A DAY AND INCREASE TO 50 UNITS AS INDICATED (Patient taking differently: Inject 63 Units under the skin into the appropriate area as directed Every Night.), Disp: 75 mL, Rfl: 3  •  levothyroxine (SYNTHROID, LEVOTHROID) 25 MCG tablet, TAKE 1 TABLET DAILY IN THE MORNING ON AN EMPTY STOMACH (Patient taking differently: Take 25 mcg by mouth Daily.), Disp: 90 tablet, Rfl: 1  •  losartan (COZAAR) 50 MG tablet, Take 1 tablet by mouth Daily., Disp: 90 tablet, Rfl: 1  •  metoprolol succinate XL (TOPROL-XL) 100 MG 24 hr tablet, TAKE 1 TABLET DAILY  (Patient taking differently: Take 100 mg by mouth Daily.), Disp: 90 tablet, Rfl: 1  •  montelukast (SINGULAIR) 10 MG tablet, Take 10 mg by mouth Every Night., Disp: , Rfl:   •  pantoprazole (PROTONIX) 40 MG EC tablet, Take 40 mg by mouth Daily., Disp: , Rfl:   •  polyethylene glycol (GoLYTELY) 236 g solution, Starting at noon on day prior to procedure, drink 8 ounces every 30 minutes until all gone or stools are clear. May add flavor packet., Disp: 4000 mL, Rfl: 0  •  Trulicity 1.5 MG/0.5ML solution pen-injector, INJECT 1.5 MG UNDER THE SKIN INTO APPROPRIATE AREA AS DIRECTED ONCE A WEEK, Disp: 6 mL, Rfl: 3  •  vitamin B-12 (CYANOCOBALAMIN) 500 MCG tablet, Take 500 mcg by mouth Daily., Disp: , Rfl:    Medication list was reviewed with patient. Patient denies questions or concerns regarding current medication therapy. Patient was instructed to continue medications as prescribed.     Exercise:   Patient has been instructed to walk for 10 minutes after each meal initially and build strength and endurance to achieve 30 minutes of sustained exercise per day; recommended patient seek advice from Primary Care Provider prior to beginning any exercise program if other health concerns exist.     Problem solving and reducing risks:   I explained the importance of keeping provider appointments, taking medications as prescribed, having laboratory work performed as ordered by provider and seeking care as soon as possible when complications occur.     Patient Selected Behavioral Goal :  #1 - take medications as prescribed  #2 - increase movement    Patient Selected Ongoing Support Plan:  [employer] and Family Member Support          Follow up Instructions and Plan:Patient was encouraged to contact DSME staff with questions and or concerns.  DSME staff contact information was given to patient.  Patient will be contacted when group classes resume. DSME staff will contact patient at 3 months and 6 months to evaluate patient's  further needs regarding diabetes.        Start Time: 13: 55  End Time: 14: 57    Carli Moy RN, BSN  03/03/2022

## 2022-03-07 ENCOUNTER — OFFICE VISIT (OUTPATIENT)
Dept: SLEEP MEDICINE | Facility: HOSPITAL | Age: 72
End: 2022-03-07

## 2022-03-07 VITALS
DIASTOLIC BLOOD PRESSURE: 72 MMHG | WEIGHT: 164 LBS | BODY MASS INDEX: 30.18 KG/M2 | TEMPERATURE: 96 F | HEART RATE: 78 BPM | OXYGEN SATURATION: 95 % | HEIGHT: 62 IN | SYSTOLIC BLOOD PRESSURE: 140 MMHG

## 2022-03-07 DIAGNOSIS — E11.65 TYPE 2 DIABETES MELLITUS WITH HYPERGLYCEMIA, WITHOUT LONG-TERM CURRENT USE OF INSULIN: ICD-10-CM

## 2022-03-07 DIAGNOSIS — G47.10 HYPERSOMNIA: ICD-10-CM

## 2022-03-07 DIAGNOSIS — G47.30 OBSERVED SLEEP APNEA: Primary | ICD-10-CM

## 2022-03-07 DIAGNOSIS — R06.83 SNORING: ICD-10-CM

## 2022-03-07 DIAGNOSIS — E66.9 CLASS 1 OBESITY: ICD-10-CM

## 2022-03-07 PROBLEM — E66.811 CLASS 1 OBESITY: Status: ACTIVE | Noted: 2022-03-07

## 2022-03-07 PROCEDURE — G0463 HOSPITAL OUTPT CLINIC VISIT: HCPCS | Performed by: INTERNAL MEDICINE

## 2022-03-07 PROCEDURE — 99204 OFFICE O/P NEW MOD 45 MIN: CPT | Performed by: INTERNAL MEDICINE

## 2022-03-07 NOTE — PROGRESS NOTES
[address] [address]  [place]   [place] [address]  Phone: [phone]  Fax: [phone]      Jyoti Alvarez  1950  72 y.o.  female      PCP:Ramo Noguera MD    Type of service: Initial New Patient Office Visit  Date of service: 3/7/2022    Chief Complaint   Patient presents with   • Witnessed Apnea   • Fatigue   • Daytime Sleepiness   • Obesity   • Non-restorative Sleep   • Snoring       History of present illness;  Jyoti Alvarez 72 y.o.  is a new patient for me and was seen today for sleep related problems of snoring, non-restorative sleep and witnessed apneas. The symptoms are present for many years and they are persistent in nature.  The snoring is present in all positions and it is loud.  Has no history of prior sleep evaluation and sleep studies. Patient has  prior surgery namely tonsillectomy, 1957  Recently she underwent surgery and anesthesiologist told her that she might have sleep apnea because of the witnessed apneas and low oxygen.  September 2021 both she and her  got Covid unfortunately her  passed away.  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    Patient gives the following sleep history.  Sleep schedule:  Bedtime: 12 midnight  Wake time: 9 AM  Normally takes about 30-45 minutes to fall asleep  Average hours of sleep 70  Number of naps per day rarely  Symptoms  In addition to snoring, nonrestorative sleep and witnessed apneas patient gives the following associated symptoms.  Have you ever awakened gasping for breath, coughing, choking: Yes   Change in weight,  No   Morning headaches  Yes   Awaken with a sore throat or dry mouth  Yes   Leg jerking at night:  No   Crawly feeling/urge sensation to move in the legs: Yes   Teeth grinding:Yes   Have you ever awakened at night with a sour taste or burning sensation in your chest:  No   Do  "you have muscle weakness with laughing or anger or sleep paralysis:  No   Have you ever felt paralyzed while going to sleep or waking up:  No   Sleepwalking, nightmares, No   Nocturia (urination at night): 2 times per night  Memory Problem:No     Past medical history: (Relevant to sleep medicine)  1. Hypertension  2. Diabetes mellitus  3. Arthritis  4. Anxiety  5. Depression situational    • Medications are reviewed by me and documented in the encounter  • Allergies reviewed and documented in encounter    Social history:  Do you drive a commercial vehicle:  No   Shift work:  No   Tobacco use:  No   Alcohol use:  0 per week  Caffeinated drinks: 1 soda per week    FAMILY HISTORY (Your mother, father, brothers and sisters)  1. Obesity  2. History of sleep    REVIEW OF SYSTEMS.  Full review of systems available on the intake form which is scanned in the media tab.  The relevant positive are noted below  1. Daytime excessive sleepiness with Cameron Sleepiness Scale :Total score: 4   2. Snoring  3. Fatigue  4. Frequent urination  5. Nasal congestion  6. Heartburn  7. Abdominal bloating  8. Anxiety      Physical exam:  Vitals:    03/07/22 1100   BP: 140/72   Pulse: 78   Temp: 96 °F (35.6 °C)   SpO2: 95%   Weight: 74.4 kg (164 lb)   Height: 157.5 cm (62\")    Body mass index is 30 kg/m².    HEENT: Head is atraumatic, normocephalic  Eyes: pupils are round equal and reacting to light and accommodation, conjunctiva normal  Nose: no nasal septal defects or deviation and the nasal passages are clear, no nasal polyps,  Throat: tonsils are not present, tongue normal, oral airway Mallampati class 3  NECK: , trachea is in the midline, thyroid not enlarged  RESPIRATORY SYSTEM: Breath sounds are equal on both sides, there are no wheezes   CARDIOVASULAR SYSTEM: Heart sounds are regular rhythm and damion rate, no edema  EXTREMITES: No cyanosis, clubbing  NEUROLOGICAL SYSTEM: Oriented x 3, no gross motor defects, gait normal    Labs " reviewed.  TSH Results:  TSH    TSH 7/21/21 10/27/21   TSH 1.930 1.270            Most Recent A1C    HGBA1C Most Recent 2/24/22   Hemoglobin A1C 6.90 (A)   (A) Abnormal value               Assessment and plan:  · Witnessed apnea (R06.81) patient's symptoms and examination is consistent with sleep apnea (G47.30)  I have talked to the patient about the signs and symptoms of sleep apnea. In addition, I have also discussed pathophysiology of sleep apnea.  I also discussed the complications of untreated sleep apnea including effects on hypertension, diabetes mellitus and nonrestorative sleep with hypersomnia which can increase risk for motor vehicle accidents.  Untreated sleep apnea is also a risk factor for development of atrial fibrillation, pulmonary hypertension and stroke.  Discussed in detail of various testing methods including home-based and lab based sleep studies.  Based on history and physical examination the most appropriate study is home sleep test.  The order for the sleep study is placed in [facility].  The test will be scheduled after approval from insurance. Treatment and management will be discussed after the test is completed.  Patient was given opportunity to ask questions and all the questions were answered.   · Snoring (R06.83) snoring is the sound created by turbulent airflow vibrating upper airway soft tissue.  I have also discussed factors affecting snoring including sleep deprivation, sleeping on the back and alcohol ingestion. To minimize snoring, patient is advised to have adequate sleep, sleep on the side and avoid alcohol and sedative medications before bedtime  · Daytime excessive sleepiness .  It was assessed with Almont Sleepiness Scale of Total score: 4.  There are many causes for daytime excessive sleepiness including sleep depression, shiftwork syndrome, depression and other medical disorders including heart, kidney and liver failure.  The most serious cause of excessive sleepiness is due  to neurological conditions like narcolepsy/cataplexy.  But the most common cause of excessive sleepiness is due to sleep apnea with frequent awakenings during sleep time.  I have discussed safety of driving and to remain vigilant while driving.  · Obesity 1, with BMI Body mass index is 30 kg/m².. I have discussed the relationship between weight and sleep apnea.There is direct correlation between weight and severity of sleep apnea.  Weight reduction is encouraged, as it is going to reduce the severity of sleep apnea. I have also discussed with the patient diet and exercise to achieve ideal body weight    I have also discussed with the patient the following  • Sleep hygiene: Maintaining a regular bedtime and wake time, not to watch television or work in bed, limit caffeine-containing beverages before bed time and avoid naps during the day  • Adequate amount of sleep.  Generally most people needs about 7 to 8 hours of sleep.  • Return for 31 to 90 days after PAP setup with down load..  Patient's questions were answered.        Randy Gotti MD  Sleep Medicine.  Medical Director, [facility], [facility]  3/7/2022 ,

## 2022-03-09 ENCOUNTER — HOSPITAL ENCOUNTER (OUTPATIENT)
Dept: OCCUPATIONAL THERAPY | Facility: HOSPITAL | Age: 72
Setting detail: THERAPIES SERIES
Discharge: HOME | End: 2022-03-09

## 2022-03-09 DIAGNOSIS — L90.5 SCAR TISSUE: ICD-10-CM

## 2022-03-09 DIAGNOSIS — I89.0 LYMPHEDEMA: Primary | ICD-10-CM

## 2022-03-09 PROCEDURE — 97140 MANUAL THERAPY 1/> REGIONS: CPT

## 2022-03-09 RX ORDER — METOPROLOL SUCCINATE 100 MG/1
100 TABLET, EXTENDED RELEASE ORAL DAILY
Qty: 90 TABLET | Refills: 1 | Status: SHIPPED | OUTPATIENT
Start: 2022-03-09 | End: 2022-06-09

## 2022-03-09 RX ORDER — FLUTICASONE PROPIONATE 50 MCG
SPRAY, SUSPENSION (ML) NASAL
Qty: 48 G | Refills: 1 | Status: SHIPPED | OUTPATIENT
Start: 2022-03-09 | End: 2023-03-06

## 2022-03-09 NOTE — THERAPY TREATMENT NOTE
Outpatient Occupational Therapy Lymphedema Treatment Note  JR Chanel     Patient Name: Jyoti Alvarez  : 1950  MRN: [id number]  Today's Date: 3/9/2022      Visit Date: 2022    Patient Active Problem List   Diagnosis   • Breast cancer (HCC)   • Age related osteoporosis   • Osteoarthritis   • Asthma   • Colon polyp   • Gastroesophageal reflux disease without esophagitis   • Essential hypertension   • Fatty liver   • Lumbar radiculopathy   • Mild intermittent asthma   • Personal history of malignant neoplasm of breast   • Seasonal allergic rhinitis   • Vitamin D deficiency   • Type 2 diabetes mellitus with hyperglycemia, without long-term current use of insulin (HCC)   • Mixed hyperlipidemia   • Atrophy of thyroid   • Iron deficiency anemia   • Chronic diarrhea   • Vertigo   • COVID-19 virus infection   • Vitamin B12 deficiency   • Acute URI   • Deformity and disproportion of reconstructed breast   • Post-procedural fever   • Observed sleep apnea   • Stage 3a chronic kidney disease (HCC)   • Postoperative follow-up   • History of colon polyps   • Snoring   • Hypersomnia   • Class 1 obesity        Past Medical History:   Diagnosis Date   • Arthritis    • Asthma    • Breast cancer (HCC)     RIGHT   • Colon polyp    • Diabetes (HCC)     BS IN THE A.M.    • Diabetes (HCC)    • Disease of thyroid gland Hypothyroidism   • Fatty liver    • Gastroesophageal reflux    • HL (hearing loss) Hearing aids    • Hyperlipemia    • Hypertension    • Limb swelling    • Seasonal allergies    • Skin cancer    • Strain of latissimus dorsi muscle 2017    Right   • Vertigo         Past Surgical History:   Procedure Laterality Date   • BREAST AUGMENTATION BILATERAL MASTOPEXY     • BREAST RECONSTRUCTION Bilateral 2022    Procedure: BILATERAL BREAST RECONSTRUCTION WITH REMOVAL AND REPLACEMENT OF IMPLANTS, MESH PLACEMENT, CHEST WALL SKIN RESECTION, CHEST LIPOSUCTION;  Surgeon: Romain Quezada MD;   [place] [facility] [place] [facility] [place]  Service: [place]astics;  Laterality: Bilateral;   • BREAST SURGERY      Bilateral mastectomies and reconstruction   •  SECTION     • COLONOSCOPY  2019   • COSMETIC SURGERY  2004    Bilateral mastectomies and reconstructio   • ENDOSCOPY  2019   • EYE SURGERY  Not sure    Cateracts   • LIPOSUCTION N/A 2022    Procedure: CHEST WALL LIPOSUCTION AND CHEST WALL RESECTION;  Surgeon: Romain Quezada MD;  [place] [facility] [place] [facility] [place]  Service: Plastics;  Laterality: N/A;  TIMES BASED ON PREVIOUS EXPERIENCE   • MASTECTOMY COMPLETE / SIMPLE Bilateral    • OOPHORECTOMY     • OTHER SURGICAL HISTORY      Joint surgery,  RIGHT FOOT SECOND  TOE WITH A PIN   • SKIN CANCER EXCISION Right 2020    neck   • TOE SURGERY Right     2nd digit pin inserted   • TONSILLECTOMY           Visit Dx:      ICD-10-CM ICD-9-CM   1. Lymphedema  I89.0 457.1   2. Scar tissue  L90.5 709.2        Lymphedema     Row Name 22 1200             Subjective Pain    Able to rate subjective pain? yes  -SC      Pre-Treatment Pain Level 0  -SC      Post-Treatment Pain Level 0  -SC      Subjective Pain Comment Pt denies pain  -SC              Subjective Comments    Subjective Comments Pt states the swelling is getting much better.  -SC              Skin Changes/Observations    Location/Assessment Upper Quadrant  -SC      Upper Quadrant Conditions bilateral:;normal;clean  -SC      Upper Quadrant Color/Pigment bilateral:;erythema;other (comment)  scar tissue  -SC      Upper Quadrant Skin Details erythema is mild  -SC      Skin Observations Comment Pt lateral chest wall bilaterally at incision site noted with scar tissue.  -SC              Manual Lymphatic Drainage    Manual Lymphatic Drainage initial sequence;opened regional lymph nodes;opened anastamoses;extremity treatment  -SC      Initial Sequence full neck;cervical;supraclavicular;shoulder collectors;abdomen;diaphragmatic breathing  -SC      Diaphragmatic  Breathing x5 x2  -SC      Opened Regional Lymph Nodes axillary;inguinal;paraspinal;other lymph nodes  parasternal  -SC      Opened Anastamoses anterior axillo-axillary;axillo-inguinal  -SC      Extremity Treatment MLD to proximal limb only  -SC      MLD to Proximal Limb Only B UE  -SC              Compression/Skin Care    Compression/Skin Care Comments Compression bra. Therapist providing pt with wave foam to be placed daily at B lateral chest wall incision scar to decrease scar tissue.  -SC            User Key  (r) = Recorded By, (t) = Taken By, (c) = Cosigned By    Initials Name Provider Type    Leticia Rutherford COTA Occupational Therapy Assistant                                Manual Rx (last 36 hours)     Manual Treatments     Row Name 03/09/22 1212             Total Minutes    96773 - OT Manual Therapy Minutes 48  -SC            User Key  (r) = Recorded By, (t) = Taken By, (c) = Cosigned By    Initials Name Provider Type    Leticia Rutherford COTA Occupational Therapy Assistant                                   Time Calculation:   Timed Charges  85358 - OT Manual Therapy Minutes: 48  Total Minutes  Timed Charges Total Minutes: 48   Total Minutes: 48     Therapy Charges for Today     Code Description Service Date Service Provider Modifiers Qty    11131370601 HC OT MANUAL THERAPY EA 15 MIN 3/9/2022 Leticia Ledbetter COTA GO 3                      ANA Akers  3/9/2022

## 2022-03-10 ENCOUNTER — OFFICE VISIT (OUTPATIENT)
Dept: PLASTIC SURGERY | Facility: CLINIC | Age: 72
End: 2022-03-10

## 2022-03-10 VITALS
SYSTOLIC BLOOD PRESSURE: 121 MMHG | TEMPERATURE: 98.1 F | WEIGHT: 168 LBS | OXYGEN SATURATION: 99 % | BODY MASS INDEX: 30.91 KG/M2 | HEART RATE: 70 BPM | DIASTOLIC BLOOD PRESSURE: 79 MMHG | HEIGHT: 62 IN

## 2022-03-10 DIAGNOSIS — Z09 POSTOPERATIVE FOLLOW-UP: Primary | ICD-10-CM

## 2022-03-10 PROCEDURE — 99024 POSTOP FOLLOW-UP VISIT: CPT | Performed by: NURSE PRACTITIONER

## 2022-03-11 ENCOUNTER — HOSPITAL ENCOUNTER (OUTPATIENT)
Dept: OCCUPATIONAL THERAPY | Facility: HOSPITAL | Age: 72
Setting detail: THERAPIES SERIES
Discharge: HOME | End: 2022-03-11

## 2022-03-11 DIAGNOSIS — I89.0 LYMPHEDEMA: Primary | ICD-10-CM

## 2022-03-11 DIAGNOSIS — L90.5 SCAR TISSUE: ICD-10-CM

## 2022-03-11 PROCEDURE — 97110 THERAPEUTIC EXERCISES: CPT

## 2022-03-11 PROCEDURE — 97140 MANUAL THERAPY 1/> REGIONS: CPT

## 2022-03-11 NOTE — THERAPY TREATMENT NOTE
Outpatient Occupational Therapy Lymphedema Treatment Note  RJ Chanel     Patient Name: Jyoti Alvarez  : 1950  MRN: [id number]  Today's Date: 3/11/2022      Visit Date: 2022    Patient Active Problem List   Diagnosis   • Breast cancer (HCC)   • Age related osteoporosis   • Osteoarthritis   • Asthma   • Colon polyp   • Gastroesophageal reflux disease without esophagitis   • Essential hypertension   • Fatty liver   • Lumbar radiculopathy   • Mild intermittent asthma   • Personal history of malignant neoplasm of breast   • Seasonal allergic rhinitis   • Vitamin D deficiency   • Type 2 diabetes mellitus with hyperglycemia, without long-term current use of insulin (HCC)   • Mixed hyperlipidemia   • Atrophy of thyroid   • Iron deficiency anemia   • Chronic diarrhea   • Vertigo   • COVID-19 virus infection   • Vitamin B12 deficiency   • Acute URI   • Deformity and disproportion of reconstructed breast   • Post-procedural fever   • Observed sleep apnea   • Stage 3a chronic kidney disease (HCC)   • Postoperative follow-up   • History of colon polyps   • Snoring   • Hypersomnia   • Class 1 obesity        Past Medical History:   Diagnosis Date   • Arthritis    • Asthma    • Breast cancer (HCC)     RIGHT   • Colon polyp    • Diabetes (HCC)     BS IN THE A.M.    • Diabetes (HCC)    • Disease of thyroid gland Hypothyroidism   • Fatty liver    • Gastroesophageal reflux    • HL (hearing loss) Hearing aids    • Hyperlipemia    • Hypertension    • Limb swelling    • Seasonal allergies    • Skin cancer    • Strain of latissimus dorsi muscle 2017    Right   • Vertigo         Past Surgical History:   Procedure Laterality Date   • BREAST AUGMENTATION BILATERAL MASTOPEXY     • BREAST RECONSTRUCTION Bilateral 2022    Procedure: BILATERAL BREAST RECONSTRUCTION WITH REMOVAL AND REPLACEMENT OF IMPLANTS, MESH PLACEMENT, CHEST WALL SKIN RESECTION, CHEST LIPOSUCTION;  Surgeon: Romain Quezada MD;   Location: [facility] [place] [facility] [place];  Service: Plastics;  Laterality: Bilateral;   • BREAST SURGERY      Bilateral mastectomies and reconstruction   •  SECTION     • COLONOSCOPY  2019   • COSMETIC SURGERY  2004    Bilateral mastectomies and reconstructio   • ENDOSCOPY  2019   • EYE SURGERY  Not sure    Cateracts   • LIPOSUCTION N/A 2022    Procedure: CHEST WALL LIPOSUCTION AND CHEST WALL RESECTION;  Surgeon: Romain Quezada MD;  Location: [facility] OR [facility] [place];  Service: Plastics;  Laterality: N/A;  TIMES BASED ON PREVIOUS EXPERIENCE   • MASTECTOMY COMPLETE / SIMPLE Bilateral    • OOPHORECTOMY     • OTHER SURGICAL HISTORY      Joint surgery,  RIGHT FOOT SECOND  TOE WITH A PIN   • SKIN CANCER EXCISION Right 2020    neck   • TOE SURGERY Right     2nd digit pin inserted   • TONSILLECTOMY           Visit Dx:      ICD-10-CM ICD-9-CM   1. Lymphedema  I89.0 457.1   2. Scar tissue  L90.5 709.2        Lymphedema     Row Name 22 1400             Subjective Pain    Able to rate subjective pain? yes  -MP      Pre-Treatment Pain Level 1  -MP      Post-Treatment Pain Level 1  -MP      Subjective Pain Comment Pt reports shooting pains occassionally.  -MP              Subjective Comments    Subjective Comments Pt saw APRN yesterday. She states she trimmed a stitch yesterday.  -MP              Skin Changes/Observations    Location/Assessment Upper Quadrant  -MP      Upper Quadrant Conditions bilateral:;normal;clean;dry  -MP      Upper Quadrant Color/Pigment bilateral:;erythema  -MP      Upper Quadrant Skin Details mild erythemia  -MP              Manual Lymphatic Drainage    Manual Lymphatic Drainage initial sequence;opened regional lymph nodes;opened anastamoses;extremity treatment  -MP      Initial Sequence full neck;cervical;supraclavicular;shoulder collectors;abdomen;diaphragmatic breathing  -MP      Abdomen Comment abdominal techniuqe  -MP      Opened Regional Lymph Nodes axillary;inguinal;paraspinal;other lymph  nodes  parasternal  -MP      Opened Anastamoses anterior axillo-axillary;axillo-inguinal  -MP      Extremity Treatment MLD to proximal limb only  -MP      MLD to Proximal Limb Only BUEs  -MP              Compression/Skin Care    Compression/Skin Care Comments APRN told pt to stop wearing compression bra in 2 weeks.  -MP            User Key  (r) = Recorded By, (t) = Taken By, (c) = Cosigned By    Initials Name Provider Type    Val Becerra OT Occupational Therapist                         OT Assessment/Plan     Row Name 03/11/22 1752          OT Assessment    Assessment Comments Zaira continues to benefit from skilled occupational therapy services and is appropriate to continue skilled occupational therapy services for lymphedema management until plateau is obtained and patient education is complete.  -MP           User Key  (r) = Recorded By, (t) = Taken By, (c) = Cosigned By    Initials Name Provider Type    Val Becerra OT Occupational Therapist                    Manual Rx (last 36 hours)     Manual Treatments     Row Name 03/11/22 1755             Total Minutes    78626 - OT Manual Therapy Minutes 45  -MP            User Key  (r) = Recorded By, (t) = Taken By, (c) = Cosigned By    Initials Name Provider Type    Val Becerra OT Occupational Therapist                  Therapy Education  Education Details: Patient demonstrated ability to perform home exercise program and self lymphatic drainage.  She is asked to continue to perform these activities daily as well as continuing to wear compression garment for the next couple of weeks as she was requested to do at yesterday's appointment at her surgeon's office.  Patient return demonstrated ability to perform scar management as well.  Given: Symptoms/condition management, HEP  Program: Reinforced  How Provided: Verbal, Demonstration, Written  Provided to: Patient  Level of Understanding: Teach back education performed, Verbalized,  Demonstrated        Time Calculation:   Timed Charges  31007 - OT Therapeutic Exercise Minutes: 10  93867 - OT Manual Therapy Minutes: 45  Total Minutes  Timed Charges Total Minutes: 55   Total Minutes: 55     Therapy Charges for Today     Code Description Service Date Service Provider Modifiers Qty    10691484117  OT THER PROC EA 15 MIN 3/11/2022 Val Spicer OT GO 1    [id number]  OT MANUAL THERAPY EA 15 MIN 3/11/2022 Val Spicer OT GO 3                      Val Spicer OT  3/11/2022

## 2022-03-15 RX ORDER — BLOOD SUGAR DIAGNOSTIC
STRIP MISCELLANEOUS
Qty: 300 EACH | Refills: 1 | Status: SHIPPED | OUTPATIENT
Start: 2022-03-15 | End: 2022-09-16

## 2022-03-15 RX ORDER — PEN NEEDLE, DIABETIC 30 GX5/16"
NEEDLE, DISPOSABLE MISCELLANEOUS
Qty: 100 EACH | Refills: 1 | Status: SHIPPED | OUTPATIENT
Start: 2022-03-15 | End: 2022-05-10

## 2022-03-15 RX ORDER — BLOOD-GLUCOSE METER
EACH MISCELLANEOUS
Qty: 100 EACH | Refills: 1 | Status: SHIPPED | OUTPATIENT
Start: 2022-03-15 | End: 2022-05-31

## 2022-03-17 ENCOUNTER — HOSPITAL ENCOUNTER (OUTPATIENT)
Dept: OCCUPATIONAL THERAPY | Facility: HOSPITAL | Age: 72
Setting detail: THERAPIES SERIES
Discharge: HOME | End: 2022-03-17

## 2022-03-17 DIAGNOSIS — L90.5 SCAR TISSUE: ICD-10-CM

## 2022-03-17 DIAGNOSIS — I89.0 LYMPHEDEMA: Primary | ICD-10-CM

## 2022-03-17 PROCEDURE — 97110 THERAPEUTIC EXERCISES: CPT

## 2022-03-17 PROCEDURE — 97140 MANUAL THERAPY 1/> REGIONS: CPT

## 2022-03-17 NOTE — THERAPY TREATMENT NOTE
Outpatient Occupational Therapy Lymphedema Treatment Note  RJ Chanel     Patient Name: Jyoti Alvarez  : 1950  MRN: [id number]  Today's Date: 3/17/2022      Visit Date: 2022    Patient Active Problem List   Diagnosis   • Breast cancer (HCC)   • Age related osteoporosis   • Osteoarthritis   • Asthma   • Colon polyp   • Gastroesophageal reflux disease without esophagitis   • Essential hypertension   • Fatty liver   • Lumbar radiculopathy   • Mild intermittent asthma   • Personal history of malignant neoplasm of breast   • Seasonal allergic rhinitis   • Vitamin D deficiency   • Type 2 diabetes mellitus with hyperglycemia, without long-term current use of insulin (HCC)   • Mixed hyperlipidemia   • Atrophy of thyroid   • Iron deficiency anemia   • Chronic diarrhea   • Vertigo   • COVID-19 virus infection   • Vitamin B12 deficiency   • Acute URI   • Deformity and disproportion of reconstructed breast   • Post-procedural fever   • Observed sleep apnea   • Stage 3a chronic kidney disease (HCC)   • Postoperative follow-up   • History of colon polyps   • Snoring   • Hypersomnia   • Class 1 obesity        Past Medical History:   Diagnosis Date   • Arthritis    • Asthma    • Breast cancer (HCC)     RIGHT   • Colon polyp    • Diabetes (HCC)     BS IN THE A.M.    • Diabetes (HCC)    • Disease of thyroid gland Hypothyroidism   • Fatty liver    • Gastroesophageal reflux    • HL (hearing loss) Hearing aids    • Hyperlipemia    • Hypertension    • Limb swelling    • Seasonal allergies    • Skin cancer    • Strain of latissimus dorsi muscle 2017    Right   • Vertigo         Past Surgical History:   Procedure Laterality Date   • BREAST AUGMENTATION BILATERAL MASTOPEXY     • BREAST RECONSTRUCTION Bilateral 2022    Procedure: BILATERAL BREAST RECONSTRUCTION WITH REMOVAL AND REPLACEMENT OF IMPLANTS, MESH PLACEMENT, CHEST WALL SKIN RESECTION, CHEST LIPOSUCTION;  Surgeon: Romain Quezada MD;   [place] [place] [facility] [place]: [place];  Laterality: Bilateral;   • BREAST SURGERY      Bilateral mastectomies and reconstruction   •  SECTION     • COLONOSCOPY  2019   • COSMETIC SURGERY  2004    Bilateral mastectomies and reconstructio   • ENDOSCOPY  2019   • EYE SURGERY  Not sure    Cateracts   • LIPOSUCTION N/A 2022    Procedure: CHEST WALL LIPOSUCTION AND CHEST WALL RESECTION;  Surgeon: Romain Quezada MD;  [place] [facility] [place] [facility] [place]  Service: Plastics;  Laterality: N/A;  TIMES BASED ON PREVIOUS EXPERIENCE   • MASTECTOMY COMPLETE / SIMPLE Bilateral    • OOPHORECTOMY     • OTHER SURGICAL HISTORY      Joint surgery,  RIGHT FOOT SECOND  TOE WITH A PIN   • SKIN CANCER EXCISION Right 2020    neck   • TOE SURGERY Right     2nd digit pin inserted   • TONSILLECTOMY           Visit Dx:      ICD-10-CM ICD-9-CM   1. Lymphedema  I89.0 457.1   2. Scar tissue  L90.5 709.2        Lymphedema     Row Name 22 1400             Subjective Pain    Able to rate subjective pain? yes  -MP      Pre-Treatment Pain Level 1  -MP      Post-Treatment Pain Level 1  -MP              Subjective Comments    Subjective Comments Pt said that she feels she is steadily getting better.  -MP              Skin Changes/Observations    Location/Assessment Upper Quadrant  -MP      Upper Quadrant Conditions left:  -MP      Upper [place] Color/Pigment bilateral:;erythema  -MP      Upper Quadrant Skin Details mild erythemia  -MP              Manual Lymphatic Drainage    Manual Lymphatic Drainage initial sequence;opened regional lymph nodes;opened anastamoses;extremity treatment  -MP      Initial Sequence full neck;cervical;supraclavicular;shoulder collectors;abdomen;diaphragmatic breathing  -MP      Abdomen Comment abdominal technique  -MP      Opened Regional Lymph Nodes axillary;inguinal;paraspinal;other lymph nodes  parasternal  -MP      Opened Anastamoses anterior axillo-axillary;axillo-inguinal  -MP       Extremity Treatment MLD to proximal limb only  -MP      MLD to Proximal Limb Only BUEs  -MP      Manual Therapy OT addressed small lymphatic cord going from pectoral region to axilla L with release noted;  Addressed lymphatic cords right with soft tissue manipulation but unable to release cording right.  -MP            User Key  (r) = Recorded By, (t) = Taken By, (c) = Cosigned By    Initials Name Provider Type    Val Becerra OT Occupational Therapist                         OT Assessment/Plan     Row Name 03/17/22 1451          OT Assessment    Assessment Comments Lymphatic cording noted at both right and left UEs today.  OT was able to relese Left side cording, unable today at right.  However, pt reported decreased discomfort with overhead reach and abduction post manual therapy.  She is indepdent to perform exercise to assist with release at home.  She requires continued skilled OT in order to progress to prior level of function.  -MP           User Key  (r) = Recorded By, (t) = Taken By, (c) = Cosigned By    Initials Name Provider Type    Val Becerra OT Occupational Therapist                    Manual Rx (last 36 hours)     Manual Treatments     Row Name 03/17/22 1458             Total Minutes    71128 - OT Manual Therapy Minutes 26  -MP            User Key  (r) = Recorded By, (t) = Taken By, (c) = Cosigned By    Initials Name Provider Type    Val Becerra OT Occupational Therapist                  Therapy Education  Education Details: Pt was asked to continue to perform self MLD and scar massage daily and patient demonstrated knowledge of technuque as previously instructed.  Patient was instructed in neural gliding for lymphatic cording for B UEs and return demonstrated properly and was provided written instruction as well.  Given: HEP, Symptoms/condition management  Program: New, Reinforced  How Provided: Verbal, Demonstration, Written  Provided to: Patient  Level of Understanding: Teach  back education performed, Verbalized, Demonstrated  93246 - OT Self Care/Mgmt Minutes: 9          Time Calculation:   Timed Charges  45318 - OT Therapeutic Exercise Minutes: 10  91158 - OT Manual Therapy Minutes: 26  48227 - OT Self Care/Mgmt Minutes: 9  Total Minutes  Timed Charges Total Minutes: 45   Total Minutes: 45     Therapy Charges for Today     Code Description Service Date Service Provider Modifiers Qty    [id number]  OT THER PROC EA 15 MIN 3/17/2022 Val Spicer OT GO 1    [id number]  OT MANUAL THERAPY EA 15 MIN 3/17/2022 Val Spicer OT GO 2                      Val Spicer OT  3/17/2022

## 2022-03-23 ENCOUNTER — HOSPITAL ENCOUNTER (OUTPATIENT)
Dept: OCCUPATIONAL THERAPY | Facility: HOSPITAL | Age: 72
Setting detail: THERAPIES SERIES
Discharge: HOME | End: 2022-03-23

## 2022-03-23 DIAGNOSIS — I89.0 LYMPHEDEMA: Primary | ICD-10-CM

## 2022-03-23 DIAGNOSIS — L90.5 SCAR TISSUE: ICD-10-CM

## 2022-03-23 DIAGNOSIS — C50.919 MALIGNANT NEOPLASM OF FEMALE BREAST, UNSPECIFIED ESTROGEN RECEPTOR STATUS, UNSPECIFIED LATERALITY, UNSPECIFIED SITE OF BREAST: ICD-10-CM

## 2022-03-23 PROCEDURE — 97535 SELF CARE MNGMENT TRAINING: CPT

## 2022-03-23 PROCEDURE — 97140 MANUAL THERAPY 1/> REGIONS: CPT

## 2022-03-23 PROCEDURE — 93702 BIS XTRACELL FLUID ANALYSIS: CPT

## 2022-03-23 NOTE — THERAPY RE-EVALUATION
Outpatient Occupational Therapy Lymphedema Re-Evaluation  RJ Yanique     Patient Name: Jyoti Alvarez  : 1950  MRN: [id number]  Today's Date: 3/23/2022      Visit Date: 2022    Patient Active Problem List   Diagnosis   • Breast cancer (HCC)   • Age related osteoporosis   • Osteoarthritis   • Asthma   • Colon polyp   • Gastroesophageal reflux disease without esophagitis   • Essential hypertension   • Fatty liver   • Lumbar radiculopathy   • Mild intermittent asthma   • Personal history of malignant neoplasm of breast   • Seasonal allergic rhinitis   • Vitamin D deficiency   • Type 2 diabetes mellitus with hyperglycemia, without long-term current use of insulin (HCC)   • Mixed hyperlipidemia   • Atrophy of thyroid   • Iron deficiency anemia   • Chronic diarrhea   • Vertigo   • COVID-19 virus infection   • Vitamin B12 deficiency   • Acute URI   • Deformity and disproportion of reconstructed breast   • Post-procedural fever   • Observed sleep apnea   • Stage 3a chronic kidney disease (HCC)   • Postoperative follow-up   • History of colon polyps   • Snoring   • Hypersomnia   • Class 1 obesity        Past Medical History:   Diagnosis Date   • Arthritis    • Asthma    • Breast cancer (HCC)     RIGHT   • Colon polyp    • Diabetes (HCC)     BS IN THE A.M.    • Diabetes (HCC)    • Disease of thyroid gland Hypothyroidism   • Fatty liver    • Gastroesophageal reflux    • HL (hearing loss) Hearing aids    • Hyperlipemia    • Hypertension    • Limb swelling    • Seasonal allergies    • Skin cancer    • Strain of latissimus dorsi muscle 2017    Right   • Vertigo         Past Surgical History:   Procedure Laterality Date   • BREAST AUGMENTATION BILATERAL MASTOPEXY     • BREAST RECONSTRUCTION Bilateral 2022    Procedure: BILATERAL BREAST RECONSTRUCTION WITH REMOVAL AND REPLACEMENT OF IMPLANTS, MESH PLACEMENT, CHEST WALL SKIN RESECTION, CHEST LIPOSUCTION;  Surgeon: Romain Quezada MD;   [place] [facility] [place] [facility] [place]  Service: Plastics;  Laterality: Bilateral;   • BREAST SURGERY      Bilateral mastectomies and reconstruction   •  SECTION     • COLONOSCOPY  2019   • COSMETIC SURGERY  2004    Bilateral mastectomies and reconstructio   • ENDOSCOPY  2019   • EYE SURGERY  Not sure    Cateracts   • LIPOSUCTION N/A 2022    Procedure: CHEST WALL LIPOSUCTION AND CHEST WALL RESECTION;  Surgeon: Romain Quezada MD;  [place] [facility] [place] [facility] [place]  Service: Plastics;  Laterality: N/A;  TIMES BASED ON PREVIOUS EXPERIENCE   • MASTECTOMY COMPLETE / SIMPLE Bilateral    • OOPHORECTOMY     • OTHER SURGICAL HISTORY      Joint surgery,  RIGHT FOOT SECOND  TOE WITH A PIN   • SKIN CANCER EXCISION Right 2020    neck   • TOE SURGERY Right     2nd digit pin inserted   • TONSILLECTOMY           Visit Dx:     ICD-10-CM ICD-9-CM   1. Lymphedema  I89.0 457.1   2. Scar tissue  L90.5 709.2            Lymphedema     Row Name 22 1400             Subjective Pain    Able to rate subjective pain? yes  -CH      Pre-Treatment Pain Level 1  -CH      Post-Treatment Pain Level 1  -CH      Subjective Pain Comment Patient states that the pain has not changed after treatment however the irritation on the right lateral chest wall has improved.  -CH              Lymphedema Assessment    Lymphedema Classification RUE:;stage 1 (Spontaneously Reversible)  -CH      Lymph Nodes Removed # 15  -CH      Positive Lymph Nodes # 0  -CH      Stage of Cancer Stage I  -CH      Chemo Received no  -CH      Radiation Therapy Received no  -CH              LLIS - Physical Concerns    The amount of pain associated with my lymphedema is: 1  -CH      The amount of limb heaviness associated with my lymphedema is: 0  -CH      The amount of skin tightness associated with my lymphedema is: 0  -CH      The size of my swollen limb(s) seems: 1  -CH      Lymphedema affects the movement of my swollen limb(s): 0  -CH      The strength in my swollen  "limb(s) is: 0  -CH              LLIS - Psychosocial Concerns    Lymphedema affects my body image (i.e., \"how I think I look\"). 0  -CH      Lymphedema affects my socializing with others. 0  -CH      Lymphedema affects my intimate relations with spouse or partner (rate 0 if not applicable 0  -CH      Lymphedema \"gets me down\" (i.e., depression, frustration, or anger) 0  -CH      I must rely on others for help due to my lymphedema. 0  -CH      I know what to do to manage my lymphedema 1  -CH              LLIS - Functional Concerns    Lymphedema affects my ability to perform self-care activities (i.e. eating, dressing, hygiene) 0  -CH      Lymphedema affects my ability to perform routine home or work-related activities. 0  -CH      Lymphedema affects my performance of preferred leisure activities. 0  -CH      Lymphedema affects proper fit of clothing/shoes 1  -CH      Lymphedema affects my sleep 0  -CH              Posture/Observations    Posture- WNL Posture is WNL  -CH              General ROM    RT Upper Ext Rt Shoulder Flexion  -CH      LT Upper Ext Lt Shoulder Flexion  -CH              Right Upper Ext    Rt Shoulder Flexion AROM 168  -CH              Left Upper Ext    Lt Shoulder Flexion AROM 165  -CH              Lymphedema Edema Assessment    Edema Assessment Comment Patient is observed with mild increase in swelling in the posterior aspect of the left lateral chest wall.  Patient also noted with mild swelling in the anterior aspect of the right chest wall.  -CH              Skin Changes/Observations    Location/Assessment Upper Quadrant  -CH      Upper Quadrant Conditions bilateral:;normal;intact  -CH      Upper Quadrant Color/Pigment bilateral:  -CH              Manual Lymphatic Drainage    Manual Lymphatic Drainage initial sequence;opened regional lymph nodes;opened anastamoses;extremity treatment  -CH      Initial Sequence full neck;cervical;supraclavicular;shoulder collectors;abdomen;diaphragmatic breathing  " "-CH      Opened Regional Lymph Nodes axillary;inguinal;paraspinal;other lymph nodes  parasternal  -CH      Opened Anastamoses anterior axillo-axillary;axillo-inguinal  -CH      Extremity Treatment MLD to proximal limb only  -CH      Manual Therapy Patient continues to present with lymphatic sclerosis x2 on the right axillary region.  OT was unable to release the cord.  OT did apply instrument assisted deep friction massage along the scar tissue cording with reported improvement in irritation in that area.  -CH              L-Dex Bioimpedence Screening    L-Dex Measurement Extremity RUE  -CH      L-Dex Patient Position Standing  -CH      L-Dex UE Dominate Side Right  -CH      L-Dex UE At Risk Side Left  -CH      L-Dex UE Pre Surgical Value No  -CH      L-Dex UE Score -3.4  -CH      L-Dex UE Baseline Score -3  -CH      L-Dex UE Value Change -0.4  -CH      $ L-Dex Charge yes  -CH              Lymphedema Life Impact Scale Totals    A.  Total Q1 - Q17 (Do not include Q18) 4  -CH      B.  Total number of questions answered (Q1-Q17) 17  -CH      C. Divide A by B 0.24  -CH      D. Multiple C by 25 6  -CH            User Key  (r) = Recorded By, (t) = Taken By, (c) = Cosigned By    Initials Name Provider Type    CH Jade Cam OT Occupational Therapist              2/22/2022  R     L  Cir Below Ax:   41.5     41.5  Elbow crease:  27.2     27.3  2\" above elbow:  24.5     29  Total Volume:   96.2     97.8     3/23/2022  R     L  TBE at next visit secondary to error.   Cir Below Ax:     Elbow crease:    2\" above elbow:    Total Volume:       Therapy Education  Education Details: Patient complained of increased tenderness at the ribs bilaterally that she attributes to the band of her compression bra folding up and pushing into her ribs.  OT did make the recommendation of releasing the seam around the zippers that would allow for easement of the compression level at that particular point but not affect the overall compression " in her compression garment.  Patient did not want to do that at this time but stated she would do that if she felt like it was necessary.  Given: Symptoms/condition management  Program: New  How Provided: Verbal, Demonstration, Written  Provided to: Patient  Level of Understanding: Teach back education performed, Verbalized, Demonstrated  89773 - OT Self Care/Mgmt Minutes: 15      Manual Rx (last 36 hours)     Manual Treatments     Row Name 03/23/22 1733             Total Minutes    43184 - OT Manual Therapy Minutes 35  -CH            User Key  (r) = Recorded By, (t) = Taken By, (c) = Cosigned By    Initials Name Provider Type    Jade Capone, OT Occupational Therapist               OT Goals     Row Name 03/23/22 1733          Time Calculation    OT Goal Re-Cert Due Date 04/22/22  -CH           User Key  (r) = Recorded By, (t) = Taken By, (c) = Cosigned By    Initials Name Provider Type    Jade Capone OT Occupational Therapist              UQLymphgoals:   1. Uncontrolled lymphedema of Left upper extremity and upper quadrant.  LTG1:  90 days:  Volumetric measurements of Left upper extremity/upper quadrant will be decreased by 10 % or until plateau in reduction is achieved to improve functional mobility.             STATUS:  TBE              STG1a:  30 days:  Volumetric measurements of left upper extremity and upper quadrant will be decreased by  5% bilaterally to improve functional mobility.    STATUS:  TBE  TREATMENT:  Manual Therapy, Therapeutic exercise, ADL/self-care therapy, Bioimpedence Fluid Analysis, Orthotic management and training, Therapeutic Activity, wound dressing as needed, Modalities: TENS, NMES, Ultrasound, Fluidotherapy, and Patient and family/caregiver education.     2. Patient with decreased ADL/Self-Care routine for lymphedema management.              LTG 2:  90 days:  Patient/caregiver will independently verbalize/demonstrate ADL/Self-Care routine for lymphedema management.                       STATUS:  Met              STG 2a:  30 days:  Patient/caregiver will independently perform or verbally dictate compression wrapping technique of the upper extremity/upper quadrant.                      STATUS:   Not Met: discontinue              STG2b:  30 days:  Patient will independently verbalize signs and symptoms of infection.                            STATUS:  Met              STG2c:  30 days:  Patient will independently demonstrate/verbalize proper skin care routine to prevent infection and or wound development.                       STATUS: Met  STG2d:  30 days:  Patient will independently perform teach back of HEP routine.                      STATUS: Met: ongoing  STG2e:  30 days:  Patient/caregiver will obtain properly fitting compression orthosis, will demonstrate don/doff skill of compression orthosis with modified independence, and independently verbalize wear schedule.          STATUS: Met: ongoing  STG2f: 30 days: Patient/caregiver will independently perform self-manual lymphatic drainage of the upper extremity/upper quadrant.          STATUS: New  TREATMENT:  Therapeutic Activity, Patient/Caregiver Education, ADL retraining, Manual Therapy, Orthotic Management and training, Modalities: TENS, NMES, Ultrasound, Fluidotherapy Wound dressing if necessary, Therapeutic Exercise, Modalities     3. Self-Care Limitations                                    LTG 3: 90 days:  The patient will demonstrate improved quality of life by achieving a score of  5 on the Lymphedema Life Impact Scale.                      STATUS:  Partially Met: ongoing              STG 3a: 30 days:  The patient will demonstrate improved quality of life by achieving a score of 10 on the Lymphedema Life Impact Scale.                      STATUS:  Met: ongoing  TREATMENT:  Manual therapy, therapeutic exercise, therapeutic activity, ADL retraining, Patient/caregiver education, Modalities: TENS, NMES, Ultrasound, Fluidotherapy,  Orthotic Management and Training, wound dressing as needed.     OT Assessment/Plan     Row Name 03/23/22 1730          OT Assessment    Functional Limitations Limitation in home management;Performance in work activities  -     Impairments Impaired lymphatic circulation;Range of motion;Pain;Integumentary integrity  -     Assessment Comments Patient continues to present to OT with lymphatic sclerosis on the right axillary region.  Patient also is noted with continued seroma on the left lateral chest wall and swelling on the right anterior chest wall.  Patient will benefit from continued skilled occupational therapy services to improve pain and decreased swelling in the upper quadrant.  -     OT Rehab Potential Good  -     Patient/caregiver participated in establishment of treatment plan and goals Yes  -     Patient would benefit from skilled therapy intervention Yes  -CH            OT Plan    OT Frequency 2x/week  -     Predicted Duration of Therapy Intervention (OT) 12  -     Planned CPT's? OT EVAL MOD COMPLEXITY: 31358;OT THER PROC EA 15 MIN: 21433HU;OT SELF CARE/MGMT/TRAIN 15 MIN: 70203;OT MANUAL THERAPY EA 15 MIN: 95315;OT ORTHOTIC MGMT/TRAIN EA 15 MIN: 30967;OT WC MGMT EA 15 MIN: 36874  -     Planned Therapy Interventions (Optional Details) joint mobilization;manual therapy techniques;orthotic fitting/training;patient/family education;ROM (Range of Motion);strengthening;wound care  -           User Key  (r) = Recorded By, (t) = Taken By, (c) = Cosigned By    Initials Name Provider Type     Jade Cam OT Occupational Therapist                          Time Calculation:   Timed Charges  80044 - OT Manual Therapy Minutes: 35  75558 - OT Self Care/Mgmt Minutes: 15  Total Minutes  Timed Charges Total Minutes: 50   Total Minutes: 50     Therapy Charges for Today     Code Description Service Date Service Provider Modifiers Qty    84974420441 HC PT BIS XTRACELL FLUID ANALYSIS 3/23/2022 Tutu  Jade OT  1    31357313794 HC OT MANUAL THERAPY EA 15 MIN 3/23/2022 Jade Cam OT GO 2    43997741238 HC OT SELF CARE/MGMT/TRAIN EA 15 MIN 3/23/2022 Jade Cam OT GO 1                    Jade Cam OT  3/23/2022

## 2022-03-25 ENCOUNTER — HOSPITAL ENCOUNTER (OUTPATIENT)
Dept: OCCUPATIONAL THERAPY | Facility: HOSPITAL | Age: 72
Setting detail: THERAPIES SERIES
Discharge: HOME | End: 2022-03-25

## 2022-03-25 ENCOUNTER — HOSPITAL ENCOUNTER (OUTPATIENT)
Dept: SLEEP MEDICINE | Facility: HOSPITAL | Age: 72
Discharge: HOME | End: 2022-03-25
Admitting: INTERNAL MEDICINE

## 2022-03-25 DIAGNOSIS — G47.10 HYPERSOMNIA: ICD-10-CM

## 2022-03-25 DIAGNOSIS — L90.5 SCAR TISSUE: ICD-10-CM

## 2022-03-25 DIAGNOSIS — G47.30 OBSERVED SLEEP APNEA: ICD-10-CM

## 2022-03-25 DIAGNOSIS — C50.919 MALIGNANT NEOPLASM OF FEMALE BREAST, UNSPECIFIED ESTROGEN RECEPTOR STATUS, UNSPECIFIED LATERALITY, UNSPECIFIED SITE OF BREAST: ICD-10-CM

## 2022-03-25 DIAGNOSIS — I89.0 LYMPHEDEMA: Primary | ICD-10-CM

## 2022-03-25 DIAGNOSIS — E66.9 CLASS 1 OBESITY: ICD-10-CM

## 2022-03-25 DIAGNOSIS — R06.83 SNORING: ICD-10-CM

## 2022-03-25 DIAGNOSIS — E11.65 TYPE 2 DIABETES MELLITUS WITH HYPERGLYCEMIA, WITHOUT LONG-TERM CURRENT USE OF INSULIN: ICD-10-CM

## 2022-03-25 PROCEDURE — 97140 MANUAL THERAPY 1/> REGIONS: CPT

## 2022-03-25 PROCEDURE — 95806 SLEEP STUDY UNATT&RESP EFFT: CPT | Performed by: INTERNAL MEDICINE

## 2022-03-25 PROCEDURE — 95806 SLEEP STUDY UNATT&RESP EFFT: CPT

## 2022-03-25 NOTE — THERAPY TREATMENT NOTE
Outpatient Occupational Therapy Lymphedema Treatment Note  RJ Chanel     Patient Name: Jyoti Alvarez  : 1950  MRN: [id number]  Today's Date: 3/25/2022      Visit Date: 2022    Patient Active Problem List   Diagnosis   • Breast cancer (HCC)   • Age related osteoporosis   • Osteoarthritis   • Asthma   • Colon polyp   • Gastroesophageal reflux disease without esophagitis   • Essential hypertension   • Fatty liver   • Lumbar radiculopathy   • Mild intermittent asthma   • Personal history of malignant neoplasm of breast   • Seasonal allergic rhinitis   • Vitamin D deficiency   • Type 2 diabetes mellitus with hyperglycemia, without long-term current use of insulin (HCC)   • Mixed hyperlipidemia   • Atrophy of thyroid   • Iron deficiency anemia   • Chronic diarrhea   • Vertigo   • COVID-19 virus infection   • Vitamin B12 deficiency   • Acute URI   • Deformity and disproportion of reconstructed breast   • Post-procedural fever   • Observed sleep apnea   • Stage 3a chronic kidney disease (HCC)   • Postoperative follow-up   • History of colon polyps   • Snoring   • Hypersomnia   • Class 1 obesity        Past Medical History:   Diagnosis Date   • Arthritis    • Asthma    • Breast cancer (HCC)     RIGHT   • Colon polyp    • Diabetes (HCC)     BS IN THE A.M.    • Diabetes (HCC)    • Disease of thyroid gland Hypothyroidism   • Fatty liver    • Gastroesophageal reflux    • HL (hearing loss) Hearing aids    • Hyperlipemia    • Hypertension    • Limb swelling    • Seasonal allergies    • Skin cancer    • Strain of latissimus dorsi muscle 2017    Right   • Vertigo         Past Surgical History:   Procedure Laterality Date   • BREAST AUGMENTATION BILATERAL MASTOPEXY     • BREAST RECONSTRUCTION Bilateral 2022    Procedure: BILATERAL BREAST RECONSTRUCTION WITH REMOVAL AND REPLACEMENT OF IMPLANTS, MESH PLACEMENT, CHEST WALL SKIN RESECTION, CHEST LIPOSUCTION;  Surgeon: Romain Quezada MD;   [facility] [place] [facility];  Service: Plastics;  Laterality: Bilateral;   • BREAST SURGERY      Bilateral mastectomies and reconstruction   •  SECTION     • COLONOSCOPY  2019   • COSMETIC SURGERY  2004    Bilateral mastectomies and reconstructio   • ENDOSCOPY  2019   • EYE SURGERY  Not sure    Cateracts   • LIPOSUCTION N/A 2022    Procedure: CHEST WALL LIPOSUCTION AND CHEST WALL RESECTION;  Surgeon: Romain Quezada MD;  [facility] [place] [facility];  Service: Plastics;  Laterality: N/A;  TIMES BASED ON PREVIOUS EXPERIENCE   • MASTECTOMY COMPLETE / SIMPLE Bilateral    • OOPHORECTOMY     • OTHER SURGICAL HISTORY      Joint surgery,  RIGHT FOOT SECOND  TOE WITH A PIN   • SKIN CANCER EXCISION Right 2020    neck   • TOE SURGERY Right     2nd digit pin inserted   • TONSILLECTOMY           Visit Dx:      ICD-10-CM ICD-9-CM   1. Lymphedema  I89.0 457.1   2. Malignant neoplasm of female breast, unspecified estrogen receptor status, unspecified laterality, unspecified site of breast (HCC)  C50.919 174.9   3. Scar tissue  L90.5 709.2        Lymphedema     Row Name 22 1600             Subjective Pain    Able to rate subjective pain? yes  -SC      Pre-Treatment Pain Level 0  -SC      Post-Treatment Pain Level 0  -SC      Subjective Pain Comment Pt denies pain this date  -SC              Subjective Comments    Subjective Comments Pt states since last treatment pt has been able to move L UE with no pulling sensations  -SC              Manual Lymphatic Drainage    Manual Lymphatic Drainage initial sequence;opened regional lymph nodes;opened anastamoses;extremity treatment  -SC      Initial Sequence full neck;cervical;supraclavicular;shoulder collectors;abdomen;diaphragmatic breathing  -SC      Opened Regional Lymph Nodes axillary;inguinal;paraspinal;other lymph nodes  parasternal  -SC      Opened Anastamoses anterior axillo-axillary;axillo-inguinal  -SC      Extremity Treatment MLD to proximal limb only   -SC      Manual Therapy Patient continues to present with lymphatic sclerosis x2 on the right axillary region. Therapist was unable to release the cord.  Therapist utilizing instrument assisted deep friction massage along the scar tissue cording R UE  -SC            User Key  (r) = Recorded By, (t) = Taken By, (c) = Cosigned By    Initials Name Provider Type    Leticia Rutherford COTA Occupational Therapist Assistant                         OT Assessment/Plan     Row Name 03/25/22 1622          OT Assessment    Assessment Comments Patient will benefit from continued skilled occupational therapy services to improve pain and decreased swelling in the upper quadrant  -SC     Patient would benefit from skilled therapy intervention Yes  -SC            OT Plan    OT Plan Comments Continue POC  -SC           User Key  (r) = Recorded By, (t) = Taken By, (c) = Cosigned By    Initials Name Provider Type    Leticia Rutherford COTA Occupational Therapist Assistant                    Manual Rx (last 36 hours)     Manual Treatments     Row Name 03/25/22 1623             Total Minutes    15610 - OT Manual Therapy Minutes 53  -SC            User Key  (r) = Recorded By, (t) = Taken By, (c) = Cosigned By    Initials Name Provider Type    Leticia Rutherford COTA Occupational Therapist Assistant                                   Time Calculation:   Timed Charges  61665 - OT Manual Therapy Minutes: 53  Total Minutes  Timed Charges Total Minutes: 53   Total Minutes: 53     Therapy Charges for Today     Code Description Service Date Service Provider Modifiers Qty    [id number]  OT MANUAL THERAPY EA 15 MIN 3/25/2022 Leticia Ledbetter COTA GO 4                      ANA Akers  3/25/2022

## 2022-03-28 DIAGNOSIS — R06.83 SNORING: ICD-10-CM

## 2022-03-28 DIAGNOSIS — G47.33 OSA (OBSTRUCTIVE SLEEP APNEA): Primary | ICD-10-CM

## 2022-03-29 ENCOUNTER — TELEPHONE (OUTPATIENT)
Dept: SLEEP MEDICINE | Facility: HOSPITAL | Age: 72
End: 2022-03-29

## 2022-03-31 ENCOUNTER — TELEPHONE (OUTPATIENT)
Dept: SLEEP MEDICINE | Facility: HOSPITAL | Age: 72
End: 2022-03-31

## 2022-04-06 ENCOUNTER — HOSPITAL ENCOUNTER (OUTPATIENT)
Dept: OCCUPATIONAL THERAPY | Facility: HOSPITAL | Age: 72
Setting detail: THERAPIES SERIES
Discharge: HOME | End: 2022-04-06

## 2022-04-06 DIAGNOSIS — I89.0 LYMPHEDEMA: Primary | ICD-10-CM

## 2022-04-06 DIAGNOSIS — L90.5 SCAR TISSUE: ICD-10-CM

## 2022-04-06 DIAGNOSIS — C50.919 MALIGNANT NEOPLASM OF FEMALE BREAST, UNSPECIFIED ESTROGEN RECEPTOR STATUS, UNSPECIFIED LATERALITY, UNSPECIFIED SITE OF BREAST: ICD-10-CM

## 2022-04-06 PROCEDURE — 97140 MANUAL THERAPY 1/> REGIONS: CPT

## 2022-04-06 NOTE — THERAPY TREATMENT NOTE
Outpatient Occupational Therapy Lymphedema Treatment Note  [facility]     Patient Name: Jyoti Alvarez  : 1950  MRN: [id number]  Today's Date: 2022      Visit Date: 2022    Patient Active Problem List   Diagnosis   • Breast cancer (HCC)   • Age related osteoporosis   • Osteoarthritis   • Asthma   • Colon polyp   • Gastroesophageal reflux disease without esophagitis   • Essential hypertension   • Fatty liver   • Lumbar radiculopathy   • Mild intermittent asthma   • Personal history of malignant neoplasm of breast   • Seasonal allergic rhinitis   • Vitamin D deficiency   • Type 2 diabetes mellitus with hyperglycemia, without long-term current use of insulin (HCC)   • Mixed hyperlipidemia   • Atrophy of thyroid   • Iron deficiency anemia   • Chronic diarrhea   • Vertigo   • COVID-19 virus infection   • Vitamin B12 deficiency   • Acute URI   • Deformity and disproportion of reconstructed breast   • Post-procedural fever   • Observed sleep apnea   • Stage 3a chronic kidney disease (HCC)   • Postoperative follow-up   • History of colon polyps   • Snoring   • Hypersomnia   • Class 1 obesity        Past Medical History:   Diagnosis Date   • Arthritis    • Asthma    • Breast cancer (HCC)     RIGHT   • Colon polyp    • Diabetes (HCC)     BS IN THE A.M.    • Diabetes (HCC)    • Disease of thyroid gland Hypothyroidism   • Fatty liver    • Gastroesophageal reflux    • HL (hearing loss) Hearing aids    • Hyperlipemia    • Hypertension    • Limb swelling    • Seasonal allergies    • Skin cancer    • Strain of latissimus dorsi muscle 2017    Right   • Vertigo         Past Surgical History:   Procedure Laterality Date   • BREAST AUGMENTATION BILATERAL MASTOPEXY     • BREAST RECONSTRUCTION Bilateral 2022    Procedure: BILATERAL BREAST RECONSTRUCTION WITH REMOVAL AND REPLACEMENT OF IMPLANTS, MESH PLACEMENT, CHEST WALL SKIN RESECTION, CHEST LIPOSUCTION;  Surgeon: Romain Quezada MD;   Location: [facility] OR [facility];  Service: Plastics;  Laterality: Bilateral;   • BREAST SURGERY      Bilateral mastectomies and reconstruction   •  SECTION     • COLONOSCOPY  2019   • COSMETIC SURGERY  2004    Bilateral mastectomies and reconstructio   • ENDOSCOPY  2019   • EYE SURGERY  Not sure    Cateracts   • LIPOSUCTION N/A 2022    Procedure: CHEST WALL LIPOSUCTION AND CHEST WALL RESECTION;  Surgeon: Romain Quezada MD;  Location: [facility] OR [facility];  Service: Plastics;  Laterality: N/A;  TIMES BASED ON PREVIOUS EXPERIENCE   • MASTECTOMY COMPLETE / SIMPLE Bilateral    • OOPHORECTOMY     • OTHER SURGICAL HISTORY      Joint surgery,  RIGHT FOOT SECOND  TOE WITH A PIN   • SKIN CANCER EXCISION Right 2020    neck   • TOE SURGERY Right     2nd digit pin inserted   • TONSILLECTOMY           Visit Dx:      ICD-10-CM ICD-9-CM   1. Lymphedema  I89.0 457.1   2. Malignant neoplasm of female breast, unspecified estrogen receptor status, unspecified laterality, unspecified site of breast (HCC)  C50.919 174.9   3. Scar tissue  L90.5 709.2        Lymphedema     Row Name 22 1000             Subjective Pain    Able to rate subjective pain? yes  -SC      Pre-Treatment Pain Level 0  -SC      Post-Treatment Pain Level 0  -SC      Subjective Pain Comment Pt denies pain however does c/o slight pulling in B axilla at end of ROM  -SC              Subjective Comments    Subjective Comments Pt states she has noticed decreased swelling in B lateral chest wall  -SC              Skin Changes/Observations    Location/Assessment Upper Quadrant  -SC      Upper Quadrant Conditions bilateral:;normal;intact  -SC      Skin Observations Comment Pt noted with continued lymphatic cording x2 in R axilla and x1 in L axilla  -SC              Manual Lymphatic Drainage    Manual Lymphatic Drainage initial sequence;opened regional lymph nodes;opened anastamoses;extremity treatment  -SC      Initial Sequence full  neck;cervical;supraclavicular;shoulder collectors;abdomen;diaphragmatic breathing  -SC      Opened Regional Lymph Nodes axillary;inguinal;paraspinal;other lymph nodes  parasternal  -SC      Opened Anastamoses anterior axillo-axillary;axillo-inguinal  -SC      Extremity Treatment MLD to proximal limb only  -SC      Manual Therapy Therapist unable to break cording this date  -SC            User Key  (r) = Recorded By, (t) = Taken By, (c) = Cosigned By    Initials Name Provider Type    Leticia Rutherford COTA Occupational Therapist Assistant                         OT Assessment/Plan     Row Name 04/06/22 1048          OT Assessment    Assessment Comments Patient will benefit from continued skilled occupational therapy services to improve pain and decreased swelling in the upper quadrant  -SC     Patient would benefit from skilled therapy intervention Yes  -SC            OT Plan    OT Plan Comments Continue POC  -SC           User Key  (r) = Recorded By, (t) = Taken By, (c) = Cosigned By    Initials Name Provider Type    SC Leticia Ledbetter COTA Occupational Therapist Assistant                    Manual Rx (last 36 hours)     Manual Treatments     Row Name 04/06/22 1145             Total Minutes    24797 - OT Manual Therapy Minutes 54  -SC            User Key  (r) = Recorded By, (t) = Taken By, (c) = Cosigned By    Initials Name Provider Type    SC Leticia Ledbetter COTA Occupational Therapist Assistant                                   Time Calculation:   Timed Charges  44296 - OT Manual Therapy Minutes: 54  Total Minutes  Timed Charges Total Minutes: 54   Total Minutes: 54     Therapy Charges for Today     Code Description Service Date Service Provider Modifiers Qty    [id number]  OT MANUAL THERAPY EA 15 MIN 4/6/2022 Leticia Ledbetter COTA GO 4                      ANA Akers  4/6/2022

## 2022-04-12 ENCOUNTER — OFFICE VISIT (OUTPATIENT)
Dept: DIABETES SERVICES | Facility: HOSPITAL | Age: 72
End: 2022-04-12

## 2022-04-12 VITALS
DIASTOLIC BLOOD PRESSURE: 85 MMHG | TEMPERATURE: 96.3 F | OXYGEN SATURATION: 96 % | HEIGHT: 62 IN | SYSTOLIC BLOOD PRESSURE: 161 MMHG | BODY MASS INDEX: 30.43 KG/M2 | HEART RATE: 87 BPM | WEIGHT: 165.34 LBS

## 2022-04-12 DIAGNOSIS — E66.9 OBESITY (BMI 30-39.9): ICD-10-CM

## 2022-04-12 DIAGNOSIS — N18.2 CONTROLLED TYPE 2 DIABETES MELLITUS WITH STAGE 2 CHRONIC KIDNEY DISEASE, WITH LONG-TERM CURRENT USE OF INSULIN: Primary | ICD-10-CM

## 2022-04-12 DIAGNOSIS — E11.22 CONTROLLED TYPE 2 DIABETES MELLITUS WITH STAGE 2 CHRONIC KIDNEY DISEASE, WITH LONG-TERM CURRENT USE OF INSULIN: Primary | ICD-10-CM

## 2022-04-12 DIAGNOSIS — Z79.4 CONTROLLED TYPE 2 DIABETES MELLITUS WITH STAGE 2 CHRONIC KIDNEY DISEASE, WITH LONG-TERM CURRENT USE OF INSULIN: Primary | ICD-10-CM

## 2022-04-12 LAB — GLUCOSE BLDC GLUCOMTR-MCNC: 146 MG/DL (ref 70–99)

## 2022-04-12 PROCEDURE — G0463 HOSPITAL OUTPT CLINIC VISIT: HCPCS | Performed by: NURSE PRACTITIONER

## 2022-04-12 PROCEDURE — 82962 GLUCOSE BLOOD TEST: CPT | Performed by: NURSE PRACTITIONER

## 2022-04-12 PROCEDURE — 99204 OFFICE O/P NEW MOD 45 MIN: CPT | Performed by: NURSE PRACTITIONER

## 2022-04-12 RX ORDER — PEN NEEDLE, DIABETIC 32GX 5/32"
NEEDLE, DISPOSABLE MISCELLANEOUS
COMMUNITY
Start: 2022-03-25 | End: 2022-06-23

## 2022-04-12 NOTE — PROGRESS NOTES
Chief Complaint  Diabetes (Had covid in sept,  has recently passed away in September, had a resent breast reconstruction surgery, having some trouble keep blood sugars under control,  )    Referred By: Ramo Noguera MD    Subjective          Jyoti Alvarez presents to [facility] for diabetes medication management    History of Present Illness    Visit type:  an initial evaluation  Diabetes type:  Type 2  Age at time of diagnosis/Number of years: She was diagnosed in her mid 40s  Current diabetes status/concerns/issues: She has been referred to our office by her primary care provider for assistance in managing her diabetes.  She has been seen previously by the diabetes nurse educator for general diabetes education and counseling.  The patient says she had Covid in September 2021 and she is struggled to control her glucose levels since that time.  Other current health concerns: She has had a great deal of stress over the last 3 to 6 months.  She had Covid herself in September 2021 and her  also had Covid which led to his death in September 2021.  She is also recently had significant breast reconstruction surgery in January of this year.  She is now getting ready to sell her home and move into her daughter's home.  Diabetes symptoms:    Polyuria: Yes   Polydipsia: No   Polyphagia: No   Blurred vision: Yes   Excessive fatigue: Yes  Diabetes complications:  Neuropathy:Yes, She has pain and some tingling-like sensations primarily in the right foot but she has had surgery on that foot; this is most likely not diabetes related  Nephropathy:Yes, Labs indicate stage II renal disease improved from stage III approximately 2 months ago  Retinopathy:No  Amputation/Wounds:No  Gastroparesis:No  Cardiovascular Disease:Yes, Hypertension and hyperlipidemia  Erectile Dysfunction:N/A  Hospitalizations/ED/911 secondary to DM?  No  Hypoglycemia:  Level 1 hypoglycemia (54 mg/dL - 70  mg/dL); Frequency - Her glucose logs in this case glucose levels in the 60s back in February after surgery but those seem to have resolved now.  Hypoglycemia Symptoms:  shaking/tremors and dizziness  Current Diabetes treatment: Trulicity 1.5 mg once weekly which was started sometime in the late fall, Lantus 50 units once every evening.  She has adjusted this dose up from original prescribed dose of around 20 units.  She has been taking insulin since the summer 2021.  She is also taking glimepiride 4 mg twice a day  Prior diabetes treatments: Metformin which she could not tolerate due to GI concerns; Jardiance which was not tolerated due to urinary tract infections and Bydureon which was not tolerated due to abdominal pain.  Blood glucose device:  Meter  Blood glucose monitoring frequency:  2 -3  Blood glucose range/average:  130-140; blood glucose logs indicate glucose levels also in the 160s 170s at times  Diet:  Avoids high carb/sweet foods, She does admit to occasional drinking of sugary drinks.  Activity/Exercise:  Walking    Past Medical History:   Diagnosis Date   • Anemia    • Arthritis    • Asthma    • Breast cancer (HCC)     RIGHT   • Colon polyp    • Diabetes (HCC)     BS IN THE A.M.    • Diabetes (HCC)    • Disease of thyroid gland Hypothyroidism   • Fatty liver    • Gastroesophageal reflux    • HL (hearing loss) Hearing aids 2021   • Hyperlipemia    • Hypertension    • Limb swelling    • Seasonal allergies    • Skin cancer    • Strain of latissimus dorsi muscle 09/11/2017    Right   • Type 2 diabetes mellitus (HCC)    • Vertigo      Past Surgical History:   Procedure Laterality Date   • BREAST AUGMENTATION BILATERAL MASTOPEXY     • BREAST RECONSTRUCTION Bilateral 2/1/2022    Procedure: BILATERAL BREAST RECONSTRUCTION WITH REMOVAL AND REPLACEMENT OF IMPLANTS, MESH PLACEMENT, CHEST WALL SKIN RESECTION, CHEST LIPOSUCTION;  Surgeon: Romain Quezada MD;  Location: [facility] OR [facility];  Service:  Plastics;  Laterality: Bilateral;   • BREAST SURGERY      Bilateral mastectomies and reconstruction   •  SECTION     • COLONOSCOPY     • COSMETIC SURGERY  2004    Bilateral mastectomies and reconstructio   • ENDOSCOPY  2019   • EYE SURGERY  Not sure    Cateracts   • LIPOSUCTION N/A 2022    Procedure: CHEST WALL LIPOSUCTION AND CHEST WALL RESECTION;  Surgeon: Romain Quezada MD;  Location: [facility] OR [facility];  Service: Plastics;  Laterality: N/A;  TIMES BASED ON PREVIOUS EXPERIENCE   • MASTECTOMY COMPLETE / SIMPLE Bilateral    • OOPHORECTOMY     • OTHER SURGICAL HISTORY      Joint surgery,  RIGHT FOOT SECOND  TOE WITH A PIN   • SKIN CANCER EXCISION Right 2020    neck   • TOE SURGERY Right     2nd digit pin inserted   • TONSILLECTOMY       Family History   Problem Relation Age of Onset   • Heart disease Mother    • Cancer Mother         Breast cancer age 45 and 60   • Diabetes Mother    • Arthritis Mother    • Osteoporosis Mother    • Heart failure Mother         Heart attack age 60. Age 87. Cause of death.   • Hypertension Mother    • Osteoarthritis Mother    • Heart disease Father    • Cancer Father         Skin cancer   • Heart failure Father         Age 79  cause of death.   • Cancer Sister         Breast cancer age 27 and 52. Ovarian cancer age 53 caused death at 56.   • Diabetes Sister    • Ovarian cancer Sister 53   • Arthritis Sister    • Cancer Brother         Prostate cancer age about 58.   • Prostate cancer Brother         unsure age   • Breast cancer Other    • Skin cancer Other    • Cancer Daughter    • Cancer Maternal Aunt         Breast cancer   • Cancer Maternal Aunt          with ovarian cancer age mid 40s   • Malig Hyperthermia Neg Hx    • Colon cancer Neg Hx      Social History     Socioeconomic History   • Marital status:    Tobacco Use   • Smoking status: Never Smoker   • Smokeless tobacco: Never Used   Vaping Use   • Vaping Use: Never used   Substance and  Sexual Activity   • Alcohol use: Never   • Drug use: Never   • Sexual activity: Defer     Allergies   Allergen Reactions   • Exenatide Unknown - High Severity     Weight loss and pain in the right side. Bypark.   • Fluoxetine Hives   • Gabapentin Headache     Occular Miaigraines   • Jardiance [Empagliflozin] Other (See Comments)     UTI   • Levaquin [Levofloxacin] Hives   • Nickel Itching and Rash   • Sulfa Antibiotics Swelling       Current Outpatient Medications:   •  acetaminophen (TYLENOL) 650 MG 8 hr tablet, Take 650 mg by mouth 2 (two) times a day., Disp: , Rfl:   •  albuterol sulfate  (90 Base) MCG/ACT inhaler, USE 2 INHALATIONS EVERY 6 HOURS AS NEEDED (Patient taking differently: Inhale 2 puffs Every 6 (Six) Hours As Needed.), Disp: 51 g, Rfl: 2  •  amLODIPine (NORVASC) 5 MG tablet, Take 5 mg by mouth Daily., Disp: , Rfl:   •  aspirin 81 MG EC tablet, Take 81 mg by mouth Daily. LAST DOSE TODAY PER DR. HAYES (BACILIO), OKAYED WITH DR. MONTEMAYOR. BACILIO STATED SHE WOULD NOTIFY PATIENT, Disp: , Rfl:   •  atorvastatin (LIPITOR) 40 MG tablet, Take 1 tablet by mouth Every Night., Disp: 90 tablet, Rfl: 1  •  BD Pen Needle Ca U/F 32G X 4 MM misc, , Disp: , Rfl:   •  betamethasone dipropionate (DIPROLENE) 0.05 % lotion, APPLY TO SCALP TWICE DAILY AS NEEDED FOR DISCOMFORT, Disp: , Rfl:   •  Cholecalciferol (Vitamin D3) 1.25 MG (55556 UT) capsule, TAKE ONE CAPSULE BY MOUTH ONCE A WEEK (Patient taking differently: 50,000 Units Every 7 (Seven) Days.), Disp: 12 capsule, Rfl: 1  •  ferrous sulfate 325 (65 FE) MG tablet, Take 325 mg by mouth Daily With Breakfast., Disp: , Rfl:   •  fluocinonide (LIDEX) 0.05 % cream, fluocinonide 0.05 % topical cream apply to affected area(s) by topical route As needed   Active, Disp: , Rfl:   •  fluticasone (FLONASE) 50 MCG/ACT nasal spray, USE 1 SPRAY IN EACH NOSTRIL ONCE DAILY, Disp: 48 g, Rfl: 1  •  fluticasone-salmeterol (Advair Diskus) 500-50 MCG/DOSE DISKUS, Inhale 1 puff  2 (Two) Times a Day., Disp: 3 each, Rfl: 1  •  glimepiride (AMARYL) 4 MG tablet, TAKE 1 TABLET TWICE A DAY (Patient taking differently: Take 4 mg by mouth Every Morning Before Breakfast.), Disp: 180 tablet, Rfl: 1  •  hydrocortisone 2.5 % cream, APPLY TO SCALY AREAS ON THE EARS TWICE DAILY AS NEEDED FLARES., Disp: , Rfl:   •  ketoconazole (NIZORAL) 2 % shampoo, WASH FACE AND SCALP TWO TO THREE TIMES WEEKLY. LEAVE ON FOR 5 MINUTES BEFORE RINSING, Disp: , Rfl:   •  Lantus SoloStar 100 UNIT/ML injection pen, INJECT 20 UNITS ONCE A DAY AND INCREASE TO 50 UNITS AS INDICATED (Patient taking differently: Inject 63 Units under the skin into the appropriate area as directed Every Night.), Disp: 75 mL, Rfl: 3  •  levothyroxine (SYNTHROID, LEVOTHROID) 25 MCG tablet, TAKE 1 TABLET DAILY IN THE MORNING ON AN EMPTY STOMACH (Patient taking differently: Take 25 mcg by mouth Daily.), Disp: 90 tablet, Rfl: 1  •  [employer] FINEPOINT LANCETS misc, Use 1 lancet to test blood sugar three times a day. Dx: E11.65, Disp: 100 each, Rfl: 1  •  losartan (COZAAR) 50 MG tablet, Take 1 tablet by mouth Daily., Disp: 90 tablet, Rfl: 1  •  metoprolol succinate XL (TOPROL-XL) 100 MG 24 hr tablet, Take 1 tablet by mouth Daily., Disp: 90 tablet, Rfl: 1  •  montelukast (SINGULAIR) 10 MG tablet, Take 10 mg by mouth Every Night., Disp: , Rfl:   •  OneTouch Verio test strip, TEST BLOOD SUGAR THREE TIMES DAILY, Disp: 300 each, Rfl: 1  •  pantoprazole (PROTONIX) 40 MG EC tablet, Take 40 mg by mouth Daily., Disp: , Rfl:   •  polyethylene glycol (GoLYTELY) 236 g solution, Starting at noon on day prior to procedure, drink 8 ounces every 30 minutes until all gone or stools are clear. May add flavor packet., Disp: 4000 mL, Rfl: 0  •  Trulicity 1.5 MG/0.5ML solution pen-injector, INJECT 1.5 MG UNDER THE SKIN INTO APPROPRIATE AREA AS DIRECTED ONCE A WEEK, Disp: 6 mL, Rfl: 3  •  vitamin B-12 (CYANOCOBALAMIN) 500 MCG tablet, Take 500 mcg by mouth Daily., Disp: , Rfl:  "  •  Insulin Pen Needle (Pen Needles 5/16\") 30G X 8 MM misc, Use 1 needle three times a day. Dx: E11.9, Disp: 100 each, Rfl: 1    Review of Systems   Constitutional: Positive for activity change, appetite change and fatigue. Negative for fever, unexpected weight gain and unexpected weight loss.   HENT: Positive for congestion, hearing loss (She uses hearing aids) and sneezing. Negative for ear pain, facial swelling, sore throat and tinnitus.    Eyes: Positive for blurred vision. Negative for double vision, redness and visual disturbance.   Respiratory: Negative for cough, shortness of breath and wheezing.    Cardiovascular: Positive for palpitations. Negative for chest pain and leg swelling.   Gastrointestinal: Positive for GERD. Negative for abdominal distention, constipation, diarrhea, nausea, vomiting and indigestion.   Endocrine: Negative for polydipsia, polyphagia and polyuria.   Genitourinary: Positive for urinary incontinence. Negative for difficulty urinating, frequency and urgency.   Musculoskeletal: Positive for arthralgias and myalgias. Negative for back pain and gait problem.   Skin: Negative for rash, skin lesions and wound.   Neurological: Positive for dizziness (Vertigo since having Covid), headache and confusion. Negative for seizures, speech difficulty and weakness.   Psychiatric/Behavioral: Positive for sleep disturbance and stress. Negative for depressed mood. The patient is not nervous/anxious.         Objective     Vitals:    04/12/22 1303   BP: 161/85   BP Location: Right arm   Patient Position: Sitting   Cuff Size: Adult   Pulse: 87   Temp: 96.3 °F (35.7 °C)   SpO2: 96%   Weight: 75 kg (165 lb 5.5 oz)   Height: 157.5 cm (62\")   PainSc:   2     Body mass index is 30.24 kg/m².    Physical Exam  Constitutional:       Appearance: Normal appearance. She is obese.      Comments: Obesity with BMI of 30.24   HENT:      Head: Normocephalic and atraumatic.      Right Ear: External ear normal.      Left " Ear: External ear normal.      Nose: Nose normal.   Eyes:      Extraocular Movements: Extraocular movements intact.      Conjunctiva/sclera: Conjunctivae normal.   Pulmonary:      Effort: Pulmonary effort is normal.   Musculoskeletal:         General: Normal range of motion.      Cervical back: Normal range of motion.   Skin:     General: Skin is warm and dry.   Neurological:      General: No focal deficit present.      Mental Status: She is alert and oriented to person, place, and time. Mental status is at baseline.   Psychiatric:         Mood and Affect: Mood normal.         Behavior: Behavior normal.         Thought Content: Thought content normal.         Judgment: Judgment normal.          Result Review :   The following data was reviewed by: FANY Thakur on 04/12/2022:    Her most recent A1c was collected on 2/24/2022 and was 6.9% indicating controlled type 2 diabetes.  This was down from the prior result of 8.02 collected in January of this year.    Most Recent A1C    HGBA1C Most Recent 2/24/22   Hemoglobin A1C 6.90 (A)   (A) Abnormal value              A1C Last 3 Results    HGBA1C Last 3 Results 10/27/21 1/27/22 2/24/22   Hemoglobin A1C 7.62 (A) 8.02 (A) 6.90 (A)   (A) Abnormal value              Creatinine   Date Value Ref Range Status   02/24/2022 0.88 0.57 - 1.00 mg/dL Final   01/27/2022 0.99 0.57 - 1.00 mg/dL Final   07/06/2021 0.70 mg/dL Final     Comment:     Serial Number: [id number]:  [id number]       eGFR Non  Amer   Date Value Ref Range Status   02/24/2022 63 >60 mL/min/1.73 Final   01/27/2022 55 (L) >60 mL/min/1.73 Final       Labs collected on 2/24/2022 show improvement in GFR indicating stage II renal disease          Assessment: The patient has been seen by the diabetes nurse educator and implemented behavioral changes and closer monitoring of her diet and glucose levels.  Her A1c is in a controlled status at this particular time despite significant health issues and  stressors in her life over the last 3 to 6 months.      Diagnoses and all orders for this visit:    1. Controlled type 2 diabetes mellitus with stage 2 chronic kidney disease, with long-term current use of insulin ([facility]) (Primary)    2. Obesity (BMI 30-39.9)    Other orders  -     POC Glucose        Plan: At this time elected to make no changes to her current treatment plan.  She is scheduled to have her A1c repeated in the next 2 weeks prior to her follow-up appointment with her PCP.  If her A1c has gone up we will consider adjusting the Trulicity to 3 mg on making adjustments to her insulin if needed.  She is encouraged to continue strategies to promote glucose control through diet and physical activity as well as to promote weight loss.    The patient will monitor her blood glucose levels 2-3 times a day.  If she develops problematic hyperglycemia or hypoglycemia or adverse drug reaction, she will contact the office for further instructions.        Follow Up     Return in about 3 months (around 7/12/2022) for Medication Management.    Patient was given instructions and counseling regarding her condition or for health maintenance advice. Please see specific information pulled into the AVS if appropriate.     Kelly Vicente, APRN  04/12/2022

## 2022-04-13 ENCOUNTER — OFFICE VISIT (OUTPATIENT)
Dept: SLEEP MEDICINE | Facility: HOSPITAL | Age: 72
End: 2022-04-13

## 2022-04-13 ENCOUNTER — HOSPITAL ENCOUNTER (OUTPATIENT)
Dept: OCCUPATIONAL THERAPY | Facility: HOSPITAL | Age: 72
Setting detail: THERAPIES SERIES
Discharge: HOME | End: 2022-04-13

## 2022-04-13 VITALS
BODY MASS INDEX: 30.73 KG/M2 | WEIGHT: 167 LBS | DIASTOLIC BLOOD PRESSURE: 74 MMHG | OXYGEN SATURATION: 97 % | HEART RATE: 80 BPM | SYSTOLIC BLOOD PRESSURE: 144 MMHG | HEIGHT: 62 IN | TEMPERATURE: 96 F

## 2022-04-13 DIAGNOSIS — R06.83 SNORING: ICD-10-CM

## 2022-04-13 DIAGNOSIS — I89.0 LYMPHEDEMA: Primary | ICD-10-CM

## 2022-04-13 DIAGNOSIS — C50.919 MALIGNANT NEOPLASM OF FEMALE BREAST, UNSPECIFIED ESTROGEN RECEPTOR STATUS, UNSPECIFIED LATERALITY, UNSPECIFIED SITE OF BREAST: ICD-10-CM

## 2022-04-13 DIAGNOSIS — G47.33 OSA (OBSTRUCTIVE SLEEP APNEA): Primary | ICD-10-CM

## 2022-04-13 DIAGNOSIS — E66.9 CLASS 1 OBESITY: ICD-10-CM

## 2022-04-13 DIAGNOSIS — L90.5 SCAR TISSUE: ICD-10-CM

## 2022-04-13 PROCEDURE — 97140 MANUAL THERAPY 1/> REGIONS: CPT

## 2022-04-13 PROCEDURE — G0463 HOSPITAL OUTPT CLINIC VISIT: HCPCS | Performed by: INTERNAL MEDICINE

## 2022-04-13 PROCEDURE — 99213 OFFICE O/P EST LOW 20 MIN: CPT | Performed by: INTERNAL MEDICINE

## 2022-04-13 NOTE — PROGRESS NOTES
"  [address] [address]  [place]   [place] [address]  Phone: [phone]  Fax: [phone]      SLEEP CLINIC FOLLOW UP PROGRESS NOTE.    Jyoti Alvarez  [id number]   1950  72 y.o.  female      PCP: Ramo Noguera MD      Date of visit: 4/13/2022    Chief Complaint   Patient presents with   • Sleep Apnea   • Obesity       HPI:  This is a 72 y.o. years old patient is here for the management of obstructive sleep apnea.  Sleep apnea is moderate in severity with a AHI of 16.1/hr. she also has significant snoring for 78% of sleep time.  Patient is here to discuss the test results and treatment options.  Recently she lost her  due to Covid and she is very anxious.    Medications and allergies are reviewed by me and documented in the encounter.     SOCIAL (habits pertaining to sleep medicine)  History tobacco use:No   History of alcohol use: 0 per week  Caffeine use: 1 soda per week    REVIEW OF SYSTEMS:   [place] Sleepiness Scale :    Nasal congestion:Yes   Dry mouth/nose:No   Post nasal drip; No   Acid reflux/Heartburn:No   Abd bloating:No   Morning headache:No   Anxiety:Yes   Depression:No    PHYSICAL EXAMINATION:  CONSTITUTIONAL:  Vitals:    04/13/22 0900   BP: 144/74   Pulse: 80   Temp: 96 °F (35.6 °C)   SpO2: 97%   Weight: 75.8 kg (167 lb)   Height: 157.5 cm (62\")    Body mass index is 30.54 kg/m².   NOSE: nasal passages are clear, No deformities noted   RESP SYSTEM: Not in any respiratory distress, no chest deformities noted,   CARDIOVASULAR: No edema noted  NEURO: Oriented x 3, gait normal,  Mood and affect appeared appropriate            ASSESSMENT AND PLAN:  · Obstructive sleep apnea ( G 47.33).  I have discussed with the patient the test results and explained the pathophysiology of sleep apnea and the treatment.  Now she understands the process and will set up a auto CPAP and see her back in 31 to 90 days for compliance check.   Without proper control of sleep " apnea and good compliance there is a increased risk for hypertension, diabetes mellitus and nonrestorative sleep with hypersomnia which can increase risk for motor vehicle accidents.  Untreated sleep apnea is also a risk factor for development of atrial fibrillation, pulmonary hypertension and stroke.   · Obesity  1 with BMI is Body mass index is 30.54 kg/m².. I have discuss the relationship between the weight and sleep apnea. The benefit of weight loss in reducing severity of sleep apnea was discussed. Discussed diet and exercise with the patient to achieve ideal BMI   · Return for 31 to 90 days after PAP setup with down load. . Patient's questions were answered.      Randy Gotti MD  Sleep Medicine.  Medical Director, [facility], [facility]  4/13/2022 ,

## 2022-04-13 NOTE — THERAPY TREATMENT NOTE
Outpatient Occupational Therapy Lymphedema Treatment Note  RJ Chanel     Patient Name: Jyoti Alvarez  : 1950  MRN: [id number]  Today's Date: 2022      Visit Date: 2022    Patient Active Problem List   Diagnosis   • Breast cancer (HCC)   • Age related osteoporosis   • Osteoarthritis   • Asthma   • Colon polyp   • Gastroesophageal reflux disease without esophagitis   • Essential hypertension   • Fatty liver   • Lumbar radiculopathy   • Mild intermittent asthma   • Personal history of malignant neoplasm of breast   • Seasonal allergic rhinitis   • Vitamin D deficiency   • Type 2 diabetes mellitus with hyperglycemia, without long-term current use of insulin (HCC)   • Mixed hyperlipidemia   • Atrophy of thyroid   • Iron deficiency anemia   • Chronic diarrhea   • Vertigo   • COVID-19 virus infection   • Vitamin B12 deficiency   • Acute URI   • Deformity and disproportion of reconstructed breast   • Post-procedural fever   • SHALONDA (obstructive sleep apnea)   • Stage 3a chronic kidney disease (HCC)   • Postoperative follow-up   • History of colon polyps   • Snoring   • Hypersomnia   • Class 1 obesity        Past Medical History:   Diagnosis Date   • Anemia    • Arthritis    • Asthma    • Breast cancer (HCC)     RIGHT   • Colon polyp    • Diabetes (HCC)     BS IN THE A.M.    • Diabetes (HCC)    • Disease of thyroid gland Hypothyroidism   • Fatty liver    • Gastroesophageal reflux    • HL (hearing loss) Hearing aids    • Hyperlipemia    • Hypertension    • Limb swelling    • Seasonal allergies    • Skin cancer    • Strain of latissimus dorsi muscle 2017    Right   • Type 2 diabetes mellitus (HCC)    • Vertigo         Past Surgical History:   Procedure Laterality Date   • BREAST AUGMENTATION BILATERAL MASTOPEXY     • BREAST RECONSTRUCTION Bilateral 2022    Procedure: BILATERAL BREAST RECONSTRUCTION WITH REMOVAL AND REPLACEMENT OF IMPLANTS, MESH PLACEMENT, CHEST WALL SKIN RESECTION,  CHEST LIPOSUCTION;  Surgeon: Romain Quezada MD;  Location: [facility] OR [facility] [place];  Service: Plastics;  Laterality: Bilateral;   • BREAST SURGERY      Bilateral mastectomies and reconstruction   •  SECTION     • COLONOSCOPY     • COSMETIC SURGERY  2004    Bilateral mastectomies and reconstructio   • ENDOSCOPY  2019   • EYE SURGERY  Not sure    Cateracts   • LIPOSUCTION N/A 2022    Procedure: CHEST WALL LIPOSUCTION AND CHEST WALL RESECTION;  Surgeon: Romain Quezada MD;  Location: [facility] OR [facility] [place];  Service: Plastics;  Laterality: N/A;  TIMES BASED ON PREVIOUS EXPERIENCE   • MASTECTOMY COMPLETE / SIMPLE Bilateral    • OOPHORECTOMY     • OTHER SURGICAL HISTORY      Joint surgery,  RIGHT FOOT SECOND  TOE WITH A PIN   • SKIN CANCER EXCISION Right 2020    neck   • TOE SURGERY Right     2nd digit pin inserted   • TONSILLECTOMY           Visit Dx:      ICD-10-CM ICD-9-CM   1. Lymphedema  I89.0 457.1   2. Malignant neoplasm of female breast, unspecified estrogen receptor status, unspecified laterality, unspecified site of breast ([facility])  C50.919 174.9   3. Scar tissue  L90.5 709.2        Lymphedema     Row Name 22 1600             Subjective Pain    Able to rate subjective pain? yes  -SC      Pre-Treatment Pain Level 0  -SC      Post-Treatment Pain Level 0  -SC              Subjective Comments    Subjective Comments Pt states she feels her R anterior chest is a little puffy compared to the L  -SC              Skin Changes/Observations    Location/Assessment Upper Quadrant  -SC      Upper Quadrant Conditions bilateral:;normal;intact  -SC      Upper Quadrant Skin Details Pt noted with appearance of very slight increase in swelling in R anterior chest when compred to L.  -SC      Skin Observations Comment Pt noted with continued lymphatic cording x2 in R axilla and x1 in L axilla  -SC              Manual Lymphatic Drainage    Manual Lymphatic Drainage initial sequence;opened regional lymph  nodes;opened anastamoses;extremity treatment  -SC      Initial Sequence full neck;cervical;supraclavicular;shoulder collectors;abdomen;diaphragmatic breathing  -SC      Opened Regional Lymph Nodes axillary;inguinal;paraspinal;other lymph nodes  parasternal  -SC      Axillary left  -SC      Inguinal right;left  -SC      Opened Anastamoses anterior axillo-axillary;axillo-inguinal  -SC      Axillo-Inguinal right;left  -SC      Extremity Treatment MLD to proximal limb only  -SC      MLD to Proximal Limb Only B UE  -SC      Manual Therapy Therapist providing ubend technique to break cords however therapist unable to break cording this date. Therapist providing trigger point release in B UQ to decrease muscle tightness  -SC            User Key  (r) = Recorded By, (t) = Taken By, (c) = Cosigned By    Initials Name Provider Type    Letciia Rutherford COTA Occupational Therapist Assistant                         OT Assessment/Plan     Row Name 04/13/22 1624          OT Assessment    Assessment Comments Patient will benefit from continued skilled occupational therapy services to improve pain and decreased swelling in the upper quadrant in order to improve pt function with I/ADLs  -SC     Patient would benefit from skilled therapy intervention Yes  -SC            OT Plan    OT Plan Comments Continue POC  -SC           User Key  (r) = Recorded By, (t) = Taken By, (c) = Cosigned By    Initials Name Provider Type    Leticia Rutherford COTA Occupational Therapist Assistant                    Manual Rx (last 36 hours)     Manual Treatments     Row Name 04/13/22 1625             Total Minutes    23104 - OT Manual Therapy Minutes 53  -SC            User Key  (r) = Recorded By, (t) = Taken By, (c) = Cosigned By    Initials Name Provider Type    Leticia Rutherford COTA Occupational Therapist Assistant                                   Time Calculation:   Timed Charges  52029 - OT Manual Therapy Minutes: 53  Total Minutes  Timed  Charges Total Minutes: 53   Total Minutes: 53     Therapy Charges for Today     Code Description Service Date Service Provider Modifiers Qty    68111541642 HC OT MANUAL THERAPY EA 15 MIN 4/13/2022 Leticia Ledbetter COTA  4                      ANA Akers  4/13/2022

## 2022-04-15 ENCOUNTER — APPOINTMENT (OUTPATIENT)
Dept: OCCUPATIONAL THERAPY | Facility: HOSPITAL | Age: 72
End: 2022-04-15

## 2022-04-18 RX ORDER — LEVOTHYROXINE SODIUM 0.03 MG/1
TABLET ORAL
Qty: 90 TABLET | Refills: 1 | Status: SHIPPED | OUTPATIENT
Start: 2022-04-18 | End: 2022-10-17

## 2022-04-18 RX ORDER — PANTOPRAZOLE SODIUM 40 MG/1
TABLET, DELAYED RELEASE ORAL
Qty: 90 TABLET | Refills: 1 | Status: SHIPPED | OUTPATIENT
Start: 2022-04-18 | End: 2022-10-17

## 2022-04-19 ENCOUNTER — HOSPITAL ENCOUNTER (OUTPATIENT)
Dept: OCCUPATIONAL THERAPY | Facility: HOSPITAL | Age: 72
Setting detail: THERAPIES SERIES
Discharge: HOME | End: 2022-04-19

## 2022-04-19 ENCOUNTER — LAB (OUTPATIENT)
Dept: LAB | Facility: HOSPITAL | Age: 72
End: 2022-04-19

## 2022-04-19 DIAGNOSIS — L90.5 SCAR TISSUE: ICD-10-CM

## 2022-04-19 DIAGNOSIS — I89.0 LYMPHEDEMA: Primary | ICD-10-CM

## 2022-04-19 DIAGNOSIS — E11.65 TYPE 2 DIABETES MELLITUS WITH HYPERGLYCEMIA, WITHOUT LONG-TERM CURRENT USE OF INSULIN: ICD-10-CM

## 2022-04-19 DIAGNOSIS — C50.919 MALIGNANT NEOPLASM OF FEMALE BREAST, UNSPECIFIED ESTROGEN RECEPTOR STATUS, UNSPECIFIED LATERALITY, UNSPECIFIED SITE OF BREAST: ICD-10-CM

## 2022-04-19 DIAGNOSIS — E03.4 ATROPHY OF THYROID: ICD-10-CM

## 2022-04-19 DIAGNOSIS — E78.2 MIXED HYPERLIPIDEMIA: ICD-10-CM

## 2022-04-19 LAB
ALBUMIN SERPL-MCNC: 4.4 G/DL (ref 3.5–5.2)
ALBUMIN/GLOB SERPL: 2.2 G/DL
ALP SERPL-CCNC: 80 U/L (ref 39–117)
ALT SERPL W P-5'-P-CCNC: 22 U/L (ref 1–33)
ANION GAP SERPL CALCULATED.3IONS-SCNC: 13.6 MMOL/L (ref 5–15)
AST SERPL-CCNC: 19 U/L (ref 1–32)
BILIRUB SERPL-MCNC: 0.3 MG/DL (ref 0–1.2)
BUN SERPL-MCNC: 16 MG/DL (ref 8–23)
BUN/CREAT SERPL: 25.8 (ref 7–25)
CALCIUM SPEC-SCNC: 9.7 MG/DL (ref 8.6–10.5)
CHLORIDE SERPL-SCNC: 104 MMOL/L (ref 98–107)
CHOLEST SERPL-MCNC: 157 MG/DL (ref 0–200)
CO2 SERPL-SCNC: 21.4 MMOL/L (ref 22–29)
CREAT SERPL-MCNC: 0.62 MG/DL (ref 0.57–1)
EGFRCR SERPLBLD CKD-EPI 2021: 94.2 ML/MIN/1.73
GLOBULIN UR ELPH-MCNC: 2 GM/DL
GLUCOSE SERPL-MCNC: 149 MG/DL (ref 65–99)
HBA1C MFR BLD: 7 % (ref 4.8–5.6)
HDLC SERPL-MCNC: 40 MG/DL (ref 40–60)
LDLC SERPL CALC-MCNC: 90 MG/DL (ref 0–100)
LDLC/HDLC SERPL: 2.15 {RATIO}
POTASSIUM SERPL-SCNC: 4.6 MMOL/L (ref 3.5–5.2)
PROT SERPL-MCNC: 6.4 G/DL (ref 6–8.5)
SODIUM SERPL-SCNC: 139 MMOL/L (ref 136–145)
T4 FREE SERPL-MCNC: 1.2 NG/DL (ref 0.93–1.7)
TRIGL SERPL-MCNC: 155 MG/DL (ref 0–150)
TSH SERPL DL<=0.05 MIU/L-ACNC: 1.71 UIU/ML (ref 0.27–4.2)
VLDLC SERPL-MCNC: 27 MG/DL (ref 5–40)

## 2022-04-19 PROCEDURE — 84439 ASSAY OF FREE THYROXINE: CPT

## 2022-04-19 PROCEDURE — 83036 HEMOGLOBIN GLYCOSYLATED A1C: CPT

## 2022-04-19 PROCEDURE — 97140 MANUAL THERAPY 1/> REGIONS: CPT

## 2022-04-19 PROCEDURE — 80053 COMPREHEN METABOLIC PANEL: CPT

## 2022-04-19 PROCEDURE — 80061 LIPID PANEL: CPT

## 2022-04-19 PROCEDURE — 97535 SELF CARE MNGMENT TRAINING: CPT

## 2022-04-19 PROCEDURE — 84443 ASSAY THYROID STIM HORMONE: CPT

## 2022-04-19 PROCEDURE — 36415 COLL VENOUS BLD VENIPUNCTURE: CPT

## 2022-04-19 NOTE — THERAPY TREATMENT NOTE
Outpatient Occupational Therapy Lymphedema Treatment Note  RJ Chanel     Patient Name: Jyoti Alvarez  : 1950  MRN: [id number]  Today's Date: 2022      Visit Date: 2022    Patient Active Problem List   Diagnosis   • Breast cancer (HCC)   • Age related osteoporosis   • Osteoarthritis   • Asthma   • Colon polyp   • Gastroesophageal reflux disease without esophagitis   • Essential hypertension   • Fatty liver   • Lumbar radiculopathy   • Mild intermittent asthma   • Personal history of malignant neoplasm of breast   • Seasonal allergic rhinitis   • Vitamin D deficiency   • Type 2 diabetes mellitus with hyperglycemia, without long-term current use of insulin (HCC)   • Mixed hyperlipidemia   • Atrophy of thyroid   • Iron deficiency anemia   • Chronic diarrhea   • Vertigo   • COVID-19 virus infection   • Vitamin B12 deficiency   • Acute URI   • Deformity and disproportion of reconstructed breast   • Post-procedural fever   • SHALONDA (obstructive sleep apnea)   • Stage 3a chronic kidney disease (HCC)   • Postoperative follow-up   • History of colon polyps   • Snoring   • Hypersomnia   • Class 1 obesity        Past Medical History:   Diagnosis Date   • Anemia    • Arthritis    • Asthma    • Breast cancer (HCC)     RIGHT   • Colon polyp    • Diabetes (HCC)     BS IN THE A.M.    • Diabetes (HCC)    • Disease of thyroid gland Hypothyroidism   • Fatty liver    • Gastroesophageal reflux    • HL (hearing loss) Hearing aids    • Hyperlipemia    • Hypertension    • Limb swelling    • Seasonal allergies    • Skin cancer    • Strain of latissimus dorsi muscle 2017    Right   • Type 2 diabetes mellitus (HCC)    • Vertigo         Past Surgical History:   Procedure Laterality Date   • BREAST AUGMENTATION BILATERAL MASTOPEXY     • BREAST RECONSTRUCTION Bilateral 2022    Procedure: BILATERAL BREAST RECONSTRUCTION WITH REMOVAL AND REPLACEMENT OF IMPLANTS, MESH PLACEMENT, CHEST WALL SKIN RESECTION,  CHEST LIPOSUCTION;  Surgeon: Romain Quezada MD;  Location: [facility];  Service: Plastics;  Laterality: Bilateral;   • BREAST SURGERY      Bilateral mastectomies and reconstruction   •  SECTION     • COLONOSCOPY     • COSMETIC SURGERY  2004    Bilateral mastectomies and reconstructio   • ENDOSCOPY  2019   • EYE SURGERY  Not sure    Cateracts   • LIPOSUCTION N/A 2022    Procedure: CHEST WALL LIPOSUCTION AND CHEST WALL RESECTION;  Surgeon: Romain Quezada MD;  Location: [facility];  Service: Plastics;  Laterality: N/A;  TIMES BASED ON PREVIOUS EXPERIENCE   • MASTECTOMY COMPLETE / SIMPLE Bilateral    • OOPHORECTOMY     • OTHER SURGICAL HISTORY      Joint surgery,  RIGHT FOOT SECOND  TOE WITH A PIN   • SKIN CANCER EXCISION Right 2020    neck   • TOE SURGERY Right     2nd digit pin inserted   • TONSILLECTOMY           Visit Dx:      ICD-10-CM ICD-9-CM   1. Lymphedema  I89.0 457.1   2. Malignant neoplasm of female breast, unspecified estrogen receptor status, unspecified laterality, unspecified site of breast ([facility])  C50.919 174.9   3. Scar tissue  L90.5 709.2        Lymphedema     Row Name 22 1700             Subjective Pain    Able to rate subjective pain? yes  -SC      Pre-Treatment Pain Level 0  -SC      Post-Treatment Pain Level 0  -SC              Subjective Comments    Subjective Comments Patient states she has had no pain and has noticed a decrease in swelling since beginning treatment.  Patient does states she does feel that she has a little swelling on the right clavicle however is not convinced it is lymphedema related.  -SC              Skin Changes/Observations    Skin Observations Comment Patient does continue to note with axillary cording in right axilla however it is asymptomatic at this time.  No axillary cording noted in left axilla this date  -SC              Manual Lymphatic Drainage    Manual Lymphatic Drainage initial sequence;opened regional lymph  nodes;opened anastamoses;extremity treatment  -SC      Initial Sequence full neck;cervical;supraclavicular;shoulder collectors;abdomen;diaphragmatic breathing  -SC      Opened Regional Lymph Nodes axillary;inguinal;paraspinal;other lymph nodes  parasternal  -SC      Axillary left  -SC      Inguinal right;left  -SC      Opened Anastamoses anterior axillo-axillary;axillo-inguinal  -SC      Axillo-Inguinal right;left  -SC      Extremity Treatment MLD to proximal limb only  -SC      MLD to Proximal Limb Only B UE  -SC      Manual Therapy Therapist providing passive range of motion/stretch on bilateral upper extremity to decrease any tightness  -SC            User Key  (r) = Recorded By, (t) = Taken By, (c) = Cosigned By    Initials Name Provider Type    Leticia Rutherford COTA Occupational Therapist Assistant                                Manual Rx (last 36 hours)     Manual Treatments     Row Name 04/19/22 1728             Total Minutes    48848 - OT Manual Therapy Minutes 30  -SC            User Key  (r) = Recorded By, (t) = Taken By, (c) = Cosigned By    Initials Name Provider Type    Leticia Rutherford COTA Occupational Therapist Assistant                  Therapy Education  Education Details: Therapist discussing with patient regarding possibility of discharging from therapy services.  Patient states she feels she is doing much better and no longer experiences swelling and decrease in daily activities. Patient states that the tightness in her bilateral upper extremities feels better once treatment is over.  However patient does feel that she is doing well, therapist agrees.  Therapist encouraging patient to attend next week's appointment to reassess lymph volume with the bioimpedance and prepare for discharge.  Therapist did provide patient with new HEP, includes stretching and upper body strengthening with Thera-Band.  Patient was able to demonstrate a good understanding of exercises.  Therapist recommending  patient perform stretches daily and strengthening exercises 3 times a week.  Therapist educating patient on importance of allowing rest days with the strengthening exercises to build muscle and allow muscles to recover.  Given: HEP, Symptoms/condition management  Program: New  How Provided: Verbal, Demonstration, Written  Provided to: Patient  Level of Understanding: Teach back education performed, Verbalized, Demonstrated  26663 - OT Self Care/Mgmt Minutes: 24                Time Calculation:   Timed Charges  45447 - OT Manual Therapy Minutes: 30  31116 - OT Self Care/Mgmt Minutes: 24  Total Minutes  Timed Charges Total Minutes: 54   Total Minutes: 54     Therapy Charges for Today     Code Description Service Date Service Provider Modifiers Qty    56518017381 HC OT MANUAL THERAPY EA 15 MIN 4/19/2022 Leticia Ledbetter COTA GO 2    08360955192 HC OT SELF CARE/MGMT/TRAIN EA 15 MIN 4/19/2022 Leticia Ledbetter COTA GO 2                      ANA Akers  4/19/2022

## 2022-04-22 ENCOUNTER — APPOINTMENT (OUTPATIENT)
Dept: OCCUPATIONAL THERAPY | Facility: HOSPITAL | Age: 72
End: 2022-04-22

## 2022-04-25 ENCOUNTER — HOSPITAL ENCOUNTER (OUTPATIENT)
Dept: OCCUPATIONAL THERAPY | Facility: HOSPITAL | Age: 72
Setting detail: THERAPIES SERIES
Discharge: HOME | End: 2022-04-25

## 2022-04-25 DIAGNOSIS — C50.919 MALIGNANT NEOPLASM OF FEMALE BREAST, UNSPECIFIED ESTROGEN RECEPTOR STATUS, UNSPECIFIED LATERALITY, UNSPECIFIED SITE OF BREAST: ICD-10-CM

## 2022-04-25 DIAGNOSIS — L90.5 SCAR TISSUE: ICD-10-CM

## 2022-04-25 DIAGNOSIS — E55.9 VITAMIN D DEFICIENCY: ICD-10-CM

## 2022-04-25 DIAGNOSIS — I89.0 LYMPHEDEMA: Primary | ICD-10-CM

## 2022-04-25 PROCEDURE — 93702 BIS XTRACELL FLUID ANALYSIS: CPT

## 2022-04-25 PROCEDURE — 97535 SELF CARE MNGMENT TRAINING: CPT

## 2022-04-25 NOTE — THERAPY RE-EVALUATION
Outpatient Occupational Therapy Lymphedema Re-Evaluation  RJ Chanel     Patient Name: Jyoti Alvarez  : 1950  MRN: [id number]  Today's Date: 2022      Visit Date: 2022    Patient Active Problem List   Diagnosis   • Breast cancer (HCC)   • Age related osteoporosis   • Osteoarthritis   • Asthma   • Colon polyp   • Gastroesophageal reflux disease without esophagitis   • Essential hypertension   • Fatty liver   • Lumbar radiculopathy   • Mild intermittent asthma   • Personal history of malignant neoplasm of breast   • Seasonal allergic rhinitis   • Vitamin D deficiency   • Type 2 diabetes mellitus with hyperglycemia, without long-term current use of insulin (HCC)   • Mixed hyperlipidemia   • Atrophy of thyroid   • Iron deficiency anemia   • Chronic diarrhea   • Vertigo   • COVID-19 virus infection   • Vitamin B12 deficiency   • Acute URI   • Deformity and disproportion of reconstructed breast   • Post-procedural fever   • SHALONDA (obstructive sleep apnea)   • Stage 3a chronic kidney disease (HCC)   • Postoperative follow-up   • History of colon polyps   • Snoring   • Hypersomnia   • Class 1 obesity        Past Medical History:   Diagnosis Date   • Anemia    • Arthritis    • Asthma    • Breast cancer (HCC)     RIGHT   • Colon polyp    • Diabetes (HCC)     BS IN THE A.M.    • Diabetes (HCC)    • Disease of thyroid gland Hypothyroidism   • Fatty liver    • Gastroesophageal reflux    • HL (hearing loss) Hearing aids    • Hyperlipemia    • Hypertension    • Limb swelling    • Seasonal allergies    • Skin cancer    • Strain of latissimus dorsi muscle 2017    Right   • Type 2 diabetes mellitus (HCC)    • Vertigo         Past Surgical History:   Procedure Laterality Date   • BREAST AUGMENTATION BILATERAL MASTOPEXY     • BREAST RECONSTRUCTION Bilateral 2022    Procedure: BILATERAL BREAST RECONSTRUCTION WITH REMOVAL AND REPLACEMENT OF IMPLANTS, MESH PLACEMENT, CHEST WALL SKIN RESECTION,  "CHEST LIPOSUCTION;  Surgeon: Romain Quezada MD;  Location: [facility];  Service: Plastics;  Laterality: Bilateral;   • BREAST SURGERY      Bilateral mastectomies and reconstruction   •  SECTION     • COLONOSCOPY     • COSMETIC SURGERY  2004    Bilateral mastectomies and reconstructio   • ENDOSCOPY  2019   • EYE SURGERY  Not sure    Cateracts   • LIPOSUCTION N/A 2022    Procedure: CHEST WALL LIPOSUCTION AND CHEST WALL RESECTION;  Surgeon: Romain Quezada MD;  Location: [facility];  Service: Plastics;  Laterality: N/A;  TIMES BASED ON PREVIOUS EXPERIENCE   • MASTECTOMY COMPLETE / SIMPLE Bilateral    • OOPHORECTOMY     • OTHER SURGICAL HISTORY      Joint surgery,  RIGHT FOOT SECOND  TOE WITH A PIN   • SKIN CANCER EXCISION Right 2020    neck   • TOE SURGERY Right     2nd digit pin inserted   • TONSILLECTOMY           Visit Dx:     ICD-10-CM ICD-9-CM   1. Lymphedema  I89.0 457.1   2. Malignant neoplasm of female breast, unspecified estrogen receptor status, unspecified laterality, unspecified site of breast ([facility])  C50.919 174.9   3. Scar tissue  L90.5 709.2            Lymphedema     Row Name 22 1400             Subjective Pain    Able to rate subjective pain? yes  -CH      Pre-Treatment Pain Level 1  -CH      Post-Treatment Pain Level 1  -CH              Subjective Comments    Subjective Comments Pt. states that she has irritation at the 4/5 oclock position of the breast in the mammary fold. She is noted with altered symmetry in breast position with L being lower than the R and she describes a \"pressure\" type pain at that area extending toward the abdomen.  -CH              Lymphedema Assessment    Lymphedema Classification LUE:;RUE:;at risk/stage 0  -CH      Lymphedema Cancer Related Sx bilateral;simple mastectomy;reconstructive;axillary dissection  -CH      Lymphedema Surgery Comments Patient underwent bilateral mastectomy with axillary dissection 14 years ago.  " "Patient return to occupational therapy after replacement of breast implants  -CH      Lymph Nodes Removed # 14  -CH      Positive Lymph Nodes # 0  -CH              LLIS - Physical Concerns    The amount of pain associated with my lymphedema is: 0  -CH      The amount of limb heaviness associated with my lymphedema is: 0  -CH      The amount of skin tightness associated with my lymphedema is: 0  -CH      The size of my swollen limb(s) seems: 0  -CH      Lymphedema affects the movement of my swollen limb(s): 0  -CH      The strength in my swollen limb(s) is: 0  -CH              LLIS - Psychosocial Concerns    Lymphedema affects my body image (i.e., \"how I think I look\"). 0  -CH      Lymphedema affects my socializing with others. 0  -CH      Lymphedema affects my intimate relations with spouse or partner (rate 0 if not applicable 0  -CH      Lymphedema \"gets me down\" (i.e., depression, frustration, or anger) 0  -CH      I must rely on others for help due to my lymphedema. 0  -CH      I know what to do to manage my lymphedema 1  -CH              LLIS - Functional Concerns    Lymphedema affects my ability to perform self-care activities (i.e. eating, dressing, hygiene) 0  -CH      Lymphedema affects my ability to perform routine home or work-related activities. 0  -CH      Lymphedema affects my performance of preferred leisure activities. 0  -CH      Lymphedema affects proper fit of clothing/shoes 0  -CH      Lymphedema affects my sleep 0  -CH              Posture/Observations    Posture- WNL Posture is WNL  -CH              General ROM    GENERAL ROM COMMENTS Bilateral UEs WFL  -CH              MMT (Manual Muscle Testing)    General MMT Comments Bilateral UEs WFL  -CH              Skin Changes/Observations    Location/Assessment Upper Quadrant  -CH      Upper Quadrant Conditions bilateral:;intact  -CH      Upper Quadrant Color/Pigment bilateral:;normal;fibrosis  -CH      Skin Observations Comment Patient is noted with " scar tissue fibrosis in bilateral incision sites.  Patient is noted with asymmetry and position of breast implant with left drifting inferior and laterally.  Patient is also experiencing discomfort in that area that she describes as pressure that is relieved when she supports the breast implant up.  -CH              L-Dex Bioimpedence Screening    L-Dex Measurement Extremity LUE  -CH      L-Dex Patient Position Standing  -CH      L-Dex UE Dominate Side Right  -CH      L-Dex UE At Risk Side Left  -CH      L-Dex UE Pre Surgical Value No  -CH      L-Dex UE Score -3.8  -CH      L-Dex UE Baseline Score -3  -CH      L-Dex UE Value Change -0.8  -CH      $ L-Dex Charge yes  -CH              Lymphedema Life Impact Scale Totals    A.  Total Q1 - Q17 (Do not include Q18) 1  -CH      B.  Total number of questions answered (Q1-Q17) 17  -CH      C. Divide A by B 0.06  -CH      D. Multiple C by 25 1.5  -CH            User Key  (r) = Recorded By, (t) = Taken By, (c) = Cosigned By    Initials Name Provider Type    Jade Capone OT Occupational Therapist                        Therapy Education  Education Details: OT reviewed stoplight to recovery lymphedema prevention information with patient.  Patient had fair return verbalization of understanding of information.  OT discussed keeping patient on the lymphedema surveillance program due to her having unusual swelling of the left upper extremity due to the fact that her lymph nodes removed on the right 14 years ago.  OT also suspect there may be alternative surgical intervention that may need to occur for the left breast and would like to evaluate how her lymphatic system manages this new swelling event prior to discharging patient from services.  Given: HEP, Symptoms/condition management  Program: New  How Provided: Verbal  Provided to: Patient  Level of Understanding: Verbalized  88320 - OT Self Care/Mgmt Minutes: 30         OT Goals     Row Name 04/25/22 0396          Time  Calculation    OT Goal Re-Cert Due Date 05/25/22  -           User Key  (r) = Recorded By, (t) = Taken By, (c) = Cosigned By    Initials Name Provider Type    Jade Capone, OT Occupational Therapist              UQLymphgoals:   1. Uncontrolled lymphedema of Left upper extremity and upper quadrant.  LTG1:  90 days:  Volumetric measurements of Left upper extremity/upper quadrant will be decreased by 10 % or until plateau in reduction is achieved to improve functional mobility.             STATUS:  Met              STG1a:  30 days:  Volumetric measurements of left upper extremity and upper quadrant will be decreased by  5% bilaterally to improve functional mobility.      STATUS:  Met   LTG 1b: 90 days:  As an indicator of no exacerbation of lymphedema staging, the patient will present with an L-Dex score less than [10] points from preoperative baseline.   STATUS: New    TREATMENT:  Manual Therapy, Therapeutic exercise, ADL/self-care therapy, Bioimpedence Fluid Analysis, Orthotic management and training, Therapeutic Activity, wound dressing as needed, Modalities: TENS, NMES, Ultrasound, Fluidotherapy, and Patient and family/caregiver education.         2. Patient with decreased ADL/Self-Care routine for lymphedema management.              LTG 2:  90 days:  Patient/caregiver will independently verbalize/demonstrate ADL/Self-Care routine for lymphedema management.                      STATUS:  Met              STG 2a:  30 days:  Patient/caregiver will independently perform or verbally dictate compression wrapping technique of the upper extremity/upper quadrant.                      STATUS:   Not Met: discontinue              STG2b:  30 days:  Patient will independently verbalize signs and symptoms of infection.                            STATUS:  Met              STG2c:  30 days:  Patient will independently demonstrate/verbalize proper skin care routine to prevent infection and or wound  development.                       STATUS: Met  STG2d:  30 days:  Patient will independently perform teach back of HEP routine.                      STATUS: Met: ongoing  STG2e:  30 days:  Patient/caregiver will obtain properly fitting compression orthosis, will demonstrate don/doff skill of compression orthosis with modified independence, and independently verbalize wear schedule.          STATUS: Met: ongoing  STG2f: 30 days: Patient/caregiver will independently perform self-manual lymphatic drainage of the upper extremity/upper quadrant.          STATUS: Met: Ongoing  TREATMENT:  Therapeutic Activity, Patient/Caregiver Education, ADL retraining, Manual Therapy, Orthotic Management and training, Modalities: TENS, NMES, Ultrasound, Fluidotherapy Wound dressing if necessary, Therapeutic Exercise, Modalities     3. Self-Care Limitations                                    LTG 3: 90 days:  The patient will demonstrate improved quality of life by achieving a score of  5 on the Lymphedema Life Impact Scale.                      STATUS:  Met              STG 3a: 30 days:  The patient will demonstrate improved quality of life by achieving a score of 10 on the Lymphedema Life Impact Scale.                      STATUS:  Met  TREATMENT:  Manual therapy, therapeutic exercise, therapeutic activity, ADL retraining, Patient/caregiver education, Modalities: TENS, NMES, Ultrasound, Fluidotherapy, Orthotic Management and Training, wound dressing as needed.        OT Assessment/Plan     Row Name 04/25/22 9584          OT Assessment    Functional Limitations Limitation in home management;Performance in work activities  -     Impairments Impaired lymphatic circulation;Range of motion;Pain;Integumentary integrity  -CH     Assessment Comments Patient continues to have 1 out of 10 pain that has not changed for the last several weeks of treatments.  Patient also has had resolution of swelling in her arms.  OT would like to keep patient  on the lymphedema surveillance program due to the unusual nature of the development of lymphedema that the patient had as well as possible future surgical interventions to correct implantation of her new breast implants.  Patient will benefit from skilled occupational therapy services to evaluate ongoing lymphatic functioning to prevent advancement in lymphedema staging.  -     OT Rehab Potential Good  -     Patient/caregiver participated in establishment of treatment plan and goals Yes  -     Patient would benefit from skilled therapy intervention Yes  -CH            OT Plan    OT Frequency Other (comment)  See duration  -     Predicted Duration of Therapy Intervention (OT) Pt. to re-evaluated  every 3 months from baseline for year 1.  -     Planned CPT's? OT EVAL MOD COMPLEXITY: 15364;OT THER PROC EA 15 MIN: 40433ZV;OT SELF CARE/MGMT/TRAIN 15 MIN: 43871;OT MANUAL THERAPY EA 15 MIN: 44449;OT ORTHOTIC MGMT/TRAIN EA 15 MIN: 17212;OT WC MGMT EA 15 MIN: 25190  -     Planned Therapy Interventions (Optional Details) joint mobilization;manual therapy techniques;orthotic fitting/training;patient/family education;ROM (Range of Motion);strengthening;wound care  -           User Key  (r) = Recorded By, (t) = Taken By, (c) = Cosigned By    Initials Name Provider Type     Jade Cam OT Occupational Therapist                          Time Calculation:   Timed Charges  76702 - OT Self Care/Mgmt Minutes: 30  Total Minutes  Timed Charges Total Minutes: 30   Total Minutes: 30     Therapy Charges for Today     Code Description Service Date Service Provider Modifiers Qty    21779632822 HC PT BIS XTRACELL FLUID ANALYSIS 4/25/2022 Jade Cam OT  1    [id number]  OT SELF CARE/MGMT/TRAIN EA 15 MIN 4/25/2022 Jade Cam OT GO 2                    Jade Cam OT  4/25/2022

## 2022-04-28 ENCOUNTER — OFFICE VISIT (OUTPATIENT)
Dept: INTERNAL MEDICINE | Facility: CLINIC | Age: 72
End: 2022-04-28

## 2022-04-28 VITALS
WEIGHT: 167.8 LBS | HEART RATE: 92 BPM | HEIGHT: 62 IN | BODY MASS INDEX: 30.88 KG/M2 | TEMPERATURE: 97.3 F | SYSTOLIC BLOOD PRESSURE: 138 MMHG | DIASTOLIC BLOOD PRESSURE: 86 MMHG | OXYGEN SATURATION: 94 %

## 2022-04-28 DIAGNOSIS — U09.9 POST-COVID SYNDROME: ICD-10-CM

## 2022-04-28 DIAGNOSIS — E55.9 VITAMIN D DEFICIENCY: ICD-10-CM

## 2022-04-28 DIAGNOSIS — I10 ESSENTIAL HYPERTENSION: Primary | ICD-10-CM

## 2022-04-28 DIAGNOSIS — D50.8 IRON DEFICIENCY ANEMIA SECONDARY TO INADEQUATE DIETARY IRON INTAKE: ICD-10-CM

## 2022-04-28 DIAGNOSIS — N18.31 STAGE 3A CHRONIC KIDNEY DISEASE: ICD-10-CM

## 2022-04-28 DIAGNOSIS — E53.8 VITAMIN B12 DEFICIENCY: ICD-10-CM

## 2022-04-28 DIAGNOSIS — Z00.00 WELL ADULT EXAM: ICD-10-CM

## 2022-04-28 DIAGNOSIS — E03.4 ATROPHY OF THYROID: ICD-10-CM

## 2022-04-28 DIAGNOSIS — E78.2 MIXED HYPERLIPIDEMIA: ICD-10-CM

## 2022-04-28 DIAGNOSIS — Z85.3 HISTORY OF BREAST CANCER: ICD-10-CM

## 2022-04-28 DIAGNOSIS — H81.90 VESTIBULAR DIZZINESS: ICD-10-CM

## 2022-04-28 DIAGNOSIS — E11.65 TYPE 2 DIABETES MELLITUS WITH HYPERGLYCEMIA, WITHOUT LONG-TERM CURRENT USE OF INSULIN: ICD-10-CM

## 2022-04-28 PROBLEM — N65.0 DEFORMITY AND DISPROPORTION OF RECONSTRUCTED BREAST: Status: RESOLVED | Noted: 2021-12-09 | Resolved: 2022-04-28

## 2022-04-28 PROBLEM — N65.1 DEFORMITY AND DISPROPORTION OF RECONSTRUCTED BREAST: Status: RESOLVED | Noted: 2021-12-09 | Resolved: 2022-04-28

## 2022-04-28 PROBLEM — U07.1 COVID-19 VIRUS INFECTION: Status: RESOLVED | Noted: 2021-11-03 | Resolved: 2022-04-28

## 2022-04-28 PROBLEM — R50.82 POST-PROCEDURAL FEVER: Status: RESOLVED | Noted: 2022-02-03 | Resolved: 2022-04-28

## 2022-04-28 PROBLEM — Z09 POSTOPERATIVE FOLLOW-UP: Status: RESOLVED | Noted: 2022-02-10 | Resolved: 2022-04-28

## 2022-04-28 PROCEDURE — 99214 OFFICE O/P EST MOD 30 MIN: CPT | Performed by: INTERNAL MEDICINE

## 2022-04-28 RX ORDER — LOSARTAN POTASSIUM 100 MG/1
100 TABLET ORAL DAILY
Qty: 90 TABLET | Refills: 1 | Status: SHIPPED | OUTPATIENT
Start: 2022-04-28 | End: 2022-07-25 | Stop reason: SDUPTHER

## 2022-04-28 RX ORDER — GLIMEPIRIDE 4 MG/1
4 TABLET ORAL 2 TIMES DAILY WITH MEALS
COMMUNITY
End: 2022-07-05

## 2022-04-28 NOTE — ASSESSMENT & PLAN NOTE
Her GFR has more than fully recovered as of 4/22 office visit.  It was listed as being 94.  Electrolytes are fine as well.  We will continue to monitor with routine laboratory follow-up.

## 2022-04-28 NOTE — ASSESSMENT & PLAN NOTE
A1c is 7.0 as of 4/22 office visit.  This is very stable, patient does not report any significant hypoglycemic episodes, and she is got her carbohydrate intake leveled out and very consistent.  I think this is an excellent number, certainly no changes needed to her regimen at this time.  Patient is stable to continue glimepiride 4 twice daily, low-dose Trulicity weekly, and Lantus 50 units daily.

## 2022-04-28 NOTE — ASSESSMENT & PLAN NOTE
ADULT PROTOCOL: JET AEROSOL  REASSESSMENT    Patient  Tico Teixeira     71 y.o.   female     12/4/2021  6:14 PM    Breath Sounds Pre Procedure: Right Breath Sounds: Coarse                               Left Breath Sounds: Coarse    Breath Sounds Post Procedure: Right Breath Sounds: Coarse                                 Left Breath Sounds: Coarse    Breathing pattern: Pre procedure Breathing Pattern: Regular          Post procedure Breathing Pattern: Regular    Heart Rate: Pre procedure Pulse: 86           Post procedure Pulse: 86    Resp Rate: Pre procedure Respirations: 18           Post procedure Respirations: 18      Oxygen: O2 Device: Nasal cannula   Flow rate (L/min) 4     Changed: No       SpO2: Pre procedure SpO2: 93 %    4LPM NC              Post procedure SpO2: 93 %  4LPM NC         Nebulizer Therapy: Current medications Aerosolized Medications: DuoNeb      Changed: No         Smoking History:   Smoking status: Former Smoker        Packs/day: 1.00       Years: 30.00       Pack years: 30.00    Smokeless tobacco: Never Used           Problem List:   Patient Active Problem List   Diagnosis Code    COPD (chronic obstructive pulmonary disease) ([facility]) J44.9    PTSD (post-traumatic stress disorder) F43.10    PVC (premature ventricular contraction) I49.3    Tobacco use disorder F17.200    Family history of ischemic heart disease Z82.49    Chest pain with normal coronary angiography R07.9    Abnormal nuclear stress test R94.39    Acute respiratory failure ([facility]) J96.00    Pneumonia, organism unspecified(486) J18.9    Depression, major, recurrent ([facility]) F33.9    Cervical post-laminectomy syndrome M96.1    Neck pain M54.2    Ataxia R27.0    Polyneuropathy in diabetes(357.2) E11.42    Syncope and collapse R55    COPD with acute exacerbation ([facility]) J44.1    Acute respiratory failure with hypoxia ([facility]) J96.01    Chronic respiratory failure with hypoxia ([facility]) J96.11    Benign familial tremor G25.0    TSH is 1.7 as of 4/22, so patient stable to continue low-dose Synthroid at 25 mics daily.   Atelectasis J98.11    Allergic rhinitis J30.9    Sepsis ([address] A41.9    Acute exacerbation of COPD with asthma ([facility]) J44.1, J45.901    HTN (hypertension) I10    Hyperlipidemia E78.5    Hypothyroidism E03.9    Smoking F17.200    Diabetic peripheral neuropathy associated with type 2 diabetes mellitus ([facility]) E11.42    Stenosis of both internal carotid arteries I65.23    Cervical radiculopathy due to degenerative joint disease of spine M47.22    Degenerative lumbar spinal stenosis M48.061    Vitamin D deficiency E55.9    Altered mental status, unspecified R41.82    Cerebral microvascular disease I67.89    Obesity (BMI 35.0-39.9 without comorbidity) E66.9    Fibromyalgia M79.7    Benign essential tremor syndrome G25.0    CAP (community acquired pneumonia) J18.9    Productive cough R05.8    Chest pain on breathing R07.1    Anxiety and depression F41.9, F32. A    Polypharmacy Z79.899    Shortness of breath R06.02    Drug-induced constipation K59.03    Empyema ([facility]) J86.9    Right-sided chest pain R07.9    Chronic pain syndrome G89.4    Hypercapnic respiratory failure ([facility] [address] J96.92       Respiratory Therapist: Yuly Dejesus, RT

## 2022-04-28 NOTE — ASSESSMENT & PLAN NOTE
LDL is down from 105 to 90 as of her 4/22 office visit.  Her goal given her underlying diabetes would be less than 70.  Curiously though this time last year she was in the 50s and had been so for the year prior on lower dose of atorvastatin.  We will give this longer, she will look at some fats in her diet, and repeat levels on return to office.
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
## 2022-04-28 NOTE — ASSESSMENT & PLAN NOTE
Hemoglobin is up from 12.6-13.9 as of 11/21 office visit.  Patient is stable on over-the-counter iron supplementation, continue same.---> Patient continues on this as of 4/22, will get labs on return to office in about 3 months.

## 2022-04-28 NOTE — ASSESSMENT & PLAN NOTE
Blood pressure is not adequately controlled as of 4/22 office visit.  Her readings at home are actually probably higher on average than what we got today in the office.  She has not resumed amlodipine yet, I think we might as well max out losartan prior to that anyhow.

## 2022-04-29 ENCOUNTER — APPOINTMENT (OUTPATIENT)
Dept: OCCUPATIONAL THERAPY | Facility: HOSPITAL | Age: 72
End: 2022-04-29

## 2022-05-04 ENCOUNTER — APPOINTMENT (OUTPATIENT)
Dept: OCCUPATIONAL THERAPY | Facility: HOSPITAL | Age: 72
End: 2022-05-04

## 2022-05-05 NOTE — PROGRESS NOTES
"Chief Complaint  3m post op     Subjective          History of Present Illness  Jyoti Alvarez is a 72 y.o. female who presents to [facility] for Postoperative Follow-Up of BILATERAL BREAST RECONSTRUCTION WITH REMOVAL AND REPLACEMENT OF IMPLANTS, MESH PLACEMENT, CHEST WALL SKIN RESECTION, CHEST LIPOSUCTION on 02/01/2022    She is concerned with the left side is hanging lower than the right and she states it feels heavy and feels like it's pulling down.       Allergies: Exenatide, Fluoxetine, Gabapentin, Jardiance [empagliflozin], Levaquin [levofloxacin], Nickel, and Sulfa antibiotics  Allergies Reconciled.    Review of Systems     Objective     /88 (BP Location: Left arm)   Pulse 65   Temp 98 °F (36.7 °C) (Temporal)   Ht 157.5 cm (62\")   Wt 75.4 kg (166 lb 3.2 oz)   SpO2 98%   BMI 30.40 kg/m²     Body mass index is 30.4 kg/m².    Physical Exam   Cardiovascular: Normal rate.     Pulmonary/Chest  Effort normal.        Breast: Incisions healed well, good contour and improved laterally, left breast implant lower than right     Result Review :                Assessment and Plan      Diagnoses and all orders for this visit:    1. Postoperative follow-up (Primary)        Plan: Will give it more time to settle, wear bra, limit use of arms, RTC 3 months for follow up to discuss if revision is needed.      Follow Up 3m for follow up and possible pre op for another surgery     Return for 3m for follow up and possible pre op for another surgery with Dr Quezada.    Patient was given instructions and counseling regarding her condition. Please see specific information pulled into the AVS if appropriate.     FANY Rosado  05/10/2022  Answers for HPI/ROS submitted by the patient on 2/5/2022  Please describe your symptoms.: Post operation visit. Surgery was Feb. 1,2022.  Have you had these symptoms before?: No  How long have you been having these " symptoms?: 5-7 days  Please list any medications you are currently taking for this condition.: Post operation meds have been finished or stopped per Dr. bolton.  Please describe any probable cause for these symptoms. : Surgery  What is the primary reason for your visit?: Other

## 2022-05-09 RX ORDER — MONTELUKAST SODIUM 10 MG/1
TABLET ORAL
Qty: 90 TABLET | Refills: 3 | Status: SHIPPED | OUTPATIENT
Start: 2022-05-09

## 2022-05-10 ENCOUNTER — OFFICE VISIT (OUTPATIENT)
Dept: PLASTIC SURGERY | Facility: CLINIC | Age: 72
End: 2022-05-10

## 2022-05-10 VITALS
WEIGHT: 166.2 LBS | DIASTOLIC BLOOD PRESSURE: 88 MMHG | HEIGHT: 62 IN | HEART RATE: 65 BPM | BODY MASS INDEX: 30.59 KG/M2 | SYSTOLIC BLOOD PRESSURE: 129 MMHG | TEMPERATURE: 98 F | OXYGEN SATURATION: 98 %

## 2022-05-10 DIAGNOSIS — Z09 POSTOPERATIVE FOLLOW-UP: Primary | ICD-10-CM

## 2022-05-10 PROCEDURE — 99024 POSTOP FOLLOW-UP VISIT: CPT | Performed by: NURSE PRACTITIONER

## 2022-05-18 ENCOUNTER — TELEPHONE (OUTPATIENT)
Dept: GASTROENTEROLOGY | Facility: CLINIC | Age: 72
End: 2022-05-18

## 2022-05-31 ENCOUNTER — TELEPHONE (OUTPATIENT)
Dept: INTERNAL MEDICINE | Facility: CLINIC | Age: 72
End: 2022-05-31

## 2022-05-31 DIAGNOSIS — Z79.4 TYPE 2 DIABETES MELLITUS WITHOUT COMPLICATION, WITH LONG-TERM CURRENT USE OF INSULIN: Primary | ICD-10-CM

## 2022-05-31 DIAGNOSIS — E11.9 TYPE 2 DIABETES MELLITUS WITHOUT COMPLICATION, WITH LONG-TERM CURRENT USE OF INSULIN: Primary | ICD-10-CM

## 2022-05-31 RX ORDER — LANCETS
EACH MISCELLANEOUS
Qty: 100 EACH | Refills: 1 | Status: SHIPPED | OUTPATIENT
Start: 2022-05-31 | End: 2022-05-31

## 2022-05-31 RX ORDER — LANCETS 30 GAUGE
1 EACH MISCELLANEOUS 3 TIMES DAILY
Qty: 200 EACH | Refills: 6 | Status: SHIPPED | OUTPATIENT
Start: 2022-05-31

## 2022-06-06 RX ORDER — AMLODIPINE BESYLATE 5 MG/1
TABLET ORAL
Qty: 90 TABLET | Refills: 3 | Status: SHIPPED | OUTPATIENT
Start: 2022-06-06 | End: 2022-07-25

## 2022-06-09 RX ORDER — METOPROLOL SUCCINATE 100 MG/1
TABLET, EXTENDED RELEASE ORAL
Qty: 90 TABLET | Refills: 3 | Status: SHIPPED | OUTPATIENT
Start: 2022-06-09

## 2022-06-16 ENCOUNTER — TREATMENT (OUTPATIENT)
Dept: PHYSICAL THERAPY | Facility: CLINIC | Age: 72
End: 2022-06-16

## 2022-06-16 DIAGNOSIS — R42 DIZZINESS: Primary | ICD-10-CM

## 2022-06-16 DIAGNOSIS — H81.12 BENIGN PAROXYSMAL VERTIGO OF LEFT EAR: ICD-10-CM

## 2022-06-16 DIAGNOSIS — R26.89 IMBALANCE: ICD-10-CM

## 2022-06-16 PROCEDURE — 95992 CANALITH REPOSITIONING PROC: CPT | Performed by: PHYSICAL THERAPIST

## 2022-06-16 PROCEDURE — 97161 PT EVAL LOW COMPLEX 20 MIN: CPT | Performed by: PHYSICAL THERAPIST

## 2022-06-16 NOTE — PROGRESS NOTES
Physical Therapy Initial Evaluation and Plan of Care    Patient: Jyoti Alvarez   : 1950  Diagnosis/ICD-10 Code:  Dizziness [R42]  Referring practitioner: Ramo Noguera MD  Date of Initial Visit: 2022  Today's Date: 2022  Patient seen for 1 sessions           Subjective Questionnaire: DHI: 42% limited      Subjective Evaluation    History of Present Illness  Mechanism of injury: Pt started experiencing dizziness in September when she had Covid.  She was experiencing bad headaches on the L side along with dizziness.  She would be walking and veering to one side but she states it is not happening as frequently.  Pt reports lying down and sitting up was causing spinning.  Pt reports she has had a couple of spinning episodes this week but it is not as bad as it previously was.  Pt reports dizziness with sitting up quickly, getting up quickly to walk, turning her head, and bending over.  Pt reports occasional nausea.  Pt reports imbalance sometimes.  She denies falls in the last year.  She denies use of an AD.      Medical history: Covid in September, breast cancer (18 years ago), B breast reconstruction (2022), HTN, DM      Patient Occupation:  Pain  Aggravating factors: movement, ambulation and repetitive movement    Social Support  Lives with: alone             Objective          Ambulation     Observational Gait   Gait: within functional limits   Walking speed and stride length within functional limits.     Additional Observational Gait Details  Pt is steady throughout ambulation    Functional Assessment     Comments  L faraz halpike: positive (down beating nystagmus)  R faraz halpike: negative  Horizontal canal test: negative bilaterally    Smooth pursuits horizontal: WNL (pt feels unsettled)  Smooth pursuits vertical: WNL (pt feels unsettled)  Saccades horizontal: WNL (pt feels unsettled)  Saccades vertical: WNL (pt feels unsettled)  VOR horizontal: WNL  VOR vertical:  WNL    Romberg on floor: WNL (unsteady)  Romberg on floor/eyes closed: WNL (unsteady)  Romberg on foam: WNL (very unsteady)  Romberg on foam/eyes closed: NT  Sharpened romberg on floor: WNL  Sharpened romberg on floor/eyes closed: 8 seconds  Sharpened romberg on foam: NT  Sharpened romberg on foam/eyes closed: NT        See Exercise, Manual, and Modality Logs for complete treatment.       Assessment & Plan     Assessment  Impairments: activity intolerance, impaired balance, lacks appropriate home exercise program and safety issue  Functional Limitations: walking, moving in bed, stooping and unable to perform repetitive tasks  Assessment details: Pt presents with limitations, noted below, that impede her ability to bend over, turn over in bed, and sit up.  The patient presents with a diagnosis of dizziness/vertigo and has L BPPV, difficulty with visual tracking, and imbalance and will benefit from therapeutic exercises, manual therapy, neuromuscular re-education, gait training, canalith repositioning maneuver, and modalities to improve tolerance to functional activities. Epley was performed twice to the L side with spinning decreasing after the first time.  The skills of a therapist will be required to safely and effectively implement the following treatment plan to restore maximal level of function.     Prognosis: good    Goals  Plan Goals: 1. Mobility: Walking/Moving Around Functional Limitation     LTG 1: 12 weeks:  The patient will demonstrate 20% limitation by achieving a score of 20 on the Dizziness Handicap Inventory.   STATUS:  New   STG 1a: 6 weeks:  The patient will demonstrate 30% limitation by achieving a score of 30 on the Dizziness Handicap Inventory.     STATUS:  New      2. The patient has difficulty with balance.   LTG 2: 12 weeks: The patient will hold the sharpened romberg position on floor with eyes closed for 30 seconds in order to improve balance and decrease risk of falling.   STATUS: New   STG  2: 6 weeks: The patient will hold the sharpened romberg position on floor with eyes closed for 20 seconds in order to improve balance and decrease risk of falling.   STATUS: New     TREATMENT:  Manual therapy, neuromuscular re-education, gait training, canalith repositioning maneuver, therapeutic exercise, home exercise instruction, and modalities as needed to include: moist heat, electrical stimulation, and ultrasound.             Plan  Therapy options: will be seen for skilled therapy services  Planned therapy interventions: balance/weight-bearing training, gait training, home exercise program, manual therapy, neuromuscular re-education, strengthening and therapeutic activities  Other planned therapy interventions: canalith repositioning maneuver  Frequency: 3x week  Duration in weeks: 12  Treatment plan discussed with: patient        History # of Personal Factors and/or Comorbidities: LOW (0)  Examination of Body System(s): # of elements: LOW (1-2)  Clinical Presentation: STABLE   Clinical Decision Making: LOW       Timed:         Manual Therapy:         mins  28737;     Therapeutic Exercise:         mins  97794;     Neuromuscular Bill:        mins  00929;    Therapeutic Activity:          mins  52946;     Gait Training:           mins  33302;     Ultrasound:          mins  96703;    Ionto                                   mins   94565  Self Care                            mins   67057      Un-Timed:  Electrical Stimulation:         mins  71885 ( );  Dry Needling          mins self-pay  Traction          mins 50255  Low Eval     35     Mins  91987  Mod Eval          Mins  40775  High Eval                            Mins  85605  Re-Eval                               mins  44352  Canalith Repos    10     mins 93491      Timed Treatment:   0   mins   Total Treatment:     45   mins    PT SIGNATURE: Electronically signed by DALY Driscoll License: [id number]      Initial Certification  Certification Period:  6/16/2022 thru 9/13/2022  I certify that the therapy services are furnished while this patient is under my care.  The services outlined above are required by this patient, and will be reviewed every 90 days.     PHYSICIAN: Ramo Noguera MD  NPI: [id number]                                      DATE:        Please sign and return via fax to [phone].Thank you, [facility].

## 2022-06-23 RX ORDER — PEN NEEDLE, DIABETIC 32GX 5/32"
NEEDLE, DISPOSABLE MISCELLANEOUS
Qty: 90 EACH | Refills: 3 | Status: SHIPPED | OUTPATIENT
Start: 2022-06-23

## 2022-07-05 RX ORDER — GLIMEPIRIDE 4 MG/1
TABLET ORAL
Qty: 180 TABLET | Refills: 3 | Status: SHIPPED | OUTPATIENT
Start: 2022-07-05 | End: 2022-10-20

## 2022-07-08 RX ORDER — CHOLECALCIFEROL (VITAMIN D3) 1250 MCG
50000 CAPSULE ORAL
Qty: 12 CAPSULE | Refills: 1 | Status: SHIPPED | OUTPATIENT
Start: 2022-07-08

## 2022-07-12 ENCOUNTER — LAB (OUTPATIENT)
Dept: LAB | Facility: HOSPITAL | Age: 72
End: 2022-07-12

## 2022-07-12 DIAGNOSIS — I10 ESSENTIAL HYPERTENSION: ICD-10-CM

## 2022-07-12 DIAGNOSIS — E78.2 MIXED HYPERLIPIDEMIA: ICD-10-CM

## 2022-07-12 DIAGNOSIS — E53.8 VITAMIN B12 DEFICIENCY: ICD-10-CM

## 2022-07-12 DIAGNOSIS — Z85.3 HISTORY OF BREAST CANCER: ICD-10-CM

## 2022-07-12 DIAGNOSIS — D50.8 IRON DEFICIENCY ANEMIA SECONDARY TO INADEQUATE DIETARY IRON INTAKE: ICD-10-CM

## 2022-07-12 DIAGNOSIS — E55.9 VITAMIN D DEFICIENCY: ICD-10-CM

## 2022-07-12 DIAGNOSIS — E11.65 TYPE 2 DIABETES MELLITUS WITH HYPERGLYCEMIA, WITHOUT LONG-TERM CURRENT USE OF INSULIN: ICD-10-CM

## 2022-07-12 DIAGNOSIS — Z00.00 WELL ADULT EXAM: ICD-10-CM

## 2022-07-12 LAB
25(OH)D3 SERPL-MCNC: 80.9 NG/ML (ref 30–100)
ALBUMIN SERPL-MCNC: 4.2 G/DL (ref 3.5–5.2)
ALBUMIN UR-MCNC: <1.2 MG/DL
ALBUMIN/GLOB SERPL: 1.8 G/DL
ALP SERPL-CCNC: 64 U/L (ref 39–117)
ALT SERPL W P-5'-P-CCNC: 23 U/L (ref 1–33)
ANION GAP SERPL CALCULATED.3IONS-SCNC: 17.5 MMOL/L (ref 5–15)
AST SERPL-CCNC: 27 U/L (ref 1–32)
BASOPHILS # BLD AUTO: 0.05 10*3/MM3 (ref 0–0.2)
BASOPHILS NFR BLD AUTO: 0.7 % (ref 0–1.5)
BILIRUB SERPL-MCNC: 0.7 MG/DL (ref 0–1.2)
BUN SERPL-MCNC: 18 MG/DL (ref 8–23)
BUN/CREAT SERPL: 22 (ref 7–25)
CALCIUM SPEC-SCNC: 9.2 MG/DL (ref 8.6–10.5)
CHLORIDE SERPL-SCNC: 101 MMOL/L (ref 98–107)
CHOLEST SERPL-MCNC: 147 MG/DL (ref 0–200)
CO2 SERPL-SCNC: 21.5 MMOL/L (ref 22–29)
CREAT SERPL-MCNC: 0.82 MG/DL (ref 0.57–1)
CREAT UR-MCNC: 98.2 MG/DL
DEPRECATED RDW RBC AUTO: 43.6 FL (ref 37–54)
EGFRCR SERPLBLD CKD-EPI 2021: 75.6 ML/MIN/1.73
EOSINOPHIL # BLD AUTO: 0.14 10*3/MM3 (ref 0–0.4)
EOSINOPHIL NFR BLD AUTO: 2.1 % (ref 0.3–6.2)
ERYTHROCYTE [DISTWIDTH] IN BLOOD BY AUTOMATED COUNT: 13.2 % (ref 12.3–15.4)
FERRITIN SERPL-MCNC: 67.3 NG/ML (ref 13–150)
FOLATE SERPL-MCNC: >20 NG/ML (ref 4.78–24.2)
GLOBULIN UR ELPH-MCNC: 2.3 GM/DL
GLUCOSE SERPL-MCNC: 102 MG/DL (ref 65–99)
HBA1C MFR BLD: 7 % (ref 4.8–5.6)
HCT VFR BLD AUTO: 40.5 % (ref 34–46.6)
HCV AB SER DONR QL: NORMAL
HDLC SERPL-MCNC: 41 MG/DL (ref 40–60)
HGB BLD-MCNC: 13.7 G/DL (ref 12–15.9)
IMM GRANULOCYTES # BLD AUTO: 0.02 10*3/MM3 (ref 0–0.05)
IMM GRANULOCYTES NFR BLD AUTO: 0.3 % (ref 0–0.5)
IRON 24H UR-MRATE: 137 MCG/DL (ref 37–145)
IRON SATN MFR SERPL: 33 % (ref 20–50)
LDLC SERPL CALC-MCNC: 80 MG/DL (ref 0–100)
LDLC/HDLC SERPL: 1.85 {RATIO}
LYMPHOCYTES # BLD AUTO: 1.87 10*3/MM3 (ref 0.7–3.1)
LYMPHOCYTES NFR BLD AUTO: 27.7 % (ref 19.6–45.3)
MCH RBC QN AUTO: 30.9 PG (ref 26.6–33)
MCHC RBC AUTO-ENTMCNC: 33.8 G/DL (ref 31.5–35.7)
MCV RBC AUTO: 91.2 FL (ref 79–97)
MICROALBUMIN/CREAT UR: NORMAL MG/G{CREAT}
MONOCYTES # BLD AUTO: 0.72 10*3/MM3 (ref 0.1–0.9)
MONOCYTES NFR BLD AUTO: 10.7 % (ref 5–12)
NEUTROPHILS NFR BLD AUTO: 3.95 10*3/MM3 (ref 1.7–7)
NEUTROPHILS NFR BLD AUTO: 58.5 % (ref 42.7–76)
NRBC BLD AUTO-RTO: 0 /100 WBC (ref 0–0.2)
PLATELET # BLD AUTO: 320 10*3/MM3 (ref 140–450)
PMV BLD AUTO: 9.8 FL (ref 6–12)
POTASSIUM SERPL-SCNC: 4.3 MMOL/L (ref 3.5–5.2)
PROT SERPL-MCNC: 6.5 G/DL (ref 6–8.5)
RBC # BLD AUTO: 4.44 10*6/MM3 (ref 3.77–5.28)
SODIUM SERPL-SCNC: 140 MMOL/L (ref 136–145)
TIBC SERPL-MCNC: 419 MCG/DL (ref 298–536)
TRANSFERRIN SERPL-MCNC: 281 MG/DL (ref 200–360)
TRIGL SERPL-MCNC: 150 MG/DL (ref 0–150)
VIT B12 BLD-MCNC: 968 PG/ML (ref 211–946)
VLDLC SERPL-MCNC: 26 MG/DL (ref 5–40)
WBC NRBC COR # BLD: 6.75 10*3/MM3 (ref 3.4–10.8)

## 2022-07-12 PROCEDURE — 82746 ASSAY OF FOLIC ACID SERUM: CPT

## 2022-07-12 PROCEDURE — 80053 COMPREHEN METABOLIC PANEL: CPT

## 2022-07-12 PROCEDURE — 83036 HEMOGLOBIN GLYCOSYLATED A1C: CPT

## 2022-07-12 PROCEDURE — 86300 IMMUNOASSAY TUMOR CA 15-3: CPT

## 2022-07-12 PROCEDURE — 86803 HEPATITIS C AB TEST: CPT

## 2022-07-12 PROCEDURE — 83540 ASSAY OF IRON: CPT

## 2022-07-12 PROCEDURE — 82607 VITAMIN B-12: CPT

## 2022-07-12 PROCEDURE — 82306 VITAMIN D 25 HYDROXY: CPT

## 2022-07-12 PROCEDURE — 36415 COLL VENOUS BLD VENIPUNCTURE: CPT

## 2022-07-12 PROCEDURE — 84466 ASSAY OF TRANSFERRIN: CPT

## 2022-07-12 PROCEDURE — 82043 UR ALBUMIN QUANTITATIVE: CPT

## 2022-07-12 PROCEDURE — 85025 COMPLETE CBC W/AUTO DIFF WBC: CPT

## 2022-07-12 PROCEDURE — 80061 LIPID PANEL: CPT

## 2022-07-12 PROCEDURE — 82570 ASSAY OF URINE CREATININE: CPT

## 2022-07-12 PROCEDURE — 82728 ASSAY OF FERRITIN: CPT

## 2022-07-13 LAB — CANCER AG27-29 SERPL-ACNC: 11.9 U/ML (ref 0–38.6)

## 2022-07-15 ENCOUNTER — TREATMENT (OUTPATIENT)
Dept: PHYSICAL THERAPY | Facility: CLINIC | Age: 72
End: 2022-07-15

## 2022-07-15 ENCOUNTER — OFFICE VISIT (OUTPATIENT)
Dept: DIABETES SERVICES | Facility: HOSPITAL | Age: 72
End: 2022-07-15

## 2022-07-15 VITALS
HEIGHT: 62 IN | SYSTOLIC BLOOD PRESSURE: 124 MMHG | WEIGHT: 168 LBS | OXYGEN SATURATION: 97 % | TEMPERATURE: 98.2 F | DIASTOLIC BLOOD PRESSURE: 76 MMHG | HEART RATE: 80 BPM | BODY MASS INDEX: 30.91 KG/M2

## 2022-07-15 DIAGNOSIS — E66.9 OBESITY (BMI 30-39.9): ICD-10-CM

## 2022-07-15 DIAGNOSIS — N18.2 CONTROLLED TYPE 2 DIABETES MELLITUS WITH STAGE 2 CHRONIC KIDNEY DISEASE, WITH LONG-TERM CURRENT USE OF INSULIN: Primary | ICD-10-CM

## 2022-07-15 DIAGNOSIS — Z79.4 CONTROLLED TYPE 2 DIABETES MELLITUS WITH STAGE 2 CHRONIC KIDNEY DISEASE, WITH LONG-TERM CURRENT USE OF INSULIN: Primary | ICD-10-CM

## 2022-07-15 DIAGNOSIS — E11.22 CONTROLLED TYPE 2 DIABETES MELLITUS WITH STAGE 2 CHRONIC KIDNEY DISEASE, WITH LONG-TERM CURRENT USE OF INSULIN: Primary | ICD-10-CM

## 2022-07-15 DIAGNOSIS — H81.12 BENIGN PAROXYSMAL VERTIGO OF LEFT EAR: ICD-10-CM

## 2022-07-15 DIAGNOSIS — R42 DIZZINESS: Primary | ICD-10-CM

## 2022-07-15 DIAGNOSIS — R26.89 IMBALANCE: ICD-10-CM

## 2022-07-15 PROCEDURE — 97112 NEUROMUSCULAR REEDUCATION: CPT | Performed by: PHYSICAL THERAPIST

## 2022-07-15 PROCEDURE — G0463 HOSPITAL OUTPT CLINIC VISIT: HCPCS | Performed by: NURSE PRACTITIONER

## 2022-07-15 PROCEDURE — 99213 OFFICE O/P EST LOW 20 MIN: CPT | Performed by: NURSE PRACTITIONER

## 2022-07-15 NOTE — PATIENT INSTRUCTIONS
Try moving your Lantus 60 units to mornings instead the evenings to see if this helps resolve the lower glucose levels in the morning hours.    If it does not reduce the low glucose events then try splitting the Lantus into 2 injections of 30 units each approximately 10 to 12 hours apart

## 2022-07-15 NOTE — PROGRESS NOTES
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  She is frustrated because she is having difficulty losing weight  Other health concerns: She continues to struggle with the grieving process from the loss of her  in September last year.  She developed vertigo after having COVID and has been going to therapy for treatment.  She has had some slow improvement in symptoms.  Diabetes symptoms:    Polyuria: Yes   Polydipsia: Yes   Polyphagia: No   Blurred vision: Yes   Excessive fatigue: No  Diabetes complications:  Neuro: Neuropathy with pain and some tingling-like sensations primarily in the right foot but she has had surgery on that foot; this is most likely not diabetes related  Renal: stage II renal disease  Eyes: None  Amputation/Wounds: None  GI: None  Cardiovascular: hypertension and hyperlipidemia  ED: N/A  Other: None  Hypoglycemia:  Level 1 hypoglycemia (54 mg/dL - 70 mg/dL); Frequency - She does have occasional glucose levels in the 50s mostly in the morning hours  Hypoglycemia Symptoms:  confusion, shaking/tremors, hunger and weakness  Current diabetes treatment:  Trulicity 1.5 mg once weekly, Lantus 60 units once every evening and glimepiride 4 mg twice a day  Blood glucose device:  Meter  Blood glucose monitoring frequency:  2 -3  Blood  glucose range/average: Most blood glucose levels are staying below 140 mg/dL.  Glucose logs have been scanned into the patient's record  Diet:  Limits high carb/sweet foods, Avoids sugary drinks  Activity/Exercise:  She is just recently started going to the gym    Past Medical History:   Diagnosis Date   • Anemia    • Arthritis    • Asthma    • Breast cancer (HCC)     RIGHT   • Colon polyp    • Diabetes (HCC)     BS IN THE A.M.    • Diabetes (HCC)    • Disease of thyroid gland Hypothyroidism   • Fatty liver    • Gastroesophageal reflux    • HL (hearing loss) Hearing aids    • Hyperlipemia    • Hypertension    • Limb swelling    • Seasonal allergies    • Skin cancer    • Strain of latissimus dorsi muscle 2017    Right   • Type 2 diabetes mellitus (HCC)    • Vertigo      Past Surgical History:   Procedure Laterality Date   • BREAST AUGMENTATION BILATERAL MASTOPEXY     • BREAST RECONSTRUCTION Bilateral 2022    Procedure: BILATERAL BREAST RECONSTRUCTION WITH REMOVAL AND REPLACEMENT OF IMPLANTS, MESH PLACEMENT, CHEST WALL SKIN RESECTION, CHEST LIPOSUCTION;  Surgeon: Romain Quezada MD;  Location: [facility] OR [facility] [place];  Service: Plastics;  Laterality: Bilateral;   • BREAST SURGERY  2004    Bilateral mastectomies and reconstruction   •  SECTION     • COLONOSCOPY     • COSMETIC SURGERY  2004    Bilateral mastectomies and reconstructio   • ENDOSCOPY  2019   • EYE SURGERY  Not sure    Cateracts   • LIPOSUCTION N/A 2022    Procedure: CHEST WALL LIPOSUCTION AND CHEST WALL RESECTION;  Surgeon: Romain Quezada MD;  Location: [facility] OR [facility] [place];  Service: Plastics;  Laterality: N/A;  TIMES BASED ON PREVIOUS EXPERIENCE   • MASTECTOMY COMPLETE / SIMPLE Bilateral    • OOPHORECTOMY     • OTHER SURGICAL HISTORY      Joint surgery,  RIGHT FOOT SECOND  TOE WITH A PIN   • SKIN CANCER EXCISION Right 2020    neck   • TOE SURGERY Right     2nd digit pin inserted   • TONSILLECTOMY       Family  History   Problem Relation Age of Onset   • Heart disease Mother    • Cancer Mother         Breast cancer age 45 and 60   • Diabetes Mother    • Arthritis Mother    • Osteoporosis Mother    • Heart failure Mother         Heart attack age 60. Age 87. Cause of death.   • Hypertension Mother    • Osteoarthritis Mother    • Heart disease Father    • Cancer Father         Skin cancer   • Heart failure Father         Age 79  cause of death.   • Cancer Sister         Breast cancer age 27 and 52. Ovarian cancer age 53 caused death at 56.   • Diabetes Sister    • Ovarian cancer Sister 53   • Arthritis Sister    • Cancer Brother         Prostate cancer age about 58.   • Prostate cancer Brother         unsure age   • Breast cancer Other    • Skin cancer Other    • Cancer Daughter    • Cancer Maternal Aunt         Breast cancer   • Cancer Maternal Aunt          with ovarian cancer age mid 40s   • Malig Hyperthermia Neg Hx    • Colon cancer Neg Hx      Social History     Socioeconomic History   • Marital status:    • Number of children: 3   Tobacco Use   • Smoking status: Never Smoker   • Smokeless tobacco: Never Used   Vaping Use   • Vaping Use: Never used   Substance and Sexual Activity   • Alcohol use: Never   • Drug use: Never   • Sexual activity: Defer     Allergies   Allergen Reactions   • Exenatide Unknown - High Severity     Weight loss and pain in the right side. Bydereon.   • Fluoxetine Hives   • Gabapentin Headache     Occular Miaigraines   • Jardiance [Empagliflozin] Other (See Comments)     UTI   • Levaquin [Levofloxacin] Hives   • Nickel Itching and Rash   • Sulfa Antibiotics Swelling       Current Outpatient Medications:   •  acetaminophen (TYLENOL) 650 MG 8 hr tablet, Take 650 mg by mouth 2 (two) times a day., Disp: , Rfl:   •  albuterol sulfate  (90 Base) MCG/ACT inhaler, USE 2 INHALATIONS EVERY 6 HOURS AS NEEDED (Patient taking differently: Inhale 2 puffs Every 6 (Six) Hours As Needed.), Disp:  51 g, Rfl: 2  •  amLODIPine (NORVASC) 5 MG tablet, TAKE 1 TABLET DAILY, Disp: 90 tablet, Rfl: 3  •  aspirin 81 MG EC tablet, Take 81 mg by mouth Daily. LAST DOSE TODAY PER DR. HAYES (BACILIO), OKAYED WITH DR. MONTEMAYOR. BACILIO STATED SHE WOULD NOTIFY PATIENT, Disp: , Rfl:   •  atorvastatin (LIPITOR) 40 MG tablet, Take 1 tablet by mouth Every Night., Disp: 90 tablet, Rfl: 1  •  BD Pen Needle Ca U/F 32G X 4 MM misc, USE 1 PEN NEEDLE DAILY, Disp: 90 each, Rfl: 3  •  betamethasone dipropionate (DIPROLENE) 0.05 % lotion, APPLY TO SCALP TWICE DAILY AS NEEDED FOR DISCOMFORT, Disp: , Rfl:   •  Cholecalciferol (Vitamin D3) 1.25 MG (68111 UT) capsule, Take 1 capsule by mouth Every 7 (Seven) Days., Disp: 12 capsule, Rfl: 1  •  ferrous sulfate 325 (65 FE) MG tablet, Take 325 mg by mouth Daily With Breakfast., Disp: , Rfl:   •  fluocinonide (LIDEX) 0.05 % cream, fluocinonide 0.05 % topical cream apply to affected area(s) by topical route As needed   Active, Disp: , Rfl:   •  fluticasone (FLONASE) 50 MCG/ACT nasal spray, USE 1 SPRAY IN EACH NOSTRIL ONCE DAILY, Disp: 48 g, Rfl: 1  •  fluticasone-salmeterol (Advair Diskus) 500-50 MCG/DOSE DISKUS, Inhale 1 puff 2 (Two) Times a Day., Disp: 3 each, Rfl: 1  •  glimepiride (AMARYL) 4 MG tablet, TAKE 1 TABLET TWICE A DAY, Disp: 180 tablet, Rfl: 3  •  hydrocortisone 2.5 % cream, APPLY TO SCALY AREAS ON THE EARS TWICE DAILY AS NEEDED FLARES., Disp: , Rfl:   •  ketoconazole (NIZORAL) 2 % shampoo, WASH FACE AND SCALP TWO TO THREE TIMES WEEKLY. LEAVE ON FOR 5 MINUTES BEFORE RINSING, Disp: , Rfl:   •  Lantus SoloStar 100 UNIT/ML injection pen, INJECT 20 UNITS ONCE A DAY AND INCREASE TO 50 UNITS AS INDICATED (Patient taking differently: Inject 63 Units under the skin into the appropriate area as directed Every Night.), Disp: 75 mL, Rfl: 3  •  levothyroxine (SYNTHROID, LEVOTHROID) 25 MCG tablet, TAKE 1 TABLET DAILY IN THE MORNING ON AN EMPTY STOMACH, Disp: 90 tablet, Rfl: 1  •  losartan  (COZAAR) 100 MG tablet, Take 1 tablet by mouth Daily., Disp: 90 tablet, Rfl: 1  •  metoprolol succinate XL (TOPROL-XL) 100 MG 24 hr tablet, TAKE 1 TABLET DAILY, Disp: 90 tablet, Rfl: 3  •  montelukast (SINGULAIR) 10 MG tablet, TAKE 1 TABLET DAILY, Disp: 90 tablet, Rfl: 3  •  OneTouch Delica Lancets 30G misc, 1 each 3 (Three) Times a Day., Disp: 200 each, Rfl: 6  •  OneTouch Verio test strip, TEST BLOOD SUGAR THREE TIMES DAILY, Disp: 300 each, Rfl: 1  •  pantoprazole (PROTONIX) 40 MG EC tablet, TAKE 1 TABLET DAILY, Disp: 90 tablet, Rfl: 1  •  polyethylene glycol (GoLYTELY) 236 g solution, Starting at noon on day prior to procedure, drink 8 ounces every 30 minutes until all gone or stools are clear. May add flavor packet., Disp: 4000 mL, Rfl: 0  •  Trulicity 1.5 MG/0.5ML solution pen-injector, INJECT 1.5 MG UNDER THE SKIN INTO APPROPRIATE AREA AS DIRECTED ONCE A WEEK, Disp: 6 mL, Rfl: 3  •  vitamin B-12 (CYANOCOBALAMIN) 500 MCG tablet, Take 500 mcg by mouth Daily., Disp: , Rfl:     Review of Systems   Constitutional: Positive for activity change (Increase). Negative for appetite change, fatigue, fever, unexpected weight gain and unexpected weight loss.   HENT: Positive for hearing loss. Negative for congestion, ear pain, facial swelling, sore throat and tinnitus.    Eyes: Positive for blurred vision. Negative for double vision, redness and visual disturbance.   Respiratory: Positive for shortness of breath. Negative for cough and wheezing.    Cardiovascular: Negative for chest pain, palpitations and leg swelling.   Gastrointestinal: Positive for GERD. Negative for abdominal distention, constipation, diarrhea, nausea, vomiting and indigestion.   Endocrine: Positive for polydipsia and polyuria. Negative for polyphagia.   Genitourinary: Negative for difficulty urinating, frequency and urgency.   Musculoskeletal: Positive for arthralgias, back pain and myalgias. Negative for gait problem.   Skin: Negative for rash, skin  "lesions and wound.   Neurological: Positive for dizziness, numbness (In the feet) and confusion. Negative for seizures, speech difficulty, weakness and headache.   Psychiatric/Behavioral: Positive for sleep disturbance. Negative for depressed mood and stress. The patient is not nervous/anxious.         Objective     Vitals:    07/15/22 1324 07/15/22 1326   BP:  124/76   BP Location:  Left arm   Patient Position:  Sitting   Cuff Size:  Adult   Pulse:  80   Temp:  98.2 °F (36.8 °C)   SpO2:  97%   Weight: 76.5 kg (168 lb 10.4 oz) 76.2 kg (168 lb)   Height:  157.5 cm (62\")   PainSc:    3     Body mass index is 30.73 kg/m².    Physical Exam  Constitutional:       Appearance: Normal appearance. She is obese.      Comments: Obesity with BMI of 30.73   HENT:      Head: Normocephalic and atraumatic.      Right Ear: External ear normal.      Left Ear: External ear normal.      Nose: Nose normal.   Eyes:      Extraocular Movements: Extraocular movements intact.      Conjunctiva/sclera: Conjunctivae normal.   Pulmonary:      Effort: Pulmonary effort is normal.   Musculoskeletal:         General: Normal range of motion.      Cervical back: Normal range of motion.   Skin:     General: Skin is warm and dry.   Neurological:      General: No focal deficit present.      Mental Status: She is alert and oriented to person, place, and time. Mental status is at baseline.   Psychiatric:         Mood and Affect: Mood normal.         Behavior: Behavior normal.         Thought Content: Thought content normal.         Judgment: Judgment normal.         Result Review :   The following data was reviewed by: FANY Thakur on 07/15/2022:     A1c collected on 7/12/2020 was 7% indicating controlled type 2 diabetes.  This is unchanged from the prior result collected in April of this year    Most Recent A1C    HGBA1C Most Recent 7/12/22   Hemoglobin A1C 7.00 (A)   (A) Abnormal value              A1C Last 3 Results    HGBA1C Last 3 Results " 2/24/22 4/19/22 7/12/22   Hemoglobin A1C 6.90 (A) 7.00 (A) 7.00 (A)   (A) Abnormal value              Creatinine   Date Value Ref Range Status   07/12/2022 0.82 0.57 - 1.00 mg/dL Final   04/19/2022 0.62 0.57 - 1.00 mg/dL Final       eGFR   Date Value Ref Range Status   07/12/2022 75.6 >60.0 mL/min/1.73 Final     Comment:     National Kidney Foundation and American Society of Nephrology (ASN) Task Force recommended calculation based on the Chronic Kidney Disease Epidemiology Collaboration (CKD-EPI) equation refit without adjustment for race.   04/19/2022 94.2 >60.0 mL/min/1.73 Final     Comment:     National Kidney Foundation and American Society of Nephrology (ASN) Task Force recommended calculation based on the Chronic Kidney Disease Epidemiology Collaboration (CKD-EPI) equation refit without adjustment for race.       Microalbumin, Urine   Date Value Ref Range Status   07/12/2022 <1.2 mg/dL Final       Labs collected on 7/12/2022 show stage II renal disease.  Urine microalbumin is within normal limits          Assessment: The patient's A1c remains stable and controlled.  She is frustrated because she is struggling to lose weight.  She has recently started going to the gym and plans to return to work part-time.  We discussed increasing her Trulicity but elected to hold off until these lifestyle changes occur.      Diagnoses and all orders for this visit:    1. Controlled type 2 diabetes mellitus with stage 2 chronic kidney disease, with long-term current use of insulin (HCC) (Primary)    2. Obesity (BMI 30-39.9)        Plan: No changes made to the treatment plan today.  She will monitor the impact on her glucose levels with increased activity and her return to work.  The patient will contact our office if she feels she needs the increase in the Trulicity prior to getting it refilled again.  She is to focus on increasing her physical activity and dietary strategies to control glucose levels and promote weight  loss.    The patient will monitor her blood glucose levels 2-3 times each day.  If she develops problematic hyperglycemia or hypoglycemia or adverse drug reactions, she will contact the office for further instructions.        Follow Up     Return in about 3 months (around 10/15/2022) for Medication Management.    Patient was given instructions and counseling regarding her condition or for health maintenance advice. Please see specific information pulled into the AVS if appropriate.     Kelly Vicente, FANY  07/15/2022

## 2022-07-15 NOTE — PROGRESS NOTES
Progress Assessment        Patient: Jyoti Alvarez   : 1950  Diagnosis/ICD-10 Code:  Dizziness [R42]  Referring practitioner: Ramo Noguera MD  Date of Initial Visit: Type: THERAPY  Noted: 2022  Today's Date: 7/15/2022  Patient seen for 2 sessions      Subjective:   Jyoti Alvarez reports: she continues to have some dizziness with lying down and sitting up quickly.  She states her dizziness is improved.  She still has trouble walking without feeling like she is intoxicated.  She states she uses a cart when walking at a store.  She states she is performing her exercises at home and they are getting easier.      Subjective Questionnaire: DHI: 66% limited  Clinical Progress: improved  Home Program Compliance: Yes  Treatment has included: neuromuscular re-education and initial evaluation only    Subjective     Objective   Comments  L faraz halpike: negative  R faraz halpike: negative  Horizontal canal test: negative bilaterally     Smooth pursuits horizontal: WNL, slight dizziness  Smooth pursuits vertical: WNL, slight dizziness  Saccades horizontal: WNL; slight dizziness  Saccades vertical: WNL; slight dizziness  VOR horizontal: WNL, slight dizziness  VOR vertical: WNL, slight dizziness     Romberg on floor: WNL  Romberg on floor/eyes closed: WNL (unsteady)  Romberg on foam: WNL (very unsteady)  Romberg on foam/eyes closed: 2 seconds  Sharpened romberg on floor: WNL  Sharpened romberg on floor/eyes closed: WNL  Sharpened romberg on foam: 14 seconds  Sharpened romberg on foam/eyes closed: NT     Assessment/Plan  Pt has not yet had any PT treatment sessions due to scheduling constraints, however she has been performing her visual exercises at home.  She does report improvements in her overall dizziness but it has not completely resolved.  She continues to have increased dizziness with visual testing, which is worse with vertical eye movement.  She does show improved balance but still has a balance  deficit.  She will benefit from continuing with PT to improve dizziness and balance.    Goals  Plan Goals: 1. Mobility: Walking/Moving Around Functional Limitation                       LTG 1: 12 weeks:  The patient will demonstrate 20% limitation by achieving a score of 20 on the Dizziness Handicap Inventory.   STATUS:  progressing  STG 1a: 6 weeks:  The patient will demonstrate 30% limitation by achieving a score of 30 on the Dizziness Handicap Inventory.     STATUS:  progressing     2. The patient has difficulty with balance.   LTG 2: 12 weeks: The patient will hold the sharpened romberg position on floor with eyes closed for 30 seconds in order to improve balance and decrease risk of falling.   STATUS: progressing  STG 2: 6 weeks: The patient will hold the sharpened romberg position on floor with eyes closed for 20 seconds in order to improve balance and decrease risk of falling.   STATUS: progressing  Progress toward previous goals: Partially Met    See Exercise, Manual, and Modality Logs for complete treatment.         Recommendations: Continue as planned  Timeframe: 3 months  Prognosis to achieve goals: good    PT Signature: Kasandra Rdz PT, DPT  License Number: [id number]    Based upon review of the patient's progress and continued therapy plan, it is my medical opinion that Jyoti Alvarez should continue physical therapy treatment at [facility]  [address]  [place] [address] [phone].      Timed:         Manual Therapy:         mins  83898;     Therapeutic Exercise:         mins  09017;     Neuromuscular Bill:    25    mins  66323;    Therapeutic Activity:          mins  05888;     Gait Training:           mins  48536;     Ultrasound:          mins  78690;    Ionto                                   mins   51078  Self Care                            mins   39139  Aquatic                               mins 68319      Un-Timed:  Electrical Stimulation:          mins  97369 ( );  Dry Needling          mins self-pay  Traction          mins 18220  Low Eval          Mins  55311  Mod Eval          Mins  50547  High Eval                            Mins  64717  Re-Eval                               mins  78539  Canalith Repos         mins 04755    Timed Treatment:   25   mins   Total Treatment:     25   mins      I certify that the therapy services are furnished while this patient is under my care.  The services outlined above are required by this patient, and will be reviewed every 90 days.

## 2022-07-18 ENCOUNTER — TREATMENT (OUTPATIENT)
Dept: PHYSICAL THERAPY | Facility: CLINIC | Age: 72
End: 2022-07-18

## 2022-07-18 DIAGNOSIS — H81.12 BENIGN PAROXYSMAL VERTIGO OF LEFT EAR: ICD-10-CM

## 2022-07-18 DIAGNOSIS — R42 DIZZINESS: Primary | ICD-10-CM

## 2022-07-18 DIAGNOSIS — R26.89 IMBALANCE: ICD-10-CM

## 2022-07-18 PROCEDURE — 97112 NEUROMUSCULAR REEDUCATION: CPT | Performed by: PHYSICAL THERAPIST

## 2022-07-18 NOTE — PROGRESS NOTES
Physical Therapy Daily Treatment Note    VISIT#: 3    Subjective   Jyoti Alvarez reports she hasn't noticed any spinning.  She states her dizziness is better.  She states she is doing the exercises at home.    Objective     See Exercise, Manual, and Modality Logs for complete treatment.     Assessment/Plan  Balance and visual exercises were added today with good tolerance.  Pt has a significant balance deficit with her eyes closed. Will continue with plan of care.    Progress per Plan of Care and Progress strengthening /stabilization /functional activity            Timed:         Manual Therapy:         mins  42882;     Therapeutic Exercise:         mins  30195;     Neuromuscular Bill:    28    mins  74203;    Therapeutic Activity:          mins  95786;     Gait Training:           mins  03777;     Ultrasound:          mins  45428;    Ionto                                   mins   04884  Self Care                            mins   48369  Aquatic Therapy                 mins   08730    Un-Timed:  Electrical Stimulation:         mins  76222 ( );  Dry Needling          mins self-pay  Traction          mins 01603  Low Eval          Mins  70476  Mod Eval          Mins  36922  High Eval                            Mins  55197  Re-Eval                               mins  53942  Canalith Repos                   mins  85858    Timed Treatment:   28   mins   Total Treatment:     28   mins    Kasandra Rdz PT, DPT  License Number [id number]

## 2022-07-19 RX ORDER — ATORVASTATIN CALCIUM 40 MG/1
TABLET, FILM COATED ORAL
Qty: 90 TABLET | Refills: 3 | Status: SHIPPED | OUTPATIENT
Start: 2022-07-19

## 2022-07-20 ENCOUNTER — TREATMENT (OUTPATIENT)
Dept: PHYSICAL THERAPY | Facility: CLINIC | Age: 72
End: 2022-07-20

## 2022-07-20 DIAGNOSIS — R42 DIZZINESS: Primary | ICD-10-CM

## 2022-07-20 DIAGNOSIS — H81.12 BENIGN PAROXYSMAL VERTIGO OF LEFT EAR: ICD-10-CM

## 2022-07-20 DIAGNOSIS — R26.89 IMBALANCE: ICD-10-CM

## 2022-07-20 PROCEDURE — 97112 NEUROMUSCULAR REEDUCATION: CPT | Performed by: PHYSICAL THERAPIST

## 2022-07-20 NOTE — PROGRESS NOTES
Physical Therapy Daily Treatment Note    VISIT#: 4    Subjective   Jyoti Alvarez reports she hasn't done much today and she is feeling fine.    Objective     See Exercise, Manual, and Modality Logs for complete treatment.     Assessment/Plan  Pt continues to have a significant balance deficit with eyes closed.  She does not experience too much dizziness with exercises today but does show evidence of imbalance.  Continue with plan of care.    Progress per Plan of Care and Progress strengthening /stabilization /functional activity            Timed:         Manual Therapy:         mins  31913;     Therapeutic Exercise:         mins  74939;     Neuromuscular Bill:    30    mins  42155;    Therapeutic Activity:          mins  20707;     Gait Training:           mins  09700;     Ultrasound:          mins  48388;    Ionto                                   mins   05264  Self Care                            mins   61722  Aquatic Therapy                 mins   84016    Un-Timed:  Electrical Stimulation:         mins  79923 ( );  Dry Needling          mins self-pay  Traction          mins 93888  Low Eval          Mins  32489  Mod Eval          Mins  35546  High Eval                            Mins  84364  Re-Eval                               mins  52422  Canalith Repos                   mins  40507    Timed Treatment:   30   mins   Total Treatment:     30   mins    Kasandra Rdz PT, DPT  License Number [id number]

## 2022-07-25 ENCOUNTER — OFFICE VISIT (OUTPATIENT)
Dept: INTERNAL MEDICINE | Facility: CLINIC | Age: 72
End: 2022-07-25

## 2022-07-25 ENCOUNTER — HOSPITAL ENCOUNTER (OUTPATIENT)
Dept: OCCUPATIONAL THERAPY | Facility: HOSPITAL | Age: 72
Setting detail: THERAPIES SERIES
Discharge: HOME | End: 2022-07-25

## 2022-07-25 VITALS
WEIGHT: 167.6 LBS | OXYGEN SATURATION: 95 % | DIASTOLIC BLOOD PRESSURE: 85 MMHG | BODY MASS INDEX: 30.84 KG/M2 | TEMPERATURE: 97.3 F | HEIGHT: 62 IN | SYSTOLIC BLOOD PRESSURE: 135 MMHG | HEART RATE: 92 BPM

## 2022-07-25 DIAGNOSIS — M85.852 OSTEOPENIA OF BOTH HIPS: ICD-10-CM

## 2022-07-25 DIAGNOSIS — N18.31 STAGE 3A CHRONIC KIDNEY DISEASE: ICD-10-CM

## 2022-07-25 DIAGNOSIS — C50.919 MALIGNANT NEOPLASM OF FEMALE BREAST, UNSPECIFIED ESTROGEN RECEPTOR STATUS, UNSPECIFIED LATERALITY, UNSPECIFIED SITE OF BREAST: ICD-10-CM

## 2022-07-25 DIAGNOSIS — E55.9 VITAMIN D DEFICIENCY: ICD-10-CM

## 2022-07-25 DIAGNOSIS — M85.851 OSTEOPENIA OF BOTH HIPS: ICD-10-CM

## 2022-07-25 DIAGNOSIS — L90.5 SCAR TISSUE: ICD-10-CM

## 2022-07-25 DIAGNOSIS — I89.0 LYMPHEDEMA: Primary | ICD-10-CM

## 2022-07-25 DIAGNOSIS — I10 ESSENTIAL HYPERTENSION: Primary | ICD-10-CM

## 2022-07-25 DIAGNOSIS — E03.4 ATROPHY OF THYROID: ICD-10-CM

## 2022-07-25 DIAGNOSIS — E11.65 TYPE 2 DIABETES MELLITUS WITH HYPERGLYCEMIA, WITHOUT LONG-TERM CURRENT USE OF INSULIN: ICD-10-CM

## 2022-07-25 DIAGNOSIS — R42 VERTIGO: ICD-10-CM

## 2022-07-25 DIAGNOSIS — E53.8 VITAMIN B12 DEFICIENCY: ICD-10-CM

## 2022-07-25 DIAGNOSIS — E78.2 MIXED HYPERLIPIDEMIA: ICD-10-CM

## 2022-07-25 PROCEDURE — 90715 TDAP VACCINE 7 YRS/> IM: CPT | Performed by: INTERNAL MEDICINE

## 2022-07-25 PROCEDURE — 97535 SELF CARE MNGMENT TRAINING: CPT

## 2022-07-25 PROCEDURE — 90471 IMMUNIZATION ADMIN: CPT | Performed by: INTERNAL MEDICINE

## 2022-07-25 PROCEDURE — 93702 BIS XTRACELL FLUID ANALYSIS: CPT

## 2022-07-25 PROCEDURE — 99214 OFFICE O/P EST MOD 30 MIN: CPT | Performed by: INTERNAL MEDICINE

## 2022-07-25 RX ORDER — LOSARTAN POTASSIUM 50 MG/1
50 TABLET ORAL DAILY
Qty: 90 TABLET | Refills: 1 | Status: SHIPPED | OUTPATIENT
Start: 2022-07-25 | End: 2023-01-05

## 2022-07-25 NOTE — PATIENT INSTRUCTIONS
1.  Shingrix is the name of the shingles vaccine to look for at your pharmacy.  You need 2 doses over a 2 to 3-month timeframe.

## 2022-07-25 NOTE — THERAPY RE-EVALUATION
Outpatient Occupational Therapy Lymphedema Re-Evaluation  RJ Chanel     Patient Name: Jyoti Alvarez  : 1950  MRN: [id number]  Today's Date: 2022      Visit Date: 2022    Patient Active Problem List   Diagnosis   • Breast cancer (HCC)   • Age related osteoporosis   • Osteoarthritis   • Asthma   • Colon polyp   • Gastroesophageal reflux disease without esophagitis   • Essential hypertension   • Fatty liver   • Lumbar radiculopathy   • Vitamin D deficiency   • Type 2 diabetes mellitus with hyperglycemia, without long-term current use of insulin (HCC)   • Mixed hyperlipidemia   • Atrophy of thyroid   • Iron deficiency anemia secondary to inadequate dietary iron intake   • Chronic diarrhea   • Vertigo   • Vitamin B12 deficiency   • Acute URI   • SHALONDA (obstructive sleep apnea)   • Stage 3a chronic kidney disease (HCC)   • History of colon polyps   • Snoring   • Hypersomnia   • Class 1 obesity        Past Medical History:   Diagnosis Date   • Anemia    • Arthritis    • Asthma    • Breast cancer (HCC)     RIGHT   • Colon polyp    • Diabetes (HCC)     BS IN THE A.M.    • Diabetes (HCC)    • Disease of thyroid gland Hypothyroidism   • Fatty liver    • Gastroesophageal reflux    • HL (hearing loss) Hearing aids    • Hyperlipemia    • Hypertension    • Limb swelling    • Seasonal allergies    • Skin cancer    • Strain of latissimus dorsi muscle 2017    Right   • Type 2 diabetes mellitus (HCC)    • Vertigo         Past Surgical History:   Procedure Laterality Date   • BREAST AUGMENTATION BILATERAL MASTOPEXY     • BREAST RECONSTRUCTION Bilateral 2022    Procedure: BILATERAL BREAST RECONSTRUCTION WITH REMOVAL AND REPLACEMENT OF IMPLANTS, MESH PLACEMENT, CHEST WALL SKIN RESECTION, CHEST LIPOSUCTION;  Surgeon: Rmoain Quezada MD;  Location: [facility] [place];  Service: Plastics;  Laterality: Bilateral;   • BREAST SURGERY  2004    Bilateral mastectomies and reconstruction   •   SECTION     • COLONOSCOPY  2019   • COSMETIC SURGERY  2004    Bilateral mastectomies and reconstructio   • ENDOSCOPY  2019   • EYE SURGERY  Not sure    Cateracts   • LIPOSUCTION N/A 2/1/2022    Procedure: CHEST WALL LIPOSUCTION AND CHEST WALL RESECTION;  Surgeon: Romain Quezada MD;  [place] [facility] [place] [facility] [place]  Service: Plastics;  Laterality: N/A;  TIMES BASED ON PREVIOUS EXPERIENCE   • MASTECTOMY COMPLETE / SIMPLE Bilateral 2004   • OOPHORECTOMY     • OTHER SURGICAL HISTORY      Joint surgery,  RIGHT FOOT SECOND  TOE WITH A PIN   • SKIN CANCER EXCISION Right 07/2020    neck   • TOE SURGERY Right     2nd digit pin inserted   • TONSILLECTOMY           Visit Dx:     ICD-10-CM ICD-9-CM   1. Lymphedema  I89.0 457.1   2. Malignant neoplasm of female breast, unspecified estrogen receptor status, unspecified laterality, unspecified site of breast (HCC)  C50.919 174.9   3. Scar tissue  L90.5 709.2            Lymphedema     Row Name 07/25/22 1400             Subjective Pain    Able to rate subjective pain? yes  -CH      Pre-Treatment Pain Level 0  -CH      Post-Treatment Pain Level 0  -CH              Subjective Comments    Subjective Comments Patient denies any swelling in the left upper extremity.  Patient does report feeling like she has some swelling in the left axillary region however she also notes symmetry and balance due to scar tissue from surgery.  -CH              Lymphedema Assessment    Lymphedema Classification LUE:;RUE:;at risk/stage 0  -CH      Lymphedema Cancer Related Sx bilateral;simple mastectomy;reconstructive;axillary dissection  -CH      Lymphedema Surgery Comments Patient underwent bilateral mastectomy with right axillary dissection 14 years ago.  Patient return to occupational therapy after replacement of breast implants and swelling in the left distal brachial region that was thought to be lymphedema related.  -CH      Lymph Nodes Removed # 14  -CH      Positive Lymph Nodes # 0  -CH            "   LLIS - Physical Concerns    The amount of pain associated with my lymphedema is: 0  -CH      The amount of limb heaviness associated with my lymphedema is: 0  -CH      The amount of skin tightness associated with my lymphedema is: 0  -CH      The size of my swollen limb(s) seems: 0  -CH      Lymphedema affects the movement of my swollen limb(s): 0  -CH      The strength in my swollen limb(s) is: 0  -CH              LLIS - Psychosocial Concerns    Lymphedema affects my body image (i.e., \"how I think I look\"). 0  -CH      Lymphedema affects my socializing with others. 0  -CH      Lymphedema affects my intimate relations with spouse or partner (rate 0 if not applicable 0  -CH      Lymphedema \"gets me down\" (i.e., depression, frustration, or anger) 0  -CH      I must rely on others for help due to my lymphedema. 0  -CH      I know what to do to manage my lymphedema 1  -CH              LLIS - Functional Concerns    Lymphedema affects my ability to perform self-care activities (i.e. eating, dressing, hygiene) 0  -CH      Lymphedema affects my ability to perform routine home or work-related activities. 0  -CH      Lymphedema affects my performance of preferred leisure activities. 0  -CH      Lymphedema affects proper fit of clothing/shoes 0  -CH      Lymphedema affects my sleep 0  -CH              Posture/Observations    Posture- WNL Posture is WNL  -CH              General ROM    GENERAL ROM COMMENTS Bilateral UEs WFL  -CH              L-Dex Bioimpedence Screening    L-Dex Measurement Extremity LUE  -CH      L-Dex Patient Position Standing  -CH      L-Dex UE Dominate Side Right  -CH      L-Dex UE At Risk Side Left  -CH      L-Dex UE Pre Surgical Value No  -CH      L-Dex UE Score 0.5  -CH      L-Dex UE Baseline Score -3  -CH      L-Dex UE Value Change 3.5  -CH      $ L-Dex Charge yes  -CH              Lymphedema Life Impact Scale Totals    A.  Total Q1 - Q17 (Do not include Q18) 1  -CH      B.  Total number of questions " answered (Q1-Q17) 17  -CH      C. Divide A by B 0.06  -CH      D. Multiple C by 25 1.5  -CH            User Key  (r) = Recorded By, (t) = Taken By, (c) = Cosigned By    Initials Name Provider Type    CH Jade Cam, OT Occupational Therapist                        Therapy Education  Education Details: Patient continues to verbalize understanding of lymphedema prevention education as well as self manual lymphatic drainage.  Patient states she has not been consistently performing self manual lymphatic drainage twice a day due to increased therapy for vestibular rehab and increased home care responsibilities.  OT did educate patient that when her symptoms are in the green zone of the lymphedema prevention/stoplight to recovery guidance education she does not have to do self MLD on a regular basis as this is a tool to utilize when she is experiencing swelling.  Patient expressed relief with this new understanding of her home care education.  Patient did educate OT that she will follow-up with her plastic surgeon on August 10 and is anticipating additional surgery to correct asymmetry in chest wall appearance.  OT and patient agreed that patient will benefit from and a lymphedema assessment 3 weeks after this surgery if at that time she has normalized lymphatic functioning she will have a 3-month follow-up.  If at that 3-month follow-up patient continues to demonstrate normalized lymphatic functioning, patient will be discharged from the lymphedema surveillance program.  Given: Symptoms/condition management  Program: New, Reinforced  How Provided: Verbal  Provided to: Patient  Level of Understanding: Verbalized  08556 - OT Self Care/Mgmt Minutes: 10             OT Assessment/Plan     Row Name 07/25/22 5027          OT Assessment    Functional Limitations Limitation in home management;Performance in work activities  -     Impairments Impaired lymphatic circulation;Range of motion;Pain;Integumentary integrity  -      Assessment Comments Patient is demonstrating normalized lymphatic functioning. OT and patient agreed that patient will benefit from and a lymphedema assessment 3 weeks after this surgery if at that time she has normalized lymphatic functioning she will have a 3-month follow-up. If at that 3-month follow-up patient continues to demonstrate normalized lymphatic functioning, patient will be discharged from the lymphedema surveillance program.  -     OT Rehab Potential Excellent  -CH     Patient/caregiver participated in establishment of treatment plan and goals Yes  -CH     Patient would benefit from skilled therapy intervention Yes  -CH            OT Plan    OT Frequency Other (comment)  See duration  -     Predicted Duration of Therapy Intervention (OT) Patient to be reevaluated 3 weeks after neck surgery and 3 months after that reevaluation.  -     Planned CPT's? OT EVAL MOD COMPLEXITY: 97964;OT THER PROC EA 15 MIN: [id number];OT SELF CARE/MGMT/TRAIN 15 MIN: 84044;OT MANUAL THERAPY EA 15 MIN: 63079;OT ORTHOTIC MGMT/TRAIN EA 15 MIN: 35415;OT WC MGMT EA 15 MIN: 50571  -     Planned Therapy Interventions (Optional Details) joint mobilization;manual therapy techniques;orthotic fitting/training;patient/family education;ROM (Range of Motion);strengthening;wound care  -           User Key  (r) = Recorded By, (t) = Taken By, (c) = Cosigned By    Initials Name Provider Type    Jade Capone OT Occupational Therapist                  UQLymphgoals:   1. Uncontrolled lymphedema of Left upper extremity and upper quadrant.  LTG1:  90 days:  Volumetric measurements of Left upper extremity/upper quadrant will be decreased by 10 % or until plateau in reduction is achieved to improve functional mobility.             STATUS:  Met              STG1a:  30 days:  Volumetric measurements of left upper extremity and upper quadrant will be decreased by  5% bilaterally to improve functional mobility.                             STATUS:  Met   LTG 1b: 90 days:  As an indicator of no exacerbation of lymphedema staging, the patient will present with an L-Dex score less than 7 points from preoperative baseline.              STATUS: Met: ongoing     TREATMENT:  Manual Therapy, Therapeutic exercise, ADL/self-care therapy, Bioimpedence Fluid Analysis, Orthotic management and training, Therapeutic Activity, wound dressing as needed, Modalities: TENS, NMES, Ultrasound, Fluidotherapy, and Patient and family/caregiver education.                  Time Calculation:   Timed Charges  98641 - OT Self Care/Mgmt Minutes: 10  Total Minutes  Timed Charges Total Minutes: 10   Total Minutes: 10     Therapy Charges for Today     Code Description Service Date Service Provider Modifiers Qty    35971683017 HC PT BIS XTRACELL FLUID ANALYSIS 7/25/2022 Jade Cam OT  1    [id number] HC OT SELF CARE/MGMT/TRAIN EA 15 MIN 7/25/2022 Jade Cam OT GO 1                    Jade Cam OT  7/25/2022

## 2022-07-25 NOTE — ASSESSMENT & PLAN NOTE
A1c is 7.0 as of 7/22 office visit.  This is very stable, patient does not report any significant hypoglycemic episodes, and she is got her carbohydrate intake leveled out and very consistent.  I think this is an excellent number, certainly no changes needed to her regimen at this time.  Patient is stable to continue glimepiride 4 twice daily, low-dose Trulicity weekly, and Lantus 60 units daily.

## 2022-07-25 NOTE — ASSESSMENT & PLAN NOTE
GFR is very stable at 75 as of her 7/22 office visit.  Her electrolytes are fine as well.  We will continue to monitor.
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
## 2022-07-25 NOTE — ASSESSMENT & PLAN NOTE
B12 levels fine as of her 7/22 office visit.  Patient stable to continue current over-the-counter dosing.

## 2022-07-25 NOTE — ASSESSMENT & PLAN NOTE
LDL has trended down this year from 105 to 80 as of her 7/22 office visit.  She is not quite to goal, but she certainly is improving, so we will continue with moderate dose atorvastatin for now.

## 2022-07-25 NOTE — ASSESSMENT & PLAN NOTE
Patient's blood pressure trended down some a month or more ago, and she decided to try 50 mg of losartan.  She been keeping a close eye on her blood pressure since then, and just rarely as her diastolic even 90, it is generally 80 or better, and her systolic has not been in the 140s for quite some time.  We are getting a good reading here today in the office as well, so certainly patient stable to continue with 50 mg of losartan along with milligrams of metoprolol daily as of her 7/22 appointment.

## 2022-07-25 NOTE — ASSESSMENT & PLAN NOTE
Vitamin D has trended up from 34 to 80 as of her 7/22 office visit.  She is on prescription strength 3 times a month, she has started some new supplements for her overall health, and apparently they contain some vitamin D.  She will lower the prescription down to just once a month, and will repeat labs in about 3 months.

## 2022-07-25 NOTE — ASSESSMENT & PLAN NOTE
This could be combination of orthostatic and true vertigo.  She does have some wax in the left ear that we discussed using over-the-counter medications for.  Blood pressure is on the lower side due to her not eating and drinking as well, so she may want to monitor blood pressure for a while potentially just take half of an amlodipine at times.  If persisting, we could try some Antivert for this.---> Patient is in vestibular rehab as of her 7/22 office visit.

## 2022-07-25 NOTE — ASSESSMENT & PLAN NOTE
Patient is clinically euthyroid as of her 7/22 office visit.  She is stable to continue very low-dose supplementation, labs on return to office in the fall.

## 2022-07-26 ENCOUNTER — TREATMENT (OUTPATIENT)
Dept: PHYSICAL THERAPY | Facility: CLINIC | Age: 72
End: 2022-07-26

## 2022-07-26 DIAGNOSIS — R42 DIZZINESS: Primary | ICD-10-CM

## 2022-07-26 DIAGNOSIS — H81.12 BENIGN PAROXYSMAL VERTIGO OF LEFT EAR: ICD-10-CM

## 2022-07-26 DIAGNOSIS — R26.89 IMBALANCE: ICD-10-CM

## 2022-07-26 PROCEDURE — 97112 NEUROMUSCULAR REEDUCATION: CPT | Performed by: PHYSICAL THERAPIST

## 2022-07-26 NOTE — PROGRESS NOTES
Physical Therapy Daily Treatment Note    VISIT#: 5    Subjective   Jyoti Alvarez reports she hasn't had anymore spinning.  She states she notices some dizziness when her blood sugar drops if she hasn't eaten.  She states she still can't walk a straight line and feels off balance.  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    Objective     See Exercise, Manual, and Modality Logs for complete treatment.     Assessment/Plan  Pt has noticeably more instability on her L LE than her R LE.  She is continuing to progress with balance exercises.      Progress per Plan of Care and Progress strengthening /stabilization /functional activity            Timed:         Manual Therapy:         mins  25084;     Therapeutic Exercise:         mins  33543;     Neuromuscular Bill:    30    mins  32163;    Therapeutic Activity:          mins  36544;     Gait Training:           mins  24781;     Ultrasound:          mins  61028;    Ionto                                   mins   53822  Self Care                            mins   23609  Aquatic Therapy                 mins   83504    Un-Timed:  Electrical Stimulation:         mins  66850 ( );  Dry Needling          mins self-pay  Traction          mins 71238  Low Eval          Mins  46073  Mod Eval          Mins  70348  High Eval                            Mins  51837  Re-Eval                               mins  39213  Canalith Repos                   mins  72071    Timed Treatment:   32   mins   Total Treatment:     32   mins    Kasandra Rdz PT, DPT  License Number [id number]

## 2022-07-29 ENCOUNTER — TREATMENT (OUTPATIENT)
Dept: PHYSICAL THERAPY | Facility: CLINIC | Age: 72
End: 2022-07-29

## 2022-07-29 DIAGNOSIS — H81.12 BENIGN PAROXYSMAL VERTIGO OF LEFT EAR: ICD-10-CM

## 2022-07-29 DIAGNOSIS — R42 DIZZINESS: Primary | ICD-10-CM

## 2022-07-29 DIAGNOSIS — R26.89 IMBALANCE: ICD-10-CM

## 2022-07-29 PROCEDURE — 97112 NEUROMUSCULAR REEDUCATION: CPT | Performed by: PHYSICAL THERAPIST

## 2022-07-29 NOTE — PROGRESS NOTES
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  She denies spinning/dizziness.    Objective     See Exercise, Manual, and Modality Logs for complete treatment.     Assessment/Plan  Pt continues to have a significant balance deficit with her eyes closed.  Additional exercises with eyes closed were added today.  Pt will be provided with an updated HEP with LE strengthening exercises at next session.     Progress per Plan of Care and Progress strengthening /stabilization /functional activity            Timed:         Manual Therapy:         mins  38578;     Therapeutic Exercise:         mins  53816;     Neuromuscular Bill:    30    mins  62153;    Therapeutic Activity:          mins  49208;     Gait Training:          mins  62155;     Ultrasound:          mins  90481;    Ionto                                   mins   34258  Self Care                            mins   83410  Aquatic Therapy                 mins   98053    Un-Timed:  Electrical Stimulation:         mins  52519 ( );  Dry Needling          mins self-pay  Traction          mins 09942  Low Eval          Mins  28631  Mod Eval          Mins  21853  High Eval                            Mins  95267  Re-Eval                               mins  68307  Canalith Repos                   mins  85773    Timed Treatment:   30   mins   Total Treatment:     30   mins    Kasandra Rdz PT, DPT  License Number [id number]

## 2022-08-02 ENCOUNTER — TREATMENT (OUTPATIENT)
Dept: PHYSICAL THERAPY | Facility: CLINIC | Age: 72
End: 2022-08-02

## 2022-08-02 DIAGNOSIS — R42 DIZZINESS: Primary | ICD-10-CM

## 2022-08-02 DIAGNOSIS — H81.12 BENIGN PAROXYSMAL VERTIGO OF LEFT EAR: ICD-10-CM

## 2022-08-02 DIAGNOSIS — R26.89 IMBALANCE: ICD-10-CM

## 2022-08-02 PROCEDURE — 97110 THERAPEUTIC EXERCISES: CPT | Performed by: PHYSICAL THERAPIST

## 2022-08-02 PROCEDURE — 97112 NEUROMUSCULAR REEDUCATION: CPT | Performed by: PHYSICAL THERAPIST

## 2022-08-02 NOTE — PROGRESS NOTES
"Chief Complaint  6m post op     Subjective          History of Present Illness  Jyoti Alvarez is a 72 y.o. female who presents to [facility] for Postoperative Follow-Up of BILATERAL BREAST RECONSTRUCTION WITH REMOVAL AND REPLACEMENT OF IMPLANTS, MESH PLACEMENT, CHEST WALL SKIN RESECTION, CHEST LIPOSUCTION on 02/01/2022    She is 6m post op & would also like to discuss if revision is needed.  Patient concerned with lower portion of left breast.    Patient also had a visual field study yesterday and is interested in a blepharoplasty. She reports difficulty driving mainly on the lateral aspect of the visual field.      Allergies: Exenatide, Fluoxetine, Gabapentin, Jardiance [empagliflozin], Levaquin [levofloxacin], Nickel, and Sulfa antibiotics  Allergies Reconciled.    Review of Systems     Objective     /78 (BP Location: Left arm, Patient Position: Sitting)   Pulse 80   Temp 96.6 °F (35.9 °C) (Temporal)   Ht 157.5 cm (62.01\")   Wt 76.1 kg (167 lb 12.8 oz)   SpO2 94%   BMI 30.68 kg/m²     Body mass index is 30.68 kg/m².    Physical Exam   Cardiovascular: Normal rate.     Pulmonary/Chest  Effort normal.        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 Presence of static transverse wrinkles over the whole forehead.  No  glabellar rhytids. Both eyebrows are at the level of the orbital rim.   Upper Lids/ orbit: bilateral eyes reveal normal position without infection. The red sclera is normal.   There is mild bilateral eyelid ptosis   and the skin touches the upper eyelashes with bilateral elevated supratarsal fold. palpebral fissure is normal, the pretarsal skin is not visible in any of her eyes and there is bilateral roger that obstructs her vision. The lateral canthus is 1 mm above " the medial canthus bilaterally. Marginal reflex distance is (MDR) 2.5.   Lower lids: there is good  skin  with no  significant relaxation, lid pulls away from the conjunctiva. Punctum is normal. Tug laxity is normal.  Snapback on bilateral eyes is normal     Medial face: moderate loss of soft tissue     Dynamic examination    Eyes: pupil exam reveals round and equally reactive to light and accommodation.  Motility: ocular motility exam reveals gross orthotropia with full ductions and versions bilateral   Levator function: moderate bilaterally: 10 mm on the right and 9 mm on the left.   Upper eyelid position: right: upper eyelid height is 2 mm  below  the upper limbus on the right and 2 mm on the left.  Her eyelid ptosis is more severe on the left.        Result Review :                Assessment and Plan      Diagnoses and all orders for this visit:    1. Brow ptosis, bilateral (Primary)    2. Acquired involutional ptosis of eyelid, bilateral    3. Contour irregularity in reconstructed breast        Plan:   I recommend bilateral eyelid ptosis repair with brow lift due to her compensatory brow elevation. Her left eyelid ptosis is more severe than the right. This portion of the procedure needs to be done under sedation since I need her to be awake to check for eyelid position - 1.5 h  I also recommend left IMF repair since there is a loss of that definition which is bothering her. I will use this opportunity to tailor the left breast skin as well  1h under general anesthesia      81338 repair of blepharoplasty, levator advancement, external approach   41795 repair of brow ptosis  55916- revision of reconstructed breast          Follow Up     No follow-ups on file.    Patient was given instructions and counseling regarding her condition. Please see specific information pulled into the AVS if appropriate.     Romain Quezada MD  08/10/2022

## 2022-08-02 NOTE — PROGRESS NOTES
Physical Therapy Daily Treatment Note    VISIT#: 7    Subjective   Jyoti Alvarez reports she didn't get much sleep last night.      Objective     See Exercise, Manual, and Modality Logs for complete treatment.     Assessment/Plan  Pt was provided with an updated HEP with LE strengthening exercises which were performed today in clinic.  Pt will benefit from continuing with therapy to improve balance.    Progress per Plan of Care and Progress strengthening /stabilization /functional activity            Timed:         Manual Therapy:         mins  97701;     Therapeutic Exercise:    15     mins  51823;     Neuromuscular Bill:    15    mins  11475;    Therapeutic Activity:          mins  46260;     Gait Training:           mins  60040;     Ultrasound:          mins  88612;    Ionto                                   mins   87379  Self Care                            mins   83811  Aquatic Therapy                 mins   04597    Un-Timed:  Electrical Stimulation:         mins  11009 ( );  Dry Needling          mins self-pay  Traction          mins 21946  Low Eval          Mins  39981  Mod Eval          Mins  05852  High Eval                            Mins  26937  Re-Eval                               mins  72745  Canalith Repos                   mins  79470    Timed Treatment:   30   mins   Total Treatment:     30   mins    Kasandra Rdz PT, DPT  License Number [id number]

## 2022-08-05 ENCOUNTER — TREATMENT (OUTPATIENT)
Dept: PHYSICAL THERAPY | Facility: CLINIC | Age: 72
End: 2022-08-05

## 2022-08-05 DIAGNOSIS — H81.12 BENIGN PAROXYSMAL VERTIGO OF LEFT EAR: ICD-10-CM

## 2022-08-05 DIAGNOSIS — R42 DIZZINESS: Primary | ICD-10-CM

## 2022-08-05 DIAGNOSIS — R26.89 IMBALANCE: ICD-10-CM

## 2022-08-05 PROCEDURE — 97112 NEUROMUSCULAR REEDUCATION: CPT | Performed by: PHYSICAL THERAPIST

## 2022-08-05 NOTE — PROGRESS NOTES
Physical Therapy Daily Treatment Note    VISIT#: 8    Subjective   Jyoti Alvarez reports she isn't improving as quickly as she wants to.  She states she went to the gym yesterday.      Objective     See Exercise, Manual, and Modality Logs for complete treatment.       Assessment/Plan  Balance exercises were advanced today.  Pt is still unsteady with eyes closed but she is improving in this area.  Will continue with plan of care.    Progress per Plan of Care and Progress strengthening /stabilization /functional activity            Timed:         Manual Therapy:         mins  04681;     Therapeutic Exercise:         mins  00096;     Neuromuscular Bill:    24    mins  09902;    Therapeutic Activity:          mins  79546;     Gait Training:           mins  01403;     Ultrasound:          mins  58240;    Ionto                                   mins   19843  Self Care                            mins   81814  Aquatic Therapy                 mins   35641    Un-Timed:  Electrical Stimulation:         mins  13452 ( );  Dry Needling          mins self-pay  Traction          mins 47505  Low Eval          Mins  97172  Mod Eval          Mins  74479  High Eval                            Mins  46116  Re-Eval                               mins  54611  Canalith Repos                   mins  97208    Timed Treatment:   24   mins   Total Treatment:     24   mins    Kasandra Rdz PT, DPT  License Number [id number]

## 2022-08-08 ENCOUNTER — HOSPITAL ENCOUNTER (OUTPATIENT)
Dept: BONE DENSITY | Facility: HOSPITAL | Age: 72
Discharge: HOME | End: 2022-08-08
Admitting: INTERNAL MEDICINE

## 2022-08-08 ENCOUNTER — TREATMENT (OUTPATIENT)
Dept: PHYSICAL THERAPY | Facility: CLINIC | Age: 72
End: 2022-08-08

## 2022-08-08 DIAGNOSIS — M85.851 OSTEOPENIA OF BOTH HIPS: ICD-10-CM

## 2022-08-08 DIAGNOSIS — M85.852 OSTEOPENIA OF BOTH HIPS: ICD-10-CM

## 2022-08-08 DIAGNOSIS — R26.89 IMBALANCE: ICD-10-CM

## 2022-08-08 DIAGNOSIS — R42 DIZZINESS: Primary | ICD-10-CM

## 2022-08-08 DIAGNOSIS — H81.12 BENIGN PAROXYSMAL VERTIGO OF LEFT EAR: ICD-10-CM

## 2022-08-08 PROCEDURE — 97112 NEUROMUSCULAR REEDUCATION: CPT | Performed by: PHYSICAL THERAPIST

## 2022-08-08 PROCEDURE — 77080 DXA BONE DENSITY AXIAL: CPT

## 2022-08-08 NOTE — PROGRESS NOTES
Physical Therapy Daily Treatment Note    VISIT#: 9    Subjective   Jyoti Alvarez reports no new complaints.    Objective     See Exercise, Manual, and Modality Logs for complete treatment.     Assessment/Plan  Pt is showing improvements in her balance with her eyes closed.  Balance exercises continue to be advanced with good tolerance.  Continue with plan of care.    Progress per Plan of Care and Progress strengthening /stabilization /functional activity            Timed:         Manual Therapy:         mins  34372;     Therapeutic Exercise:         mins  63910;     Neuromuscular Bill:    38    mins  68183;    Therapeutic Activity:          mins  41328;     Gait Training:           mins  03690;     Ultrasound:          mins  07893;    Ionto                                   mins   14140  Self Care                            mins   83206  Aquatic Therapy                 mins   48850    Un-Timed:  Electrical Stimulation:         mins  37114 ( );  Dry Needling          mins self-pay  Traction          mins 70660  Low Eval          Mins  60304  Mod Eval          Mins  28714  High Eval                            Mins  57701  Re-Eval                               mins  92804  Canalith Repos                   mins  45203    Timed Treatment:   38   mins   Total Treatment:     38   mins    Kasandra Rdz PT, DPT  License Number [id number]

## 2022-08-10 ENCOUNTER — OFFICE VISIT (OUTPATIENT)
Dept: PLASTIC SURGERY | Facility: CLINIC | Age: 72
End: 2022-08-10

## 2022-08-10 VITALS
HEIGHT: 62 IN | HEART RATE: 80 BPM | BODY MASS INDEX: 30.88 KG/M2 | SYSTOLIC BLOOD PRESSURE: 117 MMHG | OXYGEN SATURATION: 94 % | TEMPERATURE: 96.6 F | WEIGHT: 167.8 LBS | DIASTOLIC BLOOD PRESSURE: 78 MMHG

## 2022-08-10 DIAGNOSIS — N65.0 CONTOUR IRREGULARITY IN RECONSTRUCTED BREAST: ICD-10-CM

## 2022-08-10 DIAGNOSIS — H57.813 BROW PTOSIS, BILATERAL: Primary | ICD-10-CM

## 2022-08-10 DIAGNOSIS — H02.403 ACQUIRED INVOLUTIONAL PTOSIS OF EYELID, BILATERAL: ICD-10-CM

## 2022-08-10 PROCEDURE — 99213 OFFICE O/P EST LOW 20 MIN: CPT | Performed by: SURGERY

## 2022-08-12 ENCOUNTER — TREATMENT (OUTPATIENT)
Dept: PHYSICAL THERAPY | Facility: CLINIC | Age: 72
End: 2022-08-12

## 2022-08-12 DIAGNOSIS — R42 DIZZINESS: Primary | ICD-10-CM

## 2022-08-12 DIAGNOSIS — R26.89 IMBALANCE: ICD-10-CM

## 2022-08-12 DIAGNOSIS — H81.12 BENIGN PAROXYSMAL VERTIGO OF LEFT EAR: ICD-10-CM

## 2022-08-12 PROCEDURE — 97112 NEUROMUSCULAR REEDUCATION: CPT | Performed by: PHYSICAL THERAPIST

## 2022-08-12 NOTE — PROGRESS NOTES
Physical Therapy Daily Treatment Note    VISIT#: 10    Subjective   Jyoti Alvarez reports she is tired today.  She states she is more off balance and dizzy today.  Pain Rating (0-10): 0    Objective     See Exercise, Manual, and Modality Logs for complete treatment    Assessment/Plan  Pt continues to have imbalance with eyes closed although it is improving each session.  Will re-evaluate next session.    Progress per Plan of Care and Progress strengthening /stabilization /functional activity            Timed:         Manual Therapy:         mins  84516;     Therapeutic Exercise:         mins  93155;     Neuromuscular Bill:    28    mins  13185;    Therapeutic Activity:          mins  91602;     Gait Training:           mins  92180;     Ultrasound:          mins  65409;    Ionto                                   mins   53572  Self Care                            mins   65123  Aquatic Therapy                 mins   72427    Un-Timed:  Electrical Stimulation:         mins  26653 ( );  Dry Needling          mins self-pay  Traction          mins 79986  Low Eval          Mins  47120  Mod Eval          Mins  80651  High Eval                            Mins  03762  Re-Eval                               mins  64536  Canalith Repos                   mins  57613    Timed Treatment:   28   mins   Total Treatment:     28   mins    Kasandra Rdz PT, DPT  License Number [id number]

## 2022-08-16 ENCOUNTER — TREATMENT (OUTPATIENT)
Dept: PHYSICAL THERAPY | Facility: CLINIC | Age: 72
End: 2022-08-16

## 2022-08-16 DIAGNOSIS — H81.12 BENIGN PAROXYSMAL VERTIGO OF LEFT EAR: ICD-10-CM

## 2022-08-16 DIAGNOSIS — R42 DIZZINESS: Primary | ICD-10-CM

## 2022-08-16 DIAGNOSIS — R26.89 IMBALANCE: ICD-10-CM

## 2022-08-16 PROBLEM — H02.403 ACQUIRED INVOLUTIONAL PTOSIS OF EYELID, BILATERAL: Status: ACTIVE | Noted: 2022-08-16

## 2022-08-16 PROBLEM — H57.813 BROW PTOSIS, BILATERAL: Status: ACTIVE | Noted: 2022-08-16

## 2022-08-16 PROCEDURE — 97112 NEUROMUSCULAR REEDUCATION: CPT | Performed by: PHYSICAL THERAPIST

## 2022-08-16 NOTE — PROGRESS NOTES
Progress Assessment        Patient: Jyoti Alvarez   : 1950  Diagnosis/ICD-10 Code:  Dizziness [R42]  Referring practitioner: Ramo Noguera MD  Date of Initial Visit: Type: THERAPY  Noted: 2022  Today's Date: 2022  Patient seen for 11 sessions      Subjective:   Jyoti Alvarez reports: her spinning is no longer happening.  She states she can walk straighter although she is reluctant to take off walking down the sidewalk or at the mall without hanging onto something.  Pt reports she notices she is veering to one side and feels imbalance when walking.  Pt reports she has not returned to work because she isn't sure her walking is stable enough to do that.  She states she can walk shorter distances.  She notices when she first stands up she feels off balance and the longer she stands the weaker she feels.  She states if she gets any dizziness it is usually related to her blood sugar.      Subjective Questionnaire: DHI: 54% limited  Clinical Progress: improved  Home Program Compliance: Yes  Treatment has included: neuromuscular re-education    Subjective     Objective   Comments  L faraz halpike: negative  R faraz halpike: negative  Horizontal canal test: negative bilaterally     Romberg on floor: WNL  Romberg on floor/eyes closed: WNL (unsteady)  Romberg on foam: WNL (very unsteady)  Romberg on foam/eyes closed: 13 seconds  Sharpened romberg on floor: WNL  Sharpened romberg on floor/eyes closed: WNL  Sharpened romberg on foam: WNL  Sharpened romberg on foam/eyes closed: 2 seconds    Assessment/Plan   Pt with improvements in her balance since previous re-evaluation.  She does continue to have a balance deficit that is worse with her eyes closed although it has improved.  Score on the DHI has improved.  Pt will benefit from continuing with PT to improve balance and safety with functional activities.      Goals  Plan Goals: 1. Mobility: Walking/Moving Around Functional  Limitation                       LTG 1: 12 weeks:  The patient will demonstrate 20% limitation by achieving a score of 20 on the Dizziness Handicap Inventory.   STATUS:  progressing  STG 1a: 6 weeks:  The patient will demonstrate 30% limitation by achieving a score of 30 on the Dizziness Handicap Inventory.     STATUS:  progressing     2. The patient has difficulty with balance.   LTG 2: 12 weeks: The patient will hold the sharpened romberg position on foam with eyes closed for 15 seconds in order to improve balance and decrease risk of falling.   STATUS: met and updated  STG 2: 6 weeks: The patient will hold the sharpened romberg position on floor with eyes closed for 20 seconds in order to improve balance and decrease risk of falling.   STATUS: met    Progress toward previous goals: Partially Met    See Exercise, Manual, and Modality Logs for complete treatment.         Recommendations: Continue as planned  Timeframe: 3 months  Prognosis to achieve goals: good    PT Signature: Kasandra Rdz PT, DPT  License Number: [id number]    Based upon review of the patient's progress and continued therapy plan, it is my medical opinion that Jyoti Alvarez should continue physical therapy treatment at [facility]  [address] [place] [id number] [phone].      Timed:         Manual Therapy:         mins  84059;     Therapeutic Exercise:         mins  64617;     Neuromuscular Bill:    23    mins  97972;    Therapeutic Activity:          mins  10486;     Gait Training:           mins  89788;     Ultrasound:          mins  39583;    Ionto                                   mins   16860  Self Care                            mins   24938  Aquatic                               mins 82896      Un-Timed:  Electrical Stimulation:         mins  07452 ( );  Dry Needling          mins self-pay  Traction          mins 37860  Low Eval          Mins  70010  Mod Eval          Mins   17319  High Eval                            Mins  38501  Re-Eval                               mins  52601  Canalith Repos         mins 29564    Timed Treatment:   23   mins   Total Treatment:     23   mins      I certify that the therapy services are furnished while this patient is under my care.  The services outlined above are required by this patient, and will be reviewed every 90 days.

## 2022-08-19 ENCOUNTER — TREATMENT (OUTPATIENT)
Dept: PHYSICAL THERAPY | Facility: CLINIC | Age: 72
End: 2022-08-19

## 2022-08-19 DIAGNOSIS — R26.89 IMBALANCE: ICD-10-CM

## 2022-08-19 DIAGNOSIS — H81.12 BENIGN PAROXYSMAL VERTIGO OF LEFT EAR: ICD-10-CM

## 2022-08-19 DIAGNOSIS — R42 DIZZINESS: Primary | ICD-10-CM

## 2022-08-19 PROCEDURE — 97112 NEUROMUSCULAR REEDUCATION: CPT | Performed by: PHYSICAL THERAPIST

## 2022-08-19 NOTE — PROGRESS NOTES
Physical Therapy Daily Treatment Note    VISIT#: 12    Subjective   Jyoti Alvarez reports her balance/walking are getting better but she found herself veering while walking outside.      Objective     See Exercise, Manual, and Modality Logs for complete treatment.     Assessment/Plan  Pt is able to maintain balance on step 360 with eyes closed today which is an improvement since previous sessions.  Balance exercises continue to be advanced.  Continue with plan of care.    Progress per Plan of Care and Progress strengthening /stabilization /functional activity            Timed:         Manual Therapy:         mins  05575;     Therapeutic Exercise:         mins  45873;     Neuromuscular Bill:    29    mins  71741;    Therapeutic Activity:          mins  67279;     Gait Training:           mins  24811;     Ultrasound:          mins  09394;    Ionto                                   mins   34077  Self Care                            mins   26626  Aquatic Therapy                 mins   85794    Un-Timed:  Electrical Stimulation:         mins  21956 ( );  Dry Needling          mins self-pay  Traction          mins 80673  Low Eval          Mins  81564  Mod Eval          Mins  31004  High Eval                            Mins  19155  Re-Eval                               mins  69914  Canalith Repos                   mins  47226    Timed Treatment:   29   mins   Total Treatment:     29   mins    Kasandra Rdz PT, DPT  License Number [id number]

## 2022-08-22 ENCOUNTER — TREATMENT (OUTPATIENT)
Dept: PHYSICAL THERAPY | Facility: CLINIC | Age: 72
End: 2022-08-22

## 2022-08-22 DIAGNOSIS — R42 DIZZINESS: Primary | ICD-10-CM

## 2022-08-22 DIAGNOSIS — R26.89 IMBALANCE: ICD-10-CM

## 2022-08-22 DIAGNOSIS — H81.12 BENIGN PAROXYSMAL VERTIGO OF LEFT EAR: ICD-10-CM

## 2022-08-22 PROCEDURE — 97112 NEUROMUSCULAR REEDUCATION: CPT | Performed by: PHYSICAL THERAPIST

## 2022-08-22 NOTE — PROGRESS NOTES
Physical Therapy Daily Treatment Note    VISIT#: 13    Subjective   Jyoti Alvarez reports her balance is improved.  She states she noticed she didn't have to  Hold on when she stood up in [place].    Objective     See Exercise, Manual, and Modality Logs for complete treatment.       Assessment/Plan  Pt shows improvements in her balance today and has less loss of balance with all exercises.  She is continuing to progress towards goals.     Progress per Plan of Care and Progress strengthening /stabilization /functional activity            Timed:         Manual Therapy:         mins  71858;     Therapeutic Exercise:         mins  91519;     Neuromuscular Bill:    30    mins  74884;    Therapeutic Activity:          mins  53380;     Gait Training:           mins  91727;     Ultrasound:          mins  03342;    Ionto                                   mins   75464  Self Care                            mins   20182  Aquatic Therapy                 mins   74975    Un-Timed:  Electrical Stimulation:         mins  52989 ( );  Dry Needling          mins self-pay  Traction          mins 76129  Low Eval          Mins  32525  Mod Eval          Mins  79296  High Eval                            Mins  73374  Re-Eval                               mins  59080  Canalith Repos                   mins  02693    Timed Treatment:   30   mins   Total Treatment:     30   mins    Kasandra Rdz PT, DPT  License Number [id number]

## 2022-08-23 ENCOUNTER — OFFICE VISIT (OUTPATIENT)
Dept: OBSTETRICS AND GYNECOLOGY | Facility: CLINIC | Age: 72
End: 2022-08-23

## 2022-08-23 VITALS
BODY MASS INDEX: 30.91 KG/M2 | HEIGHT: 62 IN | SYSTOLIC BLOOD PRESSURE: 143 MMHG | HEART RATE: 83 BPM | DIASTOLIC BLOOD PRESSURE: 81 MMHG | WEIGHT: 168 LBS

## 2022-08-23 DIAGNOSIS — Z01.419 WELL WOMAN EXAM: Primary | ICD-10-CM

## 2022-08-23 PROCEDURE — G0101 CA SCREEN;PELVIC/BREAST EXAM: HCPCS | Performed by: NURSE PRACTITIONER

## 2022-08-23 NOTE — PROGRESS NOTES
HPI:   72 y.o..Presents for well woman exam. Contraception or HRT: Post menopausal, using no HRT  Menses:   No vaginal bleeding  Pain:  None  Last pap normal   Complaints: Pt has no complaints today.  Mild mixed urinary incontinence, mild vaginal dryness    Past Medical History:   Diagnosis Date   • Anemia    • Arthritis    • Asthma    • Breast cancer (HCC)     RIGHT   • Colon polyp    • Diabetes (HCC)     BS IN THE A.M.    • Diabetes (HCC)    • Disease of thyroid gland Hypothyroidism   • Fatty liver    • Gastroesophageal reflux    • HL (hearing loss) Hearing aids    • Hyperlipemia    • Hypertension    • Limb swelling    • Seasonal allergies    • Skin cancer    • Strain of latissimus dorsi muscle 2017    Right   • Type 2 diabetes mellitus (HCC)    • Vertigo       Past Surgical History:   Procedure Laterality Date   • BREAST AUGMENTATION BILATERAL MASTOPEXY     • BREAST RECONSTRUCTION Bilateral 2022    Procedure: BILATERAL BREAST RECONSTRUCTION WITH REMOVAL AND REPLACEMENT OF IMPLANTS, MESH PLACEMENT, CHEST WALL SKIN RESECTION, CHEST LIPOSUCTION;  Surgeon: Romain Quezada MD;  Location: [facility] OR [facility];  Service: Plastics;  Laterality: Bilateral;   • BREAST SURGERY  2004    Bilateral mastectomies and reconstruction   •  SECTION     • COLONOSCOPY     • COSMETIC SURGERY  2004    Bilateral mastectomies and reconstructio   • ENDOSCOPY  2019   • EYE SURGERY  Not sure    Cateracts   • LIPOSUCTION N/A 2022    Procedure: CHEST WALL LIPOSUCTION AND CHEST WALL RESECTION;  Surgeon: Romain Quezada MD;  Location: [facility] OR [facility];  Service: Plastics;  Laterality: N/A;  TIMES BASED ON PREVIOUS EXPERIENCE   • MASTECTOMY COMPLETE / SIMPLE Bilateral    • OOPHORECTOMY     • OTHER SURGICAL HISTORY      Joint surgery,  RIGHT FOOT SECOND  TOE WITH A PIN   • SKIN CANCER EXCISION Right 2020    neck   • TOE SURGERY Right     2nd digit pin inserted   • TONSILLECTOMY        Family  "History   Problem Relation Age of Onset   • Heart disease Mother    • Cancer Mother         Breast cancer age 45 and 60   • Diabetes Mother    • Arthritis Mother    • Osteoporosis Mother    • Heart failure Mother         Heart attack age 60. Age 87. Cause of death.   • Hypertension Mother    • Osteoarthritis Mother    • Heart disease Father    • Cancer Father         Skin cancer   • Heart failure Father         Age 79  cause of death.   • Cancer Sister         Breast cancer age 27 and 52. Ovarian cancer age 53 caused death at 56.   • Diabetes Sister    • Ovarian cancer Sister 53   • Arthritis Sister    • Cancer Brother         Prostate cancer age about 58.   • Prostate cancer Brother         unsure age   • Breast cancer Other    • Skin cancer Other    • Cancer Daughter    • Cancer Maternal Aunt         Breast cancer   • Cancer Maternal Aunt          with ovarian cancer age mid 40s   • Malig Hyperthermia Neg Hx    • Colon cancer Neg Hx      Allergies as of 2022 - Reviewed 2022   Allergen Reaction Noted   • Exenatide Unknown - High Severity 2021   • Fluoxetine Hives 2021   • Gabapentin Headache 2021   • Jardiance [empagliflozin] Other (See Comments) 2021   • Levaquin [levofloxacin] Hives 2021   • Nickel Itching and Rash 2022   • Sulfa antibiotics Swelling 2021        PCP: does manage PMHx and preventative labs    /81   Pulse 83   Ht 157.5 cm (62.01\")   Wt 76.2 kg (168 lb)   BMI 30.72 kg/m²     PHYSICAL EXAM: Chaperone present   General- NAD, alert and oriented, appropriate  Psych- Normal mood, good memory  Neck- No masses, no thyroid enlargement  Lymphatic- No palpable neck, axillary, or groin nodes  CV- Regular rhythm, no murmurs  Resp- CTA to bases, no wheezes  Abdomen- Soft, non distended, non tender, no masses  Breast left-  Bilaterally symmetrical, no masses, non tender, no nipple discharge  Breast right- Bilaterally symmetrical, no masses, non " tender, no nipple discharge  External genitalia- Normal female, no lesions  Urethra/meatus- Normal, no masses, non tender, no prolapse  Bladder- Normal, no masses, non tender, no prolapse  Vagina- Normal, mild atrophy, no lesions, no discharge, no prolapse  Cvx- Normal, no lesions, no discharge, No cervical motion tenderness  Uterus- Normal size, shape & consistency.  Non tender, mobile, & no prolapse  Adnexa- Absent  Anus/Rectum/Perineum- Not performed  Ext- No edema, no cyanosis    Skin- No lesions, no rashes, no acanthosis nigricans      ASSESSMENT and PLAN:    Diagnoses and all orders for this visit:    1. Well woman exam (Primary)    Preventative:   BREAST HEALTH- Monthly self breast exam importance and how to reviewed. MMG and/or MRI (prn) reviewed per society guidelines and her individual history. Screen: Already up to date  CERVICAL CANCER Screening- Reviewed current ASCCP guidelines for screening w and wo cotest HPV, age specific.  Screen: Not medically needed  COLON CANCER Screening- Reviewed current medical society guidelines and options.  Screen:  Already up to date  BONE HEALTH- Reviewed current medical society guidelines and options for testing, calcium and vit D intake.  Weight bearing exercise.  DEXA: Already up to date  VACCINATIONS Recommended: Vaccination are up to date.  Importance discussed, risk being unvaccinated reviewed.  Questions answered  Smoking status- NON SMOKER  Follow up PCP/Specialist PMHx and Labs  Myriad: Does not qualify.  Pomegranate seed oil for management of vaginal dryness, pelvic floor rehab/kegels recommended for vaginal atrophy, mixed urinary incontinence  She understands the importance of having any ordered tests to be performed in a timely fashion.  The risks of not performing them include, but are not limited to, advanced cancer stages, bone loss from osteoporosis and/or subsequent increase in morbidity and/or mortality.  She is encouraged to review her results online  and/or contact or office if she has questions.     Follow Up:  Return in about 1 year (around 8/23/2023).        FANY Tang  08/23/2022

## 2022-08-25 ENCOUNTER — TREATMENT (OUTPATIENT)
Dept: PHYSICAL THERAPY | Facility: CLINIC | Age: 72
End: 2022-08-25

## 2022-08-25 DIAGNOSIS — R26.89 IMBALANCE: ICD-10-CM

## 2022-08-25 DIAGNOSIS — R42 DIZZINESS: Primary | ICD-10-CM

## 2022-08-25 DIAGNOSIS — H81.12 BENIGN PAROXYSMAL VERTIGO OF LEFT EAR: ICD-10-CM

## 2022-08-25 PROCEDURE — 97112 NEUROMUSCULAR REEDUCATION: CPT | Performed by: PHYSICAL THERAPIST

## 2022-08-25 NOTE — PROGRESS NOTES
Physical Therapy Daily Treatment Note    VISIT#: 14    Subjective   Jyoti Alvarez reports her balance is improving.    Objective     See Exercise, Manual, and Modality Logs for complete treatment.     Assessment/Plan  Pt does excellent with balance exercises today and has minimal loss of balance.  She shows improvements in her balance with eyes closed.  She is progressing well towards goals.    Progress per Plan of Care and Progress strengthening /stabilization /functional activity            Timed:         Manual Therapy:         mins  28916;     Therapeutic Exercise:         mins  68783;     Neuromuscular Bill:    29    mins  40482;    Therapeutic Activity:          mins  01292;     Gait Training:           mins  80120;     Ultrasound:          mins  21959;    Ionto                                   mins   87736  Self Care                            mins   18394  Aquatic Therapy                 mins   57872    Un-Timed:  Electrical Stimulation:         mins  43874 ( );  Dry Needling          mins self-pay  Traction          mins 19124  Low Eval          Mins  76140  Mod Eval          Mins  41055  High Eval                            Mins  08852  Re-Eval                               mins  97586  Canalith Repos                   mins  66901    Timed Treatment:   29   mins   Total Treatment:     29   mins    Kasandra Rdz PT, DPT  License Number [id number]

## 2022-09-02 ENCOUNTER — TREATMENT (OUTPATIENT)
Dept: PHYSICAL THERAPY | Facility: CLINIC | Age: 72
End: 2022-09-02

## 2022-09-02 DIAGNOSIS — R42 DIZZINESS: Primary | ICD-10-CM

## 2022-09-02 DIAGNOSIS — R26.89 IMBALANCE: ICD-10-CM

## 2022-09-02 DIAGNOSIS — H81.12 BENIGN PAROXYSMAL VERTIGO OF LEFT EAR: ICD-10-CM

## 2022-09-02 PROCEDURE — 97112 NEUROMUSCULAR REEDUCATION: CPT | Performed by: PHYSICAL THERAPIST

## 2022-09-02 NOTE — PROGRESS NOTES
Physical Therapy Daily Treatment Note    VISIT#: 15    Subjective   Jyoti Alvarez reports she doesn't think she is veering as much when she is walking.     Objective     See Exercise, Manual, and Modality Logs for complete treatment.     Assessment/Plan  Pt continues to have a deficit in balance with her eyes closed although it is improving.  Pt continues to make good progress towards goals and is nearing discharge.    Progress per Plan of Care and Progress strengthening /stabilization /functional activity            Timed:         Manual Therapy:         mins  14971;     Therapeutic Exercise:         mins  34986;     Neuromuscular Bill:    28    mins  61391;    Therapeutic Activity:          mins  12635;     Gait Training:           mins  32721;     Ultrasound:          mins  53366;    Ionto                                   mins   04481  Self Care                            mins   67524  Aquatic Therapy                 mins   45042    Un-Timed:  Electrical Stimulation:         mins  83217 ( );  Dry Needling          mins self-pay  Traction          mins 15672  Low Eval          Mins  78515  Mod Eval          Mins  50327  High Eval                            Mins  89551  Re-Eval                               mins  63662  Canalith Repos                   mins  17169    Timed Treatment:   28   mins   Total Treatment:     28   mins    Kasandra Rdz PT, DPT  License Number [id number]

## 2022-09-07 ENCOUNTER — OFFICE VISIT (OUTPATIENT)
Dept: SLEEP MEDICINE | Facility: HOSPITAL | Age: 72
End: 2022-09-07

## 2022-09-07 VITALS
WEIGHT: 166.4 LBS | SYSTOLIC BLOOD PRESSURE: 120 MMHG | OXYGEN SATURATION: 95 % | DIASTOLIC BLOOD PRESSURE: 71 MMHG | BODY MASS INDEX: 30.62 KG/M2 | HEIGHT: 62 IN | HEART RATE: 73 BPM

## 2022-09-07 DIAGNOSIS — E66.9 CLASS 1 OBESITY: ICD-10-CM

## 2022-09-07 DIAGNOSIS — Z99.89 OSA ON CPAP: Primary | ICD-10-CM

## 2022-09-07 DIAGNOSIS — G47.33 OSA ON CPAP: Primary | ICD-10-CM

## 2022-09-07 PROBLEM — R06.83 SNORING: Status: RESOLVED | Noted: 2022-03-07 | Resolved: 2022-09-07

## 2022-09-07 PROBLEM — G47.10 HYPERSOMNIA: Status: RESOLVED | Noted: 2022-03-07 | Resolved: 2022-09-07

## 2022-09-07 PROCEDURE — 99213 OFFICE O/P EST LOW 20 MIN: CPT | Performed by: INTERNAL MEDICINE

## 2022-09-07 PROCEDURE — G0463 HOSPITAL OUTPT CLINIC VISIT: HCPCS | Performed by: INTERNAL MEDICINE

## 2022-09-07 NOTE — PROGRESS NOTES
"  [address] [address]  [place]   [place] [address]  Phone: [phone]  Fax: [phone]      SLEEP CLINIC FOLLOW UP PROGRESS NOTE.    Jyoti Alvarez  [id number]   1950  72 y.o.  female      PCP: Ramo Noguera MD      Date of visit: 9/7/2022    Chief Complaint   Patient presents with   • Sleep Apnea   • Obesity       HPI:  This is a 72 y.o. years old patient is here for the management of obstructive sleep apnea.  Sleep apnea is moderate in severity with a AHI of 16/hr. Patient is using positive airway pressure therapy with auto CPAP and the symptoms of snoring, non-restorative sleep and daytime excessive sleepiness have improved significantly on the therapy. Normally goes to bed at 12 midnight and wakes up at 9 AM.  The patient wakes up 2 time(s) during the night and has no problem going back to sleep.  Feels refreshed after waking up.  Patient also denies headaches and nasal congestion.  She has long COVID and just recovering.  Unfortunately she also lost her  due to COVID and she is grieving    Medications and allergies are reviewed by me and documented in the encounter.     SOCIAL (habits pertaining to sleep medicine)  History tobacco use:No   History of alcohol use: 0 per week  Caffeine use: 2     REVIEW OF SYSTEMS:   [place] Sleepiness Scale :Total score: 0   Nasal congestion:No   Dry mouth/nose:Yes   Post nasal drip; No   Acid reflux/Heartburn:Yes   Abd bloating:No   Morning headache:No   Anxiety:No   Depression:No    PHYSICAL EXAMINATION:  CONSTITUTIONAL:  Vitals:    09/07/22 1500   BP: 120/71   Pulse: 73   SpO2: 95%   Weight: 75.5 kg (166 lb 6.4 oz)   Height: 157.5 cm (62.01\")    Body mass index is 30.43 kg/m².   NOSE: nasal passages are clear, No deformities noted   RESP SYSTEM: Not in any respiratory distress, no chest deformities noted,   CARDIOVASULAR: No edema noted  NEURO: Oriented x 3, gait normal,  Mood and affect appeared appropriate      Data " reviewed:  The Smart card downloaded on 9/7/2022 has been reviewed independently by me for compliance and discussed the data with the patient.   Poor compliance due to ill fitting mask she wants to try a nasal mask  Device: Miguelina  DME: [employer]      ASSESSMENT AND PLAN:  · Obstructive sleep apnea ( G 47.33).  The symptoms of sleep apnea have improved with the device and the treatment.  Patient's compliance with the device is excellent for treatment of sleep apnea.  I have independently reviewed the smart card down load and discussed with the patient the download data and encouarged the patient to continue to use the device.The residual AHI is acceptable. The device is benefiting the patient and the device is medically necessary.  Without proper control of sleep apnea and good compliance there is a increased risk for hypertension, diabetes mellitus and nonrestorative sleep with hypersomnia which can increase risk for motor vehicle accidents.  Untreated sleep apnea is also a risk factor for development of atrial fibrillation, pulmonary hypertension and stroke. The patient is also instructed to get the supplies from the [facility] and and change them on a regular basis.  A prescription for supplies has been sent to the [facility].  I have also discussed the good sleep hygiene habits and adequate amount of sleep needed for good health.  · Obesity  1 with BMI is Body mass index is 30.43 kg/m².. I have discuss the relationship between the weight and sleep apnea. The benefit of weight loss in reducing severity of sleep apnea was discussed. Discussed diet and exercise with the patient to achieve ideal BMI.   · Return in about 1 year (around 9/7/2023) for Next scheduled follow-up. . Patient's questions were answered.      Randy Gotti MD  Sleep Medicine.  Medical Director, [facility] sleep [facility]  9/7/2022 ,

## 2022-09-08 ENCOUNTER — TREATMENT (OUTPATIENT)
Dept: PHYSICAL THERAPY | Facility: CLINIC | Age: 72
End: 2022-09-08

## 2022-09-08 DIAGNOSIS — R42 DIZZINESS: Primary | ICD-10-CM

## 2022-09-08 DIAGNOSIS — H81.12 BENIGN PAROXYSMAL VERTIGO OF LEFT EAR: ICD-10-CM

## 2022-09-08 DIAGNOSIS — R26.89 IMBALANCE: ICD-10-CM

## 2022-09-08 PROCEDURE — 97112 NEUROMUSCULAR REEDUCATION: CPT | Performed by: PHYSICAL THERAPIST

## 2022-09-08 NOTE — PROGRESS NOTES
Physical Therapy Daily Treatment Note/Discharge Summary    VISIT#: 16    Subjective   Jyoti Alvarez reports her balance is improving and she is able to walk a straight line.  She stats her son also noticed improvements in her mobility.   DHI:     Objective   Comments  L faraz halpike: negative  R faraz halpike: negative  Horizontal canal test: negative bilaterally     Romberg on floor: WNL  Romberg on floor/eyes closed: WNL (unsteady)  Romberg on foam: WNL (very unsteady)  Romberg on foam/eyes closed: 5 seconds  Sharpened romberg on floor: WNL  Sharpened romberg on floor/eyes closed: WNL  Sharpened romberg on foam: WNL  Sharpened romberg on foam/eyes closed: 2 seconds  See Exercise, Manual, and Modality Logs for complete treatment.       Assessment/Plan  Pt has made excellent improvements in her balance since starting therapy.  Dizziness has also improved.  She does still have a slight balance deficit with her eyes closed.  She has met her goals and will discharge from PT.      Goals  Plan Goals: 1. Mobility: Walking/Moving Around Functional Limitation                       LTG 1: 12 weeks:  The patient will demonstrate 20% limitation by achieving a score of 20 on the Dizziness Handicap Inventory.   STATUS:  not met  STG 1a: 6 weeks:  The patient will demonstrate 30% limitation by achieving a score of 30 on the Dizziness Handicap Inventory.     STATUS:  met     2. The patient has difficulty with balance.   LTG 2: 12 weeks: The patient will hold the sharpened romberg position on foam with eyes closed for 15 seconds in order to improve balance and decrease risk of falling.   STATUS: not met  STG 2: 6 weeks: The patient will hold the sharpened romberg position on floor with eyes closed for 20 seconds in order to improve balance and decrease risk of falling.   STATUS: met              Timed:         Manual Therapy:         mins  12807;     Therapeutic Exercise:         mins  92864;     Neuromuscular Bill:    15    mins   86331;    Therapeutic Activity:          mins  17941;     Gait Training:           mins  74672;     Ultrasound:          mins  85156;    Ionto                                   mins   06781  Self Care                            mins   68651  Aquatic Therapy                 mins   56898    Un-Timed:  Electrical Stimulation:         mins  46814 ( );  Dry Needling          mins self-pay  Traction          mins 70958  Low Eval          Mins  20907  Mod Eval          Mins  37292  High Eval                            Mins  02756  Re-Eval                               mins  80900  Canalith Repos                   mins  07253    Timed Treatment:   15   mins   Total Treatment:     15   mins    Kasandra Rdz PT, DPT  License Number [id number]

## 2022-09-16 RX ORDER — BLOOD SUGAR DIAGNOSTIC
STRIP MISCELLANEOUS
Qty: 300 EACH | Refills: 2 | Status: SHIPPED | OUTPATIENT
Start: 2022-09-16 | End: 2022-12-27

## 2022-10-13 ENCOUNTER — TELEPHONE (OUTPATIENT)
Dept: INTERNAL MEDICINE | Facility: CLINIC | Age: 72
End: 2022-10-13

## 2022-10-13 ENCOUNTER — LAB (OUTPATIENT)
Dept: LAB | Facility: HOSPITAL | Age: 72
End: 2022-10-13

## 2022-10-13 DIAGNOSIS — E11.65 TYPE 2 DIABETES MELLITUS WITH HYPERGLYCEMIA, WITHOUT LONG-TERM CURRENT USE OF INSULIN: ICD-10-CM

## 2022-10-13 DIAGNOSIS — N18.31 STAGE 3A CHRONIC KIDNEY DISEASE: ICD-10-CM

## 2022-10-13 DIAGNOSIS — E03.4 ATROPHY OF THYROID: ICD-10-CM

## 2022-10-13 DIAGNOSIS — E55.9 VITAMIN D DEFICIENCY: ICD-10-CM

## 2022-10-13 PROCEDURE — 83036 HEMOGLOBIN GLYCOSYLATED A1C: CPT

## 2022-10-13 PROCEDURE — 82306 VITAMIN D 25 HYDROXY: CPT

## 2022-10-13 PROCEDURE — 84443 ASSAY THYROID STIM HORMONE: CPT

## 2022-10-13 PROCEDURE — 80048 BASIC METABOLIC PNL TOTAL CA: CPT

## 2022-10-13 PROCEDURE — 36415 COLL VENOUS BLD VENIPUNCTURE: CPT

## 2022-10-13 NOTE — TELEPHONE ENCOUNTER
Caller: Jyoti Alvarez    Relationship: Self    Best call back number: [phone]    What is the best time to reach you: ANY    Who are you requesting to speak with (clinical staff, provider,  specific staff member): CLINICAL    What was the call regarding: PATIENT WOULD LIKE TO KNOW IF HER UPCOMING LABS NEED TO BE FASTING, SHE IS PLANNING TO GET THEM DONE 10/14, DUE TO OTHER SCHEDULED APPOINTMENTS.    Do you require a callback: YES

## 2022-10-14 LAB
25(OH)D3 SERPL-MCNC: 60.7 NG/ML (ref 30–100)
ANION GAP SERPL CALCULATED.3IONS-SCNC: 10.3 MMOL/L (ref 5–15)
BUN SERPL-MCNC: 18 MG/DL (ref 8–23)
BUN/CREAT SERPL: 22 (ref 7–25)
CALCIUM SPEC-SCNC: 9.7 MG/DL (ref 8.6–10.5)
CHLORIDE SERPL-SCNC: 100 MMOL/L (ref 98–107)
CO2 SERPL-SCNC: 25.7 MMOL/L (ref 22–29)
CREAT SERPL-MCNC: 0.82 MG/DL (ref 0.57–1)
EGFRCR SERPLBLD CKD-EPI 2021: 75.6 ML/MIN/1.73
GLUCOSE SERPL-MCNC: 116 MG/DL (ref 65–99)
HBA1C MFR BLD: 6.9 % (ref 4.8–5.6)
POTASSIUM SERPL-SCNC: 4.4 MMOL/L (ref 3.5–5.2)
SODIUM SERPL-SCNC: 136 MMOL/L (ref 136–145)
TSH SERPL DL<=0.05 MIU/L-ACNC: 0.98 UIU/ML (ref 0.27–4.2)

## 2022-10-14 RX ORDER — MULTIPLE VITAMINS W/ MINERALS TAB 9MG-400MCG
1 TAB ORAL DAILY
COMMUNITY

## 2022-10-17 RX ORDER — PANTOPRAZOLE SODIUM 40 MG/1
TABLET, DELAYED RELEASE ORAL
Qty: 90 TABLET | Refills: 3 | Status: SHIPPED | OUTPATIENT
Start: 2022-10-17

## 2022-10-17 RX ORDER — LEVOTHYROXINE SODIUM 0.03 MG/1
TABLET ORAL
Qty: 90 TABLET | Refills: 3 | Status: SHIPPED | OUTPATIENT
Start: 2022-10-17

## 2022-10-18 ENCOUNTER — HOSPITAL ENCOUNTER (OUTPATIENT)
Facility: HOSPITAL | Age: 72
Setting detail: HOSPITAL OUTPATIENT SURGERY
Discharge: HOME | End: 2022-10-18
Attending: INTERNAL MEDICINE | Admitting: INTERNAL MEDICINE

## 2022-10-18 ENCOUNTER — ANESTHESIA (OUTPATIENT)
Dept: GASTROENTEROLOGY | Facility: HOSPITAL | Age: 72
End: 2022-10-18

## 2022-10-18 ENCOUNTER — ANESTHESIA EVENT (OUTPATIENT)
Dept: GASTROENTEROLOGY | Facility: HOSPITAL | Age: 72
End: 2022-10-18

## 2022-10-18 VITALS
TEMPERATURE: 97 F | HEIGHT: 62 IN | HEART RATE: 72 BPM | RESPIRATION RATE: 13 BRPM | BODY MASS INDEX: 30.39 KG/M2 | SYSTOLIC BLOOD PRESSURE: 76 MMHG | DIASTOLIC BLOOD PRESSURE: 53 MMHG | OXYGEN SATURATION: 98 % | WEIGHT: 165.12 LBS

## 2022-10-18 DIAGNOSIS — Z86.010 HISTORY OF COLON POLYPS: ICD-10-CM

## 2022-10-18 LAB — GLUCOSE BLDC GLUCOMTR-MCNC: 91 MG/DL (ref 70–99)

## 2022-10-18 PROCEDURE — 45385 COLONOSCOPY W/LESION REMOVAL: CPT | Performed by: INTERNAL MEDICINE

## 2022-10-18 PROCEDURE — 25010000002 PROPOFOL 10 MG/ML EMULSION: Performed by: NURSE ANESTHETIST, CERTIFIED REGISTERED

## 2022-10-18 PROCEDURE — 88305 TISSUE EXAM BY PATHOLOGIST: CPT | Performed by: INTERNAL MEDICINE

## 2022-10-18 PROCEDURE — 82962 GLUCOSE BLOOD TEST: CPT

## 2022-10-18 RX ORDER — ONDANSETRON 2 MG/ML
4 INJECTION INTRAMUSCULAR; INTRAVENOUS ONCE AS NEEDED
Status: DISCONTINUED | OUTPATIENT
Start: 2022-10-18 | End: 2022-10-18 | Stop reason: HOSPADM

## 2022-10-18 RX ORDER — PROMETHAZINE HYDROCHLORIDE 25 MG/1
25 TABLET ORAL ONCE AS NEEDED
Status: DISCONTINUED | OUTPATIENT
Start: 2022-10-18 | End: 2022-10-18 | Stop reason: HOSPADM

## 2022-10-18 RX ORDER — PHENYLEPHRINE HCL IN 0.9% NACL 1 MG/10 ML
SYRINGE (ML) INTRAVENOUS AS NEEDED
Status: DISCONTINUED | OUTPATIENT
Start: 2022-10-18 | End: 2022-10-18 | Stop reason: SURG

## 2022-10-18 RX ORDER — PROMETHAZINE HYDROCHLORIDE 25 MG/1
25 SUPPOSITORY RECTAL ONCE AS NEEDED
Status: DISCONTINUED | OUTPATIENT
Start: 2022-10-18 | End: 2022-10-18 | Stop reason: HOSPADM

## 2022-10-18 RX ORDER — SODIUM CHLORIDE, SODIUM LACTATE, POTASSIUM CHLORIDE, CALCIUM CHLORIDE 600; 310; 30; 20 MG/100ML; MG/100ML; MG/100ML; MG/100ML
30 INJECTION, SOLUTION INTRAVENOUS CONTINUOUS
Status: DISCONTINUED | OUTPATIENT
Start: 2022-10-18 | End: 2022-10-18 | Stop reason: HOSPADM

## 2022-10-18 RX ORDER — LIDOCAINE HYDROCHLORIDE 20 MG/ML
INJECTION, SOLUTION EPIDURAL; INFILTRATION; INTRACAUDAL; PERINEURAL AS NEEDED
Status: DISCONTINUED | OUTPATIENT
Start: 2022-10-18 | End: 2022-10-18 | Stop reason: SURG

## 2022-10-18 RX ORDER — PROPOFOL 10 MG/ML
VIAL (ML) INTRAVENOUS AS NEEDED
Status: DISCONTINUED | OUTPATIENT
Start: 2022-10-18 | End: 2022-10-18 | Stop reason: SURG

## 2022-10-18 RX ADMIN — PROPOFOL 50 MG: 10 INJECTION, EMULSION INTRAVENOUS at 08:33

## 2022-10-18 RX ADMIN — Medication 100 MCG: at 08:37

## 2022-10-18 RX ADMIN — PROPOFOL 100 MG: 10 INJECTION, EMULSION INTRAVENOUS at 08:23

## 2022-10-18 RX ADMIN — SODIUM CHLORIDE, POTASSIUM CHLORIDE, SODIUM LACTATE AND CALCIUM CHLORIDE 30 ML/HR: 600; 310; 30; 20 INJECTION, SOLUTION INTRAVENOUS at 07:50

## 2022-10-18 RX ADMIN — LIDOCAINE HYDROCHLORIDE 50 MG: 20 INJECTION, SOLUTION EPIDURAL; INFILTRATION; INTRACAUDAL; PERINEURAL at 08:23

## 2022-10-18 RX ADMIN — PROPOFOL 50 MG: 10 INJECTION, EMULSION INTRAVENOUS at 08:27

## 2022-10-18 NOTE — H&P
Pre Procedure History & Physical    Chief Complaint:   History colon polyps    Subjective     HPI:   Past history of colon polyps    Past Medical History:   Past Medical History:   Diagnosis Date   • Anemia    • Arthritis    • Asthma    • Breast cancer (HCC)     RIGHT   • Colon polyp    • Disease of thyroid gland Hypothyroidism   • Fatty liver    • Gastroesophageal reflux    • HL (hearing loss) Hearing aids    • Hyperlipemia    • Hypertension    • Limb swelling    • Seasonal allergies    • Skin cancer    • Sleep apnea    • Strain of latissimus dorsi muscle 2017    Right   • Type 2 diabetes mellitus (HCC)    • Vertigo        Past Surgical History:  Past Surgical History:   Procedure Laterality Date   • BREAST AUGMENTATION BILATERAL MASTOPEXY     • BREAST RECONSTRUCTION Bilateral 2022    Procedure: BILATERAL BREAST RECONSTRUCTION WITH REMOVAL AND REPLACEMENT OF IMPLANTS, MESH PLACEMENT, CHEST WALL SKIN RESECTION, CHEST LIPOSUCTION;  Surgeon: Romain Quezada MD;  Location: [facility] [place];  Service: Plastics;  Laterality: Bilateral;   • BREAST SURGERY      Bilateral mastectomies and reconstruction   •  SECTION     • COLONOSCOPY     • COSMETIC SURGERY  2004    Bilateral mastectomies and reconstructio   • ENDOSCOPY  2019   • EYE SURGERY  Not sure    Cateracts   • LIPOSUCTION N/A 2022    Procedure: CHEST WALL LIPOSUCTION AND CHEST WALL RESECTION;  Surgeon: Romain Quezada MD;  Location: [facility] [place];  Service: Plastics;  Laterality: N/A;  TIMES BASED ON PREVIOUS EXPERIENCE   • MASTECTOMY COMPLETE / SIMPLE Bilateral    • OOPHORECTOMY     • OTHER SURGICAL HISTORY      Joint surgery,  RIGHT FOOT SECOND  TOE WITH A PIN   • SKIN CANCER EXCISION Right 2020    neck   • TOE SURGERY Right     2nd digit pin inserted   • TONSILLECTOMY         Family History:  Family History   Problem Relation Age of Onset   • Heart disease Mother    • Cancer Mother         Breast cancer age 45  and 60   • Diabetes Mother    • Arthritis Mother    • Osteoporosis Mother    • Heart failure Mother         Heart attack age 60. Age 87. Cause of death.   • Hypertension Mother    • Osteoarthritis Mother    • Heart disease Father    • Cancer Father         Skin cancer   • Heart failure Father         Age 79  cause of death.   • Cancer Sister         Breast cancer age 27 and 52. Ovarian cancer age 53 caused death at 56.   • Diabetes Sister    • Ovarian cancer Sister 53   • Arthritis Sister    • Cancer Brother         Prostate cancer age about 58.   • Prostate cancer Brother         unsure age   • Breast cancer Other    • Skin cancer Other    • Cancer Daughter    • Cancer Maternal Aunt         Breast cancer   • Cancer Maternal Aunt          with ovarian cancer age mid 40s   • Malig Hyperthermia Neg Hx    • Colon cancer Neg Hx        Social History:   reports that she has never smoked. She has never used smokeless tobacco. She reports that she does not drink alcohol and does not use drugs.    Medications:   Medications Prior to Admission   Medication Sig Dispense Refill Last Dose   • acetaminophen (TYLENOL) 650 MG 8 hr tablet Take 650 mg by mouth 2 (two) times a day.   10/17/2022   • albuterol sulfate  (90 Base) MCG/ACT inhaler USE 2 INHALATIONS EVERY 6 HOURS AS NEEDED (Patient taking differently: Inhale 2 puffs Every 6 (Six) Hours As Needed.) 51 g 2 Past Week   • aspirin 81 MG EC tablet Take 81 mg by mouth Daily. LAST DOSE TODAY PER DR. HAYES (BACILIO), OKAYED WITH DR. MONTEMAYOR. BACILIO STATED SHE WOULD NOTIFY PATIENT   10/16/2022   • atorvastatin (LIPITOR) 40 MG tablet TAKE 1 TABLET EVERY NIGHT 90 tablet 3 10/17/2022   • Cholecalciferol (Vitamin D3) 1.25 MG (42624 UT) capsule Take 1 capsule by mouth Every 7 (Seven) Days. 12 capsule 1 Past Month   • ferrous sulfate 325 (65 FE) MG tablet Take 325 mg by mouth Daily With Breakfast.   Past Week   • fluticasone (FLONASE) 50 MCG/ACT nasal spray USE 1 SPRAY IN  EACH NOSTRIL ONCE DAILY (Patient taking differently: 2 sprays into the nostril(s) as directed by provider Daily As Needed.) 48 g 1 10/17/2022   • fluticasone-salmeterol (Advair Diskus) 500-50 MCG/DOSE DISKUS Inhale 1 puff 2 (Two) Times a Day. 3 each 1 10/18/2022   • glimepiride (AMARYL) 4 MG tablet TAKE 1 TABLET TWICE A  tablet 3 10/17/2022   • Lantus SoloStar 100 UNIT/ML injection pen INJECT 20 UNITS ONCE A DAY AND INCREASE TO 50 UNITS AS INDICATED (Patient taking differently: Inject 48 Units under the skin into the appropriate area as directed Daily.) 75 mL 3 10/17/2022   • levothyroxine (SYNTHROID, LEVOTHROID) 25 MCG tablet TAKE 1 TABLET DAILY IN THE MORNING ON AN EMPTY STOMACH 90 tablet 3 10/18/2022   • losartan (COZAAR) 50 MG tablet Take 1 tablet by mouth Daily. 90 tablet 1 10/17/2022   • metoprolol succinate XL (TOPROL-XL) 100 MG 24 hr tablet TAKE 1 TABLET DAILY 90 tablet 3 10/18/2022 at 0300   • montelukast (SINGULAIR) 10 MG tablet TAKE 1 TABLET DAILY 90 tablet 3 10/18/2022   • multivitamin with minerals tablet tablet Take 1 tablet by mouth Daily.   Past Week   • pantoprazole (PROTONIX) 40 MG EC tablet TAKE 1 TABLET DAILY 90 tablet 3 10/18/2022   • Trulicity 1.5 MG/0.5ML solution pen-injector INJECT 1.5 MG UNDER THE SKIN INTO APPROPRIATE AREA AS DIRECTED ONCE A WEEK 6 mL 3 Past Week   • vitamin B-12 (CYANOCOBALAMIN) 500 MCG tablet Take 500 mcg by mouth Daily.   Past Week   • BD Pen Needle Ca U/F 32G X 4 MM misc USE 1 PEN NEEDLE DAILY 90 each 3    • betamethasone dipropionate (DIPROLENE) 0.05 % lotion APPLY TO SCALP TWICE DAILY AS NEEDED FOR DISCOMFORT   More than a month   • fluocinonide (LIDEX) 0.05 % cream fluocinonide 0.05 % topical cream apply to affected area(s) by topical route As needed   Active   More than a month   • hydrocortisone 2.5 % cream APPLY TO SCALY AREAS ON THE EARS TWICE DAILY AS NEEDED FLARES.   More than a month   • ketoconazole (NIZORAL) 2 % shampoo WASH FACE AND SCALP TWO TO  "THREE TIMES WEEKLY. LEAVE ON FOR 5 MINUTES BEFORE RINSING   More than a month   • OneTouch Delica Lancets 30G misc 1 each 3 (Three) Times a Day. 200 each 6    • OneTouch Verio test strip TEST BLOOD SUGAR THREE TIMES DAILY 300 each 2        Allergies:  Exenatide, Fluoxetine, Gabapentin, Jardiance [empagliflozin], Levaquin [levofloxacin], Nickel, and Sulfa antibiotics        Objective     Blood pressure 129/94, pulse 76, temperature 97 °F (36.1 °C), temperature source Temporal, resp. rate 20, height 157.5 cm (62\"), weight 74.9 kg (165 lb 2 oz), SpO2 95 %.    Physical Exam   Constitutional: Pt is oriented to person, place, and time and well-developed, well-nourished, and in no distress.   Mouth/Throat: Oropharynx is clear and moist.   Neck: Normal range of motion.   Cardiovascular: Normal rate, regular rhythm and normal heart sounds.    Pulmonary/Chest: Effort normal and breath sounds normal.   Abdominal: Soft. Nontender  Skin: Skin is warm and dry.   Psychiatric: Mood, memory, affect and judgment normal.     Assessment & Plan     Diagnosis:  History of colon polyps    Anticipated Surgical Procedure:  Colonoscopy    The risks, benefits, and alternatives of this procedure have been discussed with the patient or the responsible party- the patient understands and agrees to proceed.            "

## 2022-10-18 NOTE — ANESTHESIA PREPROCEDURE EVALUATION
Anesthesia Evaluation     Patient summary reviewed and Nursing notes reviewed   no history of anesthetic complications:  NPO Solid Status: > 8 hours  NPO Liquid Status: > 2 hours           Airway   Mallampati: II  TM distance: >3 FB  Neck ROM: full  No difficulty expected  Dental      Pulmonary - normal exam    breath sounds clear to auscultation  (+) asthma,recent URI, sleep apnea,   Cardiovascular - normal exam  Exercise tolerance: good (4-7 METS)    Rhythm: regular  Rate: normal    (+) hypertension, hyperlipidemia,       Neuro/Psych  (+) dizziness/light headedness, numbness,    GI/Hepatic/Renal/Endo    (+)  GERD,  liver disease, renal disease, diabetes mellitus type 2, thyroid problem     Musculoskeletal     Abdominal    Substance History - negative use     OB/GYN negative ob/gyn ROS         Other   arthritis,    history of cancer    ROS/Med Hx Other: PAT Nursing Notes unavailable.      >4METS, ACTIVE. NEG STRESS TEST 11/21, EF 70%. MED CLEARANCE on chart                  Anesthesia Plan    ASA 3     general     (Total IV Anesthesia    Patient understands anesthesia not responsible for dental damage.  )  intravenous induction     Anesthetic plan, risks, benefits, and alternatives have been provided, discussed and informed consent has been obtained with: patient.    Plan discussed with CRNA.        CODE STATUS:

## 2022-10-18 NOTE — ANESTHESIA POSTPROCEDURE EVALUATION
Patient: Jyoti Alvarez    Procedure Summary     Date: 10/18/22 Room / Location: [facility] ENDOSCOPY 4 / [facility] ENDOSCOPY    Anesthesia Start: 0820 Anesthesia Stop: 0847    Procedure: COLONOSCOPY POLYPECTOMIES Diagnosis:       History of colon polyps      (History of colon polyps [Z86.010])    Surgeons: Eduardo Salas MD Provider: Ramo Escalante MD    Anesthesia Type: general ASA Status: 3          Anesthesia Type: general    Vitals  Vitals Value Taken Time   /70 10/18/22 0905   Temp 36.5 °C (97.7 °F) 10/18/22 0845   Pulse 73 10/18/22 0906   Resp 19 10/18/22 0900   SpO2 97 % 10/18/22 0906   Vitals shown include unvalidated device data.        Post Anesthesia Care and Evaluation    Patient location during evaluation: bedside  Patient participation: complete - patient participated  Level of consciousness: awake  Pain management: adequate    Airway patency: patent  Anesthetic complications: No anesthetic complications  PONV Status: none  Cardiovascular status: acceptable and stable  Respiratory status: acceptable  Hydration status: acceptable    Comments: An Anesthesiologist personally participated in the most demanding procedures (including induction and emergence if applicable) in the anesthesia plan, monitored the course of anesthesia administration at frequent intervals and remained physically present and available for immediate diagnosis and treatment of emergencies.

## 2022-10-19 NOTE — PROGRESS NOTES
Chief Complaint  Diabetes (3 month follow up, med mgt, recent labs )    Referred By: Ramo Noguera MD    Subjective          Jyoti Alvarez presents to [facility] for diabetes medication management    History of Present Illness    Visit type:  follow-up  Diabetes type:  Type 2  Current diabetes status/concerns/issues:  She has been having quite a few lows in the mornings  Other health concerns: Vertigo has improved.  She is no longer in therapy  Diabetes symptoms:    Polyuria: No   Polydipsia: Yes   Polyphagia: No   Blurred vision: No   Excessive fatigue: No   Diabetes complications:  Neuro: Neuropathy with pain and some tingling-like sensations primarily in the right foot but she has had surgery on that foot; this is most likely not diabetes related  Renal: stage II renal disease  Eyes: None  Amputation/Wounds: None  GI: None  Cardiovascular: hypertension and hyperlipidemia  ED: N/A   Other: None  Hypoglycemia:  Level 1 hypoglycemia (54 mg/dL - 70 mg/dL); Frequency - frequent episodes of hypoglycemia per glucose logs  Hypoglycemia Symptoms:  confusion, shaking/tremors, hunger and weakness  Current diabetes treatment:  Trulicity 1.5 mg once weekly, Lantus she has gradually decreased to 48 units once every evening and glimepiride 4 mg twice a day  Blood glucose device:  Meter  Blood glucose monitoring frequency:  2 -3  Blood glucose range/average:   per glucose logs (see scanned charts)  Diet:  Limits high carb/sweet foods, Avoids sugary drinks  Activity/Exercise:  walking daily    Past Medical History:   Diagnosis Date   • Anemia    • Arthritis    • Asthma    • Breast cancer (HCC)     RIGHT   • Colon polyp    • Disease of thyroid gland Hypothyroidism   • Fatty liver    • Gastroesophageal reflux    • HL (hearing loss) Hearing aids 2021   • Hyperlipemia    • Hypertension    • Limb swelling    • Seasonal allergies    • Skin cancer    • Sleep apnea    • Strain of latissimus  dorsi muscle 2017    Right   • Type 2 diabetes mellitus (HCC)    • Vertigo      Past Surgical History:   Procedure Laterality Date   • BREAST AUGMENTATION BILATERAL MASTOPEXY     • BREAST RECONSTRUCTION Bilateral 2022    Procedure: BILATERAL BREAST RECONSTRUCTION WITH REMOVAL AND REPLACEMENT OF IMPLANTS, MESH PLACEMENT, CHEST WALL SKIN RESECTION, CHEST LIPOSUCTION;  Surgeon: Romain Quezada MD;  Location: [facility];  Service: Plastics;  Laterality: Bilateral;   • BREAST SURGERY      Bilateral mastectomies and reconstruction   •  SECTION     • COLONOSCOPY  2019   • COLONOSCOPY N/A 10/18/2022    Procedure: COLONOSCOPY POLYPECTOMIES;  Surgeon: Eduardo Salas MD;  Location: [facility] ENDOSCOPY;  Service: Gastroenterology;  Laterality: N/A;  COLON POLYPS   • COSMETIC SURGERY      Bilateral mastectomies and reconstructio   • ENDOSCOPY  2019   • EYE SURGERY  Not sure    Cateracts   • LIPOSUCTION N/A 2022    Procedure: CHEST WALL LIPOSUCTION AND CHEST WALL RESECTION;  Surgeon: Romain Quezada MD;  Location: [facility];  Service: Plastics;  Laterality: N/A;  TIMES BASED ON PREVIOUS EXPERIENCE   • MASTECTOMY COMPLETE / SIMPLE Bilateral    • OOPHORECTOMY     • OTHER SURGICAL HISTORY      Joint surgery,  RIGHT FOOT SECOND  TOE WITH A PIN   • SKIN CANCER EXCISION Right 2020    neck   • TOE SURGERY Right     2nd digit pin inserted   • TONSILLECTOMY       Family History   Problem Relation Age of Onset   • Heart disease Mother    • Cancer Mother         Breast cancer age 45 and 60   • Diabetes Mother    • Arthritis Mother    • Osteoporosis Mother    • Heart failure Mother         Heart attack age 60. Age 87. Cause of death.   • Hypertension Mother    • Osteoarthritis Mother    • Heart disease Father    • Cancer Father         Skin cancer   • Heart failure Father         Age 79  cause of death.   • Cancer Sister         Breast cancer age 27 and 52. Ovarian cancer age 53  caused death at 56.   • Diabetes Sister    • Ovarian cancer Sister 53   • Arthritis Sister    • Cancer Brother         Prostate cancer age about 58.   • Prostate cancer Brother         unsure age   • Breast cancer Other    • Skin cancer Other    • Cancer Daughter    • Cancer Maternal Aunt         Breast cancer   • Cancer Maternal Aunt          with ovarian cancer age mid 40s   • Malig Hyperthermia Neg Hx    • Colon cancer Neg Hx      Social History     Socioeconomic History   • Marital status:    • Number of children: 3   Tobacco Use   • Smoking status: Never   • Smokeless tobacco: Never   Vaping Use   • Vaping Use: Never used   Substance and Sexual Activity   • Alcohol use: Never   • Drug use: Never   • Sexual activity: Not Currently     Allergies   Allergen Reactions   • Exenatide Unknown - High Severity     Weight loss and pain in the right side. Bydereon.   • Fluoxetine Hives   • Gabapentin Headache     Occular Miaigraines   • Jardiance [Empagliflozin] Other (See Comments)     UTI   • Levaquin [Levofloxacin] Hives   • Nickel Itching and Rash   • Sulfa Antibiotics Swelling       Current Outpatient Medications:   •  acetaminophen (TYLENOL) 650 MG 8 hr tablet, Take 650 mg by mouth 2 (two) times a day., Disp: , Rfl:   •  albuterol sulfate  (90 Base) MCG/ACT inhaler, USE 2 INHALATIONS EVERY 6 HOURS AS NEEDED (Patient taking differently: Inhale 2 puffs Every 6 (Six) Hours As Needed.), Disp: 51 g, Rfl: 2  •  aspirin 81 MG EC tablet, Take 81 mg by mouth Daily. LAST DOSE TODAY PER DR. HAYES (BACILIO), OKAYED WITH DR. MONTEMAYOR. BACILIO STATED SHE WOULD NOTIFY PATIENT, Disp: , Rfl:   •  atorvastatin (LIPITOR) 40 MG tablet, TAKE 1 TABLET EVERY NIGHT, Disp: 90 tablet, Rfl: 3  •  BD Pen Needle Ca U/F 32G X 4 MM misc, USE 1 PEN NEEDLE DAILY, Disp: 90 each, Rfl: 3  •  betamethasone dipropionate (DIPROLENE) 0.05 % lotion, APPLY TO SCALP TWICE DAILY AS NEEDED FOR DISCOMFORT, Disp: , Rfl:   •  Cholecalciferol  (Vitamin D3) 1.25 MG (70736 UT) capsule, Take 1 capsule by mouth Every 7 (Seven) Days., Disp: 12 capsule, Rfl: 1  •  ferrous sulfate 325 (65 FE) MG tablet, Take 325 mg by mouth Daily With Breakfast., Disp: , Rfl:   •  fluocinonide (LIDEX) 0.05 % cream, fluocinonide 0.05 % topical cream apply to affected area(s) by topical route As needed   Active, Disp: , Rfl:   •  fluticasone (FLONASE) 50 MCG/ACT nasal spray, USE 1 SPRAY IN EACH NOSTRIL ONCE DAILY (Patient taking differently: 2 sprays into the nostril(s) as directed by provider Daily As Needed.), Disp: 48 g, Rfl: 1  •  fluticasone-salmeterol (Advair Diskus) 500-50 MCG/DOSE DISKUS, Inhale 1 puff 2 (Two) Times a Day., Disp: 3 each, Rfl: 1  •  hydrocortisone 2.5 % cream, APPLY TO SCALY AREAS ON THE EARS TWICE DAILY AS NEEDED FLARES., Disp: , Rfl:   •  ketoconazole (NIZORAL) 2 % shampoo, WASH FACE AND SCALP TWO TO THREE TIMES WEEKLY. LEAVE ON FOR 5 MINUTES BEFORE RINSING, Disp: , Rfl:   •  Lantus SoloStar 100 UNIT/ML injection pen, INJECT 20 UNITS ONCE A DAY AND INCREASE TO 50 UNITS AS INDICATED (Patient taking differently: Inject 48 Units under the skin into the appropriate area as directed Daily.), Disp: 75 mL, Rfl: 3  •  levothyroxine (SYNTHROID, LEVOTHROID) 25 MCG tablet, TAKE 1 TABLET DAILY IN THE MORNING ON AN EMPTY STOMACH, Disp: 90 tablet, Rfl: 3  •  losartan (COZAAR) 50 MG tablet, Take 1 tablet by mouth Daily., Disp: 90 tablet, Rfl: 1  •  metoprolol succinate XL (TOPROL-XL) 100 MG 24 hr tablet, TAKE 1 TABLET DAILY, Disp: 90 tablet, Rfl: 3  •  montelukast (SINGULAIR) 10 MG tablet, TAKE 1 TABLET DAILY, Disp: 90 tablet, Rfl: 3  •  multivitamin with minerals tablet tablet, Take 1 tablet by mouth Daily., Disp: , Rfl:   •  OneTouch Delica Lancets 30G misc, 1 each 3 (Three) Times a Day., Disp: 200 each, Rfl: 6  •  OneTouch Verio test strip, TEST BLOOD SUGAR THREE TIMES DAILY, Disp: 300 each, Rfl: 2  •  pantoprazole (PROTONIX) 40 MG EC tablet, TAKE 1 TABLET DAILY,  "Disp: 90 tablet, Rfl: 3  •  vitamin B-12 (CYANOCOBALAMIN) 500 MCG tablet, Take 500 mcg by mouth Daily., Disp: , Rfl:   •  Dulaglutide (Trulicity) 3 MG/0.5ML solution pen-injector, Inject 0.5 mL under the skin into the appropriate area as directed Every 7 (Seven) Days for 4 doses., Disp: 6 mL, Rfl: 1  •  glimepiride (AMARYL) 2 MG tablet, Take 1 tablet by mouth Daily With Breakfast & Dinner., Disp: 180 tablet, Rfl: 1    Review of Systems   Constitutional: Positive for unexpected weight loss (6 pounds). Negative for activity change, appetite change, fatigue and unexpected weight gain.   Eyes: Negative for blurred vision and visual disturbance.   Gastrointestinal: Positive for abdominal pain. Negative for constipation, diarrhea, nausea, vomiting, GERD and indigestion.   Endocrine: Negative for polydipsia, polyphagia and polyuria.   Neurological: Positive for numbness.        Objective     Vitals:    10/20/22 1527   BP: 143/78   BP Location: Left arm   Patient Position: Sitting   Cuff Size: Adult   Pulse: 77   Temp: 97.5 °F (36.4 °C)   SpO2: 97%   Weight: 73.5 kg (162 lb 0.6 oz)   Height: 157.5 cm (62\")   PainSc:   3     Body mass index is 29.64 kg/m².    Physical Exam  Constitutional:       Appearance: Normal appearance.      Comments: Overweight with BMI of 29.64   HENT:      Head: Normocephalic and atraumatic.      Right Ear: External ear normal.      Left Ear: External ear normal.      Nose: Nose normal.   Eyes:      Extraocular Movements: Extraocular movements intact.      Conjunctiva/sclera: Conjunctivae normal.   Pulmonary:      Effort: Pulmonary effort is normal.   Musculoskeletal:         General: Normal range of motion.      Cervical back: Normal range of motion.   Skin:     General: Skin is warm and dry.   Neurological:      General: No focal deficit present.      Mental Status: She is alert and oriented to person, place, and time. Mental status is at baseline.   Psychiatric:         Mood and Affect: Mood " normal.         Behavior: Behavior normal.         Thought Content: Thought content normal.         Judgment: Judgment normal.         Result Review :   The following data was reviewed by: FANY Thakur on 10/20/2022:    Most Recent A1C    HGBA1C Most Recent 10/13/22   Hemoglobin A1C 6.90 (A)   (A) Abnormal value              A1C Last 3 Results    HGBA1C Last 3 Results 4/19/22 7/12/22 10/13/22   Hemoglobin A1C 7.00 (A) 7.00 (A) 6.90 (A)   (A) Abnormal value            Her most recent A1c was collected on 10/13/2022 and was 6.9% indicating controlled type 2 diabetes.  This is down slightly from the prior result of 7.0 collected in July of this year.    Creatinine   Date Value Ref Range Status   10/13/2022 0.82 0.57 - 1.00 mg/dL Final   07/12/2022 0.82 0.57 - 1.00 mg/dL Final   07/06/2021 0.70 mg/dL Final     Comment:     Serial Number: [id number]:  [id number]     eGFR   Date Value Ref Range Status   10/13/2022 75.6 >60.0 mL/min/1.73 Final     Comment:     National Kidney Foundation and American Society of Nephrology (ASN) Task Force recommended calculation based on the Chronic Kidney Disease Epidemiology Collaboration (CKD-EPI) equation refit without adjustment for race.   07/12/2022 75.6 >60.0 mL/min/1.73 Final     Comment:     National Kidney Foundation and American Society of Nephrology (ASN) Task Force recommended calculation based on the Chronic Kidney Disease Epidemiology Collaboration (CKD-EPI) equation refit without adjustment for race.     Labs collected on 10/13/2022 show stage II renal disease    Microalbumin, Urine   Date Value Ref Range Status   07/12/2022 <1.2 mg/dL Final     Creatinine, Urine   Date Value Ref Range Status   07/12/2022 98.2 mg/dL Final     Microalbumin/Creatinine Ratio   Date Value Ref Range Status   07/12/2022   Final     Comment:     Unable to calculate   04/08/2021 18.5 0.0 - 35.0 mg/g[Cre] Final   10/06/2020 8.1 0.0 - 35.0 mg/g[Cre] Final     Urine microalbumin collected  on 7/12/2022 is within normal limits          Assessment: She continues have a well-controlled A1c indicating controlled type 2 diabetes.  She has been having several episodes of hypoglycemia in the morning especially when she goes for her block.  She has had to have emergency treatment for a couple of those events with oral glucose.  She has had a 6 pound weight loss since her last appointment.  She would like to increase the Trulicity to help with ongoing weight loss.      Diagnoses and all orders for this visit:    1. Controlled type 2 diabetes mellitus with stage 2 chronic kidney disease, with long-term current use of insulin ([facility]) (Primary)    2. Overweight (BMI 25.0-29.9)    Other orders  -     glimepiride (AMARYL) 2 MG tablet; Take 1 tablet by mouth Daily With Breakfast & Dinner.  Dispense: 180 tablet; Refill: 1  -     Dulaglutide (Trulicity) 3 MG/0.5ML solution pen-injector; Inject 0.5 mL under the skin into the appropriate area as directed Every 7 (Seven) Days for 4 doses.  Dispense: 6 mL; Refill: 1        Plan: We will increase her Trulicity to 3 mg once weekly.  We will decrease the glimepiride to 2 mg twice a day.  If she continues to have problematic hypoglycemia we will consider discontinuing this medication.  She is encouraged to continue decreasing the insulin if necessary to prevent hypoglycemia.  She is to contact our office if she has any questions or concerns so that we can further guide her and what medications to adjust.    The patient will monitor her blood glucose levels 2-3 times each day.  If she develops problematic hyperglycemia or hypoglycemia or adverse drug reactions, she will contact the office for further instructions.        Follow Up     Return in about 3 months (around 1/20/2023) for Medication Management.    Patient was given instructions and counseling regarding her condition or for health maintenance advice. Please see specific information pulled into the AVS if appropriate.      Kelly Vicente, APRN  10/20/2022      Dictated Utilizing Dragon Dictation.  Please note that portions of this note were completed with a voice recognition program.  Part of this note may be an electronic transcription/translation of spoken language to printed text using the Dragon Dictation System.

## 2022-10-20 ENCOUNTER — OFFICE VISIT (OUTPATIENT)
Dept: DIABETES SERVICES | Facility: HOSPITAL | Age: 72
End: 2022-10-20

## 2022-10-20 VITALS
DIASTOLIC BLOOD PRESSURE: 78 MMHG | SYSTOLIC BLOOD PRESSURE: 143 MMHG | OXYGEN SATURATION: 97 % | HEART RATE: 77 BPM | BODY MASS INDEX: 29.82 KG/M2 | TEMPERATURE: 97.5 F | WEIGHT: 162.04 LBS | HEIGHT: 62 IN

## 2022-10-20 DIAGNOSIS — Z79.4 CONTROLLED TYPE 2 DIABETES MELLITUS WITH STAGE 2 CHRONIC KIDNEY DISEASE, WITH LONG-TERM CURRENT USE OF INSULIN: Primary | ICD-10-CM

## 2022-10-20 DIAGNOSIS — E11.22 CONTROLLED TYPE 2 DIABETES MELLITUS WITH STAGE 2 CHRONIC KIDNEY DISEASE, WITH LONG-TERM CURRENT USE OF INSULIN: Primary | ICD-10-CM

## 2022-10-20 DIAGNOSIS — E66.3 OVERWEIGHT (BMI 25.0-29.9): ICD-10-CM

## 2022-10-20 DIAGNOSIS — N18.2 CONTROLLED TYPE 2 DIABETES MELLITUS WITH STAGE 2 CHRONIC KIDNEY DISEASE, WITH LONG-TERM CURRENT USE OF INSULIN: Primary | ICD-10-CM

## 2022-10-20 PROCEDURE — G0463 HOSPITAL OUTPT CLINIC VISIT: HCPCS | Performed by: NURSE PRACTITIONER

## 2022-10-20 PROCEDURE — 99214 OFFICE O/P EST MOD 30 MIN: CPT | Performed by: NURSE PRACTITIONER

## 2022-10-20 RX ORDER — GLIMEPIRIDE 2 MG/1
2 TABLET ORAL
Qty: 180 TABLET | Refills: 1 | Status: SHIPPED | OUTPATIENT
Start: 2022-10-20 | End: 2023-03-31

## 2022-10-20 RX ORDER — DULAGLUTIDE 3 MG/.5ML
3 INJECTION, SOLUTION SUBCUTANEOUS
Qty: 6 ML | Refills: 1 | Status: SHIPPED | OUTPATIENT
Start: 2022-10-20 | End: 2022-11-11
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
## 2022-10-27 ENCOUNTER — OFFICE VISIT (OUTPATIENT)
Dept: INTERNAL MEDICINE | Facility: CLINIC | Age: 72
End: 2022-10-27

## 2022-10-27 VITALS
DIASTOLIC BLOOD PRESSURE: 72 MMHG | WEIGHT: 172.6 LBS | TEMPERATURE: 97 F | HEIGHT: 62 IN | BODY MASS INDEX: 31.76 KG/M2 | OXYGEN SATURATION: 98 % | SYSTOLIC BLOOD PRESSURE: 140 MMHG | HEART RATE: 83 BPM

## 2022-10-27 DIAGNOSIS — I10 ESSENTIAL HYPERTENSION: ICD-10-CM

## 2022-10-27 DIAGNOSIS — N18.31 STAGE 3A CHRONIC KIDNEY DISEASE: ICD-10-CM

## 2022-10-27 DIAGNOSIS — E03.4 ATROPHY OF THYROID: Primary | ICD-10-CM

## 2022-10-27 DIAGNOSIS — E53.8 VITAMIN B12 DEFICIENCY: ICD-10-CM

## 2022-10-27 DIAGNOSIS — E78.2 MIXED HYPERLIPIDEMIA: ICD-10-CM

## 2022-10-27 DIAGNOSIS — D50.8 IRON DEFICIENCY ANEMIA SECONDARY TO INADEQUATE DIETARY IRON INTAKE: ICD-10-CM

## 2022-10-27 DIAGNOSIS — K63.5 POLYP OF COLON, UNSPECIFIED PART OF COLON, UNSPECIFIED TYPE: ICD-10-CM

## 2022-10-27 DIAGNOSIS — E11.65 TYPE 2 DIABETES MELLITUS WITH HYPERGLYCEMIA, WITHOUT LONG-TERM CURRENT USE OF INSULIN: ICD-10-CM

## 2022-10-27 DIAGNOSIS — E55.9 VITAMIN D DEFICIENCY: ICD-10-CM

## 2022-10-27 PROCEDURE — 99215 OFFICE O/P EST HI 40 MIN: CPT | Performed by: INTERNAL MEDICINE

## 2022-10-27 NOTE — ASSESSMENT & PLAN NOTE
Blood pressure remains controlled as of her 10/22 office visit.  Patient stable to continue with moderate dose doses of losartan and metoprolol.

## 2022-10-27 NOTE — ASSESSMENT & PLAN NOTE
TSH is 0.9 as of her 10/22 office visit, so patient will continue with very low-dose supplementation, 25 mcg daily as long as she is having no palpitations/etc.

## 2022-10-27 NOTE — ASSESSMENT & PLAN NOTE
Vitamin D is 61 as of her 10/22 office visit.  Patient is stable on once monthly prescription dosing.

## 2022-10-27 NOTE — ASSESSMENT & PLAN NOTE
A1c is only 6.9 as of her 10/22 office visit.  This is evidence of excellent control.  Of note, she was having some low sugars in the morning, she backed off on her Lantus, that has stabilized, she is on 48 units now.  She was also having some intermittent hypoglycemic episodes during the day, she saw Kelly recently, and she cut her glimepiride in half and advanced her Trulicity.  Certainly on an excellent regimen, she is monitoring it closely, she will notify myself or Kelly if things trend up significantly.

## 2022-10-27 NOTE — ASSESSMENT & PLAN NOTE
LDL was at goal earlier this year, will repeat level after new year, she will continue with moderate dose atorvastatin until then.

## 2022-10-27 NOTE — PATIENT INSTRUCTIONS
The new COVID-vaccine is referred to as the bivalent vaccine, it covers more than one version of the coronavirus.  Current recommendation is just for one dose of it.

## 2022-10-28 PROCEDURE — 90662 IIV NO PRSV INCREASED AG IM: CPT | Performed by: INTERNAL MEDICINE

## 2022-10-28 PROCEDURE — G0008 ADMIN INFLUENZA VIRUS VAC: HCPCS | Performed by: INTERNAL MEDICINE

## 2022-11-07 ENCOUNTER — TELEPHONE (OUTPATIENT)
Dept: SLEEP MEDICINE | Facility: HOSPITAL | Age: 72
End: 2022-11-07

## 2022-11-07 NOTE — TELEPHONE ENCOUNTER
Patient called ,is returning cpap machine to [facility].she .will call later to make a appt to follow up

## 2022-11-10 ENCOUNTER — CLINICAL SUPPORT (OUTPATIENT)
Dept: INTERNAL MEDICINE | Facility: CLINIC | Age: 72
End: 2022-11-10

## 2022-11-10 DIAGNOSIS — Z23 NEED FOR VACCINATION: Primary | ICD-10-CM

## 2022-11-10 PROCEDURE — 91312 COVID-19 (PFIZER) BIVALENT BOOSTER 12+YRS: CPT | Performed by: INTERNAL MEDICINE

## 2022-11-10 PROCEDURE — 0124A PR ADM SARSCOV2 30MCG/0.3ML BST: CPT | Performed by: INTERNAL MEDICINE

## 2022-11-10 RX ORDER — FLUTICASONE PROPIONATE AND SALMETEROL 50; 500 UG/1; UG/1
POWDER RESPIRATORY (INHALATION)
Qty: 180 EACH | Refills: 3 | Status: SHIPPED | OUTPATIENT
Start: 2022-11-10

## 2022-11-18 DIAGNOSIS — E11.22 CONTROLLED TYPE 2 DIABETES MELLITUS WITH STAGE 2 CHRONIC KIDNEY DISEASE, WITH LONG-TERM CURRENT USE OF INSULIN: Primary | ICD-10-CM

## 2022-11-18 DIAGNOSIS — N18.2 CONTROLLED TYPE 2 DIABETES MELLITUS WITH STAGE 2 CHRONIC KIDNEY DISEASE, WITH LONG-TERM CURRENT USE OF INSULIN: Primary | ICD-10-CM

## 2022-11-18 DIAGNOSIS — Z79.4 CONTROLLED TYPE 2 DIABETES MELLITUS WITH STAGE 2 CHRONIC KIDNEY DISEASE, WITH LONG-TERM CURRENT USE OF INSULIN: Primary | ICD-10-CM

## 2022-11-18 RX ORDER — DULAGLUTIDE 3 MG/.5ML
3 INJECTION, SOLUTION SUBCUTANEOUS
Qty: 6 ML | Refills: 1 | Status: SHIPPED | OUTPATIENT
Start: 2022-11-18 | End: 2022-12-10

## 2022-11-21 ENCOUNTER — TELEPHONE (OUTPATIENT)
Dept: DIABETES SERVICES | Facility: HOSPITAL | Age: 72
End: 2022-11-21

## 2022-12-12 ENCOUNTER — HOSPITAL ENCOUNTER (OUTPATIENT)
Dept: ULTRASOUND IMAGING | Facility: HOSPITAL | Age: 72
Discharge: HOME | End: 2022-12-12
Admitting: OTOLARYNGOLOGY

## 2022-12-12 DIAGNOSIS — E04.1 THYROID NODULE: ICD-10-CM

## 2022-12-12 PROCEDURE — 76536 US EXAM OF HEAD AND NECK: CPT

## 2022-12-27 RX ORDER — BLOOD SUGAR DIAGNOSTIC
STRIP MISCELLANEOUS
Qty: 300 EACH | Refills: 3 | Status: SHIPPED | OUTPATIENT
Start: 2022-12-27 | End: 2022-12-27 | Stop reason: SDUPTHER

## 2023-01-04 RX ORDER — INSULIN GLARGINE 100 [IU]/ML
INJECTION, SOLUTION SUBCUTANEOUS
Qty: 75 ML | Refills: 3 | Status: SHIPPED | OUTPATIENT
Start: 2023-01-04

## 2023-01-05 RX ORDER — LOSARTAN POTASSIUM 50 MG/1
TABLET ORAL
Qty: 90 TABLET | Refills: 3 | Status: SHIPPED | OUTPATIENT
Start: 2023-01-05

## 2023-01-11 ENCOUNTER — TELEPHONE (OUTPATIENT)
Dept: PLASTIC SURGERY | Facility: CLINIC | Age: 73
End: 2023-01-11
Payer: MEDICARE

## 2023-01-16 ENCOUNTER — TELEPHONE (OUTPATIENT)
Dept: INTERNAL MEDICINE | Facility: CLINIC | Age: 73
End: 2023-01-16
Payer: MEDICARE

## 2023-01-16 NOTE — TELEPHONE ENCOUNTER
Caller: Jyoti Alvarez    Relationship to patient: Self    Best call back number: [phone]    Patient is needing: PATIENT NEEDS TO KNOW IF SHE NEEDS TO FAST BEFORE SHE HAS HER LAB WORK AND SHE ALSO HAS A QUESTION ABOUT POSSIBLY BEING EXPOSED TO COVID.  PLEASE CALL AND ADVISE

## 2023-01-19 ENCOUNTER — CLINICAL SUPPORT (OUTPATIENT)
Dept: INTERNAL MEDICINE | Facility: CLINIC | Age: 73
End: 2023-01-19
Payer: MEDICARE

## 2023-01-19 DIAGNOSIS — E53.8 VITAMIN B12 DEFICIENCY: ICD-10-CM

## 2023-01-19 DIAGNOSIS — E11.65 TYPE 2 DIABETES MELLITUS WITH HYPERGLYCEMIA, WITHOUT LONG-TERM CURRENT USE OF INSULIN: ICD-10-CM

## 2023-01-19 DIAGNOSIS — D50.8 IRON DEFICIENCY ANEMIA SECONDARY TO INADEQUATE DIETARY IRON INTAKE: ICD-10-CM

## 2023-01-19 DIAGNOSIS — E78.2 MIXED HYPERLIPIDEMIA: ICD-10-CM

## 2023-01-19 DIAGNOSIS — I10 ESSENTIAL HYPERTENSION: ICD-10-CM

## 2023-01-19 LAB
ALBUMIN SERPL-MCNC: 4.7 G/DL (ref 3.5–5.2)
ALBUMIN/GLOB SERPL: 2.5 G/DL
ALP SERPL-CCNC: 70 U/L (ref 39–117)
ALT SERPL W P-5'-P-CCNC: 25 U/L (ref 1–33)
ANION GAP SERPL CALCULATED.3IONS-SCNC: 8 MMOL/L (ref 5–15)
AST SERPL-CCNC: 28 U/L (ref 1–32)
BASOPHILS # BLD AUTO: 0.03 10*3/MM3 (ref 0–0.2)
BASOPHILS NFR BLD AUTO: 0.4 % (ref 0–1.5)
BILIRUB SERPL-MCNC: 0.5 MG/DL (ref 0–1.2)
BUN SERPL-MCNC: 16 MG/DL (ref 8–23)
BUN/CREAT SERPL: 19.8 (ref 7–25)
CALCIUM SPEC-SCNC: 9.8 MG/DL (ref 8.6–10.5)
CHLORIDE SERPL-SCNC: 103 MMOL/L (ref 98–107)
CHOLEST SERPL-MCNC: 153 MG/DL (ref 0–200)
CO2 SERPL-SCNC: 27 MMOL/L (ref 22–29)
CREAT SERPL-MCNC: 0.81 MG/DL (ref 0.57–1)
DEPRECATED RDW RBC AUTO: 43 FL (ref 37–54)
EGFRCR SERPLBLD CKD-EPI 2021: 76.8 ML/MIN/1.73
EOSINOPHIL # BLD AUTO: 0.12 10*3/MM3 (ref 0–0.4)
EOSINOPHIL NFR BLD AUTO: 1.6 % (ref 0.3–6.2)
ERYTHROCYTE [DISTWIDTH] IN BLOOD BY AUTOMATED COUNT: 12.7 % (ref 12.3–15.4)
FERRITIN SERPL-MCNC: 92.5 NG/ML (ref 13–150)
GLOBULIN UR ELPH-MCNC: 1.9 GM/DL
GLUCOSE SERPL-MCNC: 110 MG/DL (ref 65–99)
HBA1C MFR BLD: 6.7 % (ref 4.8–5.6)
HCT VFR BLD AUTO: 41.6 % (ref 34–46.6)
HDLC SERPL-MCNC: 46 MG/DL (ref 40–60)
HGB BLD-MCNC: 14.1 G/DL (ref 12–15.9)
IMM GRANULOCYTES # BLD AUTO: 0.02 10*3/MM3 (ref 0–0.05)
IMM GRANULOCYTES NFR BLD AUTO: 0.3 % (ref 0–0.5)
IRON 24H UR-MRATE: 99 MCG/DL (ref 37–145)
IRON SATN MFR SERPL: 24 % (ref 20–50)
LDLC SERPL CALC-MCNC: 85 MG/DL (ref 0–100)
LDLC/HDLC SERPL: 1.8 {RATIO}
LYMPHOCYTES # BLD AUTO: 1.87 10*3/MM3 (ref 0.7–3.1)
LYMPHOCYTES NFR BLD AUTO: 25.2 % (ref 19.6–45.3)
MCH RBC QN AUTO: 31.7 PG (ref 26.6–33)
MCHC RBC AUTO-ENTMCNC: 33.9 G/DL (ref 31.5–35.7)
MCV RBC AUTO: 93.5 FL (ref 79–97)
MONOCYTES # BLD AUTO: 0.77 10*3/MM3 (ref 0.1–0.9)
MONOCYTES NFR BLD AUTO: 10.4 % (ref 5–12)
NEUTROPHILS NFR BLD AUTO: 4.6 10*3/MM3 (ref 1.7–7)
NEUTROPHILS NFR BLD AUTO: 62.1 % (ref 42.7–76)
NRBC BLD AUTO-RTO: 0 /100 WBC (ref 0–0.2)
PLATELET # BLD AUTO: 328 10*3/MM3 (ref 140–450)
PMV BLD AUTO: 9.8 FL (ref 6–12)
POTASSIUM SERPL-SCNC: 5.1 MMOL/L (ref 3.5–5.2)
PROT SERPL-MCNC: 6.6 G/DL (ref 6–8.5)
RBC # BLD AUTO: 4.45 10*6/MM3 (ref 3.77–5.28)
SODIUM SERPL-SCNC: 138 MMOL/L (ref 136–145)
TIBC SERPL-MCNC: 413 MCG/DL (ref 298–536)
TRANSFERRIN SERPL-MCNC: 277 MG/DL (ref 200–360)
TRIGL SERPL-MCNC: 122 MG/DL (ref 0–150)
VLDLC SERPL-MCNC: 22 MG/DL (ref 5–40)
WBC NRBC COR # BLD: 7.41 10*3/MM3 (ref 3.4–10.8)

## 2023-01-19 PROCEDURE — 84466 ASSAY OF TRANSFERRIN: CPT | Performed by: INTERNAL MEDICINE

## 2023-01-19 PROCEDURE — 83036 HEMOGLOBIN GLYCOSYLATED A1C: CPT | Performed by: INTERNAL MEDICINE

## 2023-01-19 PROCEDURE — 80053 COMPREHEN METABOLIC PANEL: CPT | Performed by: INTERNAL MEDICINE

## 2023-01-19 PROCEDURE — 82607 VITAMIN B-12: CPT | Performed by: INTERNAL MEDICINE

## 2023-01-19 PROCEDURE — 82746 ASSAY OF FOLIC ACID SERUM: CPT | Performed by: INTERNAL MEDICINE

## 2023-01-19 PROCEDURE — 85025 COMPLETE CBC W/AUTO DIFF WBC: CPT | Performed by: INTERNAL MEDICINE

## 2023-01-19 PROCEDURE — 36415 COLL VENOUS BLD VENIPUNCTURE: CPT | Performed by: INTERNAL MEDICINE

## 2023-01-19 PROCEDURE — 83540 ASSAY OF IRON: CPT | Performed by: INTERNAL MEDICINE

## 2023-01-19 PROCEDURE — 80061 LIPID PANEL: CPT | Performed by: INTERNAL MEDICINE

## 2023-01-19 PROCEDURE — 82728 ASSAY OF FERRITIN: CPT | Performed by: INTERNAL MEDICINE

## 2023-01-20 LAB
FOLATE SERPL-MCNC: >20 NG/ML (ref 4.78–24.2)
VIT B12 BLD-MCNC: 1086 PG/ML (ref 211–946)

## 2023-01-26 ENCOUNTER — OFFICE VISIT (OUTPATIENT)
Dept: DIABETES SERVICES | Facility: HOSPITAL | Age: 73
End: 2023-01-26
Payer: MEDICARE

## 2023-01-26 ENCOUNTER — TELEPHONE (OUTPATIENT)
Dept: PLASTIC SURGERY | Facility: CLINIC | Age: 73
End: 2023-01-26
Payer: MEDICARE

## 2023-01-26 VITALS
HEIGHT: 62 IN | OXYGEN SATURATION: 96 % | BODY MASS INDEX: 31.83 KG/M2 | SYSTOLIC BLOOD PRESSURE: 118 MMHG | DIASTOLIC BLOOD PRESSURE: 60 MMHG | HEART RATE: 86 BPM | WEIGHT: 173 LBS | TEMPERATURE: 98.2 F

## 2023-01-26 DIAGNOSIS — E11.22 CONTROLLED TYPE 2 DIABETES MELLITUS WITH STAGE 2 CHRONIC KIDNEY DISEASE, WITH LONG-TERM CURRENT USE OF INSULIN: Primary | ICD-10-CM

## 2023-01-26 DIAGNOSIS — N18.2 CONTROLLED TYPE 2 DIABETES MELLITUS WITH STAGE 2 CHRONIC KIDNEY DISEASE, WITH LONG-TERM CURRENT USE OF INSULIN: Primary | ICD-10-CM

## 2023-01-26 DIAGNOSIS — Z79.4 CONTROLLED TYPE 2 DIABETES MELLITUS WITH STAGE 2 CHRONIC KIDNEY DISEASE, WITH LONG-TERM CURRENT USE OF INSULIN: Primary | ICD-10-CM

## 2023-01-26 DIAGNOSIS — E66.9 OBESITY (BMI 30-39.9): ICD-10-CM

## 2023-01-26 PROCEDURE — G0463 HOSPITAL OUTPT CLINIC VISIT: HCPCS | Performed by: NURSE PRACTITIONER

## 2023-01-26 PROCEDURE — 99213 OFFICE O/P EST LOW 20 MIN: CPT | Performed by: NURSE PRACTITIONER

## 2023-01-26 PROCEDURE — 3044F HG A1C LEVEL LT 7.0%: CPT | Performed by: NURSE PRACTITIONER

## 2023-01-26 RX ORDER — DULAGLUTIDE 3 MG/.5ML
INJECTION, SOLUTION SUBCUTANEOUS
COMMUNITY
Start: 2023-01-23 | End: 2023-02-02 | Stop reason: SDUPTHER

## 2023-01-26 NOTE — PROGRESS NOTES
Chief Complaint  Diabetes (Follow up, med mgt, )    Referred By: Ramo Noguera MD    Subjective          Jyoti Alvarez presents to [facility] for diabetes medication management    History of Present Illness    Visit type:  follow-up  Diabetes type:  Type 2  Current diabetes status/concerns/issues:  She has had some higher glucose levels in the mornings but is no longer having low glucose levels   Other health concerns: no new health issues.  She is concerned about her weight.  She has gained 11 pounds since her last appt..  She is packing her house up to sell and is still dealing with some grief.  She feels this has affected her eating habits.  Current Diabetes symptoms:    Polyuria: Yes   Polydipsia: Yes   Polyphagia: No   Blurred vision: Yes   Excessive fatigue: Yes  Known Diabetes complications:  Neuro: Neuropathy with pain and some tingling-like sensations primarily in the right foot but she has had surgery on that foot; this is most likely not diabetes related  Renal: stage II renal disease  Eyes: None  Amputation/Wounds: None  GI: None  Cardiovascular: hypertension and hyperlipidemia  ED: N/A              Other: None  Hypoglycemia:  None reported at this time  Hypoglycemia Symptoms:  No hypoglycemia at this time  Current diabetes treatment:  Trulicity 3 mg once weekly, Lantus she has gradually decreased to 50 units once every evening and glimepiride 2 mg twice a day    Blood glucose device:  Meter  Blood glucose monitoring frequency:  2 -3  Blood glucose range/average:   per glucose logs  Diet:  Limits high carb/sweet foods, Avoids sugary drinks  Activity/Exercise:  She walks daily    Past Medical History:   Diagnosis Date   • Anemia    • Arthritis    • Asthma    • Breast cancer (HCC)     RIGHT   • Colon polyp    • Disease of thyroid gland Hypothyroidism   • Fatty liver    • Gastroesophageal reflux    • HL (hearing loss) Hearing aids 2021   • Hyperlipemia    •  Hypertension    • Limb swelling    • Seasonal allergies    • Skin cancer    • Sleep apnea    • Strain of latissimus dorsi muscle 2017    Right   • Type 2 diabetes mellitus (HCC)    • Vertigo      Past Surgical History:   Procedure Laterality Date   • BREAST AUGMENTATION BILATERAL MASTOPEXY     • BREAST RECONSTRUCTION Bilateral 2022    Procedure: BILATERAL BREAST RECONSTRUCTION WITH REMOVAL AND REPLACEMENT OF IMPLANTS, MESH PLACEMENT, CHEST WALL SKIN RESECTION, CHEST LIPOSUCTION;  Surgeon: Romain Quezada MD;  Location: [facility] OR [facility] [place];  Service: Plastics;  Laterality: Bilateral;   • BREAST SURGERY  2004    Bilateral mastectomies and reconstruction   •  SECTION     • COLONOSCOPY  2019   • COLONOSCOPY N/A 10/18/2022    Procedure: COLONOSCOPY POLYPECTOMIES;  Surgeon: Eduardo Salas MD;  Location: [facility] ENDOSCOPY;  Service: Gastroenterology;  Laterality: N/A;  COLON POLYPS   • COSMETIC SURGERY  2004    Bilateral mastectomies and reconstructio   • ENDOSCOPY     • EYE SURGERY  Not sure    Cateracts   • LIPOSUCTION N/A 2022    Procedure: CHEST WALL LIPOSUCTION AND CHEST WALL RESECTION;  Surgeon: Romain Quezada MD;  Location: [facility] OR [facility] [place];  Service: Plastics;  Laterality: N/A;  TIMES BASED ON PREVIOUS EXPERIENCE   • MASTECTOMY COMPLETE / SIMPLE Bilateral    • OOPHORECTOMY     • OTHER SURGICAL HISTORY      Joint surgery,  RIGHT FOOT SECOND  TOE WITH A PIN   • SKIN CANCER EXCISION Right 2020    neck   • TOE SURGERY Right     2nd digit pin inserted   • TONSILLECTOMY       Family History   Problem Relation Age of Onset   • Heart disease Mother    • Cancer Mother         Breast cancer age 45 and 60   • Diabetes Mother    • Arthritis Mother    • Osteoporosis Mother    • Heart failure Mother         Heart attack age 60. Age 87. Cause of death.   • Hypertension Mother    • Osteoarthritis Mother    • Heart disease Father    • Cancer Father         Skin cancer   • Heart failure  Father         Age 79  cause of death.   • Cancer Sister         Breast cancer age 27 and 52. Ovarian cancer age 53 caused death at 56.   • Diabetes Sister    • Ovarian cancer Sister 53   • Arthritis Sister    • Cancer Brother         Prostate cancer age about 58.   • Prostate cancer Brother         unsure age   • Breast cancer Other    • Skin cancer Other    • Cancer Daughter    • Cancer Maternal Aunt         Breast cancer   • Cancer Maternal Aunt          with ovarian cancer age mid 40s   • Malig Hyperthermia Neg Hx    • Colon cancer Neg Hx      Social History     Socioeconomic History   • Marital status:    • Number of children: 3   Tobacco Use   • Smoking status: Never   • Smokeless tobacco: Never   Vaping Use   • Vaping Use: Never used   Substance and Sexual Activity   • Alcohol use: Never   • Drug use: Never   • Sexual activity: Not Currently     Allergies   Allergen Reactions   • Exenatide Unknown - High Severity     Weight loss and pain in the right side. Bydereon.   • Fluoxetine Hives   • Gabapentin Headache     Occular Miaigraines   • Jardiance [Empagliflozin] Other (See Comments)     UTI   • Levaquin [Levofloxacin] Hives   • Nickel Itching and Rash   • Sulfa Antibiotics Swelling   • Metformin Nausea Only       Current Outpatient Medications:   •  acetaminophen (TYLENOL) 650 MG 8 hr tablet, Take 650 mg by mouth 2 (two) times a day., Disp: , Rfl:   •  Advair Diskus 500-50 MCG/ACT DISKUS, USE 1 INHALATION TWICE A DAY, Disp: 180 each, Rfl: 3  •  albuterol sulfate  (90 Base) MCG/ACT inhaler, USE 2 INHALATIONS EVERY 6 HOURS AS NEEDED (Patient taking differently: Inhale 2 puffs Every 6 (Six) Hours As Needed.), Disp: 51 g, Rfl: 2  •  aspirin 81 MG EC tablet, Take 81 mg by mouth Daily. LAST DOSE TODAY PER DR. HAYES (BACILIO), OKAYED WITH DR. MONTEMAYOR. BACILIO STATED SHE WOULD NOTIFY PATIENT, Disp: , Rfl:   •  atorvastatin (LIPITOR) 40 MG tablet, TAKE 1 TABLET EVERY NIGHT, Disp: 90 tablet, Rfl:  3  •  BD Pen Needle Ca U/F 32G X 4 MM misc, USE 1 PEN NEEDLE DAILY, Disp: 90 each, Rfl: 3  •  betamethasone dipropionate (DIPROLENE) 0.05 % lotion, APPLY TO SCALP TWICE DAILY AS NEEDED FOR DISCOMFORT, Disp: , Rfl:   •  Cholecalciferol (Vitamin D3) 1.25 MG (81846 UT) capsule, Take 1 capsule by mouth Every 7 (Seven) Days., Disp: 12 capsule, Rfl: 1  •  ferrous sulfate 325 (65 FE) MG tablet, Take 325 mg by mouth Daily With Breakfast., Disp: , Rfl:   •  fluocinonide (LIDEX) 0.05 % cream, fluocinonide 0.05 % topical cream apply to affected area(s) by topical route As needed   Active, Disp: , Rfl:   •  fluticasone (FLONASE) 50 MCG/ACT nasal spray, USE 1 SPRAY IN EACH NOSTRIL ONCE DAILY (Patient taking differently: 2 sprays into the nostril(s) as directed by provider Daily As Needed.), Disp: 48 g, Rfl: 1  •  glimepiride (AMARYL) 2 MG tablet, Take 1 tablet by mouth Daily With Breakfast & Dinner., Disp: 180 tablet, Rfl: 1  •  glucose blood (OneTouch Verio) test strip, 1 each by Other route 3 (Three) Times a Day. use to test blood sugar 3 times daily, Disp: 300 each, Rfl: 3  •  hydrocortisone 2.5 % cream, APPLY TO SCALY AREAS ON THE EARS TWICE DAILY AS NEEDED FLARES., Disp: , Rfl:   •  ketoconazole (NIZORAL) 2 % shampoo, WASH FACE AND SCALP TWO TO THREE TIMES WEEKLY. LEAVE ON FOR 5 MINUTES BEFORE RINSING, Disp: , Rfl:   •  Lantus SoloStar 100 UNIT/ML injection pen, INJECT 20 UNITS ONCE A DAY AND INCREASE TO 50 UNITS AS INDICATED, Disp: 75 mL, Rfl: 3  •  levothyroxine (SYNTHROID, LEVOTHROID) 25 MCG tablet, TAKE 1 TABLET DAILY IN THE MORNING ON AN EMPTY STOMACH, Disp: 90 tablet, Rfl: 3  •  losartan (COZAAR) 50 MG tablet, TAKE 1 TABLET DAILY, Disp: 90 tablet, Rfl: 3  •  metoprolol succinate XL (TOPROL-XL) 100 MG 24 hr tablet, TAKE 1 TABLET DAILY, Disp: 90 tablet, Rfl: 3  •  montelukast (SINGULAIR) 10 MG tablet, TAKE 1 TABLET DAILY, Disp: 90 tablet, Rfl: 3  •  multivitamin with minerals tablet tablet, Take 1 tablet by mouth  "Daily., Disp: , Rfl:   •  OneTouch Delica Lancets 30G misc, 1 each 3 (Three) Times a Day., Disp: 200 each, Rfl: 6  •  pantoprazole (PROTONIX) 40 MG EC tablet, TAKE 1 TABLET DAILY, Disp: 90 tablet, Rfl: 3  •  Trulicity 3 MG/0.5ML solution pen-injector, , Disp: , Rfl:   •  vitamin B-12 (CYANOCOBALAMIN) 500 MCG tablet, Take 500 mcg by mouth Daily., Disp: , Rfl:     Review of Systems   Constitutional: Positive for unexpected weight gain (11 pounfd). Negative for activity change, appetite change, fatigue and unexpected weight loss.   Eyes: Negative for blurred vision and visual disturbance.   Gastrointestinal: Negative for abdominal pain, constipation, diarrhea, nausea, vomiting, GERD and indigestion.   Endocrine: Positive for polydipsia and polyuria. Negative for polyphagia.   Neurological: Negative for numbness.        Objective     Vitals:    01/26/23 1439   BP: 118/60   BP Location: Left arm   Patient Position: Sitting   Cuff Size: Large Adult   Pulse: 86   Temp: 98.2 °F (36.8 °C)   SpO2: 96%   Weight: 78.5 kg (173 lb)   Height: 157.5 cm (62\")   PainSc:   3     Body mass index is 31.64 kg/m².    Physical Exam  Constitutional:       Appearance: Normal appearance. She is obese.      Comments: Obesity with BMI 31.64   HENT:      Head: Normocephalic and atraumatic.      Right Ear: External ear normal.      Left Ear: External ear normal.      Nose: Nose normal.   Eyes:      Extraocular Movements: Extraocular movements intact.      Conjunctiva/sclera: Conjunctivae normal.   Pulmonary:      Effort: Pulmonary effort is normal.   Musculoskeletal:         General: Normal range of motion.      Cervical back: Normal range of motion.   Skin:     General: Skin is warm and dry.   Neurological:      General: No focal deficit present.      Mental Status: She is alert and oriented to person, place, and time. Mental status is at baseline.   Psychiatric:         Mood and Affect: Mood normal.         Behavior: Behavior normal.         " Thought Content: Thought content normal.         Judgment: Judgment normal.         Result Review :   The following data was reviewed by: FANY Thakur on 01/26/2023:    Most Recent A1C    HGBA1C Most Recent 1/19/23   Hemoglobin A1C 6.70 (A)   (A) Abnormal value              A1C Last 3 Results    HGBA1C Last 3 Results 7/12/22 10/13/22 1/19/23   Hemoglobin A1C 7.00 (A) 6.90 (A) 6.70 (A)   (A) Abnormal value            Her most recent A1c was collected on 1/19/2023 and was 6.7% indicating controlled type 2 diabetes.  This is down from the prior result of 6.9 collected in October 2022    Creatinine   Date Value Ref Range Status   01/19/2023 0.81 0.57 - 1.00 mg/dL Final   10/13/2022 0.82 0.57 - 1.00 mg/dL Final   07/06/2021 0.70 mg/dL Final     Comment:     Serial Number: [id number]:  [id number]     eGFR   Date Value Ref Range Status   01/19/2023 76.8 >60.0 mL/min/1.73 Final   10/13/2022 75.6 >60.0 mL/min/1.73 Final     Comment:     National Kidney Foundation and American Society of Nephrology (ASN) Task Force recommended calculation based on the Chronic Kidney Disease Epidemiology Collaboration (CKD-EPI) equation refit without adjustment for race.     Labs collected on 1/19/2023 show stage II renal disease    Total Cholesterol   Date Value Ref Range Status   01/19/2023 153 0 - 200 mg/dL Final   07/12/2022 147 0 - 200 mg/dL Final     Triglycerides   Date Value Ref Range Status   01/19/2023 122 0 - 150 mg/dL Final   07/12/2022 150 0 - 150 mg/dL Final     HDL Cholesterol   Date Value Ref Range Status   01/19/2023 46 40 - 60 mg/dL Final   07/12/2022 41 40 - 60 mg/dL Final     LDL Cholesterol    Date Value Ref Range Status   01/19/2023 85 0 - 100 mg/dL Final   07/12/2022 80 0 - 100 mg/dL Final     Lipid panel collected on 1/19/2023 is within normal limits            Assessment: The patient's A1c remains very well controlled.  We decreased her glimepiride at the last appointment to help minimize the risk for  hypoglycemia.  She states the hypoglycemic issues have been resolved.  We did increase her Trulicity however the patient has had an 11 pound weight gain since last being seen.  She is still grieving over the loss of her  and is now moving out of her home and into her daughter's home.  This is caused a great deal of stress for the patient.  Emotional eating may be a factor in her weight gain because she is not been exercising as usual      Diagnoses and all orders for this visit:    1. Controlled type 2 diabetes mellitus with stage 2 chronic kidney disease, with long-term current use of insulin (HCC) (Primary)    2. Obesity (BMI 30-39.9)        Plan: We elected to make no changes to her diabetes medication plan as her A1c is well controlled.  She is to focus on diet and physical activity to help promote weight loss.  She will be scheduled for routine follow-up appointment    The patient will monitor her blood glucose levels 2 times a day.  If she develops problematic hyperglycemia or hypoglycemia or adverse drug reactions, she will contact the office for further instructions.        Follow Up     Return in about 3 months (around 4/26/2023) for Medication Management.    Patient was given instructions and counseling regarding her condition or for health maintenance advice. Please see specific information pulled into the AVS if appropriate.     Kelly Vicente, APRN  01/26/2023      Dictated Utilizing Dragon Dictation.  Please note that portions of this note were completed with a voice recognition program.  Part of this note may be an electronic transcription/translation of spoken language to printed text using the Dragon Dictation System.

## 2023-02-01 PROBLEM — J45.909 ASTHMA: Status: RESOLVED | Noted: 2021-07-21 | Resolved: 2023-02-01

## 2023-02-01 PROBLEM — J06.9 ACUTE URI: Status: RESOLVED | Noted: 2021-12-07 | Resolved: 2023-02-01

## 2023-02-01 PROBLEM — E66.01 MORBID (SEVERE) OBESITY DUE TO EXCESS CALORIES: Status: ACTIVE | Noted: 2023-02-01
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
## 2023-02-02 ENCOUNTER — OFFICE VISIT (OUTPATIENT)
Dept: INTERNAL MEDICINE | Facility: CLINIC | Age: 73
End: 2023-02-02
Payer: MEDICARE

## 2023-02-02 VITALS
DIASTOLIC BLOOD PRESSURE: 80 MMHG | HEIGHT: 62 IN | BODY MASS INDEX: 31.87 KG/M2 | SYSTOLIC BLOOD PRESSURE: 143 MMHG | WEIGHT: 173.2 LBS | HEART RATE: 83 BPM | TEMPERATURE: 97.5 F | OXYGEN SATURATION: 97 %

## 2023-02-02 DIAGNOSIS — E55.9 VITAMIN D DEFICIENCY: ICD-10-CM

## 2023-02-02 DIAGNOSIS — E11.65 TYPE 2 DIABETES MELLITUS WITH HYPERGLYCEMIA, WITHOUT LONG-TERM CURRENT USE OF INSULIN: ICD-10-CM

## 2023-02-02 DIAGNOSIS — E78.2 MIXED HYPERLIPIDEMIA: ICD-10-CM

## 2023-02-02 DIAGNOSIS — E53.8 VITAMIN B12 DEFICIENCY: ICD-10-CM

## 2023-02-02 DIAGNOSIS — E03.4 ATROPHY OF THYROID: ICD-10-CM

## 2023-02-02 DIAGNOSIS — N18.31 STAGE 3A CHRONIC KIDNEY DISEASE: ICD-10-CM

## 2023-02-02 DIAGNOSIS — I10 ESSENTIAL HYPERTENSION: Primary | ICD-10-CM

## 2023-02-02 PROCEDURE — 99214 OFFICE O/P EST MOD 30 MIN: CPT | Performed by: INTERNAL MEDICINE

## 2023-02-02 PROCEDURE — 3044F HG A1C LEVEL LT 7.0%: CPT | Performed by: INTERNAL MEDICINE

## 2023-02-02 RX ORDER — DULAGLUTIDE 1.5 MG/.5ML
1.5 INJECTION, SOLUTION SUBCUTANEOUS WEEKLY
Qty: 12 ML | Refills: 1 | Status: SHIPPED | OUTPATIENT
Start: 2023-02-02 | End: 2023-03-20 | Stop reason: SDUPTHER

## 2023-02-02 NOTE — ASSESSMENT & PLAN NOTE
B12 level stable right around 1K as of her 2/23 office visit, patient will continue current over-the-counter dosing.

## 2023-02-02 NOTE — ASSESSMENT & PLAN NOTE
GFR of 67 is very stable as of her 2/23 office visit.  Electrolytes are fine as well, will continue to monitor this with her routine labs.

## 2023-02-02 NOTE — ASSESSMENT & PLAN NOTE
Once he is down to 6.7 as of her 2/23 office visit.  This is certainly excellent given the time of the year.  She still follows with Kelly as well.  She will continue with very low-dose glimepiride along with Trulicity and Lantus as per her list.

## 2023-02-02 NOTE — ASSESSMENT & PLAN NOTE
LDL 85 as of 2/23 office visit.  This is pretty close to goal, patient stable to continue with moderate dose atorvastatin.

## 2023-02-02 NOTE — ASSESSMENT & PLAN NOTE
Blood pressure stable here as of her 2/23 office visit.  Her readings are stable as well, so she will continue with moderate dose losartan along with moderate dose of metoprolol.

## 2023-03-06 ENCOUNTER — TELEPHONE (OUTPATIENT)
Dept: PLASTIC SURGERY | Facility: CLINIC | Age: 73
End: 2023-03-06
Payer: MEDICARE

## 2023-03-06 RX ORDER — ALBUTEROL SULFATE 90 UG/1
AEROSOL, METERED RESPIRATORY (INHALATION)
Qty: 51 G | Refills: 2 | Status: SHIPPED | OUTPATIENT
Start: 2023-03-06

## 2023-03-06 RX ORDER — FLUTICASONE PROPIONATE 50 MCG
SPRAY, SUSPENSION (ML) NASAL
Qty: 48 G | Refills: 2 | Status: SHIPPED | OUTPATIENT
Start: 2023-03-06

## 2023-03-10 ENCOUNTER — TELEPHONE (OUTPATIENT)
Dept: PLASTIC SURGERY | Facility: CLINIC | Age: 73
End: 2023-03-10

## 2023-03-10 ENCOUNTER — PREP FOR SURGERY (OUTPATIENT)
Dept: OTHER | Facility: HOSPITAL | Age: 73
End: 2023-03-10
Payer: MEDICARE

## 2023-03-10 DIAGNOSIS — H02.403 ACQUIRED INVOLUTIONAL PTOSIS OF EYELID, BILATERAL: ICD-10-CM

## 2023-03-10 DIAGNOSIS — N65.0 BREAST RECONSTRUCTION DEFORMITY: ICD-10-CM

## 2023-03-10 DIAGNOSIS — H57.813 BROW PTOSIS, BILATERAL: Primary | ICD-10-CM

## 2023-03-10 RX ORDER — CEFAZOLIN SODIUM 2 G/100ML
2 INJECTION, SOLUTION INTRAVENOUS ONCE
OUTPATIENT
Start: 2023-03-10 | End: 2023-03-10

## 2023-03-10 NOTE — TELEPHONE ENCOUNTER
Called patient to schedule surgery, no answer, left voicemail. Informed patient to call by 03/13/2023 to stay on surg schedule.

## 2023-03-14 ENCOUNTER — TELEPHONE (OUTPATIENT)
Dept: INTERNAL MEDICINE | Facility: CLINIC | Age: 73
End: 2023-03-14
Payer: MEDICARE

## 2023-03-14 NOTE — TELEPHONE ENCOUNTER
"    Caller: Jyoti Alvarez    Relationship to patient: Self    Best call back number: [phone]    Patient is needing: PATIENT STATES SHE NEEDS TO SPEAK TO GEOVANNA. PATIENT DID NOT GIVE ANY REAL INFORMATION EXCEPT IT IS \"REGARDING HER SYMPTOMS\"    PATIENT ASKS FOR A CALL BACK LEAVE A MESSAGE IF NO ANSWER.       "

## 2023-03-15 ENCOUNTER — OFFICE VISIT (OUTPATIENT)
Dept: INTERNAL MEDICINE | Facility: CLINIC | Age: 73
End: 2023-03-15
Payer: MEDICARE

## 2023-03-15 VITALS
HEART RATE: 83 BPM | SYSTOLIC BLOOD PRESSURE: 130 MMHG | TEMPERATURE: 98.4 F | DIASTOLIC BLOOD PRESSURE: 70 MMHG | HEIGHT: 62 IN | OXYGEN SATURATION: 96 % | BODY MASS INDEX: 31.62 KG/M2 | WEIGHT: 171.8 LBS

## 2023-03-15 DIAGNOSIS — R09.89 RUNNY NOSE: ICD-10-CM

## 2023-03-15 DIAGNOSIS — Z00.00 MEDICARE ANNUAL WELLNESS VISIT, SUBSEQUENT: Primary | ICD-10-CM

## 2023-03-15 DIAGNOSIS — R53.83 OTHER FATIGUE: ICD-10-CM

## 2023-03-15 DIAGNOSIS — K21.9 GASTROESOPHAGEAL REFLUX DISEASE WITHOUT ESOPHAGITIS: ICD-10-CM

## 2023-03-15 DIAGNOSIS — R05.1 ACUTE COUGH: ICD-10-CM

## 2023-03-15 LAB
EXPIRATION DATE: NORMAL
FLUAV AG UPPER RESP QL IA.RAPID: NOT DETECTED
FLUBV AG UPPER RESP QL IA.RAPID: NOT DETECTED
INTERNAL CONTROL: NORMAL
Lab: NORMAL
SARS-COV-2 AG UPPER RESP QL IA.RAPID: NOT DETECTED

## 2023-03-15 PROCEDURE — 1170F FXNL STATUS ASSESSED: CPT | Performed by: INTERNAL MEDICINE

## 2023-03-15 PROCEDURE — G0439 PPPS, SUBSEQ VISIT: HCPCS | Performed by: INTERNAL MEDICINE

## 2023-03-15 PROCEDURE — 3078F DIAST BP <80 MM HG: CPT | Performed by: INTERNAL MEDICINE

## 2023-03-15 PROCEDURE — 99214 OFFICE O/P EST MOD 30 MIN: CPT | Performed by: INTERNAL MEDICINE

## 2023-03-15 PROCEDURE — 87428 SARSCOV & INF VIR A&B AG IA: CPT | Performed by: INTERNAL MEDICINE

## 2023-03-15 PROCEDURE — 3075F SYST BP GE 130 - 139MM HG: CPT | Performed by: INTERNAL MEDICINE

## 2023-03-15 PROCEDURE — 1159F MED LIST DOCD IN RCRD: CPT | Performed by: INTERNAL MEDICINE

## 2023-03-15 PROCEDURE — 3044F HG A1C LEVEL LT 7.0%: CPT | Performed by: INTERNAL MEDICINE

## 2023-03-15 RX ORDER — AZITHROMYCIN 250 MG/1
TABLET, FILM COATED ORAL
Qty: 6 TABLET | Refills: 0 | Status: SHIPPED | OUTPATIENT
Start: 2023-03-15

## 2023-03-15 NOTE — ASSESSMENT & PLAN NOTE
CAROLYNV completed 3/23.  Patient no falls.  No recent hospitalizations.  She does not have a living will or healthcare surrogate, need for this was reviewed at this office visit.

## 2023-03-15 NOTE — ASSESSMENT & PLAN NOTE
This is the indication for the patient's 3/23 urgent visit.  Her COVID/flu swab is pending.  She had symptoms for past 4 to 5 days.  She is utilizing her albuterol, she is increased her Advair to full dose, but her sats are fine in the mid or upper 90s, and her lung fields appear clear.---> COVID/flu swab just came back negative.  Hopefully patient will continue to improve, recommend she go ahead and listen to her body and rest when she needs to.  We will send prescription over for antibiotic if symptoms are persisting next week.
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
## 2023-03-15 NOTE — PROGRESS NOTES
The ABCs of the Annual Wellness Visit  Subsequent Medicare Wellness Visit    Subjective      Jyoti Alvarez is a 73 y.o. female who presents for a Subsequent Medicare Wellness Visit.    The following portions of the patient's history were reviewed and   updated as appropriate: allergies, current medications, past family history, past medical history, past social history, past surgical history and problem list.    Compared to one year ago, the patient feels her physical   health is the same.    Compared to one year ago, the patient feels her mental   health is the same.      Recent Hospitalizations:  She was not admitted to the hospital during the last year.       Current Medical Providers:  Patient Care Team:  Ramo Noguera MD as PCP - General (Internal Medicine)  Zion Shaw MD as Attending Provider (Plastic Surgery)  Kelly Vicente APRN as Nurse Practitioner (Endocrinology)    Outpatient Medications Prior to Visit   Medication Sig Dispense Refill   • acetaminophen (TYLENOL) 650 MG 8 hr tablet Take 1 tablet by mouth 2 (two) times a day.     • Advair Diskus 500-50 MCG/ACT DISKUS USE 1 INHALATION TWICE A  each 3   • albuterol sulfate  (90 Base) MCG/ACT inhaler USE 2 INHALATIONS EVERY 6 HOURS AS NEEDED 51 g 2   • aspirin 81 MG EC tablet Take 1 tablet by mouth Daily. LAST DOSE TODAY PER DR. HAYES (BACILIO), OKAYED WITH DR. NOGUERA. BACILIO STATED SHE WOULD NOTIFY PATIENT     • atorvastatin (LIPITOR) 40 MG tablet TAKE 1 TABLET EVERY NIGHT 90 tablet 3   • BD Pen Needle Ca U/F 32G X 4 MM misc USE 1 PEN NEEDLE DAILY 90 each 3   • betamethasone dipropionate (DIPROLENE) 0.05 % lotion APPLY TO SCALP TWICE DAILY AS NEEDED FOR DISCOMFORT     • Cholecalciferol (Vitamin D3) 1.25 MG (14371 UT) capsule Take 1 capsule by mouth Every 7 (Seven) Days. 12 capsule 1   • Dulaglutide (Trulicity) 1.5 MG/0.5ML solution pen-injector Inject 1.5 mg under the skin into the appropriate area as directed  1 (One) Time Per Week. 12 mL 1   • ferrous sulfate 325 (65 FE) MG tablet Take 1 tablet by mouth Daily With Breakfast.     • fluocinonide (LIDEX) 0.05 % cream fluocinonide 0.05 % topical cream apply to affected area(s) by topical route As needed   Active     • fluticasone (FLONASE) 50 MCG/ACT nasal spray USE 1 SPRAY IN EACH NOSTRIL ONCE DAILY 48 g 2   • glimepiride (AMARYL) 2 MG tablet Take 1 tablet by mouth Daily With Breakfast & Dinner. 180 tablet 1   • glucose blood (OneTouch Verio) test strip 1 each by Other route 3 (Three) Times a Day. use to test blood sugar 3 times daily 300 each 3   • hydrocortisone 2.5 % cream APPLY TO SCALY AREAS ON THE EARS TWICE DAILY AS NEEDED FLARES.     • ketoconazole (NIZORAL) 2 % shampoo WASH FACE AND SCALP TWO TO THREE TIMES WEEKLY. LEAVE ON FOR 5 MINUTES BEFORE RINSING     • Lantus SoloStar 100 UNIT/ML injection pen INJECT 20 UNITS ONCE A DAY AND INCREASE TO 50 UNITS AS INDICATED 75 mL 3   • levothyroxine (SYNTHROID, LEVOTHROID) 25 MCG tablet TAKE 1 TABLET DAILY IN THE MORNING ON AN EMPTY STOMACH 90 tablet 3   • losartan (COZAAR) 50 MG tablet TAKE 1 TABLET DAILY 90 tablet 3   • metoprolol succinate XL (TOPROL-XL) 100 MG 24 hr tablet TAKE 1 TABLET DAILY 90 tablet 3   • montelukast (SINGULAIR) 10 MG tablet TAKE 1 TABLET DAILY 90 tablet 3   • multivitamin with minerals tablet tablet Take 1 tablet by mouth Daily.     • OneTouch Delica Lancets 30G misc 1 each 3 (Three) Times a Day. 200 each 6   • pantoprazole (PROTONIX) 40 MG EC tablet TAKE 1 TABLET DAILY 90 tablet 3   • vitamin B-12 (CYANOCOBALAMIN) 500 MCG tablet Take 1 tablet by mouth Daily.       No facility-administered medications prior to visit.       No opioid medication identified on active medication list. I have reviewed chart for other potential  high risk medication/s and harmful drug interactions in the elderly.          Aspirin is on active medication list. Aspirin use is indicated based on review of current medical  "condition/s. Pros and cons of this therapy have been discussed today. Benefits of this medication outweigh potential harm.  Patient has been encouraged to continue taking this medication.  .      Patient Active Problem List   Diagnosis   • Breast cancer (HCC)   • Age related osteoporosis   • Colon polyp   • Gastroesophageal reflux disease without esophagitis   • Essential hypertension   • Fatty liver   • Lumbar radiculopathy   • Vitamin D deficiency   • Type 2 diabetes mellitus with hyperglycemia, without long-term current use of insulin (HCC)   • Mixed hyperlipidemia   • Atrophy of thyroid   • Iron deficiency anemia secondary to inadequate dietary iron intake   • Chronic diarrhea   • Vertigo   • Vitamin B12 deficiency   • Contour irregularity in reconstructed breast   • SHALONDA on CPAP   • Stage 3a chronic kidney disease (HCC)   • History of colon polyps   • Acquired involutional ptosis of eyelid, bilateral   • Brow ptosis, bilateral   • Morbid (severe) obesity due to excess calories (HCC)   • Acute cough   • Medicare annual wellness visit, subsequent     Advance Care Planning  Advance Directive is not on file.  ACP discussion was held with the patient during this visit. Patient does not have an advance directive, information provided.     Objective    Vitals:    03/15/23 1553   BP: 130/70   Pulse: 83   Temp: 98.4 °F (36.9 °C)   TempSrc: Skin   SpO2: 96%   Weight: 77.9 kg (171 lb 12.8 oz)   Height: 157.5 cm (62.01\")     Estimated body mass index is 31.41 kg/m² as calculated from the following:    Height as of this encounter: 157.5 cm (62.01\").    Weight as of this encounter: 77.9 kg (171 lb 12.8 oz).    BMI is >= 30 and <35. (Class 1 Obesity). The following options were offered after discussion;: nutrition counseling/recommendations      Does the patient have evidence of cognitive impairment?   No    Lab Results   Component Value Date    TRIG 122 01/19/2023    HDL 46 01/19/2023    LDL 85 01/19/2023    VLDL 22 " 01/19/2023    HGBA1C 6.70 (H) 01/19/2023          HEALTH RISK ASSESSMENT    Smoking Status:  Social History     Tobacco Use   Smoking Status Never   Smokeless Tobacco Never     Alcohol Consumption:  Social History     Substance and Sexual Activity   Alcohol Use Never     Fall Risk Screen:    DARYL Fall Risk Assessment was completed, and patient is at LOW risk for falls.Assessment completed on:2/2/2023    Depression Screening:  PHQ-2/PHQ-9 Depression Screening 2/2/2023   Little Interest or Pleasure in Doing Things 0-->not at all   Feeling Down, Depressed or Hopeless 0-->not at all   PHQ-9: Brief Depression Severity Measure Score 0       Health Habits and Functional and Cognitive Screening:  Functional & Cognitive Status 3/15/2023   Do you have difficulty preparing food and eating? No   Do you have difficulty bathing yourself, getting dressed or grooming yourself? No   Do you have difficulty using the toilet? No   Do you have difficulty moving around from place to place? No   Do you have trouble with steps or getting out of a bed or a chair? No   Current Diet Diabetic Diet   Do you need help using the phone?  No   Are you deaf or do you have serious difficulty hearing?  No   Do you need help with transportation? No   Do you need help shopping? No   Do you need help preparing meals?  No   Do you need help with housework?  No   Do you need help with laundry? No   Do you need help taking your medications? No   Do you need help managing money? No   Do you ever drive or ride in a car without wearing a seat belt? No   Do you have difficulty concentrating, remembering or making decisions? No       Age-appropriate Screening Schedule:  Refer to the list below for future screening recommendations based on patient's age, sex and/or medical conditions. Orders for these recommended tests are listed in the plan section. The patient has been provided with a written plan.    Health Maintenance   Topic Date Due   • ZOSTER VACCINE (1  of 2) 02/26/1999   • ANNUAL WELLNESS VISIT  Never done   • URINE MICROALBUMIN  07/12/2023   • HEMOGLOBIN A1C  07/19/2023   • DIABETIC FOOT EXAM  10/20/2023   • DIABETIC EYE EXAM  10/20/2023   • LIPID PANEL  01/19/2024   • DXA SCAN  08/08/2024   • COLORECTAL CANCER SCREENING  10/18/2027   • TDAP/TD VACCINES (2 - Td or Tdap) 07/25/2032   • HEPATITIS C SCREENING  Completed   • COVID-19 Vaccine  Completed   • INFLUENZA VACCINE  Completed   • Pneumococcal Vaccine 65+  Completed                CMS Preventative Services Quick Reference  Risk Factors Identified During Encounter:    None Identified    The above risks/problems have been discussed with the patient.  Pertinent information has been shared with the patient in the After Visit Summary.    Diagnoses and all orders for this visit:    1. Medicare annual wellness visit, subsequent (Primary)    2. Acute cough  Assessment & Plan:  This is the indication for the patient's 3/23 urgent visit.  Her COVID/flu swab is pending.  She had symptoms for past 4 to 5 days.  She is utilizing her albuterol, she is increased her Advair to full dose, but her sats are fine in the mid or upper 90s, and her lung fields appear clear.      3. Other fatigue  -     POCT SARS-CoV-2 Antigen KIMMY + Flu    4. Runny nose  -     POCT SARS-CoV-2 Antigen KIMMY + Flu    5. Gastroesophageal reflux disease without esophagitis  Assessment & Plan:  Summary patient is having some gastritis as of her 9/21 office visit.  We had stopped her pantoprazole to see if it was contributing to her diarrhea, and it sounds like the famotidine did not work as well for her reflux etc.  She will go back to pantoprazole at this time, she will take it twice daily for a week, and then transition back to daily.  Additionally she will stop meloxicam since this could be aggravating things and in general her arthralgias are improved...Patient with no dysphagia, no persistent dyspepsia as of 11/21 office visit.  She is stable to  continue with daily Protonix.---> Patient with increased symptoms as of her 3/23 office visit.  She has upper respiratory infection exacerbating things, she is using some Gas-X, recommend that she go ahead and double up on the Protonix until this issue resolves        Follow Up:   Next Medicare Wellness visit to be scheduled in 1 year.      An After Visit Summary and PPPS were made available to the patient.

## 2023-03-15 NOTE — ASSESSMENT & PLAN NOTE
Summary patient is having some gastritis as of her 9/21 office visit.  We had stopped her pantoprazole to see if it was contributing to her diarrhea, and it sounds like the famotidine did not work as well for her reflux etc.  She will go back to pantoprazole at this time, she will take it twice daily for a week, and then transition back to daily.  Additionally she will stop meloxicam since this could be aggravating things and in general her arthralgias are improved...Patient with no dysphagia, no persistent dyspepsia as of 11/21 office visit.  She is stable to continue with daily Protonix.---> Patient with increased symptoms as of her 3/23 office visit.  She has upper respiratory infection exacerbating things, she is using some Gas-X, recommend that she go ahead and double up on the Protonix until this issue resolves

## 2023-03-20 RX ORDER — DULAGLUTIDE 1.5 MG/.5ML
1.5 INJECTION, SOLUTION SUBCUTANEOUS WEEKLY
Qty: 12 ML | Refills: 1 | Status: SHIPPED | OUTPATIENT
Start: 2023-03-20

## 2023-03-20 NOTE — TELEPHONE ENCOUNTER
Caller: [facility] - [place] [phone] [facility] [phone] FX    Relationship: Pharmacy    Best call back number: [phone]    Requested Prescriptions:   Requested Prescriptions     Pending Prescriptions Disp Refills   • Dulaglutide (Trulicity) 1.5 MG/0.5ML solution pen-injector 12 mL 1     Sig: Inject 1.5 mg under the skin into the appropriate area as directed 1 (One) Time Per Week.        Pharmacy where request should be sent: [facility] - [place], [place] - [address] [place], [place] [phone] [facility] [phone] FX     Additional details provided by patient: CALLER STATES THAT PHARMACY HAS 3MG DOSAGE BACK IN STOCK AND THAT THE PATIENT NEEDS A NEW PRESCRIPTION FOR A 90 DAY SUPPLY OF THE 3MG CALLED IN.    Does the patient have less than a 3 day supply:  [] Yes  [x] No    Would you like a call back once the refill request has been completed: [] Yes [x] No    If the office needs to give you a call back, can they leave a voicemail: [] Yes [x] No    Jennifer Rojas Rep   03/20/23 15:49 EDT

## 2023-03-31 DIAGNOSIS — Z79.4 CONTROLLED TYPE 2 DIABETES MELLITUS WITH STAGE 2 CHRONIC KIDNEY DISEASE, WITH LONG-TERM CURRENT USE OF INSULIN: ICD-10-CM

## 2023-03-31 DIAGNOSIS — E11.22 CONTROLLED TYPE 2 DIABETES MELLITUS WITH STAGE 2 CHRONIC KIDNEY DISEASE, WITH LONG-TERM CURRENT USE OF INSULIN: ICD-10-CM

## 2023-03-31 DIAGNOSIS — N18.2 CONTROLLED TYPE 2 DIABETES MELLITUS WITH STAGE 2 CHRONIC KIDNEY DISEASE, WITH LONG-TERM CURRENT USE OF INSULIN: ICD-10-CM

## 2023-03-31 RX ORDER — GLIMEPIRIDE 2 MG/1
TABLET ORAL
Qty: 180 TABLET | Refills: 3 | Status: SHIPPED | OUTPATIENT
Start: 2023-03-31

## 2023-04-27 ENCOUNTER — LAB (OUTPATIENT)
Dept: LAB | Facility: HOSPITAL | Age: 73
End: 2023-04-27
Payer: MEDICARE

## 2023-04-27 DIAGNOSIS — E03.4 ATROPHY OF THYROID: ICD-10-CM

## 2023-04-27 DIAGNOSIS — E11.65 TYPE 2 DIABETES MELLITUS WITH HYPERGLYCEMIA, WITHOUT LONG-TERM CURRENT USE OF INSULIN: ICD-10-CM

## 2023-04-27 DIAGNOSIS — E55.9 VITAMIN D DEFICIENCY: ICD-10-CM

## 2023-04-27 DIAGNOSIS — I10 ESSENTIAL HYPERTENSION: ICD-10-CM

## 2023-04-27 LAB
25(OH)D3 SERPL-MCNC: 55.3 NG/ML (ref 30–100)
ANION GAP SERPL CALCULATED.3IONS-SCNC: 11.9 MMOL/L (ref 5–15)
BUN SERPL-MCNC: 15 MG/DL (ref 8–23)
BUN/CREAT SERPL: 17.6 (ref 7–25)
CALCIUM SPEC-SCNC: 9.4 MG/DL (ref 8.6–10.5)
CHLORIDE SERPL-SCNC: 100 MMOL/L (ref 98–107)
CO2 SERPL-SCNC: 24.1 MMOL/L (ref 22–29)
CREAT SERPL-MCNC: 0.85 MG/DL (ref 0.57–1)
EGFRCR SERPLBLD CKD-EPI 2021: 72 ML/MIN/1.73
GLUCOSE SERPL-MCNC: 152 MG/DL (ref 65–99)
HBA1C MFR BLD: 7.5 % (ref 4.8–5.6)
POTASSIUM SERPL-SCNC: 4.2 MMOL/L (ref 3.5–5.2)
SODIUM SERPL-SCNC: 136 MMOL/L (ref 136–145)
TSH SERPL DL<=0.05 MIU/L-ACNC: 1.2 UIU/ML (ref 0.27–4.2)

## 2023-04-27 PROCEDURE — 83036 HEMOGLOBIN GLYCOSYLATED A1C: CPT

## 2023-04-27 PROCEDURE — 82306 VITAMIN D 25 HYDROXY: CPT

## 2023-04-27 PROCEDURE — 36415 COLL VENOUS BLD VENIPUNCTURE: CPT

## 2023-04-27 PROCEDURE — 84443 ASSAY THYROID STIM HORMONE: CPT

## 2023-04-27 PROCEDURE — 80048 BASIC METABOLIC PNL TOTAL CA: CPT

## 2023-04-28 ENCOUNTER — TELEPHONE (OUTPATIENT)
Dept: INTERNAL MEDICINE | Facility: CLINIC | Age: 73
End: 2023-04-28

## 2023-04-28 NOTE — TELEPHONE ENCOUNTER
I have let pt know to go to urgent care as we do not have any open availability today. Pt voiced understanding

## 2023-04-28 NOTE — TELEPHONE ENCOUNTER
Caller: Jyoti Alvarez    Relationship: Self    Best call back number: [phone]    Who are you requesting to speak with (clinical staff, provider,  specific staff member): GEOVANNA    What was the call regarding: PATIENT DEVELOPED A SORE THROAT 24 HOURS AGO. SHE IS FEELING WORSE. HER THROAT IS BURNING. SHE WOULD LIKE TO SPEAK WITH GEOVANNA. ATTEMPTED TO WARM TRANSFER. PLEASE CALL PATIENT TO ADVISE.

## 2023-05-03 NOTE — PROGRESS NOTES
Chief Complaint  Diabetes (Follow up, med mgt, labs in chart )    Referred By: Ramo Noguera MD    Subjective          Jyoti Alvarez presents to [facility] for diabetes medication management    History of Present Illness    Visit type:  follow-up  Diabetes type:  Type 2  Current diabetes status/concerns/issues:  She has had some high glucose levels occasionally and more so recently with use of steroids  Other health concerns: She had an URI a few weeks ago.  Most recently she has had a significant sore throat.  She has been prescribed a steroid dose pack.  She has a had a lot of stress  Current Diabetes symptoms:    Polyuria: No   Polydipsia: No   Polyphagia: No   Blurred vision: No   Excessive fatigue: No   Known Diabetes complications:  Neuropathy: None; Location: N/A  Renal: Stage II mild (GFR = 60-89mL/min)  Eyes: None; Location: N/A  Amputation/Wounds: None  GI: None  Cardiovascular: Hypertension and Hyperlipidemia  ED: N/A  Other: None  Hypoglycemia:  None reported at this time  Hypoglycemia Symptoms:  No hypoglycemia at this time  Current diabetes treatment:  Trulicity 3 mg once weekly, Lantus taking 55 units once every evening and glimepiride 2 mg twice a day.  She recently has been increasing her Lantus because of hyperglycemia associated with use of steroids.  Blood glucose device:  Meter  Blood glucose monitoring frequency:  2 -3  Blood glucose range/average: Most glucose levels are below 130.  She has had some higher glucose levels at random times hitting the 140s to 160s.  Most recently glucose levels have been higher with the use of steroids.     Glucose Source: BG Logs  Diet:  Limits high carb/sweet foods, Avoids sugary drinks  Activity/Exercise:  She is going to the gym 2 x week; doing walking and equipment    Past Medical History:   Diagnosis Date   • Anemia    • Arthritis    • Asthma    • Breast cancer     RIGHT   • Colon polyp    • Disease of thyroid gland  Hypothyroidism   • Fatty liver    • Gastroesophageal reflux    • HL (hearing loss) Hearing aids    • Hyperlipemia    • Hypertension    • Limb swelling    • Seasonal allergies    • Skin cancer    • Sleep apnea    • Strain of latissimus dorsi muscle 2017    Right   • Type 2 diabetes mellitus    • Vertigo      Past Surgical History:   Procedure Laterality Date   • BREAST AUGMENTATION BILATERAL MASTOPEXY     • BREAST RECONSTRUCTION Bilateral 2022    Procedure: BILATERAL BREAST RECONSTRUCTION WITH REMOVAL AND REPLACEMENT OF IMPLANTS, MESH PLACEMENT, CHEST WALL SKIN RESECTION, CHEST LIPOSUCTION;  Surgeon: Romain Quezada MD;  [place] [facility] [place]  Service: Plastics;  Laterality: Bilateral;   • BREAST SURGERY      Bilateral mastectomies and reconstruction   •  SECTION     • COLONOSCOPY     • COLONOSCOPY N/A 10/18/2022    Procedure: COLONOSCOPY POLYPECTOMIES;  Surgeon: Eduardo Salas MD;  Location: formerly [facility] ENDOSCOPY;  Service: Gastroenterology;  Laterality: N/A;  COLON POLYPS   • COSMETIC SURGERY  2004    Bilateral mastectomies and reconstructio   • ENDOSCOPY     • EYE SURGERY  Not sure    Cateracts   • LIPOSUCTION N/A 2022    Procedure: CHEST WALL LIPOSUCTION AND CHEST WALL RESECTION;  Surgeon: Romain Quezada MD;  [place] [facility] [place]  Service: Plastics;  Laterality: N/A;  TIMES BASED ON PREVIOUS EXPERIENCE   • MASTECTOMY COMPLETE / SIMPLE Bilateral    • OOPHORECTOMY     • OTHER SURGICAL HISTORY      Joint surgery,  RIGHT FOOT SECOND  TOE WITH A PIN   • SKIN CANCER EXCISION Right 2020    neck   • TOE SURGERY Right     2nd digit pin inserted   • TONSILLECTOMY       Family History   Problem Relation Age of Onset   • Heart disease Mother    • Cancer Mother         Breast cancer age 45 and 60   • Diabetes Mother    • Arthritis Mother    • Osteoporosis Mother    • Heart failure Mother         Heart attack age 60. Age 87. Cause of death.   • Hypertension  Mother    • Osteoarthritis Mother    • Heart disease Father    • Cancer Father         Skin cancer   • Heart failure Father         Age 79  cause of death.   • Cancer Sister         Breast cancer age 27 and 52. Ovarian cancer age 53 caused death at 56.   • Diabetes Sister    • Ovarian cancer Sister 53   • Arthritis Sister    • Cancer Brother         Prostate cancer age about 58.   • Prostate cancer Brother         unsure age   • Breast cancer Other    • Skin cancer Other    • Cancer Daughter    • Cancer Maternal Aunt         Breast cancer   • Cancer Maternal Aunt          with ovarian cancer age mid 40s   • Malig Hyperthermia Neg Hx    • Colon cancer Neg Hx      Social History     Socioeconomic History   • Marital status:    • Number of children: 3   Tobacco Use   • Smoking status: Never   • Smokeless tobacco: Never   Vaping Use   • Vaping Use: Never used   Substance and Sexual Activity   • Alcohol use: Never   • Drug use: Never   • Sexual activity: Not Currently     Allergies   Allergen Reactions   • Exenatide Unknown - High Severity     Weight loss and pain in the right side. Bydereon.   • Fluoxetine Hives   • Gabapentin Headache     Occular Miaigraines   • Jardiance [Empagliflozin] Other (See Comments)     UTI   • Levaquin [Levofloxacin] Hives   • Nickel Itching and Rash   • Sulfa Antibiotics Swelling   • Metformin Nausea Only       Current Outpatient Medications:   •  acetaminophen (TYLENOL) 650 MG 8 hr tablet, Take 1 tablet by mouth 2 (two) times a day., Disp: , Rfl:   •  Advair Diskus 500-50 MCG/ACT DISKUS, USE 1 INHALATION TWICE A DAY, Disp: 180 each, Rfl: 3  •  albuterol sulfate  (90 Base) MCG/ACT inhaler, USE 2 INHALATIONS EVERY 6 HOURS AS NEEDED, Disp: 51 g, Rfl: 2  •  amoxicillin-clavulanate (AUGMENTIN) 875-125 MG per tablet, Take 1 tablet by mouth 2 (Two) Times a Day for 10 days., Disp: 20 tablet, Rfl: 0  •  aspirin 81 MG EC tablet, Take 1 tablet by mouth Daily. LAST DOSE TODAY PER   ABIGAIL SANTORO), OKAYED WITH DR. MONTEMAYOR. BACILIO STATED SHE WOULD NOTIFY PATIENT, Disp: , Rfl:   •  atorvastatin (LIPITOR) 40 MG tablet, TAKE 1 TABLET EVERY NIGHT, Disp: 90 tablet, Rfl: 3  •  BD Pen Needle Ca U/F 32G X 4 MM misc, USE 1 PEN NEEDLE DAILY, Disp: 90 each, Rfl: 3  •  betamethasone dipropionate (DIPROLENE) 0.05 % lotion, APPLY TO SCALP TWICE DAILY AS NEEDED FOR DISCOMFORT, Disp: , Rfl:   •  Cholecalciferol (Vitamin D3) 1.25 MG (31046 UT) capsule, Take 1 capsule by mouth Every 7 (Seven) Days., Disp: 12 capsule, Rfl: 1  •  ferrous sulfate 325 (65 FE) MG tablet, Take 1 tablet by mouth Daily With Breakfast., Disp: , Rfl:   •  fluocinonide (LIDEX) 0.05 % cream, fluocinonide 0.05 % topical cream apply to affected area(s) by topical route As needed   Active, Disp: , Rfl:   •  fluticasone (FLONASE) 50 MCG/ACT nasal spray, USE 1 SPRAY IN EACH NOSTRIL ONCE DAILY, Disp: 48 g, Rfl: 2  •  glimepiride (AMARYL) 2 MG tablet, TAKE 1 TABLET DAILY WITH BREAKFAST AND DINNER, Disp: 180 tablet, Rfl: 3  •  glucose blood (OneTouch Verio) test strip, 1 each by Other route 3 (Three) Times a Day. use to test blood sugar 3 times daily, Disp: 300 each, Rfl: 3  •  hydrocortisone 2.5 % cream, APPLY TO SCALY AREAS ON THE EARS TWICE DAILY AS NEEDED FLARES., Disp: , Rfl:   •  ketoconazole (NIZORAL) 2 % shampoo, WASH FACE AND SCALP TWO TO THREE TIMES WEEKLY. LEAVE ON FOR 5 MINUTES BEFORE RINSING, Disp: , Rfl:   •  Lantus SoloStar 100 UNIT/ML injection pen, INJECT 20 UNITS ONCE A DAY AND INCREASE TO 50 UNITS AS INDICATED, Disp: 75 mL, Rfl: 3  •  levothyroxine (SYNTHROID, LEVOTHROID) 25 MCG tablet, TAKE 1 TABLET DAILY IN THE MORNING ON AN EMPTY STOMACH, Disp: 90 tablet, Rfl: 3  •  losartan (COZAAR) 50 MG tablet, TAKE 1 TABLET DAILY, Disp: 90 tablet, Rfl: 3  •  methylPREDNISolone (MEDROL) 4 MG dose pack, Take as directed on package instructions., Disp: 1 each, Rfl: 0  •  metoprolol succinate XL (TOPROL-XL) 100 MG 24 hr tablet, TAKE 1  "TABLET DAILY, Disp: 90 tablet, Rfl: 3  •  montelukast (SINGULAIR) 10 MG tablet, TAKE 1 TABLET DAILY, Disp: 90 tablet, Rfl: 3  •  multivitamin with minerals tablet tablet, Take 1 tablet by mouth Daily., Disp: , Rfl:   •  nystatin-triamcinolone (MYCOLOG II) 088226-8.1 UNIT/GM-% cream, Apply 1 application topically to the appropriate area as directed 2 (Two) Times a Day., Disp: 15 g, Rfl: 1  •  OneTouch Delica Lancets 30G misc, 1 each 3 (Three) Times a Day., Disp: 200 each, Rfl: 6  •  pantoprazole (PROTONIX) 40 MG EC tablet, TAKE 1 TABLET DAILY, Disp: 90 tablet, Rfl: 3  •  Trulicity 3 MG/0.5ML solution pen-injector, , Disp: , Rfl:   •  vitamin B-12 (CYANOCOBALAMIN) 500 MCG tablet, Take 1 tablet by mouth Daily., Disp: , Rfl:     Objective     Vitals:    05/04/23 1541   BP: 103/65   BP Location: Left arm   Patient Position: Sitting   Cuff Size: Adult   Pulse: 79   Temp: 97.5 °F (36.4 °C)   SpO2: 96%   Weight: 76.7 kg (169 lb)   Height: 157.5 cm (62\")   PainSc:   2     Body mass index is 30.91 kg/m².    Physical Exam  Constitutional:       Appearance: Normal appearance.      Comments: Obesity (BMI 30 - 39.9) Pt Current BMI = 30.91    HENT:      Head: Normocephalic and atraumatic.      Right Ear: External ear normal.      Left Ear: External ear normal.      Nose: Nose normal.   Eyes:      Extraocular Movements: Extraocular movements intact.      Conjunctiva/sclera: Conjunctivae normal.   Pulmonary:      Effort: Pulmonary effort is normal.   Musculoskeletal:         General: Normal range of motion.      Cervical back: Normal range of motion.   Skin:     General: Skin is warm and dry.   Neurological:      General: No focal deficit present.      Mental Status: She is alert and oriented to person, place, and time. Mental status is at baseline.   Psychiatric:         Mood and Affect: Mood normal.         Behavior: Behavior normal.         Thought Content: Thought content normal.         Judgment: Judgment normal. "             Result Review :   The following data was reviewed by: FANY Thakur on 05/04/2023:    Most Recent A1C        4/27/2023    12:21   HGBA1C Most Recent   Hemoglobin A1C 7.50         A1C Last 3 Results        10/13/2022    15:10 1/19/2023    14:07 4/27/2023    12:21   HGBA1C Last 3 Results   Hemoglobin A1C 6.90   6.70   7.50       Her most recent A1c was collected on 4/27/2023 and was 7.5% indicating uncontrolled type 2 diabetes.  This is up from the prior result of 6.7 collected in January of this year.     Glucose   Date Value Ref Range Status   05/04/2023 72 70 - 99 mg/dL Final     Comment:     Serial Number: [id number]:  [id number]     Point of care glucose is within normal limits    Creatinine   Date Value Ref Range Status   04/27/2023 0.85 0.57 - 1.00 mg/dL Final   01/19/2023 0.81 0.57 - 1.00 mg/dL Final     eGFR   Date Value Ref Range Status   04/27/2023 72.0 >60.0 mL/min/1.73 Final   01/19/2023 76.8 >60.0 mL/min/1.73 Final     Labs collected on 4/27/2023 show stage II renal disease          Assessment: She has had an increase in her A1c and is in an uncontrolled status.  Patient has had some recent illness as well as a lot of stress that she has sold her house and moved.  Her increase in the A1c is most likely due to her illness rather than poor dietary behavior.  She has started exercising at least twice a week at the gym.  She feels like things are starting to straighten out with the stress in her life.  She is currently taking a steroid Dosepak because of a significant sore throat over the last week.      Diagnoses and all orders for this visit:    1. Uncontrolled type 2 diabetes mellitus with hyperglycemia (Primary)    2. Type 2 diabetes mellitus with stage 2 chronic kidney disease, with long-term current use of insulin    3. Obesity (BMI 30-39.9)    Other orders  -     POC Glucose        Plan: We elected to make no changes to her current treatment plan.  She will continue to  focus on diet and exercise to help control glucose levels as well as to promote weight loss.    The patient will monitor her blood glucose levels 2-3 times each day.  If she develops problematic hyperglycemia or hypoglycemia or adverse drug reactions, she will contact the office for further instructions.        Follow Up     Return in about 3 months (around 8/4/2023) for Medication Management.    Patient was given instructions and counseling regarding her condition or for health maintenance advice. Please see specific information pulled into the AVS if appropriate.     Kelly Vicente, FANY  05/04/2023      Dictated Utilizing Dragon Dictation.  Please note that portions of this note were completed with a voice recognition program.  Part of this note may be an electronic transcription/translation of spoken language to printed text using the Dragon Dictation System.

## 2023-05-04 ENCOUNTER — OFFICE VISIT (OUTPATIENT)
Dept: DIABETES SERVICES | Facility: HOSPITAL | Age: 73
End: 2023-05-04
Payer: MEDICARE

## 2023-05-04 VITALS
DIASTOLIC BLOOD PRESSURE: 65 MMHG | SYSTOLIC BLOOD PRESSURE: 103 MMHG | HEART RATE: 79 BPM | HEIGHT: 62 IN | TEMPERATURE: 97.5 F | WEIGHT: 169 LBS | BODY MASS INDEX: 31.1 KG/M2 | OXYGEN SATURATION: 96 %

## 2023-05-04 DIAGNOSIS — E11.22 TYPE 2 DIABETES MELLITUS WITH STAGE 2 CHRONIC KIDNEY DISEASE, WITH LONG-TERM CURRENT USE OF INSULIN: ICD-10-CM

## 2023-05-04 DIAGNOSIS — N18.2 TYPE 2 DIABETES MELLITUS WITH STAGE 2 CHRONIC KIDNEY DISEASE, WITH LONG-TERM CURRENT USE OF INSULIN: ICD-10-CM

## 2023-05-04 DIAGNOSIS — Z79.4 TYPE 2 DIABETES MELLITUS WITH STAGE 2 CHRONIC KIDNEY DISEASE, WITH LONG-TERM CURRENT USE OF INSULIN: ICD-10-CM

## 2023-05-04 DIAGNOSIS — E11.65 UNCONTROLLED TYPE 2 DIABETES MELLITUS WITH HYPERGLYCEMIA: Primary | ICD-10-CM

## 2023-05-04 DIAGNOSIS — E66.9 OBESITY (BMI 30-39.9): ICD-10-CM

## 2023-05-04 LAB — GLUCOSE BLDC GLUCOMTR-MCNC: 72 MG/DL (ref 70–99)

## 2023-05-04 PROCEDURE — G0463 HOSPITAL OUTPT CLINIC VISIT: HCPCS | Performed by: NURSE PRACTITIONER

## 2023-05-04 PROCEDURE — 82948 REAGENT STRIP/BLOOD GLUCOSE: CPT | Performed by: NURSE PRACTITIONER

## 2023-05-04 RX ORDER — DULAGLUTIDE 3 MG/.5ML
INJECTION, SOLUTION SUBCUTANEOUS
COMMUNITY
Start: 2023-03-20

## 2023-05-04 RX ORDER — MONTELUKAST SODIUM 10 MG/1
TABLET ORAL
Qty: 90 TABLET | Refills: 3 | Status: SHIPPED | OUTPATIENT
Start: 2023-05-04

## 2023-05-10 PROBLEM — Z86.010 HISTORY OF COLON POLYPS: Status: RESOLVED | Noted: 2022-03-02 | Resolved: 2023-05-10

## 2023-05-10 PROBLEM — H57.813 BROW PTOSIS, BILATERAL: Status: RESOLVED | Noted: 2022-08-16 | Resolved: 2023-05-10

## 2023-05-10 PROBLEM — H02.403 ACQUIRED INVOLUTIONAL PTOSIS OF EYELID, BILATERAL: Status: RESOLVED | Noted: 2022-08-16 | Resolved: 2023-05-10

## 2023-05-10 PROBLEM — Z86.0100 HISTORY OF COLON POLYPS: Status: RESOLVED | Noted: 2022-03-02 | Resolved: 2023-05-10

## 2023-05-10 PROBLEM — N65.0 CONTOUR IRREGULARITY IN RECONSTRUCTED BREAST: Status: RESOLVED | Noted: 2021-12-09 | Resolved: 2023-05-10
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
## 2023-05-11 ENCOUNTER — OFFICE VISIT (OUTPATIENT)
Dept: INTERNAL MEDICINE | Facility: CLINIC | Age: 73
End: 2023-05-11
Payer: MEDICARE

## 2023-05-11 VITALS
TEMPERATURE: 96.2 F | BODY MASS INDEX: 31.36 KG/M2 | DIASTOLIC BLOOD PRESSURE: 70 MMHG | HEIGHT: 62 IN | HEART RATE: 73 BPM | OXYGEN SATURATION: 99 % | WEIGHT: 170.4 LBS | SYSTOLIC BLOOD PRESSURE: 108 MMHG

## 2023-05-11 DIAGNOSIS — N18.31 STAGE 3A CHRONIC KIDNEY DISEASE: ICD-10-CM

## 2023-05-11 DIAGNOSIS — E78.2 MIXED HYPERLIPIDEMIA: ICD-10-CM

## 2023-05-11 DIAGNOSIS — E11.65 TYPE 2 DIABETES MELLITUS WITH HYPERGLYCEMIA, WITHOUT LONG-TERM CURRENT USE OF INSULIN: Primary | ICD-10-CM

## 2023-05-11 DIAGNOSIS — I10 ESSENTIAL HYPERTENSION: ICD-10-CM

## 2023-05-11 DIAGNOSIS — K21.9 GASTROESOPHAGEAL REFLUX DISEASE WITHOUT ESOPHAGITIS: ICD-10-CM

## 2023-05-11 DIAGNOSIS — Z85.3 HISTORY OF RIGHT BREAST CANCER: ICD-10-CM

## 2023-05-11 DIAGNOSIS — E03.4 ATROPHY OF THYROID: ICD-10-CM

## 2023-05-11 DIAGNOSIS — E55.9 VITAMIN D DEFICIENCY: ICD-10-CM

## 2023-05-11 RX ORDER — INSULIN GLARGINE 100 [IU]/ML
55 INJECTION, SOLUTION SUBCUTANEOUS DAILY
Qty: 45 ML | Refills: 3 | Status: SHIPPED | OUTPATIENT
Start: 2023-05-11 | End: 2023-08-09

## 2023-05-11 NOTE — ASSESSMENT & PLAN NOTE
Despite recent illness and bump in her A1c, her GFR remained stable at 72 with no significant electrolyte abnormalities

## 2023-05-11 NOTE — ASSESSMENT & PLAN NOTE
Vitamin D is stable at 55 as of 5/23.  Patient can continue with just once monthly prescription strength vitamin D.

## 2023-05-11 NOTE — ASSESSMENT & PLAN NOTE
Patient is improving from this regards as of her 5/23 office visit.  She is been able to back down to just once daily pantoprazole, certainly appropriate to continue same.  She knows that she can still utilize short acting medications in the evening if needed.

## 2023-05-11 NOTE — ASSESSMENT & PLAN NOTE
A1c is up from 6.7 to 7.5 as of 5/23 office visit.  This appears to be related to recent upper respiratory infection which required corticosteroid use.  Her sugars are coming back down to prior levels, she saw Kelly recently, decision made to continue with current plan.  She is on moderate dose glimepiride, 3 mg of Trulicity, as well as 55 units of Lantus.  Continue same.

## 2023-06-05 RX ORDER — METOPROLOL SUCCINATE 100 MG/1
TABLET, EXTENDED RELEASE ORAL
Qty: 90 TABLET | Refills: 3 | Status: SHIPPED | OUTPATIENT
Start: 2023-06-05

## 2023-06-19 RX ORDER — PEN NEEDLE, DIABETIC 32GX 5/32"
NEEDLE, DISPOSABLE MISCELLANEOUS
Qty: 90 EACH | Refills: 3 | Status: SHIPPED | OUTPATIENT
Start: 2023-06-19

## 2023-06-22 ENCOUNTER — TELEPHONE (OUTPATIENT)
Dept: DIABETES SERVICES | Facility: HOSPITAL | Age: 73
End: 2023-06-22

## 2023-06-22 NOTE — TELEPHONE ENCOUNTER
Provider: CELENA  Caller: CORY GUADALUPE  Relationship to Patient: SELF    Reason for Call: PATIENT WANTED TO MAKE SURE ORDER HAD BEEN PLACED FOR TRULICITY

## 2023-06-22 NOTE — TELEPHONE ENCOUNTER
Pt was called and notified that prescription was sent to pharmacy. Pt understood and said thank you.

## 2023-08-10 ENCOUNTER — LAB (OUTPATIENT)
Dept: INTERNAL MEDICINE | Facility: CLINIC | Age: 73
End: 2023-08-10
Payer: MEDICARE

## 2023-08-10 DIAGNOSIS — E11.65 TYPE 2 DIABETES MELLITUS WITH HYPERGLYCEMIA, WITHOUT LONG-TERM CURRENT USE OF INSULIN: ICD-10-CM

## 2023-08-10 DIAGNOSIS — E78.2 MIXED HYPERLIPIDEMIA: ICD-10-CM

## 2023-08-10 DIAGNOSIS — Z85.3 HISTORY OF RIGHT BREAST CANCER: ICD-10-CM

## 2023-08-10 DIAGNOSIS — I10 ESSENTIAL HYPERTENSION: ICD-10-CM

## 2023-08-10 LAB
ALBUMIN SERPL-MCNC: 4.2 G/DL (ref 3.5–5.2)
ALBUMIN/GLOB SERPL: 2.1 G/DL
ALP SERPL-CCNC: 72 U/L (ref 39–117)
ALT SERPL W P-5'-P-CCNC: 20 U/L (ref 1–33)
ANION GAP SERPL CALCULATED.3IONS-SCNC: 11.2 MMOL/L (ref 5–15)
AST SERPL-CCNC: 22 U/L (ref 1–32)
BILIRUB SERPL-MCNC: 0.5 MG/DL (ref 0–1.2)
BUN SERPL-MCNC: 15 MG/DL (ref 8–23)
BUN/CREAT SERPL: 19.7 (ref 7–25)
CALCIUM SPEC-SCNC: 9.3 MG/DL (ref 8.6–10.5)
CHLORIDE SERPL-SCNC: 104 MMOL/L (ref 98–107)
CHOLEST SERPL-MCNC: 140 MG/DL (ref 0–200)
CO2 SERPL-SCNC: 25.8 MMOL/L (ref 22–29)
CREAT SERPL-MCNC: 0.76 MG/DL (ref 0.57–1)
EGFRCR SERPLBLD CKD-EPI 2021: 82.3 ML/MIN/1.73
GLOBULIN UR ELPH-MCNC: 2 GM/DL
GLUCOSE SERPL-MCNC: 139 MG/DL (ref 65–99)
HBA1C MFR BLD: 7 % (ref 4.8–5.6)
HDLC SERPL-MCNC: 40 MG/DL (ref 40–60)
LDLC SERPL CALC-MCNC: 71 MG/DL (ref 0–100)
LDLC/HDLC SERPL: 1.64 {RATIO}
POTASSIUM SERPL-SCNC: 4.9 MMOL/L (ref 3.5–5.2)
PROT SERPL-MCNC: 6.2 G/DL (ref 6–8.5)
SODIUM SERPL-SCNC: 141 MMOL/L (ref 136–145)
TRIGL SERPL-MCNC: 172 MG/DL (ref 0–150)
VLDLC SERPL-MCNC: 29 MG/DL (ref 5–40)

## 2023-08-10 PROCEDURE — 36415 COLL VENOUS BLD VENIPUNCTURE: CPT | Performed by: INTERNAL MEDICINE

## 2023-08-10 PROCEDURE — 86300 IMMUNOASSAY TUMOR CA 15-3: CPT | Performed by: INTERNAL MEDICINE

## 2023-08-10 PROCEDURE — 83036 HEMOGLOBIN GLYCOSYLATED A1C: CPT | Performed by: INTERNAL MEDICINE

## 2023-08-10 PROCEDURE — 80053 COMPREHEN METABOLIC PANEL: CPT | Performed by: INTERNAL MEDICINE

## 2023-08-10 PROCEDURE — 80061 LIPID PANEL: CPT | Performed by: INTERNAL MEDICINE

## 2023-08-11 DIAGNOSIS — Z79.4 TYPE 2 DIABETES MELLITUS WITHOUT COMPLICATION, WITH LONG-TERM CURRENT USE OF INSULIN: ICD-10-CM

## 2023-08-11 DIAGNOSIS — E11.9 TYPE 2 DIABETES MELLITUS WITHOUT COMPLICATION, WITH LONG-TERM CURRENT USE OF INSULIN: ICD-10-CM

## 2023-08-11 LAB — CANCER AG27-29 SERPL-ACNC: <9 U/ML (ref 0–38.6)

## 2023-08-11 RX ORDER — LANCETS 30 GAUGE
1 EACH MISCELLANEOUS 3 TIMES DAILY
Qty: 200 EACH | Refills: 6 | Status: SHIPPED | OUTPATIENT
Start: 2023-08-11

## 2023-08-11 NOTE — TELEPHONE ENCOUNTER
Rx Refill Note  Requested Prescriptions      No prescriptions requested or ordered in this encounter      Last office visit with prescribing clinician: 5/11/2023   Last telemedicine visit with prescribing clinician: Visit date not found   Next office visit with prescribing clinician: 8/21/2023                         Would you like a call back once the refill request has been completed: [] Yes [] No    If the office needs to give you a call back, can they leave a voicemail: [] Yes [] No    Melissa Orozco MA  08/11/23, 15:52 EDT

## 2023-08-18 ENCOUNTER — OFFICE VISIT (OUTPATIENT)
Dept: DIABETES SERVICES | Facility: HOSPITAL | Age: 73
End: 2023-08-18
Payer: MEDICARE

## 2023-08-18 VITALS
HEART RATE: 90 BPM | DIASTOLIC BLOOD PRESSURE: 80 MMHG | BODY MASS INDEX: 31.83 KG/M2 | SYSTOLIC BLOOD PRESSURE: 134 MMHG | WEIGHT: 173 LBS | TEMPERATURE: 98.6 F | HEIGHT: 62 IN | OXYGEN SATURATION: 98 %

## 2023-08-18 DIAGNOSIS — Z79.4 CONTROLLED TYPE 2 DIABETES MELLITUS WITH STAGE 2 CHRONIC KIDNEY DISEASE, WITH LONG-TERM CURRENT USE OF INSULIN: Primary | ICD-10-CM

## 2023-08-18 DIAGNOSIS — N18.2 CONTROLLED TYPE 2 DIABETES MELLITUS WITH STAGE 2 CHRONIC KIDNEY DISEASE, WITH LONG-TERM CURRENT USE OF INSULIN: Primary | ICD-10-CM

## 2023-08-18 DIAGNOSIS — E11.22 CONTROLLED TYPE 2 DIABETES MELLITUS WITH STAGE 2 CHRONIC KIDNEY DISEASE, WITH LONG-TERM CURRENT USE OF INSULIN: Primary | ICD-10-CM

## 2023-08-18 DIAGNOSIS — E66.9 OBESITY (BMI 30-39.9): ICD-10-CM

## 2023-08-18 LAB — GLUCOSE BLDC GLUCOMTR-MCNC: 150 MG/DL (ref 70–99)

## 2023-08-18 PROCEDURE — 3075F SYST BP GE 130 - 139MM HG: CPT | Performed by: NURSE PRACTITIONER

## 2023-08-18 PROCEDURE — 1160F RVW MEDS BY RX/DR IN RCRD: CPT | Performed by: NURSE PRACTITIONER

## 2023-08-18 PROCEDURE — 1159F MED LIST DOCD IN RCRD: CPT | Performed by: NURSE PRACTITIONER

## 2023-08-18 PROCEDURE — G0463 HOSPITAL OUTPT CLINIC VISIT: HCPCS | Performed by: NURSE PRACTITIONER

## 2023-08-18 PROCEDURE — 3051F HG A1C>EQUAL 7.0%<8.0%: CPT | Performed by: NURSE PRACTITIONER

## 2023-08-18 PROCEDURE — 99214 OFFICE O/P EST MOD 30 MIN: CPT | Performed by: NURSE PRACTITIONER

## 2023-08-18 PROCEDURE — 82948 REAGENT STRIP/BLOOD GLUCOSE: CPT | Performed by: NURSE PRACTITIONER

## 2023-08-18 PROCEDURE — 3079F DIAST BP 80-89 MM HG: CPT | Performed by: NURSE PRACTITIONER

## 2023-08-18 RX ORDER — DULAGLUTIDE 4.5 MG/.5ML
4.5 INJECTION, SOLUTION SUBCUTANEOUS
Qty: 6 ML | Refills: 1 | Status: SHIPPED | OUTPATIENT
Start: 2023-08-18 | End: 2024-02-14

## 2023-08-18 NOTE — PROGRESS NOTES
Chief Complaint  Diabetes (Follow Up - med mgmt)    Referred By: Ramo Noguera MD    Subjective     {Problem List  Visit Diagnosis   Encounters  Notes  Medications  Labs  Result Review Imaging  Media :23}     Jyoti Alvarez presents to [facility] for diabetes medication management    History of Present Illness    Visit type:  follow-up  Diabetes type:  Type 2  Current diabetes status/concerns/issues:  She has noted her glucose levels in the morning have slowly been increasing  Other health concerns: She is concerned about increasing weight.  Se has gained 5 pounds since her last appt.  Current Diabetes symptoms:    Polyuria: No   Polydipsia: No   Polyphagia: No   Blurred vision: No   Excessive fatigue: No  Known Diabetes complications:  Neuropathy: None; Location: N/A  Renal: Stage II mild (GFR = 60-89mL/min)  Eyes: None; Location: N/A  Amputation/Wounds: None  GI: None  Cardiovascular: Hypertension and Hyperlipidemia  ED: N/A  Other: None  Hypoglycemia:  None reported at this time  Hypoglycemia Symptoms:  No hypoglycemia at this time  Current diabetes treatment:  Trulicity 3 mg once weekly, Lantus taking 65 units once every morning and glimepiride 2 mg twice a day.   Blood glucose device:  Meter  Blood glucose monitoring frequency:  2 -3  Blood glucose range/average:   most glucose levels recently are in the 140 -160 range  Glucose Source: BG Logs  Diet:  Limits high carb/sweet foods, Avoids sugary drinks  Activity/Exercise:   walking 1 hour a day    Past Medical History:   Diagnosis Date    Anemia     Arthritis     Asthma     Breast cancer     RIGHT    Colon polyp     Disease of thyroid gland Hypothyroidism    Fatty liver     Gastroesophageal reflux     HL (hearing loss) Hearing aids 2021    Hyperlipemia     Hypertension     Limb swelling     Seasonal allergies     Skin cancer     Sleep apnea     Strain of latissimus dorsi muscle 09/11/2017    Right    Type 2 diabetes  mellitus     Vertigo      Past Surgical History:   Procedure Laterality Date    BREAST AUGMENTATION BILATERAL MASTOPEXY      BREAST RECONSTRUCTION Bilateral 2022    Procedure: BILATERAL BREAST RECONSTRUCTION WITH REMOVAL AND REPLACEMENT OF IMPLANTS, MESH PLACEMENT, CHEST WALL SKIN RESECTION, CHEST LIPOSUCTION;  Surgeon: Romain Quezada MD;  Location: [facility];  Service: Plastics;  Laterality: Bilateral;    BREAST SURGERY      Bilateral mastectomies and reconstruction     SECTION      COLONOSCOPY  2019    COLONOSCOPY N/A 10/18/2022    Procedure: COLONOSCOPY POLYPECTOMIES;  Surgeon: Eduardo Salas MD;  Location: [facility] ENDOSCOPY;  Service: Gastroenterology;  Laterality: N/A;  COLON POLYPS    COSMETIC SURGERY      Bilateral mastectomies and reconstructio    ENDOSCOPY  2019    EYE SURGERY  Not sure    Cateracts    LIPOSUCTION N/A 2022    Procedure: CHEST WALL LIPOSUCTION AND CHEST WALL RESECTION;  Surgeon: Romain Quezada MD;  Location: [facility];  Service: Plastics;  Laterality: N/A;  TIMES BASED ON PREVIOUS EXPERIENCE    MASTECTOMY COMPLETE / SIMPLE Bilateral     OOPHORECTOMY      OTHER SURGICAL HISTORY      Joint surgery,  RIGHT FOOT SECOND  TOE WITH A PIN    SKIN CANCER EXCISION Right 2020    neck    TOE SURGERY Right     2nd digit pin inserted    TONSILLECTOMY       Family History   Problem Relation Age of Onset    Heart disease Mother     Cancer Mother         Breast cancer age 45 and 60    Diabetes Mother     Arthritis Mother     Osteoporosis Mother     Heart failure Mother         Heart attack age 60. Age 87. Cause of death.    Hypertension Mother     Osteoarthritis Mother     Heart disease Father     Cancer Father         Skin cancer    Heart failure Father         Age 79  cause of death.    Cancer Sister         Breast cancer age 27 and 52. Ovarian cancer age 53 caused death at 56.    Diabetes Sister     Ovarian cancer Sister 53    Arthritis Sister      Cancer Brother         Prostate cancer age about 58.    Prostate cancer Brother         unsure age    Breast cancer Other     Skin cancer Other     Cancer Daughter     Cancer Maternal Aunt         Breast cancer    Cancer Maternal Aunt          with ovarian cancer age mid 40s    Malig Hyperthermia Neg Hx     Colon cancer Neg Hx      Social History     Socioeconomic History    Marital status:     Number of children: 3   Tobacco Use    Smoking status: Never    Smokeless tobacco: Never   Vaping Use    Vaping Use: Never used   Substance and Sexual Activity    Alcohol use: Never    Drug use: Never    Sexual activity: Not Currently     Allergies   Allergen Reactions    Exenatide Unknown - High Severity     Weight loss and pain in the right side. Bydereon.    Fluoxetine Hives    Gabapentin Headache     Occular Miaigraines    Jardiance [Empagliflozin] Other (See Comments)     UTI    Levaquin [Levofloxacin] Hives    Nickel Itching and Rash    Sulfa Antibiotics Swelling    Metformin Nausea Only       Current Outpatient Medications:     acetaminophen (TYLENOL) 650 MG 8 hr tablet, Take 1 tablet by mouth 2 (two) times a day., Disp: , Rfl:     Advair Diskus 500-50 MCG/ACT DISKUS, USE 1 INHALATION TWICE A DAY, Disp: 180 each, Rfl: 3    albuterol sulfate  (90 Base) MCG/ACT inhaler, USE 2 INHALATIONS EVERY 6 HOURS AS NEEDED, Disp: 51 g, Rfl: 2    aspirin 81 MG EC tablet, Take 1 tablet by mouth Daily. LAST DOSE TODAY PER DR. HAYES (BACILIO), OKAYED WITH DR. MONTEMAYOR. BACILIO STATED SHE WOULD NOTIFY PATIENT, Disp: , Rfl:     atorvastatin (LIPITOR) 40 MG tablet, TAKE 1 TABLET EVERY NIGHT, Disp: 90 tablet, Rfl: 3    BD Pen Needle Ca U/F 32G X 4 MM misc, USE 1 PEN NEEDLE DAILY, Disp: 90 each, Rfl: 3    betamethasone dipropionate (DIPROLENE) 0.05 % lotion, APPLY TO SCALP TWICE DAILY AS NEEDED FOR DISCOMFORT, Disp: , Rfl:     Cholecalciferol (Vitamin D3) 1.25 MG (42017 UT) capsule, Take 1 capsule by mouth Every 7  (Seven) Days., Disp: 12 capsule, Rfl: 1    ferrous sulfate 325 (65 FE) MG tablet, Take 1 tablet by mouth Daily With Breakfast., Disp: , Rfl:     fluocinonide (LIDEX) 0.05 % cream, fluocinonide 0.05 % topical cream apply to affected area(s) by topical route As needed   Active, Disp: , Rfl:     fluticasone (FLONASE) 50 MCG/ACT nasal spray, USE 1 SPRAY IN EACH NOSTRIL ONCE DAILY, Disp: 48 g, Rfl: 2    glimepiride (AMARYL) 2 MG tablet, TAKE 1 TABLET DAILY WITH BREAKFAST AND DINNER, Disp: 180 tablet, Rfl: 3    glucose blood (OneTouch Verio) test strip, 1 each by Other route 3 (Three) Times a Day. use to test blood sugar 3 times daily, Disp: 300 each, Rfl: 3    hydrocortisone 2.5 % cream, APPLY TO SCALY AREAS ON THE EARS TWICE DAILY AS NEEDED FLARES., Disp: , Rfl:     ketoconazole (NIZORAL) 2 % shampoo, WASH FACE AND SCALP TWO TO THREE TIMES WEEKLY. LEAVE ON FOR 5 MINUTES BEFORE RINSING, Disp: , Rfl:     levothyroxine (SYNTHROID, LEVOTHROID) 25 MCG tablet, TAKE 1 TABLET DAILY IN THE MORNING ON AN EMPTY STOMACH, Disp: 90 tablet, Rfl: 3    losartan (COZAAR) 50 MG tablet, TAKE 1 TABLET DAILY, Disp: 90 tablet, Rfl: 3    metoprolol succinate XL (TOPROL-XL) 100 MG 24 hr tablet, TAKE 1 TABLET DAILY, Disp: 90 tablet, Rfl: 3    montelukast (SINGULAIR) 10 MG tablet, TAKE 1 TABLET DAILY, Disp: 90 tablet, Rfl: 3    multivitamin with minerals tablet tablet, Take 1 tablet by mouth Daily., Disp: , Rfl:     nystatin-triamcinolone (MYCOLOG II) 513044-3.1 UNIT/GM-% cream, Apply 1 application topically to the appropriate area as directed 2 (Two) Times a Day., Disp: 15 g, Rfl: 1    OneTouch Delica Lancets 30G misc, 1 each 3 (Three) Times a Day., Disp: 200 each, Rfl: 6    pantoprazole (PROTONIX) 40 MG EC tablet, TAKE 1 TABLET DAILY, Disp: 90 tablet, Rfl: 3    vitamin B-12 (CYANOCOBALAMIN) 500 MCG tablet, Take 1 tablet by mouth Daily., Disp: , Rfl:     Dulaglutide (Trulicity) 4.5 MG/0.5ML solution pen-injector, Inject 0.5 mL under the skin  "into the appropriate area as directed Every 7 (Seven) Days for 180 days., Disp: 6 mL, Rfl: 1    Insulin Glargine (Lantus SoloStar) 100 UNIT/ML injection pen, Inject 55 Units under the skin into the appropriate area as directed Daily for 90 days., Disp: 45 mL, Rfl: 3    Objective     Vitals:    08/18/23 1528   BP: 134/80   BP Location: Left arm   Patient Position: Sitting   Cuff Size: Adult   Pulse: 90   Temp: 98.6 °F (37 °C)   SpO2: 98%   Weight: 78.5 kg (173 lb)   Height: 157.5 cm (62\")     Body mass index is 31.64 kg/m².    Physical Exam  Constitutional:       Appearance: Normal appearance. She is obese.      Comments: Obesity (BMI 30 - 39.9) Pt Current BMI = 31.64    HENT:      Head: Normocephalic and atraumatic.      Right Ear: External ear normal.      Left Ear: External ear normal.      Nose: Nose normal.   Eyes:      Extraocular Movements: Extraocular movements intact.      Conjunctiva/sclera: Conjunctivae normal.   Pulmonary:      Effort: Pulmonary effort is normal.   Musculoskeletal:         General: Normal range of motion.      Cervical back: Normal range of motion.   Skin:     General: Skin is warm and dry.   Neurological:      General: No focal deficit present.      Mental Status: She is alert and oriented to person, place, and time. Mental status is at baseline.   Psychiatric:         Mood and Affect: Mood normal.         Behavior: Behavior normal.         Thought Content: Thought content normal.         Judgment: Judgment normal.       {DIABETIC FOOT EXAM FOR  (Optional):30885::\"Diabetic Foot Exam Performed\":0}    Result Review :{Labs  Result Review  Imaging  Med Tab  Media :23}   The following data was reviewed by: FANY Thakur on 08/18/2023:    Most Recent A1C          8/10/2023    11:47   HGBA1C Most Recent   Hemoglobin A1C 7.00        A1C Last 3 Results          1/19/2023    14:07 4/27/2023    12:21 8/10/2023    11:47   HGBA1C Last 3 Results   Hemoglobin A1C 6.70  7.50  7.00  "     A1c collected 8/10/2023 is 7.0%, indicating Controlled Type II diabetes.  This result is down from the prior result of 7.5% collected on 4/27/2023    Glucose   Date Value Ref Range Status   08/18/2023 150 (H) 70 - 99 mg/dL Final     Comment:     Serial Number: [id number]:  [id number]     Point of care glucose in the office today is within normal limits for nonfasting glucose    Creatinine   Date Value Ref Range Status   08/10/2023 0.76 0.57 - 1.00 mg/dL Final   04/27/2023 0.85 0.57 - 1.00 mg/dL Final     eGFR   Date Value Ref Range Status   08/10/2023 82.3 >60.0 mL/min/1.73 Final   04/27/2023 72.0 >60.0 mL/min/1.73 Final     Labs collected on 8/10/2023 show Stage II mild (GFR = 60-89mL/min)    Total Cholesterol   Date Value Ref Range Status   08/10/2023 140 0 - 200 mg/dL Final   01/19/2023 153 0 - 200 mg/dL Final     Triglycerides   Date Value Ref Range Status   08/10/2023 172 (H) 0 - 150 mg/dL Final   01/19/2023 122 0 - 150 mg/dL Final     HDL Cholesterol   Date Value Ref Range Status   08/10/2023 40 40 - 60 mg/dL Final   01/19/2023 46 40 - 60 mg/dL Final     LDL Cholesterol    Date Value Ref Range Status   08/10/2023 71 0 - 100 mg/dL Final   01/19/2023 85 0 - 100 mg/dL Final     Lipid panel collected on 8/10/2023 shows Hypertriglyceridemia       {CC Problem List  Visit Diagnosis  ROS  Review (Popup)  [facility] Maintenance  Quality  BestPractice  Medications  SmartSets  SnapShot Encounters  Media :23}     Assessment: ***      Diagnoses and all orders for this visit:    1. Controlled type 2 diabetes mellitus with stage 2 chronic kidney disease, with long-term current use of insulin (Primary)  -     Dulaglutide (Trulicity) 4.5 MG/0.5ML solution pen-injector; Inject 0.5 mL under the skin into the appropriate area as directed Every 7 (Seven) Days for 180 days.  Dispense: 6 mL; Refill: 1    2. Obesity (BMI 30-39.9)    Other orders  -     POC Glucose        Plan: ***Increase the Trulicity    The  patient will monitor her blood glucose levels ***.  If she develops problematic hyperglycemia or hypoglycemia or adverse drug reactions, she will contact the office for further instructions.    {Time Spent (Optional):57974}    Follow Up {Instructions Charge Capture  Follow-up Communications :23}    Return in about 3 months (around 11/18/2023) for Medication Management.    Patient was given instructions and counseling regarding her condition or for health maintenance advice. Please see specific information pulled into the AVS if appropriate.     Kelly Vicente, FANY  08/18/2023      Dictated Utilizing Dragon Dictation.  Please note that portions of this note were completed with a voice recognition program.  Part of this note may be an electronic transcription/translation of spoken language to printed text using the Dragon Dictation System.

## 2023-08-19 PROBLEM — R05.1 ACUTE COUGH: Status: RESOLVED | Noted: 2023-03-15 | Resolved: 2023-08-19

## 2023-08-19 PROBLEM — K52.9 CHRONIC DIARRHEA: Status: RESOLVED | Noted: 2021-07-29 | Resolved: 2023-08-19
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
## 2023-08-21 ENCOUNTER — OFFICE VISIT (OUTPATIENT)
Dept: INTERNAL MEDICINE | Facility: CLINIC | Age: 73
End: 2023-08-21
Payer: MEDICARE

## 2023-08-21 VITALS
DIASTOLIC BLOOD PRESSURE: 78 MMHG | HEIGHT: 62 IN | HEART RATE: 86 BPM | TEMPERATURE: 98.6 F | SYSTOLIC BLOOD PRESSURE: 130 MMHG | OXYGEN SATURATION: 96 % | WEIGHT: 171.6 LBS | BODY MASS INDEX: 31.58 KG/M2

## 2023-08-21 DIAGNOSIS — E03.4 ATROPHY OF THYROID: ICD-10-CM

## 2023-08-21 DIAGNOSIS — D50.8 IRON DEFICIENCY ANEMIA SECONDARY TO INADEQUATE DIETARY IRON INTAKE: ICD-10-CM

## 2023-08-21 DIAGNOSIS — E11.65 TYPE 2 DIABETES MELLITUS WITH HYPERGLYCEMIA, WITHOUT LONG-TERM CURRENT USE OF INSULIN: Primary | ICD-10-CM

## 2023-08-21 DIAGNOSIS — I10 ESSENTIAL HYPERTENSION: ICD-10-CM

## 2023-08-21 DIAGNOSIS — E53.8 VITAMIN B12 DEFICIENCY: ICD-10-CM

## 2023-08-21 DIAGNOSIS — E55.9 VITAMIN D DEFICIENCY: ICD-10-CM

## 2023-08-21 DIAGNOSIS — K76.0 FATTY LIVER: ICD-10-CM

## 2023-08-21 PROBLEM — N18.31 STAGE 3A CHRONIC KIDNEY DISEASE: Status: RESOLVED | Noted: 2022-02-07 | Resolved: 2023-08-21

## 2023-08-21 PROCEDURE — 1159F MED LIST DOCD IN RCRD: CPT | Performed by: INTERNAL MEDICINE

## 2023-08-21 PROCEDURE — 3051F HG A1C>EQUAL 7.0%<8.0%: CPT | Performed by: INTERNAL MEDICINE

## 2023-08-21 PROCEDURE — 3078F DIAST BP <80 MM HG: CPT | Performed by: INTERNAL MEDICINE

## 2023-08-21 PROCEDURE — 3075F SYST BP GE 130 - 139MM HG: CPT | Performed by: INTERNAL MEDICINE

## 2023-08-21 PROCEDURE — 1160F RVW MEDS BY RX/DR IN RCRD: CPT | Performed by: INTERNAL MEDICINE

## 2023-08-21 PROCEDURE — 99214 OFFICE O/P EST MOD 30 MIN: CPT | Performed by: INTERNAL MEDICINE

## 2023-08-21 NOTE — ASSESSMENT & PLAN NOTE
Patient's LFTs remain normal as of her 8/23 OV.  Discussed with patient unlikely there is been any progression here given improved control of her sugars, along with her weight trending in the right direction.  We will continue to monitor the LFTs with routine labs.

## 2023-08-21 NOTE — ASSESSMENT & PLAN NOTE
A1c is down nicely from 7.5 to 7.0 as of her 8/23 OV.  Her weight is crept up a few pounds, so Kelly is titrating the Trulicity to 4.5 regardless of the A1c.  This can also potentially help lower the dose of glimepiride and/or Lantus which would help with the weight going forward.  Otherwise patient will continue with moderate dose glimepiride twice daily and 65 units of Lantus.

## 2023-08-21 NOTE — ASSESSMENT & PLAN NOTE
Blood pressure stable here as of her 8/23 office visit. Her readings are stable as well on average at home, so she will continue with moderate dose losartan along with moderate dose of metoprolol

## 2023-08-28 NOTE — PROGRESS NOTES
HPI:   73 y.o. . Presents for well woman exam. Post menopausal, using no HRT  Menses:  none  Pain:  None  Last pap: normal   Complaints: Pt has no complaints today.  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 Desires referral to pelvic floor rehab, no pain with urination, 2-4 times night time void, daytime void every 2 hours    MRI Breast Bilateral With & Without Contrast (2021 10:31)    Mammo Lymph Node Biopsy (08/10/2021 14:45)    DEXA Bone Density Axial (2022 13:18)    Past Medical History:   Diagnosis Date    Anemia     Arthritis     Asthma     Breast cancer     RIGHT    Colon polyp     Disease of thyroid gland Hypothyroidism    Fatty liver     Gastroesophageal reflux     HL (hearing loss) Hearing aids     Hyperlipemia     Hypertension     Limb swelling     Seasonal allergies     Skin cancer     Sleep apnea     Strain of latissimus dorsi muscle 2017    Right    Type 2 diabetes mellitus     Vertigo       Past Surgical History:   Procedure Laterality Date    BREAST AUGMENTATION BILATERAL MASTOPEXY      BREAST RECONSTRUCTION Bilateral 2022    Procedure: BILATERAL BREAST RECONSTRUCTION WITH REMOVAL AND REPLACEMENT OF IMPLANTS, MESH PLACEMENT, CHEST WALL SKIN RESECTION, CHEST LIPOSUCTION;  Surgeon: Romain Quezada MD;  Location: [facility] OR [facility] [place];  Service: Plastics;  Laterality: Bilateral;    BREAST SURGERY  2004    Bilateral mastectomies and reconstruction     SECTION      COLONOSCOPY  2019    COLONOSCOPY N/A 10/18/2022    Procedure: COLONOSCOPY POLYPECTOMIES;  Surgeon: Eduardo Salas MD;  Location: [facility] ENDOSCOPY;  Service: Gastroenterology;  Laterality: N/A;  COLON POLYPS    COSMETIC  SURGERY  2004    Bilateral mastectomies and reconstructio    ENDOSCOPY  2019    EYE SURGERY  Not sure    Cateracts    LIPOSUCTION N/A 2022    Procedure: CHEST WALL LIPOSUCTION AND CHEST WALL RESECTION;  Surgeon: Romain Quezada MD;  Location: [facility] OR [facility] [place];  Service: Plastics;  Laterality: N/A;  TIMES BASED ON PREVIOUS EXPERIENCE    MASTECTOMY COMPLETE / SIMPLE Bilateral     OOPHORECTOMY      OTHER SURGICAL HISTORY      Joint surgery,  RIGHT FOOT SECOND  TOE WITH A PIN    SKIN CANCER EXCISION Right 2020    neck    TOE SURGERY Right     2nd digit pin inserted    TONSILLECTOMY        Family History   Problem Relation Age of Onset    Heart disease Father     Cancer Father         Skin cancer    Heart failure Father         Age 79  cause of death.    Heart disease Mother     Breast cancer Mother         Breast cancer age 45 and 60    Diabetes Mother     Arthritis Mother     Osteoporosis Mother     Heart failure Mother         Heart attack age 60. Age 87. Cause of death.    Hypertension Mother     Osteoarthritis Mother     Deep vein thrombosis Mother     Cancer Brother         Prostate cancer age about 58.    Prostate cancer Brother         unsure age    Breast cancer Sister 27        Breast cancer age 27 and 52. Ovarian cancer age 53 caused death at 56.    Diabetes Sister     Ovarian cancer Sister 53    Arthritis Sister     Breast cancer Daughter 40    Breast cancer Maternal Aunt         mid 40s    Ovarian cancer Maternal Aunt          with ovarian cancer age mid 40s    Malig Hyperthermia Neg Hx     Colon cancer Neg Hx     Uterine cancer Neg Hx     Pulmonary embolism Neg Hx      Allergies as of 2023 - Reviewed 2023   Allergen Reaction Noted    Exenatide Unknown - High Severity 2021    Fluoxetine Hives 2021    Gabapentin Headache 2021    Jardiance [empagliflozin] Other (See Comments) 2021    Levaquin [levofloxacin] Hives 2021    Nickel Itching and Rash  "01/26/2022    Sulfa antibiotics Swelling 07/19/2021    Metformin Nausea Only 10/27/2022        PCP: does manage PMHx and preventative labs    /77   Pulse 82   Ht 157.5 cm (62.01\")   Wt 77.4 kg (170 lb 9.6 oz)   BMI 31.19 kg/mý     PHYSICAL EXAM: Chaperone present   General- NAD, alert and oriented, appropriate  Psych- Normal mood, good memory  Neck- No masses, no thyroid enlargement  Lymphatic- No palpable neck, axillary, or groin nodes  CV- Regular rhythm, no murmurs  Resp- CTA to bases, no wheezes  Abdomen- Soft, non distended, non tender, no masses  Breast left-  s/p breast implant, intact, no masses, non tender  Breast right- s/p breast implant, no masses, non tender  External genitalia- Normal female, no lesions  Urethra/meatus- Normal, no masses, non tender, no prolapse  Bladder- Normal, no masses, non tender, cystocele grade 1  Vagina- Atrophy, no lesions, no discharge,  Cvx- Normal, no lesions, no discharge, No cervical motion tenderness  Uterus- Normal size, shape & consistency.  Non tender, mobile, & no prolapse  Adnexa- s/p bilateral oophorectomy  Anus/Rectum/Perineum- Not performed  Ext- No edema, no cyanosis    Skin- No lesions, no rashes, no acanthosis nigricans       ASSESSMENT and PLAN:    Diagnoses and all orders for this visit:    1. WWE (Primary)    2. Mixed stress and urge urinary incontinence  -     Ambulatory Referral to Physical Therapy Pelvic Floor  -     POC Urinalysis Dipstick      Color, UA   Date Value Ref Range Status   02/24/2022 Dark Yellow (A) Yellow, Straw Final     Appearance, UA   Date Value Ref Range Status   02/24/2022 Turbid (A) Clear Final     Specific Gravity    Date Value Ref Range Status   08/29/2023 1.030 1.005 - 1.030 Final     pH, Urine   Date Value Ref Range Status   08/29/2023 5.0 5.0 - 8.0 Final     Leukocytes   Date Value Ref Range Status   08/29/2023 Negative Negative Final     Nitrite, UA   Date Value Ref Range Status   08/29/2023 Negative Negative Final "     Protein, POC   Date Value Ref Range Status   08/29/2023 Negative Negative mg/dL Final     Glucose, UA   Date Value Ref Range Status   08/29/2023 Negative Negative mg/dL Final     Ketones, UA   Date Value Ref Range Status   08/29/2023 Negative Negative Final     Urobilinogen, UA   Date Value Ref Range Status   08/29/2023 Normal Normal, 0.2 E.U./dL Final     Bilirubin   Date Value Ref Range Status   08/29/2023 Negative Negative Final     Blood, UA   Date Value Ref Range Status   08/29/2023 Negative Negative Final     Lot Number   Date Value Ref Range Status   03/15/2023 2,[id number]  Final     Expiration Date   Date Value Ref Range Status   03/15/2023 03/15/2024  Final       Preventative:   BREAST HEALTH- Monthly self breast exam importance and how to reviewed. MMG and/or MRI (prn) reviewed per society guidelines and her individual history. Screening Imaging: Not medically needed, s/p double mastectomy  CERVICAL CANCER Screening- Reviewed current ASCCP guidelines for screening w and wo cotest HPV, age specific.  Screen: Not medically needed  COLON CANCER Screening- Reviewed current medical society guidelines and options.  Screen:  Already up to date  SEXUAL HEALTH: Declines STD screening  BONE HEALTH- Reviewed current medical society guidelines and options for testing, calcium and vit D intake.  Weight bearing exercise.  DEXA: Already up to date  VACCINATIONS Recommended: Zoster (49yo and older)  Importance discussed, risk being unvaccinated reviewed.  Questions answered  Genetic testing: Previously screened/positive  Smoking status- NON SMOKER  Follow up PCP/Specialist PMHx and Labs     She understands the importance of having any ordered tests to be performed in a timely fashion.  The risks of not performing them include, but are not limited to, advanced cancer stages, bone loss from osteoporosis and/or subsequent increase in morbidity and/or mortality.  She is encouraged to review her results online and/or contact  or office if she has questions.     Follow Up:  Return in about 1 year (around 8/29/2024).        Kassy Marquez, APRN  08/29/2023

## 2023-08-29 ENCOUNTER — OFFICE VISIT (OUTPATIENT)
Dept: OBSTETRICS AND GYNECOLOGY | Facility: CLINIC | Age: 73
End: 2023-08-29
Payer: MEDICARE

## 2023-08-29 VITALS
WEIGHT: 170.6 LBS | DIASTOLIC BLOOD PRESSURE: 77 MMHG | HEIGHT: 62 IN | SYSTOLIC BLOOD PRESSURE: 114 MMHG | HEART RATE: 82 BPM | BODY MASS INDEX: 31.39 KG/M2

## 2023-08-29 DIAGNOSIS — N39.46 MIXED STRESS AND URGE URINARY INCONTINENCE: ICD-10-CM

## 2023-08-29 DIAGNOSIS — Z01.419 WELL WOMAN EXAM: Primary | ICD-10-CM

## 2023-08-29 LAB
BILIRUB BLD-MCNC: NEGATIVE MG/DL
GLUCOSE UR STRIP-MCNC: NEGATIVE MG/DL
KETONES UR QL: NEGATIVE
LEUKOCYTE EST, POC: NEGATIVE
NITRITE UR-MCNC: NEGATIVE MG/ML
PH UR: 5 [PH] (ref 5–8)
PROT UR STRIP-MCNC: NEGATIVE MG/DL
RBC # UR STRIP: NEGATIVE /UL
SP GR UR: 1.03 (ref 1–1.03)
UROBILINOGEN UR QL: NORMAL

## 2023-09-06 ENCOUNTER — TELEPHONE (OUTPATIENT)
Dept: OBSTETRICS AND GYNECOLOGY | Facility: CLINIC | Age: 73
End: 2023-09-06
Payer: MEDICARE

## 2023-09-06 NOTE — TELEPHONE ENCOUNTER
UNABLE TO CONTACT / TRIED 3 TIMES 8/30/23, 8/31/23 & 9/1/23 ( SEE REFERRAL ) / LETTER MAILED TO ADDRESS ON FILE / REFERRAL TO PHYSICAL THERAPY CLOSED

## 2023-09-26 ENCOUNTER — TELEPHONE (OUTPATIENT)
Dept: INTERNAL MEDICINE | Facility: CLINIC | Age: 73
End: 2023-09-26

## 2023-09-26 NOTE — TELEPHONE ENCOUNTER
Looks like Kenya could see her on Friday, so please see about scheduling an appointment.  Otherwise tell her to keep that arm elevated on pillows when at rest.

## 2023-09-26 NOTE — TELEPHONE ENCOUNTER
Caller: Jyoti Alvarez    Relationship to patient: Self    Best call back number: [phone]    Patient is needing: PATIENT IS REQUESTING A CALL BACK FROM [place] AS SOON AS POSSIBLE. PATIENT DID NOT GIVE ANY DETAILS.

## 2023-09-26 NOTE — TELEPHONE ENCOUNTER
Pt called stating that she has had some upper body left side swelling, sensation of pins and needles in her left arm/ hand. She does have a Hx of bilateral masectomy and implants. She states she is not sure if you would want her to be seen or put in a referral. Please advise. I will call pt back and let her know. [phone]

## 2023-09-29 ENCOUNTER — OFFICE VISIT (OUTPATIENT)
Dept: INTERNAL MEDICINE | Facility: CLINIC | Age: 73
End: 2023-09-29
Payer: MEDICARE

## 2023-09-29 VITALS
HEIGHT: 62 IN | HEART RATE: 78 BPM | TEMPERATURE: 98.7 F | DIASTOLIC BLOOD PRESSURE: 78 MMHG | OXYGEN SATURATION: 97 % | SYSTOLIC BLOOD PRESSURE: 138 MMHG | BODY MASS INDEX: 31.47 KG/M2 | WEIGHT: 171 LBS | RESPIRATION RATE: 18 BRPM

## 2023-09-29 DIAGNOSIS — Z85.3 PERSONAL HISTORY OF MALIGNANT NEOPLASM OF BREAST: ICD-10-CM

## 2023-09-29 DIAGNOSIS — R59.1 LYMPHADENOPATHY: Primary | ICD-10-CM

## 2023-09-29 PROCEDURE — 1160F RVW MEDS BY RX/DR IN RCRD: CPT

## 2023-09-29 PROCEDURE — 3078F DIAST BP <80 MM HG: CPT

## 2023-09-29 PROCEDURE — 1159F MED LIST DOCD IN RCRD: CPT

## 2023-09-29 PROCEDURE — 3051F HG A1C>EQUAL 7.0%<8.0%: CPT

## 2023-09-29 PROCEDURE — 3075F SYST BP GE 130 - 139MM HG: CPT

## 2023-09-29 PROCEDURE — 99213 OFFICE O/P EST LOW 20 MIN: CPT

## 2023-09-29 NOTE — PROGRESS NOTES
"Chief Complaint  Tingling (73 year old female here today complaining of left sided \"pins and needles\" States history of mastectomy and has implants. States puffiness and tenderness under . States her left hand feels as if it is asleep at times. )    History of Present Illness  SUBJECTIVE  Jyoti Alvarez presents to [facility] with complaints of a possible swollen lymph node on the lateral part of her left breast, a few inches under her left axilla. She states she did have some some pins and needles sensation.She states she has noticed some swelling and tenderness.    2/1/2022-bilateral mastectomy.  She has done lymphedema therapy.     She states that this sensation has been noticeable for the last month or so.     Denies any fever, chills, erythema.     Denies any recent illness, or recent vaccines.       Past Medical History:   Diagnosis Date    Anemia     Arthritis     Asthma     Breast cancer     RIGHT    Colon polyp     Disease of thyroid gland Hypothyroidism    Fatty liver     Gastroesophageal reflux     HL (hearing loss) Hearing aids 2021    Hyperlipemia     Hypertension     Limb swelling     Seasonal allergies     Skin cancer     Sleep apnea     Strain of latissimus dorsi muscle 09/11/2017    Right    Type 2 diabetes mellitus     Vertigo       Family History   Problem Relation Age of Onset    Heart disease Father     Cancer Father         Skin cancer    Heart failure Father         Age 79  cause of death.    Heart disease Mother     Breast cancer Mother         Breast cancer age 45 and 60    Diabetes Mother     Arthritis Mother     Osteoporosis Mother     Heart failure Mother         Heart attack age 60. Age 87. Cause of death.    Hypertension Mother     Osteoarthritis Mother     Deep vein thrombosis Mother     Cancer Brother         Prostate cancer age about 58.    Prostate cancer Brother         unsure age    Breast cancer Sister 27        Breast cancer age 27 and " 52. Ovarian cancer age 53 caused death at 56.    Diabetes Sister     Ovarian cancer Sister 53    Arthritis Sister     Breast cancer Daughter 40    Breast cancer Maternal Aunt         mid 40s    Ovarian cancer Maternal Aunt          with ovarian cancer age mid 40s    Malig Hyperthermia Neg Hx     Colon cancer Neg Hx     Uterine cancer Neg Hx     Pulmonary embolism Neg Hx       Past Surgical History:   Procedure Laterality Date    BREAST AUGMENTATION BILATERAL MASTOPEXY      BREAST RECONSTRUCTION Bilateral 2022    Procedure: BILATERAL BREAST RECONSTRUCTION WITH REMOVAL AND REPLACEMENT OF IMPLANTS, MESH PLACEMENT, CHEST WALL SKIN RESECTION, CHEST LIPOSUCTION;  Surgeon: Romain Quezada MD;  Location: [facility] OR [facility];  Service: Plastics;  Laterality: Bilateral;    BREAST SURGERY  2004    Bilateral mastectomies and reconstruction     SECTION      COLONOSCOPY  2019    COLONOSCOPY N/A 10/18/2022    Procedure: COLONOSCOPY POLYPECTOMIES;  Surgeon: Eduardo Salas MD;  Location: [facility] ENDOSCOPY;  Service: Gastroenterology;  Laterality: N/A;  COLON POLYPS    COSMETIC SURGERY      Bilateral mastectomies and reconstructio    ENDOSCOPY  2019    EYE SURGERY  Not sure    Cateracts    LIPOSUCTION N/A 2022    Procedure: CHEST WALL LIPOSUCTION AND CHEST WALL RESECTION;  Surgeon: Romain Quezada MD;  Location: [facility] OR [facility];  Service: Plastics;  Laterality: N/A;  TIMES BASED ON PREVIOUS EXPERIENCE    MASTECTOMY COMPLETE / SIMPLE Bilateral     OOPHORECTOMY      OTHER SURGICAL HISTORY      Joint surgery,  RIGHT FOOT SECOND  TOE WITH A PIN    SKIN CANCER EXCISION Right 2020    neck    TOE SURGERY Right     2nd digit pin inserted    TONSILLECTOMY          Current Outpatient Medications:     acetaminophen (TYLENOL) 650 MG 8 hr tablet, Take 1 tablet by mouth 2 (two) times a day., Disp: , Rfl:     Advair Diskus 500-50 MCG/ACT DISKUS, USE 1 INHALATION TWICE A DAY, Disp: 180 each, Rfl: 3     albuterol sulfate  (90 Base) MCG/ACT inhaler, USE 2 INHALATIONS EVERY 6 HOURS AS NEEDED, Disp: 51 g, Rfl: 2    aspirin 81 MG EC tablet, Take 1 tablet by mouth Daily. LAST DOSE TODAY PER DR. HAYES (BACILIO), OKAYED WITH DR. MONTEMAYOR. BACILIO STATED SHE WOULD NOTIFY PATIENT, Disp: , Rfl:     atorvastatin (LIPITOR) 40 MG tablet, TAKE 1 TABLET EVERY NIGHT, Disp: 90 tablet, Rfl: 3    BD Pen Needle Ca U/F 32G X 4 MM misc, USE 1 PEN NEEDLE DAILY, Disp: 90 each, Rfl: 3    betamethasone dipropionate (DIPROLENE) 0.05 % lotion, APPLY TO SCALP TWICE DAILY AS NEEDED FOR DISCOMFORT, Disp: , Rfl:     Cholecalciferol (Vitamin D3) 1.25 MG (62418 UT) capsule, Take 1 capsule by mouth Every 7 (Seven) Days., Disp: 12 capsule, Rfl: 1    Dulaglutide (Trulicity) 4.5 MG/0.5ML solution pen-injector, Inject 0.5 mL under the skin into the appropriate area as directed Every 7 (Seven) Days for 180 days., Disp: 6 mL, Rfl: 1    ferrous sulfate 325 (65 FE) MG tablet, Take 1 tablet by mouth Daily With Breakfast., Disp: , Rfl:     fluocinonide (LIDEX) 0.05 % cream, fluocinonide 0.05 % topical cream apply to affected area(s) by topical route As needed   Active, Disp: , Rfl:     fluticasone (FLONASE) 50 MCG/ACT nasal spray, USE 1 SPRAY IN EACH NOSTRIL ONCE DAILY, Disp: 48 g, Rfl: 2    glimepiride (AMARYL) 2 MG tablet, TAKE 1 TABLET DAILY WITH BREAKFAST AND DINNER, Disp: 180 tablet, Rfl: 3    glucose blood (OneTouch Verio) test strip, 1 each by Other route 3 (Three) Times a Day. use to test blood sugar 3 times daily, Disp: 300 each, Rfl: 3    hydrocortisone 2.5 % cream, APPLY TO SCALY AREAS ON THE EARS TWICE DAILY AS NEEDED FLARES., Disp: , Rfl:     ketoconazole (NIZORAL) 2 % shampoo, WASH FACE AND SCALP TWO TO THREE TIMES WEEKLY. LEAVE ON FOR 5 MINUTES BEFORE RINSING, Disp: , Rfl:     levothyroxine (SYNTHROID, LEVOTHROID) 25 MCG tablet, TAKE 1 TABLET DAILY IN THE MORNING ON AN EMPTY STOMACH, Disp: 90 tablet, Rfl: 3    losartan (COZAAR) 50 MG  "tablet, TAKE 1 TABLET DAILY, Disp: 90 tablet, Rfl: 3    metoprolol succinate XL (TOPROL-XL) 100 MG 24 hr tablet, TAKE 1 TABLET DAILY, Disp: 90 tablet, Rfl: 3    montelukast (SINGULAIR) 10 MG tablet, TAKE 1 TABLET DAILY, Disp: 90 tablet, Rfl: 3    multivitamin with minerals tablet tablet, Take 1 tablet by mouth Daily., Disp: , Rfl:     nystatin-triamcinolone (MYCOLOG II) 079244-8.1 UNIT/GM-% cream, Apply 1 application topically to the appropriate area as directed 2 (Two) Times a Day., Disp: 15 g, Rfl: 1    OneTouch Delica Lancets 30G misc, 1 each 3 (Three) Times a Day., Disp: 200 each, Rfl: 6    pantoprazole (PROTONIX) 40 MG EC tablet, TAKE 1 TABLET DAILY, Disp: 90 tablet, Rfl: 3    vitamin B-12 (CYANOCOBALAMIN) 500 MCG tablet, Take 1 tablet by mouth Daily., Disp: , Rfl:     Insulin Glargine (Lantus SoloStar) 100 UNIT/ML injection pen, Inject 55 Units under the skin into the appropriate area as directed Daily for 90 days., Disp: 45 mL, Rfl: 3    OBJECTIVE  Vital Signs:   /78 (BP Location: Left arm, Patient Position: Sitting)   Pulse 78   Temp 98.7 °F (37.1 °C) (Temporal)   Resp 18   Ht 157.5 cm (62.01\")   Wt 77.6 kg (171 lb)   SpO2 97%   BMI 31.27 kg/m²    Estimated body mass index is 31.27 kg/m² as calculated from the following:    Height as of this encounter: 157.5 cm (62.01\").    Weight as of this encounter: 77.6 kg (171 lb).     Wt Readings from Last 3 Encounters:   09/29/23 77.6 kg (171 lb)   08/29/23 77.4 kg (170 lb 9.6 oz)   08/21/23 77.8 kg (171 lb 9.6 oz)     BP Readings from Last 3 Encounters:   09/29/23 138/78   08/29/23 114/77   08/21/23 130/78       Physical Exam  Vitals and nursing note reviewed.   Constitutional:       Appearance: Normal appearance.   HENT:      Head: Normocephalic.   Eyes:      Extraocular Movements: Extraocular movements intact.      Conjunctiva/sclera: Conjunctivae normal.   Cardiovascular:      Rate and Rhythm: Normal rate.   Pulmonary:      Effort: Pulmonary effort is " normal.   Chest:          Comments: Swelling/mild tenderness to palpation. No redness  Musculoskeletal:         General: No swelling. Normal range of motion.      Cervical back: Normal range of motion and neck supple.   Lymphadenopathy:      Upper Body:      Left upper body: Axillary adenopathy present.   Skin:     General: Skin is warm and dry.   Neurological:      General: No focal deficit present.      Mental Status: She is alert. Mental status is at baseline.   Psychiatric:         Mood and Affect: Mood normal.         Thought Content: Thought content normal.        Result Review        No Images in the past 120 days found..     The above data has been reviewed by FANY Sibley 09/29/2023 13:57 EDT.          Patient Care Team:  Ramo Noguera MD as PCP - General (Internal Medicine)  Zion Shaw MD as Attending Provider (Plastic Surgery)  Kelly Vicente APRN as Nurse Practitioner (Endocrinology)            ASSESSMENT & PLAN    Diagnoses and all orders for this visit:    1. Lymphadenopathy (Primary)  Assessment & Plan:  S/p bilateral mastectomy and breast reconstruction.  Axillary lymphadenopathy.  She does have some mild tenderness.  There is no erythema or warmth.  Will get ultrasound.  She has had this in the past and a biopsy; however, symptoms are new.  We may need to get patient set back up with surgeon.     Orders:  -     Cancel: US Breast Left Limited; Future  -     US Axilla Left; Future    2. Personal history of malignant neoplasm of breast  -     Cancel: US Breast Left Limited; Future  -     US Axilla Left; Future         Tobacco Use: Low Risk     Smoking Tobacco Use: Never    Smokeless Tobacco Use: Never    Passive Exposure: Not on file       Follow Up     Return if symptoms worsen or fail to improve.    Please note that portions of this note were completed with a voice recognition program.    Patient was given instructions and counseling regarding her condition or for health  maintenance advice. Please see specific information pulled into the AVS if appropriate.   I have reviewed information obtained and documented by others and I have confirmed the accuracy of this documented note.    FANY Sibley

## 2023-09-29 NOTE — ASSESSMENT & PLAN NOTE
S/p bilateral mastectomy and breast reconstruction.  Axillary lymphadenopathy.  She does have some mild tenderness.  There is no erythema or warmth.  Will get ultrasound.  She has had this in the past and a biopsy; however, symptoms are new.  We may need to get patient set back up with surgeon.

## 2023-10-04 ENCOUNTER — TELEPHONE (OUTPATIENT)
Dept: INTERNAL MEDICINE | Facility: CLINIC | Age: 73
End: 2023-10-04
Payer: MEDICARE

## 2023-10-04 NOTE — TELEPHONE ENCOUNTER
She was not placed on any antibiotics, and if this lymphadenopathy is related to some type of mild underlying infectious process, it would still not preclude her from getting the flu shot, so she can get it anytime now, thanks.

## 2023-10-04 NOTE — TELEPHONE ENCOUNTER
PT states that you wanted her to have her flu shot around the first of October, she had to see Kenya Sharpe on 9/29/2023 for Lymphadenopathy, she is wanting to know when she can get her flu shot. Please advise.

## 2023-10-09 ENCOUNTER — CLINICAL SUPPORT (OUTPATIENT)
Dept: INTERNAL MEDICINE | Facility: CLINIC | Age: 73
End: 2023-10-09
Payer: MEDICARE

## 2023-10-09 DIAGNOSIS — Z23 ENCOUNTER FOR VACCINATION: Primary | ICD-10-CM

## 2023-10-12 ENCOUNTER — HOSPITAL ENCOUNTER (OUTPATIENT)
Dept: ULTRASOUND IMAGING | Facility: HOSPITAL | Age: 73
Discharge: HOME | End: 2023-10-12
Payer: MEDICARE

## 2023-10-12 DIAGNOSIS — R59.1 LYMPHADENOPATHY: ICD-10-CM

## 2023-10-12 DIAGNOSIS — Z85.3 PERSONAL HISTORY OF MALIGNANT NEOPLASM OF BREAST: ICD-10-CM

## 2023-10-12 PROCEDURE — 76642 ULTRASOUND BREAST LIMITED: CPT

## 2023-10-12 RX ORDER — PANTOPRAZOLE SODIUM 40 MG/1
TABLET, DELAYED RELEASE ORAL
Qty: 90 TABLET | Refills: 3 | Status: SHIPPED | OUTPATIENT
Start: 2023-10-12

## 2023-10-12 RX ORDER — LEVOTHYROXINE SODIUM 0.03 MG/1
TABLET ORAL
Qty: 90 TABLET | Refills: 3 | Status: SHIPPED | OUTPATIENT
Start: 2023-10-12

## 2023-10-26 ENCOUNTER — HOSPITAL ENCOUNTER (OUTPATIENT)
Dept: MAMMOGRAPHY | Facility: HOSPITAL | Age: 73
Discharge: HOME | End: 2023-10-26
Payer: MEDICARE

## 2023-10-26 ENCOUNTER — PATIENT OUTREACH (OUTPATIENT)
Dept: ONCOLOGY | Facility: HOSPITAL | Age: 73
End: 2023-10-26
Payer: MEDICARE

## 2023-10-26 DIAGNOSIS — R22.32 AXILLARY MASS, LEFT: ICD-10-CM

## 2023-10-26 PROCEDURE — A4648 IMPLANTABLE TISSUE MARKER: HCPCS

## 2023-10-26 PROCEDURE — 25010000002 LIDOCAINE 1 % SOLUTION

## 2023-10-26 RX ORDER — LIDOCAINE HYDROCHLORIDE AND EPINEPHRINE 10; 10 MG/ML; UG/ML
10 INJECTION, SOLUTION INFILTRATION; PERINEURAL ONCE
Status: COMPLETED | OUTPATIENT
Start: 2023-10-26 | End: 2023-10-26

## 2023-10-26 RX ORDER — LIDOCAINE HYDROCHLORIDE 10 MG/ML
10 INJECTION, SOLUTION INFILTRATION; PERINEURAL ONCE
Status: COMPLETED | OUTPATIENT
Start: 2023-10-26 | End: 2023-10-26

## 2023-10-26 RX ADMIN — LIDOCAINE HYDROCHLORIDE,EPINEPHRINE BITARTRATE 10 ML: 10; .01 INJECTION, SOLUTION INFILTRATION; PERINEURAL at 13:49

## 2023-10-26 RX ADMIN — LIDOCAINE HYDROCHLORIDE 10 ML: 10 INJECTION, SOLUTION INFILTRATION; PERINEURAL at 13:49

## 2023-10-27 LAB — Lab: NORMAL

## 2023-10-31 ENCOUNTER — TELEPHONE (OUTPATIENT)
Dept: INTERNAL MEDICINE | Facility: CLINIC | Age: 73
End: 2023-10-31
Payer: MEDICARE

## 2023-10-31 NOTE — TELEPHONE ENCOUNTER
I am not sure what she is looking at, there are no results yet, they have sent the specimens out for further evaluation.  If perhaps she is wanting to know how long this process takes, I cannot say.  We will let her know as soon as we see the final results, thanks.

## 2023-10-31 NOTE — TELEPHONE ENCOUNTER
Caller: Jyoti Alvarez    Relationship: Self    Best call back number: [phone]     Caller requesting test results: YES    What test was performed: MAMMO LYMPH NODE BIOPSY    When was the test performed: 10.26.2023    Where was the test performed: [facility]    Additional notes: PATIENT STATES THAT SHE HAD BIOPSY COMPLETED ON 10.26.2023. PATIENT HAS SEEN RESULTS IN [facility] BUT PATIENT IS WANTING TO SPEAK WITH DELORES QUINTANA OR DR MONTEMAYOR ABOUT RESULTS AS SOON AS POSSIBLE.

## 2023-11-06 ENCOUNTER — PATIENT OUTREACH (OUTPATIENT)
Dept: ONCOLOGY | Facility: HOSPITAL | Age: 73
End: 2023-11-06
Payer: MEDICARE

## 2023-11-06 LAB
CYTO UR: NORMAL
LAB AP CASE REPORT: NORMAL
LAB AP CLINICAL INFORMATION: NORMAL
LAB AP FLOW CYTOMETRY SUMMARY: NORMAL
LAB AP SPECIAL STAINS: NORMAL
PATH REPORT.FINAL DX SPEC: NORMAL
PATH REPORT.GROSS SPEC: NORMAL

## 2023-11-06 RX ORDER — FLUTICASONE PROPIONATE AND SALMETEROL 500; 50 UG/1; UG/1
1 POWDER RESPIRATORY (INHALATION) 2 TIMES DAILY
Qty: 180 EACH | Refills: 3 | Status: SHIPPED | OUTPATIENT
Start: 2023-11-06

## 2023-11-07 ENCOUNTER — TELEPHONE (OUTPATIENT)
Dept: INTERNAL MEDICINE | Facility: CLINIC | Age: 73
End: 2023-11-07
Payer: MEDICARE

## 2023-11-07 ENCOUNTER — PATIENT OUTREACH (OUTPATIENT)
Dept: ONCOLOGY | Facility: HOSPITAL | Age: 73
End: 2023-11-07
Payer: MEDICARE

## 2023-11-07 DIAGNOSIS — R92.8 ABNORMAL FINDING ON BREAST IMAGING: Primary | ICD-10-CM

## 2023-11-07 NOTE — TELEPHONE ENCOUNTER
----- Message from FANY Sibley sent at 11/7/2023 10:20 AM EST -----  Can you please call Ms. Alvarez and let her know that the biopsy came back nondiagnostic.  Dr. Cabello, the diagnostic radiologist, has recommended that we refer her to general surgery to discuss possible options of surgical excision and/or further imaging evaluations.  Once you let her know this, please get her set up with Dr. Kathy Rausch.  Thank you

## 2023-11-08 ENCOUNTER — PATIENT OUTREACH (OUTPATIENT)
Dept: ONCOLOGY | Facility: HOSPITAL | Age: 73
End: 2023-11-08
Payer: MEDICARE

## 2023-11-15 ENCOUNTER — PATIENT OUTREACH (OUTPATIENT)
Dept: ONCOLOGY | Facility: HOSPITAL | Age: 73
End: 2023-11-15
Payer: MEDICARE

## 2023-11-16 ENCOUNTER — LAB (OUTPATIENT)
Dept: LAB | Facility: HOSPITAL | Age: 73
End: 2023-11-16
Payer: MEDICARE

## 2023-11-16 DIAGNOSIS — K76.0 FATTY LIVER: ICD-10-CM

## 2023-11-16 DIAGNOSIS — E11.65 TYPE 2 DIABETES MELLITUS WITH HYPERGLYCEMIA, WITHOUT LONG-TERM CURRENT USE OF INSULIN: ICD-10-CM

## 2023-11-16 DIAGNOSIS — E03.4 ATROPHY OF THYROID: ICD-10-CM

## 2023-11-16 DIAGNOSIS — E55.9 VITAMIN D DEFICIENCY: ICD-10-CM

## 2023-11-16 DIAGNOSIS — D50.8 IRON DEFICIENCY ANEMIA SECONDARY TO INADEQUATE DIETARY IRON INTAKE: ICD-10-CM

## 2023-11-16 DIAGNOSIS — I10 ESSENTIAL HYPERTENSION: ICD-10-CM

## 2023-11-16 DIAGNOSIS — E53.8 VITAMIN B12 DEFICIENCY: ICD-10-CM

## 2023-11-16 LAB
25(OH)D3 SERPL-MCNC: 54.1 NG/ML (ref 30–100)
ALBUMIN SERPL-MCNC: 4.4 G/DL (ref 3.5–5.2)
ALBUMIN UR-MCNC: <1.2 MG/DL
ALP SERPL-CCNC: 82 U/L (ref 39–117)
ALT SERPL W P-5'-P-CCNC: 34 U/L (ref 1–33)
ANION GAP SERPL CALCULATED.3IONS-SCNC: 11 MMOL/L (ref 5–15)
AST SERPL-CCNC: 45 U/L (ref 1–32)
BASOPHILS # BLD AUTO: 0.04 10*3/MM3 (ref 0–0.2)
BASOPHILS NFR BLD AUTO: 0.6 % (ref 0–1.5)
BILIRUB CONJ SERPL-MCNC: <0.2 MG/DL (ref 0–0.3)
BILIRUB INDIRECT SERPL-MCNC: ABNORMAL MG/DL
BILIRUB SERPL-MCNC: 0.4 MG/DL (ref 0–1.2)
BUN SERPL-MCNC: 18 MG/DL (ref 8–23)
BUN/CREAT SERPL: 23.1 (ref 7–25)
CALCIUM SPEC-SCNC: 9.3 MG/DL (ref 8.6–10.5)
CHLORIDE SERPL-SCNC: 104 MMOL/L (ref 98–107)
CO2 SERPL-SCNC: 23 MMOL/L (ref 22–29)
CREAT SERPL-MCNC: 0.78 MG/DL (ref 0.57–1)
CREAT UR-MCNC: 27.5 MG/DL
DEPRECATED RDW RBC AUTO: 42.1 FL (ref 37–54)
EGFRCR SERPLBLD CKD-EPI 2021: 79.8 ML/MIN/1.73
EOSINOPHIL # BLD AUTO: 0.2 10*3/MM3 (ref 0–0.4)
EOSINOPHIL NFR BLD AUTO: 3.2 % (ref 0.3–6.2)
ERYTHROCYTE [DISTWIDTH] IN BLOOD BY AUTOMATED COUNT: 12.5 % (ref 12.3–15.4)
FERRITIN SERPL-MCNC: 102 NG/ML (ref 13–150)
FOLATE SERPL-MCNC: >20 NG/ML (ref 4.78–24.2)
GLUCOSE SERPL-MCNC: 137 MG/DL (ref 65–99)
HBA1C MFR BLD: 7.4 % (ref 4.8–5.6)
HCT VFR BLD AUTO: 39.3 % (ref 34–46.6)
HGB BLD-MCNC: 13.5 G/DL (ref 12–15.9)
IMM GRANULOCYTES # BLD AUTO: 0.01 10*3/MM3 (ref 0–0.05)
IMM GRANULOCYTES NFR BLD AUTO: 0.2 % (ref 0–0.5)
IRON 24H UR-MRATE: 60 MCG/DL (ref 37–145)
IRON SATN MFR SERPL: 17 % (ref 20–50)
LYMPHOCYTES # BLD AUTO: 1.8 10*3/MM3 (ref 0.7–3.1)
LYMPHOCYTES NFR BLD AUTO: 28.5 % (ref 19.6–45.3)
MCH RBC QN AUTO: 31.5 PG (ref 26.6–33)
MCHC RBC AUTO-ENTMCNC: 34.4 G/DL (ref 31.5–35.7)
MCV RBC AUTO: 91.8 FL (ref 79–97)
MICROALBUMIN/CREAT UR: NORMAL MG/G{CREAT}
MONOCYTES # BLD AUTO: 0.67 10*3/MM3 (ref 0.1–0.9)
MONOCYTES NFR BLD AUTO: 10.6 % (ref 5–12)
NEUTROPHILS NFR BLD AUTO: 3.6 10*3/MM3 (ref 1.7–7)
NEUTROPHILS NFR BLD AUTO: 56.9 % (ref 42.7–76)
NRBC BLD AUTO-RTO: 0 /100 WBC (ref 0–0.2)
PLATELET # BLD AUTO: 344 10*3/MM3 (ref 140–450)
PMV BLD AUTO: 10.1 FL (ref 6–12)
POTASSIUM SERPL-SCNC: 4.3 MMOL/L (ref 3.5–5.2)
PROT SERPL-MCNC: 6.2 G/DL (ref 6–8.5)
RBC # BLD AUTO: 4.28 10*6/MM3 (ref 3.77–5.28)
SODIUM SERPL-SCNC: 138 MMOL/L (ref 136–145)
TIBC SERPL-MCNC: 353 MCG/DL (ref 298–536)
TRANSFERRIN SERPL-MCNC: 237 MG/DL (ref 200–360)
TSH SERPL DL<=0.05 MIU/L-ACNC: 1.49 UIU/ML (ref 0.27–4.2)
VIT B12 BLD-MCNC: 857 PG/ML (ref 211–946)
WBC NRBC COR # BLD AUTO: 6.32 10*3/MM3 (ref 3.4–10.8)

## 2023-11-16 PROCEDURE — 83540 ASSAY OF IRON: CPT

## 2023-11-16 PROCEDURE — 82306 VITAMIN D 25 HYDROXY: CPT

## 2023-11-16 PROCEDURE — 85025 COMPLETE CBC W/AUTO DIFF WBC: CPT

## 2023-11-16 PROCEDURE — 80048 BASIC METABOLIC PNL TOTAL CA: CPT

## 2023-11-16 PROCEDURE — 82746 ASSAY OF FOLIC ACID SERUM: CPT

## 2023-11-16 PROCEDURE — 84443 ASSAY THYROID STIM HORMONE: CPT

## 2023-11-16 PROCEDURE — 36415 COLL VENOUS BLD VENIPUNCTURE: CPT

## 2023-11-16 PROCEDURE — 82607 VITAMIN B-12: CPT

## 2023-11-16 PROCEDURE — 80076 HEPATIC FUNCTION PANEL: CPT

## 2023-11-16 PROCEDURE — 82728 ASSAY OF FERRITIN: CPT

## 2023-11-16 PROCEDURE — 83036 HEMOGLOBIN GLYCOSYLATED A1C: CPT

## 2023-11-16 PROCEDURE — 82570 ASSAY OF URINE CREATININE: CPT

## 2023-11-16 PROCEDURE — 84466 ASSAY OF TRANSFERRIN: CPT

## 2023-11-16 PROCEDURE — 82043 UR ALBUMIN QUANTITATIVE: CPT

## 2023-11-17 ENCOUNTER — TREATMENT (OUTPATIENT)
Dept: PHYSICAL THERAPY | Facility: CLINIC | Age: 73
End: 2023-11-17
Payer: MEDICARE

## 2023-11-17 DIAGNOSIS — N81.89 PELVIC FLOOR WEAKNESS: ICD-10-CM

## 2023-11-17 DIAGNOSIS — N39.46 MIXED STRESS AND URGE URINARY INCONTINENCE: Primary | ICD-10-CM

## 2023-11-17 PROCEDURE — 97161 PT EVAL LOW COMPLEX 20 MIN: CPT | Performed by: PHYSICAL THERAPIST

## 2023-11-17 PROCEDURE — 97110 THERAPEUTIC EXERCISES: CPT | Performed by: PHYSICAL THERAPIST

## 2023-11-22 ENCOUNTER — OFFICE VISIT (OUTPATIENT)
Dept: SURGERY | Facility: CLINIC | Age: 73
End: 2023-11-22
Payer: MEDICARE

## 2023-11-22 VITALS — WEIGHT: 169 LBS | BODY MASS INDEX: 31.1 KG/M2 | RESPIRATION RATE: 17 BRPM | HEIGHT: 62 IN

## 2023-11-22 DIAGNOSIS — R59.1 LYMPHADENOPATHY: Primary | ICD-10-CM

## 2023-11-22 PROCEDURE — 99203 OFFICE O/P NEW LOW 30 MIN: CPT | Performed by: SURGERY

## 2023-11-22 PROCEDURE — 1159F MED LIST DOCD IN RCRD: CPT | Performed by: SURGERY

## 2023-11-22 PROCEDURE — 1160F RVW MEDS BY RX/DR IN RCRD: CPT | Performed by: SURGERY

## 2023-11-22 NOTE — PROGRESS NOTES
Chief Complaint: Abnormal Breast Imaging    Subjective         History of Present Illness  Jyoti Alvarez is a 73 y.o. female presents to [facility] GENERAL SURGERY to be seen for left axillary adenopathy.  Her imaging is shown below:    Addendum    ADDENDUM:      Pathology result shows the following:  Minute fragment of lymphoid tissue, nondiagnostic.     Flow cytometry result shows the following:  No significant lymphoid immunophenotypic abnormalities.     Given the increasing left axillary lymphadenopathy and nondiagnostic pathology result despite   submitting two 14 gauge core biopsy samples through the lymph node, recommend surgical consultation   to discuss options of surgical excision and/or further imaging evaluation.            Narrative & Impression   PROCEDURE:  US AXILLA LEFT     COMPARISON: [facility], US, US AXILLA LEFT, 7/22/2021, 12:12.     INDICATIONS:  possible lymphadenopathy inferior to left axilla     TECHNIQUE:    A targeted left axillary ultrasound was performed.     FINDINGS:          Multiple abnormal lymph nodes are evident, with hypoechoic cortices.  These would be amenable to   ultrasound-guided core needle biopsy, which is recommended for further evaluation.     IMPRESSION:  Targeted left axillary ultrasound demonstrating adenopathy, as above.     I reviewed images and discussed findings and recommendations with the patient.  The patient was   scheduled for ultrasound guided core needle biopsy before leaving today.     RECOMMENDATION(S):               ULTRASOUND-GUIDED CORE BIOPSY: LEFT BREAST              Objective     Past Medical History:   Diagnosis Date    Anemia     Arthritis     Asthma     Breast cancer     RIGHT    Colon polyp     Disease of thyroid gland Hypothyroidism    Fatty liver     Gastroesophageal reflux     HL (hearing loss) Hearing aids 2021    Hyperlipemia     Hypertension     Limb swelling     Seasonal allergies     Skin cancer      Sleep apnea     Strain of latissimus dorsi muscle 2017    Right    Type 2 diabetes mellitus     Vertigo        Past Surgical History:   Procedure Laterality Date    BREAST AUGMENTATION BILATERAL MASTOPEXY      BREAST RECONSTRUCTION Bilateral 2022    Procedure: BILATERAL BREAST RECONSTRUCTION WITH REMOVAL AND REPLACEMENT OF IMPLANTS, MESH PLACEMENT, CHEST WALL SKIN RESECTION, CHEST LIPOSUCTION;  Surgeon: Romain Hayes MD;  Location: [facility] OR [facility] [place];  Service: Plastics;  Laterality: Bilateral;    BREAST SURGERY      Bilateral mastectomies and reconstruction     SECTION      COLONOSCOPY  2019    COLONOSCOPY N/A 10/18/2022    Procedure: COLONOSCOPY POLYPECTOMIES;  Surgeon: Eduardo Salas MD;  Location: [facility] ENDOSCOPY;  Service: Gastroenterology;  Laterality: N/A;  COLON POLYPS    COSMETIC SURGERY      Bilateral mastectomies and reconstructio    ENDOSCOPY      EYE SURGERY  Not sure    Cateracts    LIPOSUCTION N/A 2022    Procedure: CHEST WALL LIPOSUCTION AND CHEST WALL RESECTION;  Surgeon: Romain Hayes MD;  Location: [facility] [place];  Service: Plastics;  Laterality: N/A;  TIMES BASED ON PREVIOUS EXPERIENCE    MASTECTOMY COMPLETE / SIMPLE Bilateral     OOPHORECTOMY      OTHER SURGICAL HISTORY      Joint surgery,  RIGHT FOOT SECOND  TOE WITH A PIN    SKIN CANCER EXCISION Right 2020    neck    TOE SURGERY Right     2nd digit pin inserted    TONSILLECTOMY           Current Outpatient Medications:     acetaminophen (TYLENOL) 650 MG 8 hr tablet, Take 1 tablet by mouth 2 (two) times a day., Disp: , Rfl:     albuterol sulfate  (90 Base) MCG/ACT inhaler, USE 2 INHALATIONS EVERY 6 HOURS AS NEEDED, Disp: 51 g, Rfl: 2    aspirin 81 MG EC tablet, Take 1 tablet by mouth Daily. LAST DOSE TODAY PER DR. HAYES (BACILIO), OKAYED WITH DR. MONTEMAYOR. BACILIO STATED SHE WOULD NOTIFY PATIENT, Disp: , Rfl:     atorvastatin (LIPITOR) 40 MG tablet, TAKE 1 TABLET EVERY  NIGHT, Disp: 90 tablet, Rfl: 3    BD Pen Needle Ca U/F 32G X 4 MM misc, USE 1 PEN NEEDLE DAILY, Disp: 90 each, Rfl: 3    betamethasone dipropionate (DIPROLENE) 0.05 % lotion, APPLY TO SCALP TWICE DAILY AS NEEDED FOR DISCOMFORT, Disp: , Rfl:     Cholecalciferol (Vitamin D3) 1.25 MG (66463 UT) capsule, Take 1 capsule by mouth Every 7 (Seven) Days., Disp: 12 capsule, Rfl: 1    Dulaglutide (Trulicity) 4.5 MG/0.5ML solution pen-injector, Inject 0.5 mL under the skin into the appropriate area as directed Every 7 (Seven) Days for 180 days., Disp: 6 mL, Rfl: 1    ferrous sulfate 325 (65 FE) MG tablet, Take 1 tablet by mouth Daily With Breakfast., Disp: , Rfl:     fluocinonide (LIDEX) 0.05 % cream, fluocinonide 0.05 % topical cream apply to affected area(s) by topical route As needed   Active, Disp: , Rfl:     fluticasone (FLONASE) 50 MCG/ACT nasal spray, USE 1 SPRAY IN EACH NOSTRIL ONCE DAILY, Disp: 48 g, Rfl: 2    Fluticasone-Salmeterol (ADVAIR/WIXELA) 500-50 MCG/ACT DISKUS, USE 1 INHALATION TWICE A DAY, Disp: 180 each, Rfl: 3    glimepiride (AMARYL) 2 MG tablet, TAKE 1 TABLET DAILY WITH BREAKFAST AND DINNER, Disp: 180 tablet, Rfl: 3    glucose blood (OneTouch Verio) test strip, 1 each by Other route 3 (Three) Times a Day. use to test blood sugar 3 times daily, Disp: 300 each, Rfl: 3    hydrocortisone 2.5 % cream, APPLY TO SCALY AREAS ON THE EARS TWICE DAILY AS NEEDED FLARES., Disp: , Rfl:     ketoconazole (NIZORAL) 2 % shampoo, WASH FACE AND SCALP TWO TO THREE TIMES WEEKLY. LEAVE ON FOR 5 MINUTES BEFORE RINSING, Disp: , Rfl:     levothyroxine (SYNTHROID, LEVOTHROID) 25 MCG tablet, TAKE 1 TABLET DAILY IN THE MORNING ON AN EMPTY STOMACH, Disp: 90 tablet, Rfl: 3    losartan (COZAAR) 50 MG tablet, TAKE 1 TABLET DAILY, Disp: 90 tablet, Rfl: 3    metoprolol succinate XL (TOPROL-XL) 100 MG 24 hr tablet, TAKE 1 TABLET DAILY, Disp: 90 tablet, Rfl: 3    montelukast (SINGULAIR) 10 MG tablet, TAKE 1 TABLET DAILY, Disp: 90 tablet,  Rfl: 3    multivitamin with minerals tablet tablet, Take 1 tablet by mouth Daily., Disp: , Rfl:     nystatin-triamcinolone (MYCOLOG II) 228296-9.1 UNIT/GM-% cream, Apply 1 application topically to the appropriate area as directed 2 (Two) Times a Day., Disp: 15 g, Rfl: 1    OneTouch Delica Lancets 30G misc, 1 each 3 (Three) Times a Day., Disp: 200 each, Rfl: 6    pantoprazole (PROTONIX) 40 MG EC tablet, TAKE 1 TABLET DAILY, Disp: 90 tablet, Rfl: 3    vitamin B-12 (CYANOCOBALAMIN) 500 MCG tablet, Take 1 tablet by mouth Daily., Disp: , Rfl:     Insulin Glargine (Lantus SoloStar) 100 UNIT/ML injection pen, Inject 55 Units under the skin into the appropriate area as directed Daily for 90 days., Disp: 45 mL, Rfl: 3    Allergies   Allergen Reactions    Exenatide Unknown - High Severity     Weight loss and pain in the right side. Bydereon.    Fluoxetine Hives    Gabapentin Headache     Occular Miaigraines    Jardiance [Empagliflozin] Other (See Comments)     UTI    Levaquin [Levofloxacin] Hives    Nickel Itching and Rash    Sulfa Antibiotics Swelling    Metformin Nausea Only        Family History   Problem Relation Age of Onset    Heart disease Father     Cancer Father         Skin cancer    Heart failure Father         Age 79  cause of death.    Heart disease Mother     Breast cancer Mother         Breast cancer age 45 and 60    Diabetes Mother     Arthritis Mother     Osteoporosis Mother     Heart failure Mother         Heart attack age 60. Age 87. Cause of death.    Hypertension Mother     Osteoarthritis Mother     Deep vein thrombosis Mother     Cancer Brother         Prostate cancer age about 58.    Prostate cancer Brother         unsure age    Breast cancer Sister 27        Breast cancer age 27 and 52. Ovarian cancer age 53 caused death at 56.    Diabetes Sister     Ovarian cancer Sister 53    Arthritis Sister     Breast cancer Daughter 40    Breast cancer Maternal Aunt         mid 40s    Ovarian cancer Maternal Aunt   "        with ovarian cancer age mid 40s    Malig Hyperthermia Neg Hx     Colon cancer Neg Hx     Uterine cancer Neg Hx     Pulmonary embolism Neg Hx        Social History     Socioeconomic History    Marital status:     Number of children: 3   Tobacco Use    Smoking status: Never    Smokeless tobacco: Never   Vaping Use    Vaping Use: Never used   Substance and Sexual Activity    Alcohol use: Never    Drug use: Never    Sexual activity: Not Currently     Partners: Male     Birth control/protection: Post-menopausal       Vital Signs:   Resp 17   Ht 157.5 cm (62.01\")   Wt 76.7 kg (169 lb)   BMI 30.90 kg/m²    Review of Systems    Physical Exam  Vitals and nursing note reviewed.   Constitutional:       General: She is not in acute distress.     Appearance: Normal appearance. She is well-developed.   HENT:      Head: Normocephalic and atraumatic.   Eyes:      Extraocular Movements: Extraocular movements intact.      Pupils: Pupils are equal, round, and reactive to light.   Cardiovascular:      Pulses: Normal pulses.   Pulmonary:      Effort: Pulmonary effort is normal. No retractions.      Breath sounds: Normal air entry. No wheezing.   Abdominal:      General: There is no distension.      Palpations: Abdomen is soft.      Tenderness: There is no abdominal tenderness.      Hernia: No hernia is present.   Musculoskeletal:         General: No swelling or deformity.      Cervical back: Neck supple.   Skin:     General: Skin is warm and dry.      Findings: No erythema.      Comments: Well healed breast reconstruction with palpable axillary adenopathy   Neurological:      General: No focal deficit present.      Mental Status: She is alert and oriented to person, place, and time.      Motor: Motor function is intact.   Psychiatric:         Mood and Affect: Mood normal.         Thought Content: Thought content normal.          Result Review :               Assessment and Plan    Diagnoses and all orders for this " visit:    1. Lymphadenopathy (Primary)  -     Case Request; Standing  -     Follow Anesthesia Guidelines / Protocol; Standing  -     Place sequential compression device- to be placed on patient in Pre-op; Standing  -     If patient has history of breast cancer, please do not place IV or blood pressure cuff on the affected side.; Standing  -     Please contact surgeon with questions or concerns.; Standing  -     Verify / Perform Chlorhexidine Skin Prep; Standing  -     Mammo Breast Placement Device Initial Without Biopsy Left; Future  -     No Lab Testing Needed; Standing  -     Obtain Informed Consent; Standing  -     ceFAZolin (ANCEF) 2,000 mg in sodium chloride 0.9 % 100 mL IVPB  -     Case Request        Follow Up   Return for Next scheduled followup after surgery.  Patient was given instructions and counseling regarding her condition or for health maintenance advice. Please see specific information pulled into the AVS if appropriate.         This document has been electronically signed by Kathy Rausch MD  November 22, 2023 12:33 EST

## 2023-11-22 NOTE — H&P (VIEW-ONLY)
Chief Complaint: Abnormal Breast Imaging    Subjective         History of Present Illness  Jyoti Alvarez is a 73 y.o. female presents to [facility] to be seen for left axillary adenopathy.  Her imaging is shown below:    Addendum    ADDENDUM:      Pathology result shows the following:  Minute fragment of lymphoid tissue, nondiagnostic.     Flow cytometry result shows the following:  No significant lymphoid immunophenotypic abnormalities.     Given the increasing left axillary lymphadenopathy and nondiagnostic pathology result despite   submitting two 14 gauge core biopsy samples through the lymph node, recommend surgical consultation   to discuss options of surgical excision and/or further imaging evaluation.            Narrative & Impression   PROCEDURE:  US AXILLA LEFT     COMPARISON: [facility], US, US AXILLA LEFT, 7/22/2021, 12:12.     INDICATIONS:  possible lymphadenopathy inferior to left axilla     TECHNIQUE:    A targeted left axillary ultrasound was performed.     FINDINGS:          Multiple abnormal lymph nodes are evident, with hypoechoic cortices.  These would be amenable to   ultrasound-guided core needle biopsy, which is recommended for further evaluation.     IMPRESSION:  Targeted left axillary ultrasound demonstrating adenopathy, as above.     I reviewed images and discussed findings and recommendations with the patient.  The patient was   scheduled for ultrasound guided core needle biopsy before leaving today.     RECOMMENDATION(S):               ULTRASOUND-GUIDED CORE BIOPSY: LEFT BREAST              Objective     Past Medical History:   Diagnosis Date    Anemia     Arthritis     Asthma     Breast cancer     RIGHT    Colon polyp     Disease of thyroid gland Hypothyroidism    Fatty liver     Gastroesophageal reflux     HL (hearing loss) Hearing aids 2021    Hyperlipemia     Hypertension     Limb swelling     Seasonal allergies     Skin cancer      Sleep apnea     Strain of latissimus dorsi muscle 2017    Right    Type 2 diabetes mellitus     Vertigo        Past Surgical History:   Procedure Laterality Date    BREAST AUGMENTATION BILATERAL MASTOPEXY      BREAST RECONSTRUCTION Bilateral 2022    Procedure: BILATERAL BREAST RECONSTRUCTION WITH REMOVAL AND REPLACEMENT OF IMPLANTS, MESH PLACEMENT, CHEST WALL SKIN RESECTION, CHEST LIPOSUCTION;  Surgeon: Romain Hayes MD;  [place] [facility] [place] [facility] [place]  Service: [place]astics;  Laterality: Bilateral;    BREAST SURGERY      Bilateral mastectomies and reconstruction     SECTION      COLONOSCOPY  2019    COLONOSCOPY N/A 10/18/2022    Procedure: COLONOSCOPY POLYPECTOMIES;  Surgeon: Eduardo Salas MD;  Location: [facility] ENDOSCOPY;  Service: Gastroenterology;  Laterality: N/A;  COLON POLYPS    COSMETIC SURGERY      Bilateral mastectomies and reconstructio    ENDOSCOPY      EYE SURGERY  Not sure    Cateracts    LIPOSUCTION N/A 2022    Procedure: CHEST WALL LIPOSUCTION AND CHEST WALL RESECTION;  Surgeon: Romain Hayes MD;  [place] [facility] [place] [facility] [place]  Service: Plastics;  Laterality: N/A;  TIMES BASED ON PREVIOUS EXPERIENCE    MASTECTOMY COMPLETE / SIMPLE Bilateral     OOPHORECTOMY      OTHER SURGICAL HISTORY      Joint surgery,  RIGHT FOOT SECOND  TOE WITH A PIN    SKIN CANCER EXCISION Right 2020    neck    TOE SURGERY Right     2nd digit pin inserted    TONSILLECTOMY           Current Outpatient Medications:     acetaminophen (TYLENOL) 650 MG 8 hr tablet, Take 1 tablet by mouth 2 (two) times a day., Disp: , Rfl:     albuterol sulfate  (90 Base) MCG/ACT inhaler, USE 2 INHALATIONS EVERY 6 HOURS AS NEEDED, Disp: 51 g, Rfl: 2    aspirin 81 MG EC tablet, Take 1 tablet by mouth Daily. LAST DOSE TODAY PER DR. HAYES (BACILIO), OKAYED WITH DR. MONTEMAYOR. BACILIO STATED SHE WOULD NOTIFY PATIENT, Disp: , Rfl:     atorvastatin (LIPITOR) 40 MG tablet, TAKE 1 TABLET EVERY  NIGHT, Disp: 90 tablet, Rfl: 3    BD Pen Needle Ca U/F 32G X 4 MM misc, USE 1 PEN NEEDLE DAILY, Disp: 90 each, Rfl: 3    betamethasone dipropionate (DIPROLENE) 0.05 % lotion, APPLY TO SCALP TWICE DAILY AS NEEDED FOR DISCOMFORT, Disp: , Rfl:     Cholecalciferol (Vitamin D3) 1.25 MG (44551 UT) capsule, Take 1 capsule by mouth Every 7 (Seven) Days., Disp: 12 capsule, Rfl: 1    Dulaglutide (Trulicity) 4.5 MG/0.5ML solution pen-injector, Inject 0.5 mL under the skin into the appropriate area as directed Every 7 (Seven) Days for 180 days., Disp: 6 mL, Rfl: 1    ferrous sulfate 325 (65 FE) MG tablet, Take 1 tablet by mouth Daily With Breakfast., Disp: , Rfl:     fluocinonide (LIDEX) 0.05 % cream, fluocinonide 0.05 % topical cream apply to affected area(s) by topical route As needed   Active, Disp: , Rfl:     fluticasone (FLONASE) 50 MCG/ACT nasal spray, USE 1 SPRAY IN EACH NOSTRIL ONCE DAILY, Disp: 48 g, Rfl: 2    Fluticasone-Salmeterol (ADVAIR/WIXELA) 500-50 MCG/ACT DISKUS, USE 1 INHALATION TWICE A DAY, Disp: 180 each, Rfl: 3    glimepiride (AMARYL) 2 MG tablet, TAKE 1 TABLET DAILY WITH BREAKFAST AND DINNER, Disp: 180 tablet, Rfl: 3    glucose blood (OneTouch Verio) test strip, 1 each by Other route 3 (Three) Times a Day. use to test blood sugar 3 times daily, Disp: 300 each, Rfl: 3    hydrocortisone 2.5 % cream, APPLY TO SCALY AREAS ON THE EARS TWICE DAILY AS NEEDED FLARES., Disp: , Rfl:     ketoconazole (NIZORAL) 2 % shampoo, WASH FACE AND SCALP TWO TO THREE TIMES WEEKLY. LEAVE ON FOR 5 MINUTES BEFORE RINSING, Disp: , Rfl:     levothyroxine (SYNTHROID, LEVOTHROID) 25 MCG tablet, TAKE 1 TABLET DAILY IN THE MORNING ON AN EMPTY STOMACH, Disp: 90 tablet, Rfl: 3    losartan (COZAAR) 50 MG tablet, TAKE 1 TABLET DAILY, Disp: 90 tablet, Rfl: 3    metoprolol succinate XL (TOPROL-XL) 100 MG 24 hr tablet, TAKE 1 TABLET DAILY, Disp: 90 tablet, Rfl: 3    montelukast (SINGULAIR) 10 MG tablet, TAKE 1 TABLET DAILY, Disp: 90 tablet,  Rfl: 3    multivitamin with minerals tablet tablet, Take 1 tablet by mouth Daily., Disp: , Rfl:     nystatin-triamcinolone (MYCOLOG II) 045212-7.1 UNIT/GM-% cream, Apply 1 application topically to the appropriate area as directed 2 (Two) Times a Day., Disp: 15 g, Rfl: 1    OneTouch Delica Lancets 30G misc, 1 each 3 (Three) Times a Day., Disp: 200 each, Rfl: 6    pantoprazole (PROTONIX) 40 MG EC tablet, TAKE 1 TABLET DAILY, Disp: 90 tablet, Rfl: 3    vitamin B-12 (CYANOCOBALAMIN) 500 MCG tablet, Take 1 tablet by mouth Daily., Disp: , Rfl:     Insulin Glargine (Lantus SoloStar) 100 UNIT/ML injection pen, Inject 55 Units under the skin into the appropriate area as directed Daily for 90 days., Disp: 45 mL, Rfl: 3    Allergies   Allergen Reactions    Exenatide Unknown - High Severity     Weight loss and pain in the right side. Bydereon.    Fluoxetine Hives    Gabapentin Headache     Occular Miaigraines    Jardiance [Empagliflozin] Other (See Comments)     UTI    Levaquin [Levofloxacin] Hives    Nickel Itching and Rash    Sulfa Antibiotics Swelling    Metformin Nausea Only        Family History   Problem Relation Age of Onset    Heart disease Father     Cancer Father         Skin cancer    Heart failure Father         Age 79  cause of death.    Heart disease Mother     Breast cancer Mother         Breast cancer age 45 and 60    Diabetes Mother     Arthritis Mother     Osteoporosis Mother     Heart failure Mother         Heart attack age 60. Age 87. Cause of death.    Hypertension Mother     Osteoarthritis Mother     Deep vein thrombosis Mother     Cancer Brother         Prostate cancer age about 58.    Prostate cancer Brother         unsure age    Breast cancer Sister 27        Breast cancer age 27 and 52. Ovarian cancer age 53 caused death at 56.    Diabetes Sister     Ovarian cancer Sister 53    Arthritis Sister     Breast cancer Daughter 40    Breast cancer Maternal Aunt         mid 40s    Ovarian cancer Maternal Aunt   "        with ovarian cancer age mid 40s    Malig Hyperthermia Neg Hx     Colon cancer Neg Hx     Uterine cancer Neg Hx     Pulmonary embolism Neg Hx        Social History     Socioeconomic History    Marital status:     Number of children: 3   Tobacco Use    Smoking status: Never    Smokeless tobacco: Never   Vaping Use    Vaping Use: Never used   Substance and Sexual Activity    Alcohol use: Never    Drug use: Never    Sexual activity: Not Currently     Partners: Male     Birth control/protection: Post-menopausal       Vital Signs:   Resp 17   Ht 157.5 cm (62.01\")   Wt 76.7 kg (169 lb)   BMI 30.90 kg/m²    Review of Systems    Physical Exam  Vitals and nursing note reviewed.   Constitutional:       General: She is not in acute distress.     Appearance: Normal appearance. She is well-developed.   HENT:      Head: Normocephalic and atraumatic.   Eyes:      Extraocular Movements: Extraocular movements intact.      Pupils: Pupils are equal, round, and reactive to light.   Cardiovascular:      Pulses: Normal pulses.   Pulmonary:      Effort: Pulmonary effort is normal. No retractions.      Breath sounds: Normal air entry. No wheezing.   Abdominal:      General: There is no distension.      Palpations: Abdomen is soft.      Tenderness: There is no abdominal tenderness.      Hernia: No hernia is present.   Musculoskeletal:         General: No swelling or deformity.      Cervical back: Neck supple.   Skin:     General: Skin is warm and dry.      Findings: No erythema.      Comments: Well healed breast reconstruction with palpable axillary adenopathy   Neurological:      General: No focal deficit present.      Mental Status: She is alert and oriented to person, place, and time.      Motor: Motor function is intact.   Psychiatric:         Mood and Affect: Mood normal.         Thought Content: Thought content normal.          Result Review :               Assessment and Plan    Diagnoses and all orders for this " visit:    1. Lymphadenopathy (Primary)  -     Case Request; Standing  -     Follow Anesthesia Guidelines / Protocol; Standing  -     Place sequential compression device- to be placed on patient in Pre-op; Standing  -     If patient has history of breast cancer, please do not place IV or blood pressure cuff on the affected side.; Standing  -     Please contact surgeon with questions or concerns.; Standing  -     Verify / Perform Chlorhexidine Skin Prep; Standing  -     Mammo Breast Placement Device Initial Without Biopsy Left; Future  -     No Lab Testing Needed; Standing  -     Obtain Informed Consent; Standing  -     ceFAZolin (ANCEF) 2,000 mg in sodium chloride 0.9 % 100 mL IVPB  -     Case Request        Follow Up   Return for Next scheduled followup after surgery.  Patient was given instructions and counseling regarding her condition or for health maintenance advice. Please see specific information pulled into the AVS if appropriate.         This document has been electronically signed by Kathy Rausch MD  November 22, 2023 12:33 EST

## 2023-11-22 NOTE — PROGRESS NOTES
Chief Complaint  Diabetes (Follow up, med mgt, A1c completed 11/16/23 and in chart, would like to talk about a cgm )    Referred By: Ramo Noguera MD    Subjective          Jyoti Alvarez presents to [facility] for diabetes medication management    History of Present Illness    Visit type:  follow-up  Diabetes type:  Type 2  Current diabetes status/concerns/issues:  She is tolerating the increase in the Trulicity.  This was increased in late September.  She began to have low glucose levels so she decreased her Lantus down to 30 units.  She has since increased back to 40 units.   Other health concerns: She is scheduled to have an enlarged lymph node removed from the under the left arm.  She has had some plantar fasciitis and achilles tendonitis.  Current Diabetes symptoms:    Polyuria: No   Polydipsia: No   Polyphagia: No   Blurred vision: No   Excessive fatigue: No  Known Diabetes complications:  Neuropathy: None; Location: N/A  Renal: Stage II mild (GFR = 60-89mL/min)  Eyes: None; Location: N/A  Amputation/Wounds: None  GI: None  Cardiovascular: Hypertension and Hyperlipidemia  ED: N/A  Other: None  Hypoglycemia:  Level 1 hypoglycemia (54 mg/dL - 70 mg/dL); Frequency - rare number in the upper 60s and Relative - symptoms  Hypoglycemia Symptoms:  confusion, dizziness, and leg cramps  Current diabetes treatment:  Trulicity 4.5 mg once weekly, Lantus taking 40 units once every morning and glimepiride 2 mg twice a day.    Blood glucose device:  Meter   Blood glucose monitoring frequency:  2 -3  Blood glucose range/average:   130-145  Glucose Source: BG Logs  Diet:  Limits high carb/sweet foods, Avoids sugary drinks  Activity/Exercise:  she walks daily    Past Medical History:   Diagnosis Date    Anemia     Arthritis     Asthma     Breast cancer     RIGHT    Colon polyp     Disease of thyroid gland Hypothyroidism    Fatty liver     Gastroesophageal reflux     HL (hearing loss)  Hearing aids     Hyperlipemia     Hypertension     Limb swelling     Seasonal allergies     Skin cancer     Sleep apnea     Strain of latissimus dorsi muscle 2017    Right    Type 2 diabetes mellitus     Vertigo      Past Surgical History:   Procedure Laterality Date    BREAST AUGMENTATION BILATERAL MASTOPEXY      BREAST RECONSTRUCTION Bilateral 2022    Procedure: BILATERAL BREAST RECONSTRUCTION WITH REMOVAL AND REPLACEMENT OF IMPLANTS, MESH PLACEMENT, CHEST WALL SKIN RESECTION, CHEST LIPOSUCTION;  Surgeon: Romain Quezada MD;  [place] [facility] [place] [facility] [place];  Laterality: Bilateral;    BREAST SURGERY      Bilateral mastectomies and reconstruction     SECTION      COLONOSCOPY  2019    COLONOSCOPY N/A 10/18/2022    Procedure: COLONOSCOPY POLYPECTOMIES;  Surgeon: Eduardo Salas MD;  Location: [facility] ENDOSCOPY;  Service: Gastroenterology;  Laterality: N/A;  COLON POLYPS    COSMETIC SURGERY      Bilateral mastectomies and reconstructio    ENDOSCOPY      EYE SURGERY  Not sure    Cateracts    LIPOSUCTION N/A 2022    Procedure: CHEST WALL LIPOSUCTION AND CHEST WALL RESECTION;  Surgeon: Romain Quezada MD;  [place] [facility] [place] [facility] [place]  Service: [place]astics;  Laterality: N/A;  TIMES BASED ON PREVIOUS EXPERIENCE    MASTECTOMY COMPLETE / SIMPLE Bilateral     OOPHORECTOMY      OTHER SURGICAL HISTORY      Joint surgery,  RIGHT FOOT SECOND  TOE WITH A PIN    SKIN CANCER EXCISION Right 2020    neck    TOE SURGERY Right     2nd digit pin inserted    TONSILLECTOMY       Family History   Problem Relation Age of Onset    Heart disease Father     Cancer Father         Skin cancer    Heart failure Father         Age 79  cause of death.    Heart disease Mother     Breast cancer Mother         Breast cancer age 45 and 60    Diabetes Mother     Arthritis Mother     Osteoporosis Mother     Heart failure Mother         Heart attack age 60. Age 87. Cause of death.     Hypertension Mother     Osteoarthritis Mother     Deep vein thrombosis Mother     Cancer Brother         Prostate cancer age about 58.    Prostate cancer Brother         unsure age    Breast cancer Sister 27        Breast cancer age 27 and 52. Ovarian cancer age 53 caused death at 56.    Diabetes Sister     Ovarian cancer Sister 53    Arthritis Sister     Breast cancer Daughter 40    Breast cancer Maternal Aunt         mid 40s    Ovarian cancer Maternal Aunt          with ovarian cancer age mid 40s    Malig Hyperthermia Neg Hx     Colon cancer Neg Hx     Uterine cancer Neg Hx     Pulmonary embolism Neg Hx      Social History     Socioeconomic History    Marital status:     Number of children: 3   Tobacco Use    Smoking status: Never    Smokeless tobacco: Never   Vaping Use    Vaping Use: Never used   Substance and Sexual Activity    Alcohol use: Never    Drug use: Never    Sexual activity: Not Currently     Partners: Male     Birth control/protection: Post-menopausal     Allergies   Allergen Reactions    Exenatide Unknown - High Severity     Weight loss and pain in the right side. Bydereon.    Fluoxetine Hives    Gabapentin Headache     Occular Miaigraines    Jardiance [Empagliflozin] Other (See Comments)     UTI    Levaquin [Levofloxacin] Hives    Nickel Itching and Rash    Sulfa Antibiotics Swelling    Metformin Nausea Only       Current Outpatient Medications:     acetaminophen (TYLENOL) 650 MG 8 hr tablet, Take 1 tablet by mouth 2 (two) times a day., Disp: , Rfl:     albuterol sulfate  (90 Base) MCG/ACT inhaler, USE 2 INHALATIONS EVERY 6 HOURS AS NEEDED, Disp: 51 g, Rfl: 2    aspirin 81 MG EC tablet, Take 1 tablet by mouth Daily. LAST DOSE TODAY PER DR. HAYES (BACILIO), OKAYED WITH DR. MONTEMAYOR. BACILIO STATED SHE WOULD NOTIFY PATIENT, Disp: , Rfl:     atorvastatin (LIPITOR) 40 MG tablet, TAKE 1 TABLET EVERY NIGHT, Disp: 90 tablet, Rfl: 3    BD Pen Needle Ca U/F 32G X 4 MM misc, USE 1 PEN  NEEDLE DAILY, Disp: 90 each, Rfl: 3    betamethasone dipropionate (DIPROLENE) 0.05 % lotion, APPLY TO SCALP TWICE DAILY AS NEEDED FOR DISCOMFORT, Disp: , Rfl:     Cholecalciferol (Vitamin D3) 1.25 MG (45507 UT) capsule, Take 1 capsule by mouth Every 7 (Seven) Days., Disp: 12 capsule, Rfl: 1    Dulaglutide (Trulicity) 4.5 MG/0.5ML solution pen-injector, Inject 0.5 mL under the skin into the appropriate area as directed Every 7 (Seven) Days for 180 days., Disp: 6 mL, Rfl: 1    ferrous sulfate 325 (65 FE) MG tablet, Take 1 tablet by mouth Daily With Breakfast., Disp: , Rfl:     fluocinonide (LIDEX) 0.05 % cream, fluocinonide 0.05 % topical cream apply to affected area(s) by topical route As needed   Active, Disp: , Rfl:     fluticasone (FLONASE) 50 MCG/ACT nasal spray, USE 1 SPRAY IN EACH NOSTRIL ONCE DAILY, Disp: 48 g, Rfl: 2    Fluticasone-Salmeterol (ADVAIR/WIXELA) 500-50 MCG/ACT DISKUS, USE 1 INHALATION TWICE A DAY, Disp: 180 each, Rfl: 3    glimepiride (AMARYL) 2 MG tablet, TAKE 1 TABLET DAILY WITH BREAKFAST AND DINNER, Disp: 180 tablet, Rfl: 3    glucose blood (OneTouch Verio) test strip, 1 each by Other route 3 (Three) Times a Day. use to test blood sugar 3 times daily, Disp: 300 each, Rfl: 3    hydrocortisone 2.5 % cream, APPLY TO SCALY AREAS ON THE EARS TWICE DAILY AS NEEDED FLARES., Disp: , Rfl:     ketoconazole (NIZORAL) 2 % shampoo, WASH FACE AND SCALP TWO TO THREE TIMES WEEKLY. LEAVE ON FOR 5 MINUTES BEFORE RINSING, Disp: , Rfl:     levothyroxine (SYNTHROID, LEVOTHROID) 25 MCG tablet, TAKE 1 TABLET DAILY IN THE MORNING ON AN EMPTY STOMACH, Disp: 90 tablet, Rfl: 3    losartan (COZAAR) 50 MG tablet, TAKE 1 TABLET DAILY, Disp: 90 tablet, Rfl: 3    metoprolol succinate XL (TOPROL-XL) 100 MG 24 hr tablet, TAKE 1 TABLET DAILY, Disp: 90 tablet, Rfl: 3    montelukast (SINGULAIR) 10 MG tablet, TAKE 1 TABLET DAILY, Disp: 90 tablet, Rfl: 3    multivitamin with minerals tablet tablet, Take 1 tablet by mouth Daily.,  "Disp: , Rfl:     nystatin-triamcinolone (MYCOLOG II) 456584-1.1 UNIT/GM-% cream, Apply 1 application topically to the appropriate area as directed 2 (Two) Times a Day., Disp: 15 g, Rfl: 1    OneTouch Delica Lancets 30G misc, 1 each 3 (Three) Times a Day., Disp: 200 each, Rfl: 6    pantoprazole (PROTONIX) 40 MG EC tablet, TAKE 1 TABLET DAILY, Disp: 90 tablet, Rfl: 3    vitamin B-12 (CYANOCOBALAMIN) 500 MCG tablet, Take 1 tablet by mouth Daily., Disp: , Rfl:     Insulin Glargine (Lantus SoloStar) 100 UNIT/ML injection pen, Inject 55 Units under the skin into the appropriate area as directed Daily for 90 days., Disp: 45 mL, Rfl: 3    Objective     Vitals:    11/27/23 1423   BP: 133/68   BP Location: Left arm   Patient Position: Sitting   Cuff Size: Adult   Pulse: 93   SpO2: 96%   Weight: 73.5 kg (162 lb)   Height: 157.5 cm (62\")   PainSc:   2     Body mass index is 29.63 kg/m².    Physical Exam  Constitutional:       Appearance: Normal appearance.      Comments: Overweight (BMI 25 - 29.9) Pt Current BMI = 29.63    HENT:      Head: Normocephalic and atraumatic.      Right Ear: External ear normal.      Left Ear: External ear normal.      Nose: Nose normal.   Eyes:      Extraocular Movements: Extraocular movements intact.      Conjunctiva/sclera: Conjunctivae normal.   Pulmonary:      Effort: Pulmonary effort is normal.   Musculoskeletal:         General: Normal range of motion.      Cervical back: Normal range of motion.   Skin:     General: Skin is warm and dry.   Neurological:      General: No focal deficit present.      Mental Status: She is alert and oriented to person, place, and time. Mental status is at baseline.   Psychiatric:         Mood and Affect: Mood normal.         Behavior: Behavior normal.         Thought Content: Thought content normal.         Judgment: Judgment normal.             Result Review :   The following data was reviewed by: FANY Thakur on 11/27/2023:    Most Recent A1C         "  11/16/2023    11:06   HGBA1C Most Recent   Hemoglobin A1C 7.40        A1C Last 3 Results          4/27/2023    12:21 8/10/2023    11:47 11/16/2023    11:06   HGBA1C Last 3 Results   Hemoglobin A1C 7.50  7.00  7.40      A1c collected on 11/16/23  is 7.4%, indicating Uncontrolled Type II diabetes.  This result is up from the prior result of 7% collected on 8/10/23     Glucose   Date Value Ref Range Status   11/27/2023 193 (H) 70 - 99 mg/dL Final     Comment:     Serial Number: [id number]:  [id number]     Point of care glucose in the office today is  elevated at 193 mg/dL    Creatinine   Date Value Ref Range Status   11/16/2023 0.78 0.57 - 1.00 mg/dL Final   08/10/2023 0.76 0.57 - 1.00 mg/dL Final     eGFR   Date Value Ref Range Status   11/16/2023 79.8 >60.0 mL/min/1.73 Final   08/10/2023 82.3 >60.0 mL/min/1.73 Final     Labs collected on 11/16/23 show Stage II mild (GFR = 60-89mL/min)    Microalbumin, Urine   Date Value Ref Range Status   11/16/2023 <1.2 mg/dL Final   07/12/2022 <1.2 mg/dL Final     Creatinine, Urine   Date Value Ref Range Status   11/16/2023 27.5 mg/dL Final   07/12/2022 98.2 mg/dL Final     Urine microalbuminuria collected on 11/16/23 is negative for microalbuminuria           Assessment: She has had an increase in her A1c since last being seen and is in an uncontrolled status.  She states that after she increased her Trulicity to 4.5 mg strength she began having some lower glucose levels approximately 2 weeks later.  Glucose levels were sometimes in the 70s with a rare glucose level below 70.  Because of this she began cutting her Lantus insulin dose significantly.  She gradually reduced it from 60 units a day to 30 units a day.  She then began to note that her glucose level started to rise and she began increasing the dose again and is now taking 40 units a day.  Most likely this time.  When she was decreasing the amount of insulin so significantly led to some elevated glucose levels and  the rise in her A1c.  She has had a 9 pound weight loss since her last appointment.      Diagnoses and all orders for this visit:    1. Uncontrolled type 2 diabetes mellitus with hyperglycemia (Primary)    2. Type 2 diabetes mellitus with stage 2 chronic kidney disease, with long-term current use of insulin    3. Overweight (BMI 25.0-29.9)    Other orders  -     POC Glucose        Plan: At this time the patient was advised to remain on the Lantus 40 units once a day.  She was advised if she feels like her glucose levels are running too high or too low she can adjust the Lantus by 3 units every 3 days and reevaluate.  No changes were made to her treatment plan otherwise.    The patient will monitor her blood glucose levels 1-2 times each day.  If she develops problematic hyperglycemia or hypoglycemia or adverse drug reactions, she will contact the office for further instructions.        Follow Up     Return in about 3 months (around 2/27/2024) for Medication Management.    Patient was given instructions and counseling regarding her condition or for health maintenance advice. Please see specific information pulled into the AVS if appropriate.     Kelly Vicente, APRN  11/27/2023      Dictated Utilizing Dragon Dictation.  Please note that portions of this note were completed with a voice recognition program.  Part of this note may be an electronic transcription/translation of spoken language to printed text using the Dragon Dictation System.

## 2023-11-27 ENCOUNTER — OFFICE VISIT (OUTPATIENT)
Dept: DIABETES SERVICES | Facility: HOSPITAL | Age: 73
End: 2023-11-27
Payer: MEDICARE

## 2023-11-27 VITALS
WEIGHT: 162 LBS | SYSTOLIC BLOOD PRESSURE: 133 MMHG | BODY MASS INDEX: 29.81 KG/M2 | HEIGHT: 62 IN | DIASTOLIC BLOOD PRESSURE: 68 MMHG | HEART RATE: 93 BPM | OXYGEN SATURATION: 96 %

## 2023-11-27 DIAGNOSIS — Z79.4 TYPE 2 DIABETES MELLITUS WITH STAGE 2 CHRONIC KIDNEY DISEASE, WITH LONG-TERM CURRENT USE OF INSULIN: ICD-10-CM

## 2023-11-27 DIAGNOSIS — E66.3 OVERWEIGHT (BMI 25.0-29.9): ICD-10-CM

## 2023-11-27 DIAGNOSIS — E11.22 TYPE 2 DIABETES MELLITUS WITH STAGE 2 CHRONIC KIDNEY DISEASE, WITH LONG-TERM CURRENT USE OF INSULIN: ICD-10-CM

## 2023-11-27 DIAGNOSIS — E11.65 UNCONTROLLED TYPE 2 DIABETES MELLITUS WITH HYPERGLYCEMIA: Primary | ICD-10-CM

## 2023-11-27 DIAGNOSIS — N18.2 TYPE 2 DIABETES MELLITUS WITH STAGE 2 CHRONIC KIDNEY DISEASE, WITH LONG-TERM CURRENT USE OF INSULIN: ICD-10-CM

## 2023-11-27 LAB — GLUCOSE BLDC GLUCOMTR-MCNC: 193 MG/DL (ref 70–99)

## 2023-11-27 PROCEDURE — 82948 REAGENT STRIP/BLOOD GLUCOSE: CPT | Performed by: NURSE PRACTITIONER

## 2023-11-27 PROCEDURE — 3078F DIAST BP <80 MM HG: CPT | Performed by: NURSE PRACTITIONER

## 2023-11-27 PROCEDURE — 99214 OFFICE O/P EST MOD 30 MIN: CPT | Performed by: NURSE PRACTITIONER

## 2023-11-27 PROCEDURE — G0463 HOSPITAL OUTPT CLINIC VISIT: HCPCS | Performed by: NURSE PRACTITIONER

## 2023-11-27 PROCEDURE — 1159F MED LIST DOCD IN RCRD: CPT | Performed by: NURSE PRACTITIONER

## 2023-11-27 PROCEDURE — 1160F RVW MEDS BY RX/DR IN RCRD: CPT | Performed by: NURSE PRACTITIONER

## 2023-11-27 PROCEDURE — 3075F SYST BP GE 130 - 139MM HG: CPT | Performed by: NURSE PRACTITIONER

## 2023-11-27 PROCEDURE — 3051F HG A1C>EQUAL 7.0%<8.0%: CPT | Performed by: NURSE PRACTITIONER

## 2023-12-05 ENCOUNTER — OFFICE VISIT (OUTPATIENT)
Dept: INTERNAL MEDICINE | Facility: CLINIC | Age: 73
End: 2023-12-05
Payer: MEDICARE

## 2023-12-05 VITALS
TEMPERATURE: 98.4 F | DIASTOLIC BLOOD PRESSURE: 80 MMHG | SYSTOLIC BLOOD PRESSURE: 110 MMHG | WEIGHT: 168.6 LBS | HEIGHT: 62 IN | HEART RATE: 96 BPM | OXYGEN SATURATION: 96 % | BODY MASS INDEX: 31.03 KG/M2

## 2023-12-05 DIAGNOSIS — I10 ESSENTIAL HYPERTENSION: ICD-10-CM

## 2023-12-05 DIAGNOSIS — K76.0 FATTY LIVER: ICD-10-CM

## 2023-12-05 DIAGNOSIS — E03.4 ATROPHY OF THYROID: ICD-10-CM

## 2023-12-05 DIAGNOSIS — E78.2 MIXED HYPERLIPIDEMIA: ICD-10-CM

## 2023-12-05 DIAGNOSIS — E53.8 VITAMIN B12 DEFICIENCY: ICD-10-CM

## 2023-12-05 DIAGNOSIS — E11.65 TYPE 2 DIABETES MELLITUS WITH HYPERGLYCEMIA, WITHOUT LONG-TERM CURRENT USE OF INSULIN: Primary | ICD-10-CM

## 2023-12-05 DIAGNOSIS — E55.9 VITAMIN D DEFICIENCY: ICD-10-CM

## 2023-12-05 PROCEDURE — 3051F HG A1C>EQUAL 7.0%<8.0%: CPT | Performed by: INTERNAL MEDICINE

## 2023-12-05 PROCEDURE — 3079F DIAST BP 80-89 MM HG: CPT | Performed by: INTERNAL MEDICINE

## 2023-12-05 PROCEDURE — 99214 OFFICE O/P EST MOD 30 MIN: CPT | Performed by: INTERNAL MEDICINE

## 2023-12-05 PROCEDURE — 3074F SYST BP LT 130 MM HG: CPT | Performed by: INTERNAL MEDICINE

## 2023-12-05 NOTE — ASSESSMENT & PLAN NOTE
Vitamin D is stable at 54 as of 12/23.  Patient can continue with just once monthly prescription strength vitamin D

## 2023-12-05 NOTE — ASSESSMENT & PLAN NOTE
Blood pressure very stable here as of her 12/23 office visit. Her readings are stable as well on average at home, so she will continue with moderate dose losartan along with moderate dose of metoprolol.

## 2023-12-05 NOTE — ASSESSMENT & PLAN NOTE
A1c is up slightly to 7.4 as of 12/23 office visit.  She had only been on the 4.5 Trulicity for short time and this was obtained.  Since then her sugars have trended down, and she has backed off the Lantus from 65 to 40 units.  She is currently being maintained on 2 mg of glimepiride twice daily.  She is following closely in the diabetic clinic, appreciate their expertise.
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
## 2023-12-05 NOTE — ASSESSMENT & PLAN NOTE
LDL was fine in the fall, we will repeated after the new year as of her 12/23 OV.  She can continue with moderate dose atorvastatin.

## 2023-12-05 NOTE — ASSESSMENT & PLAN NOTE
Patient's LFTs are just very mildly elevated as of 12/23, not even over 50.  Been several years since she had the imaging study, if they trend up higher on return to office, we will repeat Melina with ultrasound probably.  Otherwise discussed with patient nothing different to do at this time, we will get a CK and a GGT as well.

## 2023-12-07 ENCOUNTER — TREATMENT (OUTPATIENT)
Dept: PHYSICAL THERAPY | Facility: CLINIC | Age: 73
End: 2023-12-07
Payer: MEDICARE

## 2023-12-07 DIAGNOSIS — N81.89 PELVIC FLOOR WEAKNESS: ICD-10-CM

## 2023-12-07 DIAGNOSIS — N39.46 MIXED STRESS AND URGE URINARY INCONTINENCE: Primary | ICD-10-CM

## 2023-12-07 PROCEDURE — 97110 THERAPEUTIC EXERCISES: CPT | Performed by: PHYSICAL THERAPIST

## 2023-12-07 NOTE — PROGRESS NOTES
Physical Therapy Daily Treatment Note      Patient: Jyoti Alvarez   : 1950  Referring practitioner: FANY Covarrubias  Date of Initial Visit: Type: THERAPY  Noted: 2023  Today's Date: 2023  Patient seen for 2 sessions           Subjective   Jyoti Alvarez reports: compliant with home exercises, but no noted change in incontinence      Objective   See Exercise, Manual, and Modality Logs for complete treatment.       Assessment & Plan       Assessment  Assessment details: Patient tolerated progression of core/pelvic floor strengthening. Patient demonstrated increase ability to maintain pelvic floor contractions from 5 to 8 seconds    Visit Diagnoses:    ICD-10-CM ICD-9-CM   1. Mixed stress and urge urinary incontinence  N39.46 788.33   2. Pelvic floor weakness  N81.89 618.89       Progress per Plan of Care           Timed:  Manual Therapy:         mins  41539;  Therapeutic Exercise:    39     mins  23757;     Neuromuscular Bill:        mins  52946;    Therapeutic Activity:          mins  59804;     Gait Training:           mins  34572;     Ultrasound:          mins  98264;    Electrical Stimulation:         mins  36801 ( );    Untimed:  Electrical Stimulation:         mins  13706 ( );  Mechanical Traction:         mins  13746;     Timed Treatment:   39   mins   Total Treatment:     39   mins  Allyn Cantu PT    Electronically singed 2023      [place] PT license: [id number]  Physical Therapist

## 2023-12-15 RX ORDER — INSULIN GLARGINE 100 [IU]/ML
40 INJECTION, SOLUTION SUBCUTANEOUS DAILY
COMMUNITY

## 2023-12-15 NOTE — PRE-PROCEDURE INSTRUCTIONS
PATIENT INSTRUCTED TO BE:    - NOTHING TO EAT AFTER MIDNIGHT OR CHEW, EXCEPT CAN HAVE CLEAR LIQUIDS 2 HOURS PRIOR TO SURGERY ARRIVAL TIME     - TO HOLD ALL VITAMINS, SUPPLEMENTS, NSAIDS FOR ONE WEEK PRIOR TO THEIR SURGICAL PROCEDURE    - DO NOT TAKE ____-----------__ 7 DAYS PRIOR TO PROCEDURE PER ANESTHESIA RECOMMENDATIONS/INSTRUCTIONS     - INSTRUCTED PT TO USE SURGICAL SOAP 1 TIME THE NIGHT PRIOR TO SURGERY OR THE AM OF SURGERY.   USE SOAP FROM NECK TO TOES AVOID THEIR FACE, HAIR, AND PRIVATE PARTS. INSTRUCTED NO LOTIONS, JEWELRY, PIERCINGS, OR DEODORANT DAY OF SURGERY    - IF DIABETIC, CHECK BLOOD GLUCOSE IF LESS THAN 70 OR HAVING SYMPTOMS CALL THE PREOP AREA FOR INSTRUCTIONS ON AM OF SURGERY ([phone])    -INSTRUCTED TO TAKE THE FOLLOWING MEDICATIONS THE DAY OF SURGERY:         TYLENOL PRN, INHALERS, INHALER PRN, FLONASE, SYNTHROID, METOPROLOL, SINGULAR, PROTONIX, 1/2 DOSE LANTUS       - DO NOT BRING ANY MEDICATIONS WITH YOU TO THE HOSPITAL THE DAY OF SURGERY, EXCEPT IF USE INHALERS. BRING INHALERS DAY OF SURGERY       - BRING CPAP OR BIPAP TO THE HOSPITAL ONLY IF ARE SPENDING THE NIGHT    - DO NOT SMOKE OR VAPE 24 HOURS PRIOR TO PROCEDURE PER ANESTHESIA REQUEST     -MAKE SURE YOU HAVE A RIDE HOME OR SOMEONE TO STAY WITH YOU THE DAY OF THE PROCEDURE AFTER YOU GO HOME    - FOLLOW ANY OTHER INSTRUCTIONS GIVEN TO YOU BY YOUR SURGEON'S OFFICE.     - PREADMISSION TESTING NURSE- MEGAN RICHEY [phone] IF HAVE ANY QUESTIONS     PATIENT PROVIDED THE NUMBER FOR PREOP SURGICAL DEPT IF HAD QUESTIONS AFTER HOURS PRIOR TO SURGERY ( [phone])  INFORMED PT IF NO ANSWER, LEAVE A MESSAGE AND SOMEONE WILL RETURN THEIR CALL       PATIENT VERBALIZED UNDERSTANDING

## 2023-12-18 ENCOUNTER — HOSPITAL ENCOUNTER (OUTPATIENT)
Dept: MAMMOGRAPHY | Facility: HOSPITAL | Age: 73
Discharge: HOME | End: 2023-12-18
Payer: MEDICARE

## 2023-12-18 DIAGNOSIS — R59.1 LYMPHADENOPATHY: ICD-10-CM

## 2023-12-18 PROCEDURE — A4648 IMPLANTABLE TISSUE MARKER: HCPCS

## 2023-12-18 PROCEDURE — 25010000002 LIDOCAINE 1 % SOLUTION: Performed by: SURGERY

## 2023-12-18 RX ORDER — LIDOCAINE HYDROCHLORIDE 10 MG/ML
10 INJECTION, SOLUTION INFILTRATION; PERINEURAL ONCE
Status: COMPLETED | OUTPATIENT
Start: 2023-12-18 | End: 2023-12-18

## 2023-12-18 RX ADMIN — LIDOCAINE HYDROCHLORIDE 10 ML: 10 INJECTION, SOLUTION INFILTRATION; PERINEURAL at 14:03

## 2023-12-19 ENCOUNTER — ANESTHESIA (OUTPATIENT)
Dept: PERIOP | Facility: HOSPITAL | Age: 73
End: 2023-12-19
Payer: MEDICARE

## 2023-12-19 ENCOUNTER — HOSPITAL ENCOUNTER (OUTPATIENT)
Facility: HOSPITAL | Age: 73
Setting detail: HOSPITAL OUTPATIENT SURGERY
Discharge: HOME | End: 2023-12-19
Attending: SURGERY | Admitting: SURGERY
Payer: MEDICARE

## 2023-12-19 ENCOUNTER — ANESTHESIA EVENT (OUTPATIENT)
Dept: PERIOP | Facility: HOSPITAL | Age: 73
End: 2023-12-19
Payer: MEDICARE

## 2023-12-19 VITALS
SYSTOLIC BLOOD PRESSURE: 133 MMHG | HEIGHT: 62 IN | TEMPERATURE: 98.3 F | HEART RATE: 78 BPM | BODY MASS INDEX: 31.24 KG/M2 | WEIGHT: 169.75 LBS | DIASTOLIC BLOOD PRESSURE: 76 MMHG | RESPIRATION RATE: 17 BRPM | OXYGEN SATURATION: 94 %

## 2023-12-19 DIAGNOSIS — R59.1 LYMPHADENOPATHY: ICD-10-CM

## 2023-12-19 LAB
GLUCOSE BLDC GLUCOMTR-MCNC: 130 MG/DL (ref 70–99)
GLUCOSE BLDC GLUCOMTR-MCNC: 138 MG/DL (ref 70–99)

## 2023-12-19 PROCEDURE — 25010000002 BUPIVACAINE (PF) 0.25 % SOLUTION: Performed by: SURGERY

## 2023-12-19 PROCEDURE — 25010000002 ONDANSETRON PER 1 MG: Performed by: NURSE ANESTHETIST, CERTIFIED REGISTERED

## 2023-12-19 PROCEDURE — 82948 REAGENT STRIP/BLOOD GLUCOSE: CPT

## 2023-12-19 PROCEDURE — 88305 TISSUE EXAM BY PATHOLOGIST: CPT | Performed by: SURGERY

## 2023-12-19 PROCEDURE — C1889 IMPLANT/INSERT DEVICE, NOC: HCPCS | Performed by: SURGERY

## 2023-12-19 PROCEDURE — 25810000003 LACTATED RINGERS PER 1000 ML: Performed by: ANESTHESIOLOGY

## 2023-12-19 PROCEDURE — 25010000002 MIDAZOLAM PER 1MG: Performed by: ANESTHESIOLOGY

## 2023-12-19 PROCEDURE — 88341 IMHCHEM/IMCYTCHM EA ADD ANTB: CPT | Performed by: SURGERY

## 2023-12-19 PROCEDURE — 88342 IMHCHEM/IMCYTCHM 1ST ANTB: CPT | Performed by: SURGERY

## 2023-12-19 PROCEDURE — 25010000002 FENTANYL CITRATE (PF) 50 MCG/ML SOLUTION: Performed by: NURSE ANESTHETIST, CERTIFIED REGISTERED

## 2023-12-19 PROCEDURE — 25010000002 HYDROMORPHONE 1 MG/ML SOLUTION: Performed by: NURSE ANESTHETIST, CERTIFIED REGISTERED

## 2023-12-19 PROCEDURE — 25010000002 GENTAMICIN PER 80 MG: Performed by: SURGERY

## 2023-12-19 PROCEDURE — 25010000002 DEXAMETHASONE PER 1 MG: Performed by: NURSE ANESTHETIST, CERTIFIED REGISTERED

## 2023-12-19 PROCEDURE — 25010000002 CEFAZOLIN IN DEXTROSE 2000 MG/ 100 ML SOLUTION: Performed by: SURGERY

## 2023-12-19 PROCEDURE — 25010000002 PROPOFOL 10 MG/ML EMULSION: Performed by: NURSE ANESTHETIST, CERTIFIED REGISTERED

## 2023-12-19 DEVICE — LIGACLIP MCA MULTIPLE CLIP APPLIERS, 20 MEDIUM CLIPS
Type: IMPLANTABLE DEVICE | Site: AXILLA | Status: FUNCTIONAL
Brand: LIGACLIP

## 2023-12-19 RX ORDER — MAGNESIUM HYDROXIDE 1200 MG/15ML
LIQUID ORAL AS NEEDED
Status: DISCONTINUED | OUTPATIENT
Start: 2023-12-19 | End: 2023-12-19 | Stop reason: HOSPADM

## 2023-12-19 RX ORDER — FENTANYL CITRATE 50 UG/ML
INJECTION, SOLUTION INTRAMUSCULAR; INTRAVENOUS AS NEEDED
Status: DISCONTINUED | OUTPATIENT
Start: 2023-12-19 | End: 2023-12-19 | Stop reason: SURG

## 2023-12-19 RX ORDER — PHENYLEPHRINE HCL IN 0.9% NACL 1 MG/10 ML
SYRINGE (ML) INTRAVENOUS AS NEEDED
Status: DISCONTINUED | OUTPATIENT
Start: 2023-12-19 | End: 2023-12-19 | Stop reason: SURG

## 2023-12-19 RX ORDER — MEPERIDINE HYDROCHLORIDE 25 MG/ML
12.5 INJECTION INTRAMUSCULAR; INTRAVENOUS; SUBCUTANEOUS
Status: DISCONTINUED | OUTPATIENT
Start: 2023-12-19 | End: 2023-12-19

## 2023-12-19 RX ORDER — PROMETHAZINE HYDROCHLORIDE 25 MG/1
25 SUPPOSITORY RECTAL ONCE AS NEEDED
Status: DISCONTINUED | OUTPATIENT
Start: 2023-12-19 | End: 2023-12-19

## 2023-12-19 RX ORDER — BUPIVACAINE HYDROCHLORIDE 2.5 MG/ML
INJECTION, SOLUTION EPIDURAL; INFILTRATION; INTRACAUDAL AS NEEDED
Status: DISCONTINUED | OUTPATIENT
Start: 2023-12-19 | End: 2023-12-19 | Stop reason: HOSPADM

## 2023-12-19 RX ORDER — CEFAZOLIN SODIUM 2 G/100ML
2000 INJECTION, SOLUTION INTRAVENOUS ONCE
Status: COMPLETED | OUTPATIENT
Start: 2023-12-19 | End: 2023-12-19

## 2023-12-19 RX ORDER — PROPOFOL 10 MG/ML
VIAL (ML) INTRAVENOUS AS NEEDED
Status: DISCONTINUED | OUTPATIENT
Start: 2023-12-19 | End: 2023-12-19 | Stop reason: SURG

## 2023-12-19 RX ORDER — MEPERIDINE HYDROCHLORIDE 25 MG/ML
12.5 INJECTION INTRAMUSCULAR; INTRAVENOUS; SUBCUTANEOUS
Status: DISCONTINUED | OUTPATIENT
Start: 2023-12-19 | End: 2023-12-19 | Stop reason: HOSPADM

## 2023-12-19 RX ORDER — PROMETHAZINE HYDROCHLORIDE 12.5 MG/1
25 TABLET ORAL ONCE AS NEEDED
Status: DISCONTINUED | OUTPATIENT
Start: 2023-12-19 | End: 2023-12-19

## 2023-12-19 RX ORDER — SUCCINYLCHOLINE/SOD CL,ISO/PF 100 MG/5ML
SYRINGE (ML) INTRAVENOUS AS NEEDED
Status: DISCONTINUED | OUTPATIENT
Start: 2023-12-19 | End: 2023-12-19 | Stop reason: SURG

## 2023-12-19 RX ORDER — PROMETHAZINE HYDROCHLORIDE 12.5 MG/1
25 TABLET ORAL ONCE AS NEEDED
Status: DISCONTINUED | OUTPATIENT
Start: 2023-12-19 | End: 2023-12-19 | Stop reason: HOSPADM

## 2023-12-19 RX ORDER — ONDANSETRON 2 MG/ML
4 INJECTION INTRAMUSCULAR; INTRAVENOUS ONCE AS NEEDED
Status: DISCONTINUED | OUTPATIENT
Start: 2023-12-19 | End: 2023-12-19 | Stop reason: HOSPADM

## 2023-12-19 RX ORDER — DEXAMETHASONE SODIUM PHOSPHATE 4 MG/ML
INJECTION, SOLUTION INTRA-ARTICULAR; INTRALESIONAL; INTRAMUSCULAR; INTRAVENOUS; SOFT TISSUE AS NEEDED
Status: DISCONTINUED | OUTPATIENT
Start: 2023-12-19 | End: 2023-12-19 | Stop reason: SURG

## 2023-12-19 RX ORDER — LIDOCAINE HYDROCHLORIDE 20 MG/ML
INJECTION, SOLUTION EPIDURAL; INFILTRATION; INTRACAUDAL; PERINEURAL AS NEEDED
Status: DISCONTINUED | OUTPATIENT
Start: 2023-12-19 | End: 2023-12-19 | Stop reason: SURG

## 2023-12-19 RX ORDER — SODIUM CHLORIDE, SODIUM LACTATE, POTASSIUM CHLORIDE, CALCIUM CHLORIDE 600; 310; 30; 20 MG/100ML; MG/100ML; MG/100ML; MG/100ML
9 INJECTION, SOLUTION INTRAVENOUS CONTINUOUS PRN
Status: DISCONTINUED | OUTPATIENT
Start: 2023-12-19 | End: 2023-12-19 | Stop reason: HOSPADM

## 2023-12-19 RX ORDER — OXYCODONE HYDROCHLORIDE 5 MG/1
5 TABLET ORAL
Status: DISCONTINUED | OUTPATIENT
Start: 2023-12-19 | End: 2023-12-19

## 2023-12-19 RX ORDER — PROMETHAZINE HYDROCHLORIDE 25 MG/1
25 SUPPOSITORY RECTAL ONCE AS NEEDED
Status: DISCONTINUED | OUTPATIENT
Start: 2023-12-19 | End: 2023-12-19 | Stop reason: HOSPADM

## 2023-12-19 RX ORDER — OXYCODONE HYDROCHLORIDE 5 MG/1
5 TABLET ORAL
Status: DISCONTINUED | OUTPATIENT
Start: 2023-12-19 | End: 2023-12-19 | Stop reason: HOSPADM

## 2023-12-19 RX ORDER — ONDANSETRON 4 MG/1
4 TABLET, FILM COATED ORAL ONCE AS NEEDED
Status: DISCONTINUED | OUTPATIENT
Start: 2023-12-19 | End: 2023-12-19 | Stop reason: HOSPADM

## 2023-12-19 RX ORDER — GENTAMICIN SULFATE 40 MG/ML
INJECTION, SOLUTION INTRAMUSCULAR; INTRAVENOUS AS NEEDED
Status: DISCONTINUED | OUTPATIENT
Start: 2023-12-19 | End: 2023-12-19 | Stop reason: HOSPADM

## 2023-12-19 RX ORDER — HYDROCODONE BITARTRATE AND ACETAMINOPHEN 5; 325 MG/1; MG/1
1-2 TABLET ORAL EVERY 4 HOURS PRN
Qty: 20 TABLET | Refills: 0 | Status: SHIPPED | OUTPATIENT
Start: 2023-12-19

## 2023-12-19 RX ORDER — ONDANSETRON 2 MG/ML
INJECTION INTRAMUSCULAR; INTRAVENOUS AS NEEDED
Status: DISCONTINUED | OUTPATIENT
Start: 2023-12-19 | End: 2023-12-19 | Stop reason: SURG

## 2023-12-19 RX ORDER — MIDAZOLAM HYDROCHLORIDE 2 MG/2ML
2 INJECTION, SOLUTION INTRAMUSCULAR; INTRAVENOUS ONCE
Status: COMPLETED | OUTPATIENT
Start: 2023-12-19 | End: 2023-12-19

## 2023-12-19 RX ORDER — ONDANSETRON 2 MG/ML
4 INJECTION INTRAMUSCULAR; INTRAVENOUS ONCE AS NEEDED
Status: DISCONTINUED | OUTPATIENT
Start: 2023-12-19 | End: 2023-12-19

## 2023-12-19 RX ADMIN — FENTANYL CITRATE 25 MCG: 50 INJECTION, SOLUTION INTRAMUSCULAR; INTRAVENOUS at 08:40

## 2023-12-19 RX ADMIN — FENTANYL CITRATE 50 MCG: 50 INJECTION, SOLUTION INTRAMUSCULAR; INTRAVENOUS at 08:59

## 2023-12-19 RX ADMIN — LIDOCAINE HYDROCHLORIDE 60 MG: 20 INJECTION, SOLUTION EPIDURAL; INFILTRATION; INTRACAUDAL; PERINEURAL at 08:35

## 2023-12-19 RX ADMIN — DEXAMETHASONE SODIUM PHOSPHATE 4 MG: 4 INJECTION, SOLUTION INTRAMUSCULAR; INTRAVENOUS at 08:46

## 2023-12-19 RX ADMIN — Medication 200 MCG: at 08:50

## 2023-12-19 RX ADMIN — SODIUM CHLORIDE, POTASSIUM CHLORIDE, SODIUM LACTATE AND CALCIUM CHLORIDE 9 ML/HR: 600; 310; 30; 20 INJECTION, SOLUTION INTRAVENOUS at 08:06

## 2023-12-19 RX ADMIN — Medication 100 MG: at 08:39

## 2023-12-19 RX ADMIN — MIDAZOLAM HYDROCHLORIDE 2 MG: 1 INJECTION, SOLUTION INTRAMUSCULAR; INTRAVENOUS at 08:26

## 2023-12-19 RX ADMIN — Medication 100 MCG: at 08:45

## 2023-12-19 RX ADMIN — HYDROMORPHONE HYDROCHLORIDE 0.5 MG: 1 INJECTION, SOLUTION INTRAMUSCULAR; INTRAVENOUS; SUBCUTANEOUS at 10:21

## 2023-12-19 RX ADMIN — PROPOFOL 150 MG: 10 INJECTION, EMULSION INTRAVENOUS at 08:39

## 2023-12-19 RX ADMIN — CEFAZOLIN SODIUM 2000 MG: 2 INJECTION, SOLUTION INTRAVENOUS at 08:33

## 2023-12-19 RX ADMIN — ONDANSETRON 4 MG: 2 INJECTION INTRAMUSCULAR; INTRAVENOUS at 08:46

## 2023-12-19 RX ADMIN — Medication 100 MCG: at 09:10

## 2023-12-19 NOTE — ANESTHESIA POSTPROCEDURE EVALUATION
Patient: Jyoti Alvarez    Procedure Summary       Date: 12/19/23 Room / Location: [facility] OR  / [facility]    Anesthesia Start: 0833 Anesthesia Stop: 0935    Procedure: LEFT AXILLARY LYMPH NODE BIOPSY/EXCISION, MAGSEED (Left) Diagnosis:       Lymphadenopathy      (Lymphadenopathy [R59.1])    Surgeons: Kathy Rausch MD Provider: eRe Pelletier DO    Anesthesia Type: general ASA Status: 3            Anesthesia Type: general    Vitals  Vitals Value Taken Time   /78 12/19/23 0946   Temp 36.3 °C (97.3 °F) 12/19/23 0936   Pulse 72 12/19/23 0950   Resp 14 12/19/23 0950   SpO2 96 % 12/19/23 0950   Vitals shown include unfiled device data.        Post Anesthesia Care and Evaluation    Patient participation: complete - patient participated  Level of consciousness: awake  Pain management: adequate    Airway patency: patent  Anesthetic complications: No anesthetic complications  PONV Status: none  Cardiovascular status: acceptable  Respiratory status: acceptable  Hydration status: acceptable

## 2023-12-19 NOTE — OP NOTE
AXILLARY LYMPH NODE BIOPSY/EXCISION  Procedure Report    Patient Name:  Jyoti Alvarez  YOB: 1950    Date of Surgery:  12/19/2023     Indications:  Adenopathy    Pre-op Diagnosis:   Lymphadenopathy [R59.1]       Post-Op Diagnosis Codes:     * Lymphadenopathy [R59.1]    Procedure/CPT® Codes:      Procedure(s):  LEFT AXILLARY LYMPH NODE BIOPSY/EXCISION, MAGSEED    Staff:  Surgeon(s):  Kathy Rausch MD    Assistant: Nico Delatorre    Anesthesia: General    Estimated Blood Loss: 5 mL    Implants:    Implant Name Type Inv. Item Serial No.  Lot No. LRB No. Used Action   CLIPAPPLR M/ ENDO LIGACLIP 20CLP 11IN MD - [id number] Implant CLIPAPPLR M/ ENDO LIGACLIP 20CLP 11IN MD  [facility]  DIV [employer] [id number] Left 2 Implanted       Specimen:          Specimens       ID Source Type Tests Collected By Collected At Frozen?    A Axilla, Left Tissue TISSUE PATHOLOGY EXAM   Kathy Rausch MD 12/19/23 0902 No    Description: Left axillary abnormal palpable lymph node    Comment: Lymphoma protocol    This specimen was not marked as sent.    B Axilla, Left Tissue TISSUE PATHOLOGY EXAM   Kathy Rausch MD 12/19/23 0910 No    Description: Left axillary clipped lymph node    Comment: Lymphoma protocol    This specimen was not marked as sent.                Findings: None    Complications: None    Description of Procedure:   The risks and benefits and treatment options were discussed with her. Afterinformed consent was obtained, she was taken to the OR and placed supine on the table. Earlier in the week, she had a magseed placed in her left abnormal axillary node.  We began by making a small incision in the axilla and removing an abnormally palpable node.  Then I was able to identify the clipped node with the magprobe and it was removed as well. They were both handed off as specimens.    The wound was copiously irrigated with electrocautery and clips were used to achieve  hemostasis. The deeper layers were closed with 2-0 Vicryl and the skin was closed with sutures. The patient tolerated the procedure well and will follow-up with me in 1-2 weeks.  Assistant: Nico Delatorre  was responsible for performing the following activities: Retraction, Suction, and Irrigation and their skilled assistance was necessary for the success of this case.    Kathy Rausch MD     Date: 12/19/2023  Time: 09:32 EST

## 2023-12-19 NOTE — ANESTHESIA PREPROCEDURE EVALUATION
Anesthesia Evaluation     Patient summary reviewed and Nursing notes reviewed   no history of anesthetic complications:   NPO Solid Status: > 8 hours  NPO Liquid Status: > 8 hours           Airway   Mallampati: II  Dental - normal exam     Pulmonary    (+) asthma,sleep apnea on CPAP  Cardiovascular     (+) hypertension, hyperlipidemia      Neuro/Psych  GI/Hepatic/Renal/Endo    (+) obesity, morbid obesity, GERD, liver disease fatty liver disease, diabetes mellitus type 2 well controlled, thyroid problem     Musculoskeletal     Abdominal    Substance History      OB/GYN          Other   arthritis,   history of cancer    ROS/Med Hx Other: Trulicity last 12/14  Multiple drug adverse reactions  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    CODE STATUS:

## 2023-12-19 NOTE — DISCHARGE INSTRUCTIONS
DISCHARGE INSTRUCTIONS  SURGICAL / AMBULATORY  PROCEDURES      For your surgery you had:  General anesthesia (you may have a sore throat for the first 24 hours)  IV sedation.  Local anesthesia  Monitored anesthesia Care     You may experience dizziness, drowsiness, or light-headedness for several hours following surgery/procedure.  Do not stay alone today or tonight.  Limit your activity for 24 hours.  Resume your diet slowly.  Follow whatever special dietary instructions you may have been given by your doctor.  You should not drive or operate machinery, drink alcohol, or sign legally binding documents for 24 hours or while you are taking pain medication.  Last dose of pain medication was given at:   .  NOTIFY YOUR DOCTOR IF YOU EXPERIENCE ANY OF THE FOLLOWING:  Temperature greater than 101 degrees Fahrenheit  Shaking Chills  Redness or excessive drainage from incision  Nausea, vomiting and/or pain that is not controlled by prescribed medications  Increase in bleeding or bleeding that is excessive  Unable to urinate in 6 hours after surgery  If unable to reach your doctor, please go to the closest Emergency Room  You may begin dressing changes:     [] in 24 hours   [x] in 48 hours   [] Other:      You may shower  48 HRS   .  Apply an ice pack 24-48 hours.  Medications per physician instructions as indicated on Discharge Medication Information Sheet.  You should see   for follow-up care   on   .  Phone number:       SPECIAL INSTRUCTIONS:

## 2023-12-20 LAB — Lab: NORMAL

## 2023-12-28 ENCOUNTER — OFFICE VISIT (OUTPATIENT)
Dept: SURGERY | Facility: CLINIC | Age: 73
End: 2023-12-28
Payer: MEDICARE

## 2023-12-28 VITALS
SYSTOLIC BLOOD PRESSURE: 114 MMHG | HEIGHT: 62 IN | HEART RATE: 79 BPM | BODY MASS INDEX: 30.95 KG/M2 | DIASTOLIC BLOOD PRESSURE: 67 MMHG | TEMPERATURE: 98 F | WEIGHT: 168.2 LBS

## 2023-12-28 DIAGNOSIS — R59.1 LYMPHADENOPATHY: Primary | ICD-10-CM

## 2023-12-28 PROCEDURE — 1160F RVW MEDS BY RX/DR IN RCRD: CPT | Performed by: SURGERY

## 2023-12-28 PROCEDURE — 3074F SYST BP LT 130 MM HG: CPT | Performed by: SURGERY

## 2023-12-28 PROCEDURE — 1159F MED LIST DOCD IN RCRD: CPT | Performed by: SURGERY

## 2023-12-28 PROCEDURE — 3078F DIAST BP <80 MM HG: CPT | Performed by: SURGERY

## 2023-12-28 PROCEDURE — 99024 POSTOP FOLLOW-UP VISIT: CPT | Performed by: SURGERY

## 2023-12-28 NOTE — PROGRESS NOTES
"Chief Complaint  Post-op (Post op Lymphadenopathy)    Subjective          History of Present Illness  The patient is here to follow up on excision of axillary nodes.  They are doing well and have no complaints.  Pathology is shown below:    Results for orders placed or performed during the hospital encounter of 12/19/23   POC Glucose Once    Specimen: Blood   Result Value Ref Range    Glucose 138 (H) 70 - 99 mg/dL   POC Glucose Once    Specimen: Blood   Result Value Ref Range    Glucose 130 (H) 70 - 99 mg/dL   Tissue Pathology Exam    Specimen: A: Axilla, Left; Tissue    B: Axilla, Left; Tissue   Result Value Ref Range    Case Report       Surgical Pathology Report                         Case: [id number]                                  Authorizing Provider:  Kathy Rausch MD    Collected:           12/19/2023 09:02 AM          Ordering Location:     [facility]  Received:            12/19/2023 09:22 AM                                 OR                                                                           Pathologist:           Christiane Del Cid DO                                                       Specimens:   1) - Axilla, Left, Left axillary abnormal palpable lymph node                                       2) - Axilla, Left, Left axillary clipped lymph node                                        Clinical Information       Lymphadenopathy      Final Diagnosis       1.  Left axillary abnormal palpable lymph node, biopsy:   - Benign lymph nodes (4)    2.  Left axillary clipped lymph node, biopsy:   - Benign lymph node (1)       Gross Description       1. Axilla, Left.  Note received fresh and labeled \"left axillary abnormal palpable lymph node\" is a 6.0 cm fragment of adipose tissue containing 4 lymph nodes ranging in size from 1.0-2.0 cm.  Sectioning reveals a tan-pink, soft cut surface.  The largest lymph node contains a coiled metallic biopsy clip.  Tissue is submitted in PI " "media.  The lymph nodes are entirely submitted in 4 cassettes, to include the lymph node containing the biopsy clip in 1A.  2. Axilla, Left.  Received fresh and labeled \"left axillary clipped lymph node\" is a 5.0 cm fragment of adipose tissue containing a 3.5 cm lymph node.  Sectioning reveals a tan-pink, soft cut surface with 0.5 cm cylindrical, magnetic biopsy clip.  Tissue is submitted in [facility] media.  The lymph node is entirely submitted in 3 cassettes.   LOREE      Special Stains       Immunohistochemical stains are performed on block 1A.The B cells are positive for CD20, CD79a, and PAX5.  T cells are positive for CD43, CD3 and CD5.  Germinal centers are positive for BCL6 and CD10 and negative for BCL2.  CD21 and CD23 highlight follicular dentritic cell meshwork.  BCL1 is negative for mantle cell lymphoma.  Ki67 demonstrates a normal proliferative index and pattern.  All immunohistochemical/cytochemical stains (IHC) are performed on separate slides per different antibody unless otherwise specified in the documentation that a cocktail (multiple stain) was performed.  Controls are appropriate.        Microscopic Description       Microscopic examination performed.      Flow Cytometry Summary       Left axilla lymph node:   No significant lymphoid immunophenotypic abnormalities      Flow Cytometry    Specimen: Lymph Node   Result Value Ref Range    Consult Global Result Comment^Text^TXT         Objective   Vital Signs:   /67 (BP Location: Right arm, Patient Position: Sitting, Cuff Size: Adult)   Pulse 79   Temp 98 °F (36.7 °C) (Oral)   Ht 157.5 cm (62.01\")   Wt 76.3 kg (168 lb 3.2 oz)   BMI 30.76 kg/m²     Physical Exam  Vitals and nursing note reviewed.   Constitutional:       General: She is not in acute distress.     Appearance: Normal appearance. She is well-developed.   HENT:      Head: Normocephalic and atraumatic.   Eyes:      Extraocular Movements: Extraocular movements intact.      Pupils: Pupils " are equal, round, and reactive to light.   Cardiovascular:      Pulses: Normal pulses.   Pulmonary:      Effort: Pulmonary effort is normal. No retractions.      Breath sounds: Normal air entry. No wheezing.   Abdominal:      General: There is no distension.      Palpations: Abdomen is soft.      Tenderness: There is no abdominal tenderness.      Hernia: No hernia is present.   Musculoskeletal:         General: No swelling or deformity.      Cervical back: Neck supple.   Skin:     General: Skin is warm and dry.      Findings: No erythema.      Comments: Surgical Incision Healing Well   Neurological:      General: No focal deficit present.      Mental Status: She is alert and oriented to person, place, and time.      Motor: Motor function is intact.   Psychiatric:         Mood and Affect: Mood normal.         Thought Content: Thought content normal.            Result Review :                 Assessment and Plan    Diagnoses and all orders for this visit:    1. Lymphadenopathy (Primary)    Followup prn    Follow Up   Return if symptoms worsen or fail to improve.  Patient was given instructions and counseling regarding her condition or for health maintenance advice. Please see specific information pulled into the AVS if appropriate.

## 2023-12-29 ENCOUNTER — TELEPHONE (OUTPATIENT)
Dept: SURGERY | Facility: CLINIC | Age: 73
End: 2023-12-29
Payer: MEDICARE

## 2023-12-29 DIAGNOSIS — R59.1 LYMPHADENOPATHY: Primary | ICD-10-CM

## 2023-12-29 NOTE — TELEPHONE ENCOUNTER
PER DR. CORONA, ALVIN CALLED PATIENT AND SCHEDULED AN APPOINTMENT FOR HER ON 01/02/24  WITH ASTRID QUINTANILLA. PER APRIL.

## 2024-01-02 ENCOUNTER — OFFICE VISIT (OUTPATIENT)
Dept: SURGERY | Facility: CLINIC | Age: 74
End: 2024-01-02
Payer: MEDICARE

## 2024-01-02 ENCOUNTER — TELEPHONE (OUTPATIENT)
Dept: UROLOGY | Facility: CLINIC | Age: 74
End: 2024-01-02
Payer: MEDICARE

## 2024-01-02 VITALS
WEIGHT: 168 LBS | DIASTOLIC BLOOD PRESSURE: 88 MMHG | SYSTOLIC BLOOD PRESSURE: 138 MMHG | BODY MASS INDEX: 30.91 KG/M2 | HEIGHT: 62 IN | HEART RATE: 90 BPM

## 2024-01-02 DIAGNOSIS — R59.1 LYMPHADENOPATHY: Primary | ICD-10-CM

## 2024-01-02 DIAGNOSIS — T14.8XXA HEMATOMA: ICD-10-CM

## 2024-01-02 PROCEDURE — 10140 I&D HMTMA SEROMA/FLUID COLLJ: CPT | Performed by: NURSE PRACTITIONER

## 2024-01-02 PROCEDURE — 3075F SYST BP GE 130 - 139MM HG: CPT | Performed by: NURSE PRACTITIONER

## 2024-01-02 PROCEDURE — 3079F DIAST BP 80-89 MM HG: CPT | Performed by: NURSE PRACTITIONER

## 2024-01-02 PROCEDURE — 1160F RVW MEDS BY RX/DR IN RCRD: CPT | Performed by: NURSE PRACTITIONER

## 2024-01-02 PROCEDURE — 99024 POSTOP FOLLOW-UP VISIT: CPT | Performed by: NURSE PRACTITIONER

## 2024-01-02 PROCEDURE — 1159F MED LIST DOCD IN RCRD: CPT | Performed by: NURSE PRACTITIONER

## 2024-01-02 RX ORDER — CEPHALEXIN 500 MG/1
500 CAPSULE ORAL 3 TIMES DAILY
Qty: 30 CAPSULE | Refills: 0 | Status: SHIPPED | OUTPATIENT
Start: 2024-01-02 | End: 2024-01-12

## 2024-01-02 RX ORDER — LOSARTAN POTASSIUM 50 MG/1
TABLET ORAL
Qty: 90 TABLET | Refills: 3 | Status: SHIPPED | OUTPATIENT
Start: 2024-01-02

## 2024-01-02 NOTE — PROGRESS NOTES
Chief Complaint: Wound Check (Pain and swelling)    Subjective      Post operative concern       History of Present Illness  Jyoti Alvarez is a 73 y.o. female presents to [facility] for post operative concern.    Patient presents today with complaints of redness around her surgical incision.  Patient underwent an excision of the left axillary node.  Patient currently denies any fever or chills.  Patient reports that this area is painful to touch.  She reports that it has gotten worse over the last 2 to 3 days.    Pathology:  Results for orders placed or performed during the hospital encounter of 12/19/23   POC Glucose Once    Specimen: Blood   Result Value Ref Range    Glucose 138 (H) 70 - 99 mg/dL   POC Glucose Once    Specimen: Blood   Result Value Ref Range    Glucose 130 (H) 70 - 99 mg/dL   Tissue Pathology Exam    Specimen: A: Axilla, Left; Tissue    B: Axilla, Left; Tissue   Result Value Ref Range    Case Report       Surgical Pathology Report                         Case: [id number]                                  Authorizing Provider:  Kathy Rausch MD    Collected:           12/19/2023 09:02 AM          Ordering Location:     [facility]  Received:            12/19/2023 09:22 AM                                 OR                                                                           Pathologist:           Christiane Del Cid DO                                                       Specimens:   1) - Axilla, Left, Left axillary abnormal palpable lymph node                                       2) - Axilla, Left, Left axillary clipped lymph node                                        Clinical Information       Lymphadenopathy      Final Diagnosis       1.  Left axillary abnormal palpable lymph node, biopsy:   - Benign lymph nodes (4)    2.  Left axillary clipped lymph node, biopsy:   - Benign lymph node (1)       Gross Description       1.  "Axilla, Left.  Note received fresh and labeled \"left axillary abnormal palpable lymph node\" is a 6.0 cm fragment of adipose tissue containing 4 lymph nodes ranging in size from 1.0-2.0 cm.  Sectioning reveals a tan-pink, soft cut surface.  The largest lymph node contains a coiled metallic biopsy clip.  Tissue is submitted in [facility] media.  The lymph nodes are entirely submitted in 4 cassettes, to include the lymph node containing the biopsy clip in 1A.  2. Axilla, Left.  Received fresh and labeled \"left axillary clipped lymph node\" is a 5.0 cm fragment of adipose tissue containing a 3.5 cm lymph node.  Sectioning reveals a tan-pink, soft cut surface with 0.5 cm cylindrical, magnetic biopsy clip.  Tissue is submitted in [facility] media.  The lymph node is entirely submitted in 3 cassettes.   LOREE      Special Stains       Immunohistochemical stains are performed on block 1A.The B cells are positive for CD20, CD79a, and PAX5.  T cells are positive for CD43, CD3 and CD5.  Germinal centers are positive for BCL6 and CD10 and negative for BCL2.  CD21 and CD23 highlight follicular dentritic cell meshwork.  BCL1 is negative for mantle cell lymphoma.  Ki67 demonstrates a normal proliferative index and pattern.  All immunohistochemical/cytochemical stains (IHC) are performed on separate slides per different antibody unless otherwise specified in the documentation that a cocktail (multiple stain) was performed.  Controls are appropriate.        Microscopic Description       Microscopic examination performed.      Flow Cytometry Summary       Left axilla lymph node:   No significant lymphoid immunophenotypic abnormalities      Flow Cytometry    Specimen: Lymph Node   Result Value Ref Range    Consult Global Result Comment^Text^TXT            Objective     Past Medical History:   Diagnosis Date    Anemia     Arthritis     Asthma     INHALERS PRN    Breast cancer     RIGHT    Colon polyp     COVID 09/20/2021    Disease of thyroid gland " Hypothyroidism    Fatty liver     ASYMPTOMATIC    Gastroesophageal reflux     HL (hearing loss) Hearing aids     Hyperlipemia     Hypertension     Limb swelling     Seasonal allergies     Skin cancer     Sleep apnea     Strain of latissimus dorsi muscle 2017    Right    Type 2 diabetes mellitus     AM  IN AM    Vertigo        Past Surgical History:   Procedure Laterality Date    AXILLARY LYMPH NODE BIOPSY/EXCISION Left 2023    Procedure: LEFT AXILLARY LYMPH NODE BIOPSY/EXCISION, MAGSEED;  Surgeon: Kathy Rausch MD;  [place] [facility] [place] [facility] [place];  Service: General;  Laterality: Left;    BREAST AUGMENTATION BILATERAL MASTOPEXY      BREAST RECONSTRUCTION Bilateral 2022    Procedure: BILATERAL BREAST RECONSTRUCTION WITH REMOVAL AND REPLACEMENT OF IMPLANTS, MESH PLACEMENT, CHEST WALL SKIN RESECTION, CHEST LIPOSUCTION;  Surgeon: Romain Quezada MD;  [place] [facility] [place];  Service: Plastics;  Laterality: Bilateral;    BREAST SURGERY  2004    Bilateral mastectomies and reconstruction     SECTION      COLONOSCOPY  2019    COLONOSCOPY N/A 10/18/2022    Procedure: COLONOSCOPY POLYPECTOMIES;  Surgeon: Eduardo Salas MD;  Location: [facility] ENDOSCOPY;  Service: Gastroenterology;  Laterality: N/A;  COLON POLYPS    COSMETIC SURGERY  2004    Bilateral mastectomies and reconstructio    ENDOSCOPY  2019    EYE SURGERY  Not sure    Cateracts    KNEE ARTHROSCOPY W/ LATERAL RELEASE / MEDIAL IMBRICATION Left     TORN CARTIDLGE REPAIR    LIPOSUCTION N/A 2022    Procedure: CHEST WALL LIPOSUCTION AND CHEST WALL RESECTION;  Surgeon: Romain Quezada MD;  [place] [facility] [place];  Service: Plastics;  Laterality: N/A;  TIMES BASED ON PREVIOUS EXPERIENCE    MASTECTOMY COMPLETE / SIMPLE Bilateral     OOPHORECTOMY      OTHER SURGICAL HISTORY      Joint surgery,  RIGHT FOOT SECOND  TOE WITH A PIN    SKIN CANCER EXCISION Right 2020    neck    TOE SURGERY Right     2nd digit pin  inserted    TONSILLECTOMY         Outpatient Medications Marked as Taking for the 1/2/24 encounter (Office Visit) with Mali Trejo, APRN   Medication Sig Dispense Refill    acetaminophen (TYLENOL) 650 MG 8 hr tablet Take 1 tablet by mouth 2 (two) times a day.      albuterol sulfate  (90 Base) MCG/ACT inhaler USE 2 INHALATIONS EVERY 6 HOURS AS NEEDED 51 g 2    aspirin 81 MG EC tablet Take 1 tablet by mouth Daily. PT REPORTED PER DR CORONA OFFICE TO STOP 5 DAYS PRIOR TO SURGERY, LAST DOSE TAKEN PER PT WAS 12-13-23      atorvastatin (LIPITOR) 40 MG tablet TAKE 1 TABLET EVERY NIGHT 90 tablet 3    BD Pen Needle Ca U/F 32G X 4 MM misc USE 1 PEN NEEDLE DAILY 90 each 3    Cholecalciferol (Vitamin D3) 1.25 MG (00908 UT) capsule Take 1 capsule by mouth Every 7 (Seven) Days. (Patient taking differently: Take 1 capsule by mouth Every 30 (Thirty) Days.) 12 capsule 1    Dulaglutide (Trulicity) 4.5 MG/0.5ML solution pen-injector Inject 0.5 mL under the skin into the appropriate area as directed Every 7 (Seven) Days for 180 days. (Patient taking differently: Inject 0.5 mL under the skin into the appropriate area as directed Every 7 (Seven) Days. TAKES ON THURSDAY. LAST DOSE TAKEN WAS 12-14-23  INSTRUCTED NOT TO TAKE 1 WEEK PRIOR TO SURGERY) 6 mL 1    ferrous sulfate 325 (65 FE) MG tablet Take 1 tablet by mouth Daily With Breakfast.      fluticasone (FLONASE) 50 MCG/ACT nasal spray USE 1 SPRAY IN EACH NOSTRIL ONCE DAILY 48 g 2    Fluticasone-Salmeterol (ADVAIR/WIXELA) 500-50 MCG/ACT DISKUS USE 1 INHALATION TWICE A  each 3    glimepiride (AMARYL) 2 MG tablet TAKE 1 TABLET DAILY WITH BREAKFAST AND DINNER (Patient taking differently: Take 1 tablet by mouth 2 (Two) Times a Day. INSTRUCTED NOT TO TAKE DAY OF SURGERY) 180 tablet 3    glucose blood (OneTouch Verio) test strip 1 each by Other route 3 (Three) Times a Day. use to test blood sugar 3 times daily 300 each 3    hydrocortisone 2.5 % cream APPLY TO SCALY AREAS  ON THE EARS TWICE DAILY AS NEEDED FLARES.      insulin glargine (LANTUS, SEMGLEE) 100 UNIT/ML injection Inject 40 Units under the skin into the appropriate area as directed Daily. INSTRUCTED PT TO TAKE ONLY 1/2 DOSE ON DAY OF SURGERY      levothyroxine (SYNTHROID, LEVOTHROID) 25 MCG tablet TAKE 1 TABLET DAILY IN THE MORNING ON AN EMPTY STOMACH 90 tablet 3    losartan (COZAAR) 50 MG tablet TAKE 1 TABLET DAILY 90 tablet 3    metoprolol succinate XL (TOPROL-XL) 100 MG 24 hr tablet TAKE 1 TABLET DAILY 90 tablet 3    montelukast (SINGULAIR) 10 MG tablet TAKE 1 TABLET DAILY 90 tablet 3    multivitamin with minerals tablet tablet Take 1 tablet by mouth Daily.      OneTouch Delica Lancets 30G misc 1 each 3 (Three) Times a Day. 200 each 6    pantoprazole (PROTONIX) 40 MG EC tablet TAKE 1 TABLET DAILY 90 tablet 3    Probiotic Product (PROBIOTIC PO) Take 1 fluid ounce by mouth Daily. Plexus powder      vitamin B-12 (CYANOCOBALAMIN) 500 MCG tablet Take 1 tablet by mouth Daily.         Allergies   Allergen Reactions    Exenatide Unknown - High Severity     Weight loss and pain in the right side. Bydereon.    Fluoxetine Hives    Gabapentin Headache     Occular Miaigraines    Jardiance [Empagliflozin] Other (See Comments)     UTI    Levaquin [Levofloxacin] Hives    Nickel Itching and Rash    Sulfa Antibiotics Swelling    Metformin Nausea Only    Hibiclens [Chlorhexidine Gluconate] Rash        Family History   Problem Relation Age of Onset    Heart disease Father     Cancer Father         Skin cancer    Heart failure Father         Age 79  cause of death.    Heart disease Mother     Breast cancer Mother         Breast cancer age 45 and 60    Diabetes Mother     Arthritis Mother     Osteoporosis Mother     Heart failure Mother         Heart attack age 60. Age 87. Cause of death.    Hypertension Mother     Osteoarthritis Mother     Deep vein thrombosis Mother     Cancer Brother         Prostate cancer age about 58.    Prostate cancer  "Brother         unsure age    Breast cancer Sister 27        Breast cancer age 27 and 52. Ovarian cancer age 53 caused death at 56.    Diabetes Sister     Ovarian cancer Sister 53    Arthritis Sister     Breast cancer Daughter 40    Breast cancer Maternal Aunt         mid 40s    Ovarian cancer Maternal Aunt          with ovarian cancer age mid 40s    Malig Hyperthermia Neg Hx     Colon cancer Neg Hx     Uterine cancer Neg Hx     Pulmonary embolism Neg Hx        Social History     Socioeconomic History    Marital status:     Number of children: 3   Tobacco Use    Smoking status: Never    Smokeless tobacco: Never   Vaping Use    Vaping Use: Never used   Substance and Sexual Activity    Alcohol use: Never    Drug use: Never    Sexual activity: Defer     Partners: Male     Birth control/protection: Post-menopausal       Review of Systems   Constitutional:  Negative for chills and fever.   Genitourinary:  Positive for breast pain.   Skin:  Positive for color change. Negative for dry skin, pallor, rash, skin lesions, wound and bruise.        Vital Signs:   /88 (BP Location: Right arm)   Pulse 90   Ht 157.5 cm (62\")   Wt 76.2 kg (168 lb)   BMI 30.73 kg/m²      Physical Exam  Vitals and nursing note reviewed.   Constitutional:       Appearance: Normal appearance.   HENT:      Head: Normocephalic.   Cardiovascular:      Rate and Rhythm: Normal rate.   Pulmonary:      Effort: Pulmonary effort is normal.      Breath sounds: No stridor.   Abdominal:      Palpations: Abdomen is soft.   Skin:     General: Skin is warm.      Comments: Left axilla: Surgical incision is clean dry and intact with a moderate amount erythema.  After cleansing the skin with alcohol for 30 seconds using 18-gauge syringe I was able to aspirate 126 mL of bloody drainage.  Dry dressing. Patient tolerated the procedure well.   Neurological:      General: No focal deficit present.      Mental Status: She is alert and oriented to person, " place, and time.   Psychiatric:         Mood and Affect: Mood normal.         Thought Content: Thought content normal.          Result Review :          []  Laboratory  []  Radiology  []  Pathology  []  Microbiology  []  EKG/Telemetry   []  Cardiology/Vascular   []  Old records  Today I have reviewed Dr. Rausch's operative and pathology.     Assessment and Plan    Diagnoses and all orders for this visit:    1. Lymphadenopathy (Primary)    Other orders  -     cephalexin (Keflex) 500 MG capsule; Take 1 capsule by mouth 3 (Three) Times a Day for 10 days.  Dispense: 30 capsule; Refill: 0        Follow Up   Return for Follow-up with Dr. Rausch in 1 week.    I have encouraged the patient to hold her ASA-81 mg for the next 4 days.  I did discuss with the patient we will go ahead and start her on antibiotic following the evacuation of the seroma.  Instructed use Tylenol for pain.  Patient verbalizes understanding.    Seek medical emergency services:  Fever greater than 101 associated with chills.  Extreme pain.    Patient was given instructions and counseling regarding her condition or for health maintenance advice. Please see specific information pulled into the AVS if appropriate.

## 2024-01-02 NOTE — TELEPHONE ENCOUNTER
I called and spoke to April about the medication that she wanted to be sent in. I called in Keflex 500 mg TID for 7 days with no refills. Pt is aware that abx was called and she is on her ways to pick them up,

## 2024-01-09 ENCOUNTER — HOSPITAL ENCOUNTER (OUTPATIENT)
Dept: OCCUPATIONAL THERAPY | Facility: HOSPITAL | Age: 74
Setting detail: THERAPIES SERIES
Discharge: HOME | End: 2024-01-09
Payer: MEDICARE

## 2024-01-09 DIAGNOSIS — Z17.0 MALIGNANT NEOPLASM OF UPPER-OUTER QUADRANT OF RIGHT BREAST IN FEMALE, ESTROGEN RECEPTOR POSITIVE: ICD-10-CM

## 2024-01-09 DIAGNOSIS — C50.411 MALIGNANT NEOPLASM OF UPPER-OUTER QUADRANT OF RIGHT BREAST IN FEMALE, ESTROGEN RECEPTOR POSITIVE: ICD-10-CM

## 2024-01-09 DIAGNOSIS — Z91.89 AT RISK FOR LYMPHEDEMA: Primary | ICD-10-CM

## 2024-01-09 PROCEDURE — 93702 BIS XTRACELL FLUID ANALYSIS: CPT | Performed by: OCCUPATIONAL THERAPIST

## 2024-01-09 PROCEDURE — 97535 SELF CARE MNGMENT TRAINING: CPT | Performed by: OCCUPATIONAL THERAPIST

## 2024-01-09 NOTE — THERAPY EVALUATION
Outpatient Occupational Therapy Lymphedema Re-Evaluation  RJ Chanel     Patient Name: Jyoti Alvarez  : 1950  MRN: [id number]  Today's Date: 2024      Visit Date: 2024    Patient Active Problem List   Diagnosis    Breast cancer    Age related osteoporosis    Colon polyp    Gastroesophageal reflux disease without esophagitis    Essential hypertension    Fatty liver    Vitamin D deficiency    Type 2 diabetes mellitus with hyperglycemia, without long-term current use of insulin    Mixed hyperlipidemia    Atrophy of thyroid    Iron deficiency anemia secondary to inadequate dietary iron intake    Vertigo    Vitamin B12 deficiency    SHALONDA on CPAP    Morbid (severe) obesity due to excess calories    Medicare annual wellness visit, subsequent    Lymphadenopathy        Past Medical History:   Diagnosis Date    Anemia     Arthritis     Asthma     INHALERS PRN    Breast cancer     RIGHT    Colon polyp     COVID 2021    Disease of thyroid gland Hypothyroidism    Fatty liver     ASYMPTOMATIC    Gastroesophageal reflux     HL (hearing loss) Hearing aids     Hyperlipemia     Hypertension     Limb swelling     Seasonal allergies     Skin cancer     Sleep apnea     Strain of latissimus dorsi muscle 2017    Right    Type 2 diabetes mellitus     AM  IN AM    Vertigo         Past Surgical History:   Procedure Laterality Date    AXILLARY LYMPH NODE BIOPSY/EXCISION Left 2023    Procedure: LEFT AXILLARY LYMPH NODE BIOPSY/EXCISION, MAGSEED;  Surgeon: Kathy Rausch MD;  [place] [facility] [place] [facility] [place]  Service: General;  Laterality: Left;    BREAST AUGMENTATION BILATERAL MASTOPEXY      BREAST RECONSTRUCTION Bilateral 2022    Procedure: BILATERAL BREAST RECONSTRUCTION WITH REMOVAL AND REPLACEMENT OF IMPLANTS, MESH PLACEMENT, CHEST WALL SKIN RESECTION, CHEST LIPOSUCTION;  Surgeon: Romain Quezada MD;  [place] [facility] [place] [facility] [place]  Service: Plastics;  Laterality: Bilateral;    BREAST  SURGERY  2004    Bilateral mastectomies and reconstruction     SECTION      COLONOSCOPY  2019    COLONOSCOPY N/A 10/18/2022    Procedure: COLONOSCOPY POLYPECTOMIES;  Surgeon: Eduardo Salas MD;  Location: [facility] ENDOSCOPY;  Service: Gastroenterology;  Laterality: N/A;  COLON POLYPS    COSMETIC SURGERY  2004    Bilateral mastectomies and reconstructio    ENDOSCOPY  2019    EYE SURGERY  Not sure    Cateracts    KNEE ARTHROSCOPY W/ LATERAL RELEASE / MEDIAL IMBRICATION Left     TORN CARTIDLGE REPAIR    LIPOSUCTION N/A 2022    Procedure: CHEST WALL LIPOSUCTION AND CHEST WALL RESECTION;  Surgeon: Romain Quezada MD;  Location: [facility] OR [facility] [place];  Service: Plastics;  Laterality: N/A;  TIMES BASED ON PREVIOUS EXPERIENCE    MASTECTOMY COMPLETE / SIMPLE Bilateral     OOPHORECTOMY      OTHER SURGICAL HISTORY      Joint surgery,  RIGHT FOOT SECOND  TOE WITH A PIN    SKIN CANCER EXCISION Right 2020    neck    TOE SURGERY Right     2nd digit pin inserted    TONSILLECTOMY           Visit Dx:     ICD-10-CM ICD-9-CM   1. At risk for lymphedema  Z91.89 V49.89   2. Malignant neoplasm of upper-outer quadrant of right breast in female, estrogen receptor positive  C50.411 174.4    Z17.0 V86.0        Patient History       Row Name 24 0900             History    Chief Complaint --  Lymphedema Surveillance Program  -TD      Brief Description of Current Complaint Pt had a history of breast cancer in the right breast in  where she had 14 lymphnodes removed at that time. Pt had a surgery on 2023 to investigate large lymphnodes in the LUE and had 5 removed at that time.  -TD         Fall Risk Assessment    Any falls in the past year: No  -TD      Does patient have a fear of falling No  -TD         Services    Prior Rehab/Home Health Experiences No  -TD      Are you currently receiving Home Health services No  -TD         Daily Activities    Primary Language English  -TD      Are you able to read Yes  " -TD      Are you able to write Yes  -TD      How does patient learn best? Demonstration;Pictures/Video  -TD         Safety    Are you being hurt, hit, or frightened by anyone at home or in your life? No  -TD      Are you being neglected by a caregiver No  -TD      Have you had any of the following issues with Anxiety  -TD                User Key  (r) = Recorded By, (t) = Taken By, (c) = Cosigned By      Initials Name Provider Type    TD Kenya Beltran OT Occupational Therapist                     Lymphedema       Row Name 01/09/24 0900             Subjective Pain    Able to rate subjective pain? yes  -TD      Pre-Treatment Pain Level 2  -TD      Post-Treatment Pain Level 2  -TD         Subjective    Subjective Comments Pt reports that she has a seroma and pain in the left breast that has requried interention.  -TD         Lymphedema Assessment    Lymphedema Classification LUE:;RUE:;at risk/stage 0  -TD      Lymphedema Cancer Related Sx bilateral;simple mastectomy;reconstructive;axillary dissection;sentinel node biopsy  -TD      Lymphedema Surgery Comments [id number]; [id number]  -TD      Lymph Nodes Removed # --  RUE:14; LUE 5  -TD      Positive Lymph Nodes # 0  -TD         LLIS - Physical Concerns    The amount of pain associated with my lymphedema is: 1  -TD      The amount of limb heaviness associated with my lymphedema is: 1  -TD      The amount of skin tightness associated with my lymphedema is: 1  -TD      The size of my swollen limb(s) seems: 1  -TD      Lymphedema affects the movement of my swollen limb(s): 1  -TD      The strength in my swollen limb(s) is: 1  -TD         LLIS - Psychosocial Concerns    Lymphedema affects my body image (i.e., \"how I think I look\"). 0  -TD      Lymphedema affects my socializing with others. 0  -TD      Lymphedema affects my intimate relations with spouse or partner (rate 0 if not applicable 0  -TD      Lymphedema \"gets me down\" (i.e., depression, frustration, or anger) 0  -TD      " I must rely on others for help due to my lymphedema. 0  -TD      I know what to do to manage my lymphedema 1  -TD         LLIS - Functional Concerns    Lymphedema affects my ability to perform self-care activities (i.e. eating, dressing, hygiene) 0  -TD      Lymphedema affects my ability to perform routine home or work-related activities. 0  -TD      Lymphedema affects my performance of preferred leisure activities. 0  -TD      Lymphedema affects proper fit of clothing/shoes 0  -TD      Lymphedema affects my sleep 0  -TD         Posture/Observations    Posture- WNL Posture is WNL  -TD         General ROM    GENERAL ROM COMMENTS BUE are WFL but has tightness in the LUE  -TD         MMT (Manual Muscle Testing)    General MMT Comments BUE are WFL  -TD         Skin Changes/Observations    Skin Observations Comment Pt has cord present in the LUE that is resulting in pulling and pain. Pt feels like the LUE can go numb at certian times.  -TD         L-Dex Bioimpedence Screening    L-Dex Measurement Extremity LUE  -TD      L-Dex Patient Position Standing  -TD      L-Dex UE Dominate Side Right  -TD      L-Dex UE At Risk Side Left  -TD      L-Dex UE Pre Surgical Value No  -TD      L-Dex UE Score 0.1  -TD      L-Dex UE Baseline Score -3  -TD      L-Dex UE Value Change 3.1  -TD      $ L-Dex Charge yes  -TD         Lymphedema Life Impact Scale Totals    A.  Total Q1 - Q17 (Do not include Q18) 7  -TD      B.  Total number of questions answered (Q1-Q17) 17  -TD      C. Divide A by B 0.41  -TD      D. Multiple C by 25 10.25  -TD                User Key  (r) = Recorded By, (t) = Taken By, (c) = Cosigned By      Initials Name Provider Type    TD Kenya Beltran OT Occupational Therapist                            Therapy Education  Education Details: Pt was given self MLD and post surgical stretches at this time.  Given: Symptoms/condition management, Edema management, Pain management  Program: New  How Provided: Verbal,  Demonstration, Written  Provided to: Patient  Level of Understanding: Teach back education performed, Verbalized, Demonstrated  12920 - OT Self Care/Mgmt Minutes: 55         OT Goals       Row Name 01/09/24 1003          Time Calculation    OT Goal Re-Cert Due Date 02/08/24  -TD               User Key  (r) = Recorded By, (t) = Taken By, (c) = Cosigned By      Initials Name Provider Type    Kenya Garcia OT Occupational Therapist                  1. Post Breast Surgery Care/at risk for Lymphedema  LTG 1: 90 days:  As an indicator of no exacerbation of lymphedema staging, the patient will present with an L-Dex score less than [10] points from preoperative baseline.   STATUS: New  STG 1a:   30 days: To prevent exacerbation of mixed edema to lymphedema, patient will utilize the 2 postsurgical compression garments daily.      STATUS: New  STG 1b: 30 days: Patient will be independent with self-manual lymphatic massage.    STATUS: New  STG 1c: 30 days:  Patient will be independent with identification of signs and symptoms of lymphedema exasperation per stoplight to recovery education handout.   STATUS: New  STG 1 d: 30 days: Patient will be independent with HEP to prevent advancement in lymphedema staging.   STATUS: New  TREATMENT:  Self Care/ADL retraining, Therapeutic Activity, Neuromuscular Re-education, Therapeutic Exercise, Bioimpedence Fluid Analysis, Post-Surgical compression garement [address] [address] [facility]/ Silvia Camis[employer] Kit 2860K, Orthotic Management and training,  and Manual Therapy.      OT Assessment/Plan       Row Name 01/09/24 1001 01/09/24 1000       OT Assessment    Functional Limitations -- Limitation in home management;Performance in work activities  -TD    Impairments -- Impaired lymphatic circulation;Range of motion;Pain;Integumentary integrity  -TD    Assessment Comments Pt had a history of breast cancer in the right breast in 2004 where she had 14 lymphnodes removed at that time. Pt had a  surgery on 12/19/2023 to investigate large lymphnodes in the LUE and had 5 removed at that time.  Pt would benefit from continued skilled OT to prevent increased staging of lymphedema, increased pain, and decreased AROM.  -TD --    OT Diagnosis at risk for lymphedema  -TD --    OT Rehab Potential Excellent  -TD --    Patient/caregiver participated in establishment of treatment plan and goals Yes  -TD --    Patient would benefit from skilled therapy intervention Yes  -TD --       OT Plan    OT Frequency 1x/week;2x/week  -TD --    Predicted Duration of Therapy Intervention (OT) 12 weeks  -TD --    Planned CPT's? OT EVAL MOD COMPLEXITY: 27205;OT THER PROC EA 15 MIN: 33169SM;OT SELF CARE/MGMT/TRAIN 15 MIN: 89056;OT MANUAL THERAPY EA 15 MIN: 86508;OT ORTHOTIC MGMT/TRAIN EA 15 MIN: 89219;OT WC MGMT EA 15 MIN: 40618  -TD --    Planned Therapy Interventions (Optional Details) joint mobilization;manual therapy techniques;orthotic fitting/training;patient/family education;ROM (Range of Motion);strengthening;wound care  -TD --    OT Plan Comments continue POC  -TD --              User Key  (r) = Recorded By, (t) = Taken By, (c) = Cosigned By      Initials Name Provider Type    TD Kenya Beltran OT Occupational Therapist                              Time Calculation:   Timed Charges  38038 - OT Self Care/Mgmt Minutes: 55  Total Minutes  Timed Charges Total Minutes: 55   Total Minutes: 55     Therapy Charges for Today       Code Description Service Date Service Provider Modifiers Qty    33826356848 HC PT BIS XTRACELL FLUID ANALYSIS 1/9/2024 Kenya Beltran OT  1    96118073524 HC OT SELF CARE/MGMT/TRAIN EA 15 MIN 1/9/2024 Kenya Beltran OT GO 4                      Kenya Beltran OT  1/9/2024

## 2024-01-11 ENCOUNTER — OFFICE VISIT (OUTPATIENT)
Dept: SURGERY | Facility: CLINIC | Age: 74
End: 2024-01-11
Payer: MEDICARE

## 2024-01-11 VITALS — BODY MASS INDEX: 30.91 KG/M2 | WEIGHT: 168 LBS | HEIGHT: 62 IN | RESPIRATION RATE: 19 BRPM

## 2024-01-11 DIAGNOSIS — R59.1 LYMPHADENOPATHY: Primary | ICD-10-CM

## 2024-01-11 PROCEDURE — 99024 POSTOP FOLLOW-UP VISIT: CPT | Performed by: SURGERY

## 2024-01-11 NOTE — PROGRESS NOTES
"Chief Complaint  Follow-up    Subjective          History of Present Illness  The patient is here to follow up on excision of axillary nodes.  They are doing well and have no complaints.  Pathology is shown below:    Results for orders placed or performed during the hospital encounter of 12/19/23   POC Glucose Once    Specimen: Blood   Result Value Ref Range    Glucose 138 (H) 70 - 99 mg/dL   POC Glucose Once    Specimen: Blood   Result Value Ref Range    Glucose 130 (H) 70 - 99 mg/dL   Tissue Pathology Exam    Specimen: A: Axilla, Left; Tissue    B: Axilla, Left; Tissue   Result Value Ref Range    Case Report       Surgical Pathology Report                         Case: [id number]                                  Authorizing Provider:  Kathy Rausch MD    Collected:           12/19/2023 09:02 AM          Ordering Location:     [facility]  Received:            12/19/2023 09:22 AM                                 OR                                                                           Pathologist:           Christiane Del Cid DO                                                       Specimens:   1) - Axilla, Left, Left axillary abnormal palpable lymph node                                       2) - Axilla, Left, Left axillary clipped lymph node                                        Clinical Information       Lymphadenopathy      Final Diagnosis       1.  Left axillary abnormal palpable lymph node, biopsy:   - Benign lymph nodes (4)    2.  Left axillary clipped lymph node, biopsy:   - Benign lymph node (1)       Gross Description       1. Axilla, Left.  Note received fresh and labeled \"left axillary abnormal palpable lymph node\" is a 6.0 cm fragment of adipose tissue containing 4 lymph nodes ranging in size from 1.0-2.0 cm.  Sectioning reveals a tan-pink, soft cut surface.  The largest lymph node contains a coiled metallic biopsy clip.  Tissue is submitted in [facility] media.  The lymph nodes are " "entirely submitted in 4 cassettes, to include the lymph node containing the biopsy clip in 1A.  2. Axilla, Left.  Received fresh and labeled \"left axillary clipped lymph node\" is a 5.0 cm fragment of adipose tissue containing a 3.5 cm lymph node.  Sectioning reveals a tan-pink, soft cut surface with 0.5 cm cylindrical, magnetic biopsy clip.  Tissue is submitted in [facility] media.  The lymph node is entirely submitted in 3 cassettes.   LOREE      Special Stains       Immunohistochemical stains are performed on block 1A.The B cells are positive for CD20, CD79a, and PAX5.  T cells are positive for CD43, CD3 and CD5.  Germinal centers are positive for BCL6 and CD10 and negative for BCL2.  CD21 and CD23 highlight follicular dentritic cell meshwork.  BCL1 is negative for mantle cell lymphoma.  Ki67 demonstrates a normal proliferative index and pattern.  All immunohistochemical/cytochemical stains (IHC) are performed on separate slides per different antibody unless otherwise specified in the documentation that a cocktail (multiple stain) was performed.  Controls are appropriate.        Microscopic Description       Microscopic examination performed.      Flow Cytometry Summary       Left axilla lymph node:   No significant lymphoid immunophenotypic abnormalities      Flow Cytometry    Specimen: Lymph Node   Result Value Ref Range    Consult Global Result Comment^Text^TXT       She has seen lymphedema clinic and her seroma has improved.    Objective   Vital Signs:   Resp 19   Ht 157.5 cm (62\")   Wt 76.2 kg (168 lb)   BMI 30.73 kg/m²     Physical Exam  Vitals and nursing note reviewed.   Constitutional:       General: She is not in acute distress.     Appearance: Normal appearance. She is well-developed.   HENT:      Head: Normocephalic and atraumatic.   Eyes:      Extraocular Movements: Extraocular movements intact.      Pupils: Pupils are equal, round, and reactive to light.   Cardiovascular:      Pulses: Normal pulses. "   Pulmonary:      Effort: Pulmonary effort is normal. No retractions.      Breath sounds: Normal air entry. No wheezing.   Abdominal:      General: There is no distension.      Palpations: Abdomen is soft.      Tenderness: There is no abdominal tenderness.      Hernia: No hernia is present.   Musculoskeletal:         General: No swelling or deformity.      Cervical back: Neck supple.   Skin:     General: Skin is warm and dry.      Findings: No erythema.      Comments: Surgical Incision Healing Well   Neurological:      General: No focal deficit present.      Mental Status: She is alert and oriented to person, place, and time.      Motor: Motor function is intact.   Psychiatric:         Mood and Affect: Mood normal.         Thought Content: Thought content normal.            Result Review :                 Assessment and Plan    Diagnoses and all orders for this visit:    1. Lymphadenopathy (Primary)      Followup prn    Follow Up   No follow-ups on file.  Patient was given instructions and counseling regarding her condition or for health maintenance advice. Please see specific information pulled into the AVS if appropriate.

## 2024-01-15 DIAGNOSIS — E11.22 CONTROLLED TYPE 2 DIABETES MELLITUS WITH STAGE 2 CHRONIC KIDNEY DISEASE, WITH LONG-TERM CURRENT USE OF INSULIN: ICD-10-CM

## 2024-01-15 DIAGNOSIS — Z79.4 CONTROLLED TYPE 2 DIABETES MELLITUS WITH STAGE 2 CHRONIC KIDNEY DISEASE, WITH LONG-TERM CURRENT USE OF INSULIN: ICD-10-CM

## 2024-01-15 DIAGNOSIS — N18.2 CONTROLLED TYPE 2 DIABETES MELLITUS WITH STAGE 2 CHRONIC KIDNEY DISEASE, WITH LONG-TERM CURRENT USE OF INSULIN: ICD-10-CM

## 2024-01-15 RX ORDER — DULAGLUTIDE 4.5 MG/.5ML
4.5 INJECTION, SOLUTION SUBCUTANEOUS
Qty: 6 ML | Refills: 1 | Status: SHIPPED | OUTPATIENT
Start: 2024-01-15

## 2024-01-15 RX ORDER — DULAGLUTIDE 4.5 MG/.5ML
4.5 INJECTION, SOLUTION SUBCUTANEOUS
Qty: 6 ML | Refills: 1 | Status: SHIPPED | OUTPATIENT
Start: 2024-01-15 | End: 2024-01-15 | Stop reason: SDUPTHER

## 2024-01-16 ENCOUNTER — APPOINTMENT (OUTPATIENT)
Dept: OCCUPATIONAL THERAPY | Facility: HOSPITAL | Age: 74
End: 2024-01-16
Payer: MEDICARE

## 2024-01-17 ENCOUNTER — APPOINTMENT (OUTPATIENT)
Dept: OCCUPATIONAL THERAPY | Facility: HOSPITAL | Age: 74
End: 2024-01-17
Payer: MEDICARE

## 2024-01-18 ENCOUNTER — APPOINTMENT (OUTPATIENT)
Dept: OCCUPATIONAL THERAPY | Facility: HOSPITAL | Age: 74
End: 2024-01-18
Payer: MEDICARE

## 2024-01-18 ENCOUNTER — DOCUMENTATION (OUTPATIENT)
Dept: PHYSICAL THERAPY | Facility: CLINIC | Age: 74
End: 2024-01-18
Payer: MEDICARE

## 2024-01-23 ENCOUNTER — HOSPITAL ENCOUNTER (OUTPATIENT)
Dept: OCCUPATIONAL THERAPY | Facility: HOSPITAL | Age: 74
Setting detail: THERAPIES SERIES
Discharge: HOME | End: 2024-01-23
Payer: MEDICARE

## 2024-01-23 DIAGNOSIS — L90.5 SCAR CONDITION AND FIBROSIS OF SKIN: ICD-10-CM

## 2024-01-23 DIAGNOSIS — R52 PAIN: ICD-10-CM

## 2024-01-23 DIAGNOSIS — C50.412 MALIGNANT NEOPLASM OF UPPER-OUTER QUADRANT OF LEFT BREAST IN FEMALE, ESTROGEN RECEPTOR POSITIVE: ICD-10-CM

## 2024-01-23 DIAGNOSIS — Z91.89 AT RISK FOR LYMPHEDEMA: Primary | ICD-10-CM

## 2024-01-23 DIAGNOSIS — Z17.0 MALIGNANT NEOPLASM OF UPPER-OUTER QUADRANT OF LEFT BREAST IN FEMALE, ESTROGEN RECEPTOR POSITIVE: ICD-10-CM

## 2024-01-23 DIAGNOSIS — M25.60 JOINT STIFFNESS: ICD-10-CM

## 2024-01-23 PROCEDURE — 97140 MANUAL THERAPY 1/> REGIONS: CPT | Performed by: OCCUPATIONAL THERAPIST

## 2024-01-23 NOTE — THERAPY TREATMENT NOTE
Outpatient Occupational Therapy Lymphedema Treatment Note   Yanique     Patient Name: Jyoti Alvarez  : 1950  MRN: [id number]  Today's Date: 2024      Visit Date: 2024    Patient Active Problem List   Diagnosis    Breast cancer    Age related osteoporosis    Colon polyp    Gastroesophageal reflux disease without esophagitis    Essential hypertension    Fatty liver    Vitamin D deficiency    Type 2 diabetes mellitus with hyperglycemia, without long-term current use of insulin    Mixed hyperlipidemia    Atrophy of thyroid    Iron deficiency anemia secondary to inadequate dietary iron intake    Vertigo    Vitamin B12 deficiency    SHALONDA on CPAP    Morbid (severe) obesity due to excess calories    Medicare annual wellness visit, subsequent    Lymphadenopathy        Past Medical History:   Diagnosis Date    Anemia     Arthritis     Asthma     INHALERS PRN    Breast cancer     RIGHT    Colon polyp     COVID 2021    Disease of thyroid gland Hypothyroidism    Fatty liver     ASYMPTOMATIC    Gastroesophageal reflux     HL (hearing loss) Hearing aids     Hyperlipemia     Hypertension     Limb swelling     Seasonal allergies     Skin cancer     Sleep apnea     Strain of latissimus dorsi muscle 2017    Right    Type 2 diabetes mellitus     AM  IN AM    Vertigo         Past Surgical History:   Procedure Laterality Date    AXILLARY LYMPH NODE BIOPSY/EXCISION Left 2023    Procedure: LEFT AXILLARY LYMPH NODE BIOPSY/EXCISION, MAGSEED;  Surgeon: Kathy Rausch MD;  Location: [facility] OR [facility] [place];  Service: General;  Laterality: Left;    BREAST AUGMENTATION BILATERAL MASTOPEXY      BREAST RECONSTRUCTION Bilateral 2022    Procedure: BILATERAL BREAST RECONSTRUCTION WITH REMOVAL AND REPLACEMENT OF IMPLANTS, MESH PLACEMENT, CHEST WALL SKIN RESECTION, CHEST LIPOSUCTION;  Surgeon: Romain Quezada MD;  Location: [facility] OR [facility] [place];  Service: Plastics;  Laterality: Bilateral;     BREAST SURGERY      Bilateral mastectomies and reconstruction     SECTION      COLONOSCOPY  2019    COLONOSCOPY N/A 10/18/2022    Procedure: COLONOSCOPY POLYPECTOMIES;  Surgeon: Eduardo Salas MD;  Location: [facility] ENDOSCOPY;  Service: Gastroenterology;  Laterality: N/A;  COLON POLYPS    COSMETIC SURGERY      Bilateral mastectomies and reconstructio    ENDOSCOPY      EYE SURGERY  Not sure    Cateracts    KNEE ARTHROSCOPY W/ LATERAL RELEASE / MEDIAL IMBRICATION Left     TORN CARTIDLGE REPAIR    LIPOSUCTION N/A 2022    Procedure: CHEST WALL LIPOSUCTION AND CHEST WALL RESECTION;  Surgeon: Romain Quezada MD;  [place] [facility] [place] [facility] [place]  Service: Plastics;  Laterality: N/A;  TIMES BASED ON PREVIOUS EXPERIENCE    MASTECTOMY COMPLETE / SIMPLE Bilateral     OOPHORECTOMY      OTHER SURGICAL HISTORY      Joint surgery,  RIGHT FOOT SECOND  TOE WITH A PIN    SKIN CANCER EXCISION Right 2020    neck    TOE SURGERY Right     2nd digit pin inserted    TONSILLECTOMY           Visit Dx:      ICD-10-CM ICD-9-CM   1. At risk for lymphedema  Z91.89 V49.89   2. Scar condition and fibrosis of skin  L90.5 709.2   3. Joint stiffness  M25.60 719.50   4. Pain  R52 780.96   5. Malignant neoplasm of upper-outer quadrant of left breast in female, estrogen receptor positive  C50.412 174.4    Z17.0 V86.0        Lymphedema       Row Name 24 1200             Subjective Pain    Able to rate subjective pain? yes  -TD      Pre-Treatment Pain Level 2  -TD      Post-Treatment Pain Level 2  -TD         Subjective    Subjective Comments Pt reports shes feeling better  -TD         Lymphedema Assessment    Lymphedema Classification LUE:;RUE:;at risk/stage 0  -TD      Lymphedema Cancer Related Sx bilateral;simple mastectomy;reconstructive;axillary dissection;sentinel node biopsy  -TD      Lymph Nodes Removed # --  RUE:14; LUE 5  -TD      Positive Lymph Nodes # 0  -TD         Manual Lymphatic Drainage     Manual Lymphatic Drainage initial sequence;opened regional lymph nodes;opened anastamoses;extremity treatment  -TD      Initial Sequence full neck;cervical;supraclavicular;shoulder collectors;abdomen;diaphragmatic breathing  -TD      Opened Regional Lymph Nodes axillary;inguinal;paraspinal;other lymph nodes  parasternal  -TD      Axillary left  -TD      Inguinal right;left  -TD      Opened Anastamoses anterior axillo-axillary;axillo-inguinal  -TD      Axillo-Inguinal right;left  -TD      Extremity Treatment MLD to proximal limb only  -TD      Manual Therapy Therapist focused on scar tissue in the right axilla this date using MLD and vibration.  Pt was also given wave foam for the affect area.  -TD                User Key  (r) = Recorded By, (t) = Taken By, (c) = Cosigned By      Initials Name Provider Type    TD Kenya Beltran OT Occupational Therapist                             OT Assessment/Plan       Row Name 01/23/24 1216          OT Assessment    Functional Limitations Limitation in home management;Performance in work activities  -TD     Impairments Impaired lymphatic circulation;Range of motion;Pain;Integumentary integrity  -TD     Assessment Comments Pt had a history of breast cancer in the right breast in 2004 where she had 14 lymphnodes removed at that time. Pt had a surgery on 12/19/2023 to investigate large lymphnodes in the LUE and had 5 removed at that time.  Pt reports that she has good movement and feels better after treatment.  Pt was given wave foam to address the axilla. Pt would benefit from continued skilled OT to prevent increased staging of lymphedema, increased pain, and decreased AROM.  -TD     OT Diagnosis at risk for lympehdema  -TD     OT Rehab Potential Excellent  -TD     Patient/caregiver participated in establishment of treatment plan and goals Yes  -TD     Patient would benefit from skilled therapy intervention Yes  -TD        OT Plan    OT Plan Comments continue POC  -TD                User Key  (r) = Recorded By, (t) = Taken By, (c) = Cosigned By      Initials Name Provider Type    TD Kenya Beltran, OT Occupational Therapist                        Manual Rx (last 36 hours)       Manual Treatments       Row Name 01/23/24 1218             Total Minutes    28451 - OT Manual Therapy Minutes 30  -TD                User Key  (r) = Recorded By, (t) = Taken By, (c) = Cosigned By      Initials Name Provider Type    TD Kenya Beltran, OT Occupational Therapist                   OT Goals       Row Name 01/23/24 1218          Time Calculation    OT Goal Re-Cert Due Date --  -TD               User Key  (r) = Recorded By, (t) = Taken By, (c) = Cosigned By      Initials Name Provider Type    TD Kenya Beltran, OT Occupational Therapist                    Therapy Education  Education Details: Pt was given self MLD and stretches at this time.  Given: Symptoms/condition management, Edema management, Pain management  Program: New  How Provided: Verbal, Demonstration, Written  Provided to: Patient  Level of Understanding: Teach back education performed, Verbalized, Demonstrated                Time Calculation:   Timed Charges  80951 - OT Manual Therapy Minutes: 30  Total Minutes  Timed Charges Total Minutes: 30   Total Minutes: 30     Therapy Charges for Today       Code Description Service Date Service Provider Modifiers Qty    52303121037 HC OT MANUAL THERAPY EA 15 MIN 1/23/2024 Kenya Beltran OT GO 2                        Kenya Beltran OT  1/23/2024

## 2024-01-25 ENCOUNTER — HOSPITAL ENCOUNTER (OUTPATIENT)
Dept: OCCUPATIONAL THERAPY | Facility: HOSPITAL | Age: 74
Setting detail: THERAPIES SERIES
Discharge: HOME | End: 2024-01-25
Payer: MEDICARE

## 2024-01-25 DIAGNOSIS — I89.0 LYMPHEDEMA: ICD-10-CM

## 2024-01-25 DIAGNOSIS — R52 PAIN: ICD-10-CM

## 2024-01-25 DIAGNOSIS — C50.412 MALIGNANT NEOPLASM OF UPPER-OUTER QUADRANT OF LEFT BREAST IN FEMALE, ESTROGEN RECEPTOR POSITIVE: ICD-10-CM

## 2024-01-25 DIAGNOSIS — Z91.89 AT RISK FOR LYMPHEDEMA: Primary | ICD-10-CM

## 2024-01-25 DIAGNOSIS — Z17.0 MALIGNANT NEOPLASM OF UPPER-OUTER QUADRANT OF LEFT BREAST IN FEMALE, ESTROGEN RECEPTOR POSITIVE: ICD-10-CM

## 2024-01-25 DIAGNOSIS — L90.5 SCAR CONDITION AND FIBROSIS OF SKIN: ICD-10-CM

## 2024-01-25 DIAGNOSIS — M25.60 JOINT STIFFNESS: ICD-10-CM

## 2024-01-25 PROCEDURE — 97140 MANUAL THERAPY 1/> REGIONS: CPT

## 2024-01-25 NOTE — THERAPY TREATMENT NOTE
Outpatient Occupational Therapy Lymphedema Treatment Note   Yanique     Patient Name: Jyoti Alvarez  : 1950  MRN: [id number]  Today's Date: 2024      Visit Date: 2024    Patient Active Problem List   Diagnosis    Breast cancer    Age related osteoporosis    Colon polyp    Gastroesophageal reflux disease without esophagitis    Essential hypertension    Fatty liver    Vitamin D deficiency    Type 2 diabetes mellitus with hyperglycemia, without long-term current use of insulin    Mixed hyperlipidemia    Atrophy of thyroid    Iron deficiency anemia secondary to inadequate dietary iron intake    Vertigo    Vitamin B12 deficiency    SHALONDA on CPAP    Morbid (severe) obesity due to excess calories    Medicare annual wellness visit, subsequent    Lymphadenopathy        Past Medical History:   Diagnosis Date    Anemia     Arthritis     Asthma     INHALERS PRN    Breast cancer     RIGHT    Colon polyp     COVID 2021    Disease of thyroid gland Hypothyroidism    Fatty liver     ASYMPTOMATIC    Gastroesophageal reflux     HL (hearing loss) Hearing aids     Hyperlipemia     Hypertension     Limb swelling     Seasonal allergies     Skin cancer     Sleep apnea     Strain of latissimus dorsi muscle 2017    Right    Type 2 diabetes mellitus     AM  IN AM    Vertigo         Past Surgical History:   Procedure Laterality Date    AXILLARY LYMPH NODE BIOPSY/EXCISION Left 2023    Procedure: LEFT AXILLARY LYMPH NODE BIOPSY/EXCISION, MAGSEED;  Surgeon: Kathy Rausch MD;  Location: [facility] OR [facility] [place];  Service: General;  Laterality: Left;    BREAST AUGMENTATION BILATERAL MASTOPEXY      BREAST RECONSTRUCTION Bilateral 2022    Procedure: BILATERAL BREAST RECONSTRUCTION WITH REMOVAL AND REPLACEMENT OF IMPLANTS, MESH PLACEMENT, CHEST WALL SKIN RESECTION, CHEST LIPOSUCTION;  Surgeon: Romain Quezada MD;  Location: [facility] OR [facility] [place];  Service: Plastics;  Laterality: Bilateral;     BREAST SURGERY      Bilateral mastectomies and reconstruction     SECTION      COLONOSCOPY  2019    COLONOSCOPY N/A 10/18/2022    Procedure: COLONOSCOPY POLYPECTOMIES;  Surgeon: Eduardo Salas MD;  Location: [facility] ENDOSCOPY;  Service: Gastroenterology;  Laterality: N/A;  COLON POLYPS    COSMETIC SURGERY  2004    Bilateral mastectomies and reconstructio    ENDOSCOPY      EYE SURGERY  Not sure    Cateracts    KNEE ARTHROSCOPY W/ LATERAL RELEASE / MEDIAL IMBRICATION Left     TORN CARTIDLGE REPAIR    LIPOSUCTION N/A 2022    Procedure: CHEST WALL LIPOSUCTION AND CHEST WALL RESECTION;  Surgeon: Romain Quezada MD;  [place] [facility] [place] [facility] [place]  Service: Plastics;  Laterality: N/A;  TIMES BASED ON PREVIOUS EXPERIENCE    MASTECTOMY COMPLETE / SIMPLE Bilateral 2004    OOPHORECTOMY      OTHER SURGICAL HISTORY      Joint surgery,  RIGHT FOOT SECOND  TOE WITH A PIN    SKIN CANCER EXCISION Right 2020    neck    TOE SURGERY Right     2nd digit pin inserted    TONSILLECTOMY           Visit Dx:      ICD-10-CM ICD-9-CM   1. At risk for lymphedema  Z91.89 V49.89   2. Scar condition and fibrosis of skin  L90.5 709.2   3. Joint stiffness  M25.60 719.50   4. Pain  R52 780.96   5. Malignant neoplasm of upper-outer quadrant of left breast in female, estrogen receptor positive  C50.412 174.4    Z17.0 V86.0   6. Lymphedema  I89.0 457.1        Lymphedema       Row Name 24 1600             Subjective Pain    Able to rate subjective pain? yes  -SC      Pre-Treatment Pain Level 2  -SC      Post-Treatment Pain Level 1  -SC      Subjective Pain Comment tightness with ROM L UE  -SC         Subjective    Subjective Comments Pt states she feels better since last treatment however still having discomfort with ROM in L axilla and seroma area on L lateral breast  -SC         Manual Lymphatic Drainage    Manual Lymphatic Drainage initial sequence;opened regional lymph nodes;opened anastamoses;extremity treatment   -SC      Initial Sequence full neck;cervical;supraclavicular;shoulder collectors;abdomen;diaphragmatic breathing  -SC      Opened Regional Lymph Nodes axillary;inguinal;paraspinal;other lymph nodes  parasternal  -SC      Axillary left  -SC      Inguinal right;left  -SC      Opened Anastamoses anterior axillo-axillary;axillo-inguinal  -SC      Axillo-Inguinal right;left  -SC      Extremity Treatment MLD to proximal limb only  -SC      Manual Therapy Therapist massaging seroma on L lateral breast to assist with absorption. Therapist providing passive stretch and trigger point release in L UQ to decrease tightness and improve comfort. Pt feeling better post treatment  -SC                User Key  (r) = Recorded By, (t) = Taken By, (c) = Cosigned By      Initials Name Provider Type    Leticia Rutherford COTA Occupational Therapist Assistant                                    Manual Rx (last 36 hours)       Manual Treatments       Row Name 01/25/24 1607             Total Minutes    57597 - OT Manual Therapy Minutes 30  -SC                User Key  (r) = Recorded By, (t) = Taken By, (c) = Cosigned By      Initials Name Provider Type    Leticia Rutherford COTA Occupational Therapist Assistant                                       Time Calculation:   Timed Charges  80076 - OT Manual Therapy Minutes: 30  Total Minutes  Timed Charges Total Minutes: 30   Total Minutes: 30     Therapy Charges for Today       Code Description Service Date Service Provider Modifiers Qty    [id number]  OT MANUAL THERAPY EA 15 MIN 1/25/2024 Leticia Ledbetter COTA GO 2                        ANA Akers  1/25/2024

## 2024-01-29 ENCOUNTER — HOSPITAL ENCOUNTER (OUTPATIENT)
Dept: OCCUPATIONAL THERAPY | Facility: HOSPITAL | Age: 74
Setting detail: THERAPIES SERIES
Discharge: HOME | End: 2024-01-29
Payer: MEDICARE

## 2024-01-29 ENCOUNTER — APPOINTMENT (OUTPATIENT)
Dept: OCCUPATIONAL THERAPY | Facility: HOSPITAL | Age: 74
End: 2024-01-29
Payer: MEDICARE

## 2024-01-29 DIAGNOSIS — Z91.89 AT RISK FOR LYMPHEDEMA: Primary | ICD-10-CM

## 2024-01-29 DIAGNOSIS — L90.5 SCAR TISSUE: ICD-10-CM

## 2024-01-29 DIAGNOSIS — C50.412 MALIGNANT NEOPLASM OF UPPER-OUTER QUADRANT OF LEFT BREAST IN FEMALE, ESTROGEN RECEPTOR POSITIVE: ICD-10-CM

## 2024-01-29 DIAGNOSIS — Z17.0 MALIGNANT NEOPLASM OF UPPER-OUTER QUADRANT OF LEFT BREAST IN FEMALE, ESTROGEN RECEPTOR POSITIVE: ICD-10-CM

## 2024-01-29 DIAGNOSIS — L90.5 SCAR CONDITION AND FIBROSIS OF SKIN: ICD-10-CM

## 2024-01-29 DIAGNOSIS — R52 PAIN: ICD-10-CM

## 2024-01-29 DIAGNOSIS — M25.60 JOINT STIFFNESS: ICD-10-CM

## 2024-01-29 DIAGNOSIS — I89.0 LYMPHEDEMA: ICD-10-CM

## 2024-01-29 PROCEDURE — 97140 MANUAL THERAPY 1/> REGIONS: CPT

## 2024-01-29 NOTE — THERAPY TREATMENT NOTE
Outpatient Occupational Therapy Lymphedema Treatment Note   Yanique     Patient Name: Jyoti Alvarez  : 1950  MRN: [id number]  Today's Date: 2024      Visit Date: 2024    Patient Active Problem List   Diagnosis    Breast cancer    Age related osteoporosis    Colon polyp    Gastroesophageal reflux disease without esophagitis    Essential hypertension    Fatty liver    Vitamin D deficiency    Type 2 diabetes mellitus with hyperglycemia, without long-term current use of insulin    Mixed hyperlipidemia    Atrophy of thyroid    Iron deficiency anemia secondary to inadequate dietary iron intake    Vertigo    Vitamin B12 deficiency    SHALONDA on CPAP    Morbid (severe) obesity due to excess calories    Medicare annual wellness visit, subsequent    Lymphadenopathy        Past Medical History:   Diagnosis Date    Anemia     Arthritis     Asthma     INHALERS PRN    Breast cancer     RIGHT    Colon polyp     COVID 2021    Disease of thyroid gland Hypothyroidism    Fatty liver     ASYMPTOMATIC    Gastroesophageal reflux     HL (hearing loss) Hearing aids     Hyperlipemia     Hypertension     Limb swelling     Seasonal allergies     Skin cancer     Sleep apnea     Strain of latissimus dorsi muscle 2017    Right    Type 2 diabetes mellitus     AM  IN AM    Vertigo         Past Surgical History:   Procedure Laterality Date    AXILLARY LYMPH NODE BIOPSY/EXCISION Left 2023    Procedure: LEFT AXILLARY LYMPH NODE BIOPSY/EXCISION, MAGSEED;  Surgeon: Kathy Rausch MD;  Location: [facility] [place] [facility] [place];  Service: General;  Laterality: Left;    BREAST AUGMENTATION BILATERAL MASTOPEXY      BREAST RECONSTRUCTION Bilateral 2022    Procedure: BILATERAL BREAST RECONSTRUCTION WITH REMOVAL AND REPLACEMENT OF IMPLANTS, MESH PLACEMENT, CHEST WALL SKIN RESECTION, CHEST LIPOSUCTION;  Surgeon: Romain Quezada MD;  Location: [facility] OR [facility] [place];  Service: Plastics;  Laterality: Bilateral;     BREAST SURGERY      Bilateral mastectomies and reconstruction     SECTION      COLONOSCOPY  2019    COLONOSCOPY N/A 10/18/2022    Procedure: COLONOSCOPY POLYPECTOMIES;  Surgeon: Eduardo Salas MD;  Location: [facility] ENDOSCOPY;  Service: Gastroenterology;  Laterality: N/A;  COLON POLYPS    COSMETIC SURGERY  2004    Bilateral mastectomies and reconstructio    ENDOSCOPY  2019    EYE SURGERY  Not sure    Cateracts    KNEE ARTHROSCOPY W/ LATERAL RELEASE / MEDIAL IMBRICATION Left     TORN CARTIDLGE REPAIR    LIPOSUCTION N/A 2022    Procedure: CHEST WALL LIPOSUCTION AND CHEST WALL RESECTION;  Surgeon: Romain Quezada MD;  [place] [facility] [place] [facility] [place]  Service: Plastics;  Laterality: N/A;  TIMES BASED ON PREVIOUS EXPERIENCE    MASTECTOMY COMPLETE / SIMPLE Bilateral 2004    OOPHORECTOMY      OTHER SURGICAL HISTORY      Joint surgery,  RIGHT FOOT SECOND  TOE WITH A PIN    SKIN CANCER EXCISION Right 2020    neck    TOE SURGERY Right     2nd digit pin inserted    TONSILLECTOMY           Visit Dx:      ICD-10-CM ICD-9-CM   1. At risk for lymphedema  Z91.89 V49.89   2. Scar condition and fibrosis of skin  L90.5 709.2   3. Joint stiffness  M25.60 719.50   4. Pain  R52 780.96   5. Malignant neoplasm of upper-outer quadrant of left breast in female, estrogen receptor positive  C50.412 174.4    Z17.0 V86.0   6. Lymphedema  I89.0 457.1   7. Scar tissue  L90.5 709.2        Lymphedema       Row Name 24 1500             Subjective Pain    Able to rate subjective pain? yes  -SC      Pre-Treatment Pain Level 0  -SC      Subjective Pain Comment Pt does not c/o pain however does c/o tightness with end ROM L UE in L axilla and brachial region  -SC         Subjective    Subjective Comments Pt states she has felt so much better since beginning treatment and feels the fluid has gotten much better. Pt does state the different feeling tightness in L UE developed over the weekend  -SC         Skin  "Changes/Observations    Skin Observations Comment Pt noted with lymphatic cording x1 in L axilla to brachial region  -SC         Manual Lymphatic Drainage    Manual Lymphatic Drainage initial sequence;opened regional lymph nodes;opened anastamoses;extremity treatment  -SC      Initial Sequence full neck;cervical;supraclavicular;shoulder collectors;abdomen;diaphragmatic breathing  -SC      Opened Regional Lymph Nodes axillary;inguinal;paraspinal;other lymph nodes  parasternal  -SC      Axillary left  -SC      Inguinal right;left  -SC      Opened Anastamoses anterior axillo-axillary;axillo-inguinal  -SC      Axillo-Inguinal right;left  -SC      Extremity Treatment MLD to proximal limb only  -SC      Manual Therapy Therapist massaging seroma on L lateral breast to assist with absorption. Therapist providing passive stretch and trigger point release in L UQ to decrease tightness and improve comfort. Therapist was unable to break cording this date. Pt feeling better post treatment. Therapist recommending pt begin \"Bye Bye\" stretches until next appointment  -SC                User Key  (r) = Recorded By, (t) = Taken By, (c) = Cosigned By      Initials Name Provider Type    Leticia Rutherford COTA Occupational Therapist Assistant                                    Manual Rx (last 36 hours)       Manual Treatments       Row Name 01/29/24 1513             Total Minutes    15879 - OT Manual Therapy Minutes 28  -SC                User Key  (r) = Recorded By, (t) = Taken By, (c) = Cosigned By      Initials Name Provider Type    Leticia Rutherford COTA Occupational Therapist Assistant                                       Time Calculation:   Timed Charges  70948 - OT Manual Therapy Minutes: 28  Total Minutes  Timed Charges Total Minutes: 28   Total Minutes: 28     Therapy Charges for Today       Code Description Service Date Service Provider Modifiers Qty    [id number]  OT MANUAL THERAPY EA 15 MIN 1/29/2024 Leticia Ledbetter, " PEREZ GO 2                        ANA Akers  1/29/2024

## 2024-01-31 ENCOUNTER — APPOINTMENT (OUTPATIENT)
Dept: OCCUPATIONAL THERAPY | Facility: HOSPITAL | Age: 74
End: 2024-01-31
Payer: MEDICARE

## 2024-02-01 ENCOUNTER — HOSPITAL ENCOUNTER (OUTPATIENT)
Dept: OCCUPATIONAL THERAPY | Facility: HOSPITAL | Age: 74
Setting detail: THERAPIES SERIES
Discharge: HOME | End: 2024-02-01
Payer: MEDICARE

## 2024-02-01 DIAGNOSIS — C50.411 MALIGNANT NEOPLASM OF UPPER-OUTER QUADRANT OF RIGHT BREAST IN FEMALE, ESTROGEN RECEPTOR POSITIVE: ICD-10-CM

## 2024-02-01 DIAGNOSIS — C50.919 MALIGNANT NEOPLASM OF FEMALE BREAST, UNSPECIFIED ESTROGEN RECEPTOR STATUS, UNSPECIFIED LATERALITY, UNSPECIFIED SITE OF BREAST: ICD-10-CM

## 2024-02-01 DIAGNOSIS — Z17.0 MALIGNANT NEOPLASM OF UPPER-OUTER QUADRANT OF RIGHT BREAST IN FEMALE, ESTROGEN RECEPTOR POSITIVE: ICD-10-CM

## 2024-02-01 DIAGNOSIS — M25.60 JOINT STIFFNESS: ICD-10-CM

## 2024-02-01 DIAGNOSIS — L90.5 SCAR CONDITION AND FIBROSIS OF SKIN: ICD-10-CM

## 2024-02-01 DIAGNOSIS — R52 PAIN: ICD-10-CM

## 2024-02-01 DIAGNOSIS — Z17.0 MALIGNANT NEOPLASM OF UPPER-OUTER QUADRANT OF LEFT BREAST IN FEMALE, ESTROGEN RECEPTOR POSITIVE: ICD-10-CM

## 2024-02-01 DIAGNOSIS — C50.412 MALIGNANT NEOPLASM OF UPPER-OUTER QUADRANT OF LEFT BREAST IN FEMALE, ESTROGEN RECEPTOR POSITIVE: ICD-10-CM

## 2024-02-01 DIAGNOSIS — Z91.89 AT RISK FOR LYMPHEDEMA: Primary | ICD-10-CM

## 2024-02-01 PROCEDURE — 97140 MANUAL THERAPY 1/> REGIONS: CPT

## 2024-02-01 NOTE — THERAPY TREATMENT NOTE
Outpatient Occupational Therapy Lymphedema Treatment Note   Yanique     Patient Name: Jyoti Alvarez  : 1950  MRN: [id number]  Today's Date: 2024      Visit Date: 2024    Patient Active Problem List   Diagnosis    Breast cancer    Age related osteoporosis    Colon polyp    Gastroesophageal reflux disease without esophagitis    Essential hypertension    Fatty liver    Vitamin D deficiency    Type 2 diabetes mellitus with hyperglycemia, without long-term current use of insulin    Mixed hyperlipidemia    Atrophy of thyroid    Iron deficiency anemia secondary to inadequate dietary iron intake    Vertigo    Vitamin B12 deficiency    SHALONDA on CPAP    Morbid (severe) obesity due to excess calories    Medicare annual wellness visit, subsequent    Lymphadenopathy        Past Medical History:   Diagnosis Date    Anemia     Arthritis     Asthma     INHALERS PRN    Breast cancer     RIGHT    Colon polyp     COVID 2021    Disease of thyroid gland Hypothyroidism    Fatty liver     ASYMPTOMATIC    Gastroesophageal reflux     HL (hearing loss) Hearing aids     Hyperlipemia     Hypertension     Limb swelling     Seasonal allergies     Skin cancer     Sleep apnea     Strain of latissimus dorsi muscle 2017    Right    Type 2 diabetes mellitus     AM  IN AM    Vertigo         Past Surgical History:   Procedure Laterality Date    AXILLARY LYMPH NODE BIOPSY/EXCISION Left 2023    Procedure: LEFT AXILLARY LYMPH NODE BIOPSY/EXCISION, MAGSEED;  Surgeon: Kathy Rausch MD;  Location: [facility] OR [facility] [place];  Service: General;  Laterality: Left;    BREAST AUGMENTATION BILATERAL MASTOPEXY      BREAST RECONSTRUCTION Bilateral 2022    Procedure: BILATERAL BREAST RECONSTRUCTION WITH REMOVAL AND REPLACEMENT OF IMPLANTS, MESH PLACEMENT, CHEST WALL SKIN RESECTION, CHEST LIPOSUCTION;  Surgeon: Romain Quezada MD;  Location: [facility] OR [facility] [place];  Service: Plastics;  Laterality: Bilateral;     BREAST SURGERY      Bilateral mastectomies and reconstruction     SECTION      COLONOSCOPY  2019    COLONOSCOPY N/A 10/18/2022    Procedure: COLONOSCOPY POLYPECTOMIES;  Surgeon: Eduardo Salas MD;  Location: [facility] ENDOSCOPY;  Service: Gastroenterology;  Laterality: N/A;  COLON POLYPS    COSMETIC SURGERY  2004    Bilateral mastectomies and reconstructio    ENDOSCOPY  2019    EYE SURGERY  Not sure    Cateracts    KNEE ARTHROSCOPY W/ LATERAL RELEASE / MEDIAL IMBRICATION Left     TORN CARTIDLGE REPAIR    LIPOSUCTION N/A 2022    Procedure: CHEST WALL LIPOSUCTION AND CHEST WALL RESECTION;  Surgeon: Romain Quezada MD;  [place] [facility] [place] [facility] [place]  Service: Plastics;  Laterality: N/A;  TIMES BASED ON PREVIOUS EXPERIENCE    MASTECTOMY COMPLETE / SIMPLE Bilateral 2004    OOPHORECTOMY      OTHER SURGICAL HISTORY      Joint surgery,  RIGHT FOOT SECOND  TOE WITH A PIN    SKIN CANCER EXCISION Right 2020    neck    TOE SURGERY Right     2nd digit pin inserted    TONSILLECTOMY           Visit Dx:      ICD-10-CM ICD-9-CM   1. At risk for lymphedema  Z91.89 V49.89   2. Scar condition and fibrosis of skin  L90.5 709.2   3. Joint stiffness  M25.60 719.50   4. Pain  R52 780.96   5. Malignant neoplasm of upper-outer quadrant of left breast in female, estrogen receptor positive  C50.412 174.4    Z17.0 V86.0   6. Malignant neoplasm of upper-outer quadrant of right breast in female, estrogen receptor positive  C50.411 174.4    Z17.0 V86.0   7. Malignant neoplasm of female breast, unspecified estrogen receptor status, unspecified laterality, unspecified site of breast  C50.919 174.9        Lymphedema       Row Name 24 1100             Subjective Pain    Able to rate subjective pain? yes  -SF      Pre-Treatment Pain Level 0  -SF      Post-Treatment Pain Level 0  -SF      Subjective Pain Comment Patient denies pain  -SF         Subjective    Subjective Comments Patient reports she feels treatment is  really helping  -SF         Lymphedema Assessment    Lymphedema Classification LUE:;RUE:;at risk/stage 0  -SF      Lymphedema Cancer Related Sx bilateral;simple mastectomy;reconstructive;axillary dissection;sentinel node biopsy  -SF      Lymphedema Surgery Comments [id number]; [id number]  -SF      Lymph Nodes Removed # --  RUE:14; LUE 5  -SF      Positive Lymph Nodes # 0  -SF         Skin Changes/Observations    Skin Observations Comment No lymphatic cording palpated on this date  -SF         Manual Lymphatic Drainage    Manual Lymphatic Drainage initial sequence;opened regional lymph nodes;opened anastamoses;extremity treatment  -SF      Initial Sequence full neck;cervical;supraclavicular;shoulder collectors;abdomen;diaphragmatic breathing  -SF      Opened Regional Lymph Nodes axillary;inguinal;other lymph nodes  parasternal  -SF      Axillary left;right  -SF      Inguinal right;left  -SF      Opened Anastamoses axillo-inguinal  -SF      Axillo-Inguinal right;left  -SF      Extremity Treatment MLD to full limb  -SF      Manual Therapy Therapist providing gentle trigger point release to bilateral anterior delt and upper trap.  -                User Key  (r) = Recorded By, (t) = Taken By, (c) = Cosigned By      Initials Name Provider Type    SF Kim Morales OT Occupational Therapist                             OT Assessment/Plan       Row Name 02/01/24 1223          OT Assessment    Functional Limitations Limitation in home management;Performance in work activities  -SF     Impairments Impaired lymphatic circulation;Range of motion;Pain;Integumentary integrity  -SF     Assessment Comments Patient was dx w/ R breast cancer in 2004 where she had 14 lymphnodes removed. Patient had surgery on 12/19/2023 to investigate large lymphnodes in the LUE and had 5 removed at the time. Patient reports improvements w/ treatment and has tolerated treatment well. Patient will continue to benefit from skilled occupational therapy to  further evaluate ongoing lymphatic functioning to prevent increased staging of lymphedema, increased pain and decreased ROM.  -SF     OT Diagnosis at risk for lymphedema  -SF     OT Rehab Potential Excellent  -SF     Patient/caregiver participated in establishment of treatment plan and goals Yes  -SF     Patient would benefit from skilled therapy intervention Yes  -SF        OT Plan    OT Plan Comments Continue POC  -SF               User Key  (r) = Recorded By, (t) = Taken By, (c) = Cosigned By      Initials Name Provider Type    Kim Hall OT Occupational Therapist                        Manual Rx (last 36 hours)       Manual Treatments       Row Name 02/01/24 1241             Total Minutes    00975 - OT Manual Therapy Minutes 28  -SF                User Key  (r) = Recorded By, (t) = Taken By, (c) = Cosigned By      Initials Name Provider Type    Kim Hall OT Occupational Therapist                   OT Goals       Row Name 02/01/24 1241          Time Calculation    OT Goal Re-Cert Due Date 03/02/24  -SF               User Key  (r) = Recorded By, (t) = Taken By, (c) = Cosigned By      Initials Name Provider Type    Kim Hall OT Occupational Therapist                                     Time Calculation:   Timed Charges  48383 - OT Manual Therapy Minutes: 28  Total Minutes  Timed Charges Total Minutes: 28   Total Minutes: 28     Therapy Charges for Today       Code Description Service Date Service Provider Modifiers Qty    [id number]  OT MANUAL THERAPY EA 15 MIN 2/1/2024 Kim Morales OT GO 2                        Kim Morales OT  2/1/2024

## 2024-02-02 ENCOUNTER — APPOINTMENT (OUTPATIENT)
Dept: OCCUPATIONAL THERAPY | Facility: HOSPITAL | Age: 74
End: 2024-02-02
Payer: MEDICARE

## 2024-02-06 ENCOUNTER — HOSPITAL ENCOUNTER (OUTPATIENT)
Dept: OCCUPATIONAL THERAPY | Facility: HOSPITAL | Age: 74
Setting detail: THERAPIES SERIES
Discharge: HOME | End: 2024-02-06
Payer: MEDICARE

## 2024-02-06 DIAGNOSIS — Z91.89 AT RISK FOR LYMPHEDEMA: Primary | ICD-10-CM

## 2024-02-06 DIAGNOSIS — R52 PAIN: ICD-10-CM

## 2024-02-06 DIAGNOSIS — M25.60 JOINT STIFFNESS: ICD-10-CM

## 2024-02-06 DIAGNOSIS — C50.411 MALIGNANT NEOPLASM OF UPPER-OUTER QUADRANT OF RIGHT BREAST IN FEMALE, ESTROGEN RECEPTOR POSITIVE: ICD-10-CM

## 2024-02-06 DIAGNOSIS — Z17.0 MALIGNANT NEOPLASM OF UPPER-OUTER QUADRANT OF RIGHT BREAST IN FEMALE, ESTROGEN RECEPTOR POSITIVE: ICD-10-CM

## 2024-02-06 DIAGNOSIS — L90.5 SCAR CONDITION AND FIBROSIS OF SKIN: ICD-10-CM

## 2024-02-06 PROCEDURE — 97140 MANUAL THERAPY 1/> REGIONS: CPT | Performed by: OCCUPATIONAL THERAPIST

## 2024-02-06 PROCEDURE — 93702 BIS XTRACELL FLUID ANALYSIS: CPT | Performed by: OCCUPATIONAL THERAPIST

## 2024-02-06 NOTE — THERAPY RE-EVALUATION
Outpatient Occupational Therapy Lymphedema Re-Evaluation  RJ Chanel     Patient Name: Jyoti Alvarez  : 1950  MRN: [id number]  Today's Date: 2024      Visit Date: 2024    Patient Active Problem List   Diagnosis    Breast cancer    Age related osteoporosis    Colon polyp    Gastroesophageal reflux disease without esophagitis    Essential hypertension    Fatty liver    Vitamin D deficiency    Type 2 diabetes mellitus with hyperglycemia, without long-term current use of insulin    Mixed hyperlipidemia    Atrophy of thyroid    Iron deficiency anemia secondary to inadequate dietary iron intake    Vertigo    Vitamin B12 deficiency    SHALONDA on CPAP    Morbid (severe) obesity due to excess calories    Medicare annual wellness visit, subsequent    Lymphadenopathy        Past Medical History:   Diagnosis Date    Anemia     Arthritis     Asthma     INHALERS PRN    Breast cancer     RIGHT    Colon polyp     COVID 2021    Disease of thyroid gland Hypothyroidism    Fatty liver     ASYMPTOMATIC    Gastroesophageal reflux     HL (hearing loss) Hearing aids     Hyperlipemia     Hypertension     Limb swelling     Seasonal allergies     Skin cancer     Sleep apnea     Strain of latissimus dorsi muscle 2017    Right    Type 2 diabetes mellitus     AM  IN AM    Vertigo         Past Surgical History:   Procedure Laterality Date    AXILLARY LYMPH NODE BIOPSY/EXCISION Left 2023    Procedure: LEFT AXILLARY LYMPH NODE BIOPSY/EXCISION, MAGSEED;  Surgeon: Kathy Rausch MD;  [place] [facility] [place] [facility] [place]  Service: General;  Laterality: Left;    BREAST AUGMENTATION BILATERAL MASTOPEXY      BREAST RECONSTRUCTION Bilateral 2022    Procedure: BILATERAL BREAST RECONSTRUCTION WITH REMOVAL AND REPLACEMENT OF IMPLANTS, MESH PLACEMENT, CHEST WALL SKIN RESECTION, CHEST LIPOSUCTION;  Surgeon: Romain Quezada MD;  [place] [facility] [place] [facility] [place]  Service: Plastics;  Laterality: Bilateral;    BREAST  SURGERY  2004    Bilateral mastectomies and reconstruction     SECTION      COLONOSCOPY  2019    COLONOSCOPY N/A 10/18/2022    Procedure: COLONOSCOPY POLYPECTOMIES;  Surgeon: Eduardo Salas MD;  Location: [facility] ENDOSCOPY;  Service: Gastroenterology;  Laterality: N/A;  COLON POLYPS    COSMETIC SURGERY  2004    Bilateral mastectomies and reconstructio    ENDOSCOPY  2019    EYE SURGERY  Not sure    Cateracts    KNEE ARTHROSCOPY W/ LATERAL RELEASE / MEDIAL IMBRICATION Left     TORN CARTIDLGE REPAIR    LIPOSUCTION N/A 2022    Procedure: CHEST WALL LIPOSUCTION AND CHEST WALL RESECTION;  Surgeon: Romain Quezada MD;  Location: [facility] [place] [facility] [place];  Service: Plastics;  Laterality: N/A;  TIMES BASED ON PREVIOUS EXPERIENCE    MASTECTOMY COMPLETE / SIMPLE Bilateral     OOPHORECTOMY      OTHER SURGICAL HISTORY      Joint surgery,  RIGHT FOOT SECOND  TOE WITH A PIN    SKIN CANCER EXCISION Right 2020    neck    TOE SURGERY Right     2nd digit pin inserted    TONSILLECTOMY           Visit Dx:     ICD-10-CM ICD-9-CM   1. At risk for lymphedema  Z91.89 V49.89   2. Scar condition and fibrosis of skin  L90.5 709.2   3. Joint stiffness  M25.60 719.50   4. Pain  R52 780.96   5. Malignant neoplasm of upper-outer quadrant of right breast in female, estrogen receptor positive  C50.411 174.4    Z17.0 V86.0        Patient History       Row Name 24 0900             History    Chief Complaint --  Lymphedema Surveillance Program  -TD      Brief Description of Current Complaint Pt had a history of breast cancer in the right breast in  where she had 14 lymphnodes removed at that time. Pt had a surgery on 2023 to investigate large lymphnodes in the LUE and had 5 removed at that time.  -TD         Fall Risk Assessment    Any falls in the past year: No  -TD      Does patient have a fear of falling No  -TD         Services    Prior Rehab/Home Health Experiences No  -TD      Are you currently receiving Home  "Health services No  -TD         Daily Activities    Primary Language English  -TD      Are you able to read Yes  -TD      Are you able to write Yes  -TD      How does patient learn best? Demonstration;Pictures/Video  -TD         Safety    Are you being hurt, hit, or frightened by anyone at home or in your life? No  -TD      Are you being neglected by a caregiver No  -TD      Have you had any of the following issues with Anxiety  -TD                User Key  (r) = Recorded By, (t) = Taken By, (c) = Cosigned By      Initials Name Provider Type    Kenya Garcia OT Occupational Therapist                     Lymphedema       Row Name 02/06/24 0900             Subjective Pain    Able to rate subjective pain? yes  -TD      Pre-Treatment Pain Level 0  -TD      Post-Treatment Pain Level 0  -TD         Subjective    Subjective Comments Pt reports that the treatments are helping her.  -TD         Lymphedema Assessment    Lymphedema Classification LUE:;RUE:;at risk/stage 0  -TD      Lymphedema Cancer Related Sx bilateral;simple mastectomy;reconstructive;axillary dissection;sentinel node biopsy  -TD      Lymphedema Surgery Comments RUE 2004; LUE 2023  -TD      Lymph Nodes Removed # --  RUE:14; LUE 5  -TD      Positive Lymph Nodes # 0  -TD         LLIS - Physical Concerns    The amount of pain associated with my lymphedema is: 1  -TD      The amount of limb heaviness associated with my lymphedema is: 1  -TD      The amount of skin tightness associated with my lymphedema is: 1  -TD      The size of my swollen limb(s) seems: 1  -TD      Lymphedema affects the movement of my swollen limb(s): 1  -TD      The strength in my swollen limb(s) is: 1  -TD         LLIS - Psychosocial Concerns    Lymphedema affects my body image (i.e., \"how I think I look\"). 0  -TD      Lymphedema affects my socializing with others. 0  -TD      Lymphedema affects my intimate relations with spouse or partner (rate 0 if not applicable 0  -TD      " "Lymphedema \"gets me down\" (i.e., depression, frustration, or anger) 0  -TD      I must rely on others for help due to my lymphedema. 0  -TD      I know what to do to manage my lymphedema 1  -TD         LLIS - Functional Concerns    Lymphedema affects my ability to perform self-care activities (i.e. eating, dressing, hygiene) 0  -TD      Lymphedema affects my ability to perform routine home or work-related activities. 0  -TD      Lymphedema affects my performance of preferred leisure activities. 0  -TD      Lymphedema affects proper fit of clothing/shoes 0  -TD      Lymphedema affects my sleep 0  -TD         Posture/Observations    Posture- WNL Posture is WNL  -TD         General ROM    GENERAL ROM COMMENTS BUE are WFL but feels tight at end ranges.  -TD         MMT (Manual Muscle Testing)    General MMT Comments BUE are WFL  -TD         Skin Changes/Observations    Skin Observations Comment No cords felt this date.  -TD         Manual Lymphatic Drainage    Manual Lymphatic Drainage initial sequence;opened regional lymph nodes;opened anastamoses;extremity treatment  -TD      Initial Sequence full neck;cervical;supraclavicular;shoulder collectors;abdomen;diaphragmatic breathing  -TD      Opened Regional Lymph Nodes axillary;inguinal;other lymph nodes  parasternal  -TD      Axillary left;right  -TD      Inguinal right;left  -TD      Opened Anastamoses axillo-inguinal  -TD      Axillo-Inguinal right;left  -TD      Extremity Treatment MLD to full limb  -TD      Manual Therapy Therapist provided scar work on the left breast  -TD         L-Dex Bioimpedence Screening    L-Dex Measurement Extremity LUE  -TD      L-Dex Patient Position Standing  -TD      L-Dex UE Dominate Side Right  -TD      L-Dex UE At Risk Side Left  -TD      L-Dex UE Pre Surgical Value No  -TD      L-Dex UE Score -2.3  -TD      L-Dex UE Baseline Score -3  -TD      L-Dex UE Value Change 0.7  -TD      $ L-Dex Charge yes  -TD         Lymphedema Life Impact " Scale Totals    A.  Total Q1 - Q17 (Do not include Q18) 7  -TD      B.  Total number of questions answered (Q1-Q17) 17  -TD      C. Divide A by B 0.41  -TD      D. Multiple C by 25 10.25  -TD                User Key  (r) = Recorded By, (t) = Taken By, (c) = Cosigned By      Initials Name Provider Type    TD Kenya Beltran, OT Occupational Therapist                            Therapy Education  Education Details: Discussed scar massage this date.  Given: Symptoms/condition management, Edema management, Pain management  Program: New  How Provided: Verbal, Demonstration  Provided to: Patient  Level of Understanding: Teach back education performed, Verbalized, Demonstrated      Manual Rx (last 36 hours)       Manual Treatments       Row Name 02/06/24 1336             Total Minutes    46660 - OT Manual Therapy Minutes 30  -TD                User Key  (r) = Recorded By, (t) = Taken By, (c) = Cosigned By      Initials Name Provider Type    TD Kenya Beltran, OT Occupational Therapist                   OT Goals       Row Name 02/06/24 1336          Time Calculation    OT Goal Re-Cert Due Date 03/07/24  -TD               User Key  (r) = Recorded By, (t) = Taken By, (c) = Cosigned By      Initials Name Provider Type    TD Kenya Beltran, OT Occupational Therapist                  1. Post Breast Surgery Care/at risk for Lymphedema  LTG 1: 90 days:  As an indicator of no exacerbation of lymphedema staging, the patient will present with an L-Dex score less than [10] points from preoperative baseline.              STATUS: on going   STG 1a:   30 days: To prevent exacerbation of mixed edema to lymphedema, patient will utilize the 2 postsurgical compression garments daily.                 STATUS: MET, on going  STG 1b: 30 days: Patient will be independent with self-manual lymphatic massage.               STATUS: on going  STG 1c: 30 days:  Patient will be independent with identification of signs and symptoms of lymphedema exasperation  per stoplight to recovery education handout.              STATUS: on going  STG 1 d: 30 days: Patient will be independent with HEP to prevent advancement in lymphedema staging.              STATUS: on going  TREATMENT:  Self Care/ADL retraining, Therapeutic Activity, Neuromuscular Re-education, Therapeutic Exercise, Bioimpedence Fluid Analysis, Post-Surgical compression garement [address] [facility]/ Silvia Cam[employer] Kit 2860K, Orthotic Management and training,  and Manual Therapy.      OT Assessment/Plan       Row Name 02/06/24 0950          OT Assessment    Functional Limitations Limitation in home management;Performance in work activities  -TD     Impairments Impaired lymphatic circulation;Range of motion;Pain;Integumentary integrity  -TD     Assessment Comments Patient was dx w/ R breast cancer in 2004 where she had 14 lymphnodes removed. Patient had surgery on 12/19/2023 to investigate large lymphnodes in the LUE and had 5 removed at the time. Patient reports improvements w/ treatment and has tolerated treatment well.  Discussed deceasing session and working towards discharge.  Patient will continue to benefit from skilled occupational therapy to further evaluate ongoing lymphatic functioning to prevent increased staging of lymphedema, increased pain and decreased ROM  -TD     OT Diagnosis at risk for lymphedema  -TD     OT Rehab Potential Good  -TD     Patient/caregiver participated in establishment of treatment plan and goals Yes  -TD     Patient would benefit from skilled therapy intervention Yes  -TD        OT Plan    OT Frequency 1x/week;2x/week  see duration  -TD     Predicted Duration of Therapy Intervention (OT) 6 weeks  -TD     Planned CPT's? OT EVAL MOD COMPLEXITY: 54767;OT THER PROC EA 15 MIN: 48094DE;OT SELF CARE/MGMT/TRAIN 15 MIN: 96957;OT MANUAL THERAPY EA 15 MIN: 79553;OT ORTHOTIC MGMT/TRAIN EA 15 MIN: 75727;OT WC MGMT EA 15 MIN: 37789  -TD     Planned Therapy Interventions (Optional Details)  joint mobilization;manual therapy techniques;orthotic fitting/training;patient/family education;ROM (Range of Motion);strengthening;wound care  -TD     OT Plan Comments continue POC  -TD               User Key  (r) = Recorded By, (t) = Taken By, (c) = Cosigned By      Initials Name Provider Type    TD Kenya Beltran OT Occupational Therapist                              Time Calculation:   Timed Charges  31160 - OT Manual Therapy Minutes: 30  Total Minutes  Timed Charges Total Minutes: 30   Total Minutes: 30     Therapy Charges for Today       Code Description Service Date Service Provider Modifiers Qty    19269019907 HC PT BIS XTRACELL FLUID ANALYSIS 2/6/2024 Kenya Beltran OT  1    53927479263 HC OT MANUAL THERAPY EA 15 MIN 2/6/2024 Kenya Beltran OT GO 2                      Kenya Beltran OT  2/6/2024

## 2024-02-07 ENCOUNTER — APPOINTMENT (OUTPATIENT)
Dept: OCCUPATIONAL THERAPY | Facility: HOSPITAL | Age: 74
End: 2024-02-07
Payer: MEDICARE

## 2024-02-09 ENCOUNTER — HOSPITAL ENCOUNTER (OUTPATIENT)
Dept: OCCUPATIONAL THERAPY | Facility: HOSPITAL | Age: 74
Setting detail: THERAPIES SERIES
Discharge: HOME | End: 2024-02-09
Payer: MEDICARE

## 2024-02-09 DIAGNOSIS — L90.5 SCAR CONDITION AND FIBROSIS OF SKIN: ICD-10-CM

## 2024-02-09 DIAGNOSIS — C50.412 MALIGNANT NEOPLASM OF UPPER-OUTER QUADRANT OF LEFT BREAST IN FEMALE, ESTROGEN RECEPTOR POSITIVE: ICD-10-CM

## 2024-02-09 DIAGNOSIS — Z17.0 MALIGNANT NEOPLASM OF UPPER-OUTER QUADRANT OF LEFT BREAST IN FEMALE, ESTROGEN RECEPTOR POSITIVE: ICD-10-CM

## 2024-02-09 DIAGNOSIS — C50.411 MALIGNANT NEOPLASM OF UPPER-OUTER QUADRANT OF RIGHT BREAST IN FEMALE, ESTROGEN RECEPTOR POSITIVE: ICD-10-CM

## 2024-02-09 DIAGNOSIS — Z91.89 AT RISK FOR LYMPHEDEMA: Primary | ICD-10-CM

## 2024-02-09 DIAGNOSIS — C50.919 MALIGNANT NEOPLASM OF FEMALE BREAST, UNSPECIFIED ESTROGEN RECEPTOR STATUS, UNSPECIFIED LATERALITY, UNSPECIFIED SITE OF BREAST: ICD-10-CM

## 2024-02-09 DIAGNOSIS — Z17.0 MALIGNANT NEOPLASM OF UPPER-OUTER QUADRANT OF RIGHT BREAST IN FEMALE, ESTROGEN RECEPTOR POSITIVE: ICD-10-CM

## 2024-02-09 PROCEDURE — 97535 SELF CARE MNGMENT TRAINING: CPT

## 2024-02-09 NOTE — THERAPY TREATMENT NOTE
Outpatient Occupational Therapy Lymphedema Treatment Note   Yanique     Patient Name: Jyoti Alvarez  : 1950  MRN: [id number]  Today's Date: 2024      Visit Date: 2024    Patient Active Problem List   Diagnosis    Breast cancer    Age related osteoporosis    Colon polyp    Gastroesophageal reflux disease without esophagitis    Essential hypertension    Fatty liver    Vitamin D deficiency    Type 2 diabetes mellitus with hyperglycemia, without long-term current use of insulin    Mixed hyperlipidemia    Atrophy of thyroid    Iron deficiency anemia secondary to inadequate dietary iron intake    Vertigo    Vitamin B12 deficiency    SHALONDA on CPAP    Morbid (severe) obesity due to excess calories    Medicare annual wellness visit, subsequent    Lymphadenopathy        Past Medical History:   Diagnosis Date    Anemia     Arthritis     Asthma     INHALERS PRN    Breast cancer     RIGHT    Colon polyp     COVID 2021    Disease of thyroid gland Hypothyroidism    Fatty liver     ASYMPTOMATIC    Gastroesophageal reflux     HL (hearing loss) Hearing aids     Hyperlipemia     Hypertension     Limb swelling     Seasonal allergies     Skin cancer     Sleep apnea     Strain of latissimus dorsi muscle 2017    Right    Type 2 diabetes mellitus     AM  IN AM    Vertigo         Past Surgical History:   Procedure Laterality Date    AXILLARY LYMPH NODE BIOPSY/EXCISION Left 2023    Procedure: LEFT AXILLARY LYMPH NODE BIOPSY/EXCISION, MAGSEED;  Surgeon: Kathy Rausch MD;  Location: [facility] OR [facility] [place];  Service: General;  Laterality: Left;    BREAST AUGMENTATION BILATERAL MASTOPEXY      BREAST RECONSTRUCTION Bilateral 2022    Procedure: BILATERAL BREAST RECONSTRUCTION WITH REMOVAL AND REPLACEMENT OF IMPLANTS, MESH PLACEMENT, CHEST WALL SKIN RESECTION, CHEST LIPOSUCTION;  Surgeon: Romain Quezada MD;  Location: [facility] OR [facility] [place];  Service: Plastics;  Laterality: Bilateral;     BREAST SURGERY      Bilateral mastectomies and reconstruction     SECTION      COLONOSCOPY  2019    COLONOSCOPY N/A 10/18/2022    Procedure: COLONOSCOPY POLYPECTOMIES;  Surgeon: Eduardo Salas MD;  Location: [facility] ENDOS[facility];  Service: Gastroenterology;  Laterality: N/A;  COLON POLYPS    COSMETIC SURGERY  2004    Bilateral mastectomies and reconstructio    ENDOSCOPY      EYE SURGERY  Not sure    Cateracts    KNEE ARTHROSCOPY W/ LATERAL RELEASE / MEDIAL IMBRICATION Left     TORN CARTIDLGE REPAIR    LIPOSUCTION N/A 2022    Procedure: CHEST WALL LIPOSUCTION AND CHEST WALL RESECTION;  Surgeon: Romain Quezada MD;  Location: [facility] OR [facility] [place];  Service: Plastics;  Laterality: N/A;  TIMES BASED ON PREVIOUS EXPERIENCE    MASTECTOMY COMPLETE / SIMPLE Bilateral 2004    OOPHORECTOMY      OTHER SURGICAL HISTORY      Joint surgery,  RIGHT FOOT SECOND  TOE WITH A PIN    SKIN CANCER EXCISION Right 2020    neck    TOE SURGERY Right     2nd digit pin inserted    TONSILLECTOMY           Visit Dx:      ICD-10-CM ICD-9-CM   1. At risk for lymphedema  Z91.89 V49.89   2. Scar condition and fibrosis of skin  L90.5 709.2   3. Malignant neoplasm of upper-outer quadrant of right breast in female, estrogen receptor positive  C50.411 174.4    Z17.0 V86.0   4. Malignant neoplasm of upper-outer quadrant of left breast in female, estrogen receptor positive  C50.412 174.4    Z17.0 V86.0   5. Malignant neoplasm of female breast, unspecified estrogen receptor status, unspecified laterality, unspecified site of breast  C50.919 174.9        Lymphedema       Row Name 24 1500             Subjective Pain    Able to rate subjective pain? yes  -SF      Pre-Treatment Pain Level 0  -SF      Post-Treatment Pain Level 0  -SF         Subjective    Subjective Comments Patient reports no new concerns.  -SF         Lymphedema Assessment    Lymphedema Classification LUE:;RUE:;at risk/stage 0  -SF      Lymphedema Cancer  Related Sx bilateral;simple mastectomy;reconstructive;axillary dissection;sentinel node biopsy  -SF      Lymphedema Surgery Comments RUE 2004; LUE 2023  -SF      Lymph Nodes Removed # --  RUE:14; LUE 5  -SF      Positive Lymph Nodes # 0  -SF         Manual Lymphatic Drainage    Manual Lymphatic Drainage initial sequence;opened regional lymph nodes;opened anastamoses;extremity treatment  -SF      Initial Sequence full neck;cervical;supraclavicular;shoulder collectors;abdomen;diaphragmatic breathing  -SF      Opened Regional Lymph Nodes axillary;inguinal;other lymph nodes  parasternal  -SF      Axillary left;right  -SF      Inguinal right;left  -SF      Opened Anastamoses axillo-inguinal  -SF      Axillo-Inguinal right;left  -SF      Extremity Treatment MLD to proximal limb only  -SF      Manual Therapy Therapist providing scar massage to L breast and axilla.  -SF                User Key  (r) = Recorded By, (t) = Taken By, (c) = Cosigned By      Initials Name Provider Type     Kim Morales OT Occupational Therapist                             OT Assessment/Plan       Row Name 02/09/24 1538          OT Assessment    Functional Limitations Limitation in home management;Performance in work activities  -SF     Impairments Impaired lymphatic circulation;Range of motion;Pain;Integumentary integrity  -SF     Assessment Comments Patient was diagnosed with right breast cancer in 2004 where she had 14 lymph nodes removed.  Patient had surgery 12/19/2023 to investigate large lymph node in left upper extremity and had 5 lymph nodes removed.  Patient tolerated treatment well on this date.  Patient will continue to benefit from skilled occupational therapy to further evaluate ongoing lymphatic functioning to prevent increased staging of lymphedema, increased pain and decreased range of motion.  -SF     OT Diagnosis At risk for lymphedema  -SF     OT Rehab Potential Good  -SF     Patient would benefit from skilled therapy  intervention Yes  -SF        OT Plan    OT Plan Comments Continue POC  -SF               User Key  (r) = Recorded By, (t) = Taken By, (c) = Cosigned By      Initials Name Provider Type    Kim Hall OT Occupational Therapist                              Therapy Education  85294 - OT Self Care/Mgmt Minutes: 28                Time Calculation:   Timed Charges  00038 - OT Self Care/Mgmt Minutes: 28  Total Minutes  Timed Charges Total Minutes: 28   Total Minutes: 28     Therapy Charges for Today       Code Description Service Date Service Provider Modifiers Qty    [id number]  OT SELF CARE/MGMT/TRAIN EA 15 MIN 2/9/2024 Kim Morales OT GO 2                        Kim Morales OT  2/9/2024

## 2024-02-12 ENCOUNTER — HOSPITAL ENCOUNTER (OUTPATIENT)
Dept: OCCUPATIONAL THERAPY | Facility: HOSPITAL | Age: 74
Setting detail: THERAPIES SERIES
Discharge: HOME | End: 2024-02-12
Payer: MEDICARE

## 2024-02-12 DIAGNOSIS — Z91.89 AT RISK FOR LYMPHEDEMA: Primary | ICD-10-CM

## 2024-02-12 DIAGNOSIS — L90.5 SCAR CONDITION AND FIBROSIS OF SKIN: ICD-10-CM

## 2024-02-12 DIAGNOSIS — C50.411 MALIGNANT NEOPLASM OF UPPER-OUTER QUADRANT OF RIGHT BREAST IN FEMALE, ESTROGEN RECEPTOR POSITIVE: ICD-10-CM

## 2024-02-12 DIAGNOSIS — M25.60 JOINT STIFFNESS: ICD-10-CM

## 2024-02-12 DIAGNOSIS — C50.412 MALIGNANT NEOPLASM OF UPPER-OUTER QUADRANT OF LEFT BREAST IN FEMALE, ESTROGEN RECEPTOR POSITIVE: ICD-10-CM

## 2024-02-12 DIAGNOSIS — R52 PAIN: ICD-10-CM

## 2024-02-12 DIAGNOSIS — Z17.0 MALIGNANT NEOPLASM OF UPPER-OUTER QUADRANT OF LEFT BREAST IN FEMALE, ESTROGEN RECEPTOR POSITIVE: ICD-10-CM

## 2024-02-12 DIAGNOSIS — Z17.0 MALIGNANT NEOPLASM OF UPPER-OUTER QUADRANT OF RIGHT BREAST IN FEMALE, ESTROGEN RECEPTOR POSITIVE: ICD-10-CM

## 2024-02-12 PROCEDURE — 97140 MANUAL THERAPY 1/> REGIONS: CPT | Performed by: OCCUPATIONAL THERAPIST

## 2024-02-14 ENCOUNTER — APPOINTMENT (OUTPATIENT)
Dept: OCCUPATIONAL THERAPY | Facility: HOSPITAL | Age: 74
End: 2024-02-14
Payer: MEDICARE

## 2024-02-15 ENCOUNTER — APPOINTMENT (OUTPATIENT)
Dept: OCCUPATIONAL THERAPY | Facility: HOSPITAL | Age: 74
End: 2024-02-15
Payer: MEDICARE

## 2024-02-20 ENCOUNTER — TELEPHONE (OUTPATIENT)
Dept: INTERNAL MEDICINE | Facility: CLINIC | Age: 74
End: 2024-02-20
Payer: MEDICARE

## 2024-02-20 DIAGNOSIS — R30.0 BURNING WITH URINATION: Primary | ICD-10-CM

## 2024-02-20 NOTE — TELEPHONE ENCOUNTER
Pt is doing upcoming labs for DM doctor, she is having burning with urination, she wanted a UA ordered. This has been ordered for you.

## 2024-02-22 ENCOUNTER — LAB (OUTPATIENT)
Dept: LAB | Facility: HOSPITAL | Age: 74
End: 2024-02-22
Payer: MEDICARE

## 2024-02-22 DIAGNOSIS — I10 ESSENTIAL HYPERTENSION: ICD-10-CM

## 2024-02-22 DIAGNOSIS — E11.65 TYPE 2 DIABETES MELLITUS WITH HYPERGLYCEMIA, WITHOUT LONG-TERM CURRENT USE OF INSULIN: ICD-10-CM

## 2024-02-22 DIAGNOSIS — K76.0 FATTY LIVER: ICD-10-CM

## 2024-02-22 DIAGNOSIS — R30.0 BURNING WITH URINATION: ICD-10-CM

## 2024-02-22 DIAGNOSIS — E78.2 MIXED HYPERLIPIDEMIA: ICD-10-CM

## 2024-02-22 DIAGNOSIS — E53.8 VITAMIN B12 DEFICIENCY: ICD-10-CM

## 2024-02-22 LAB
ALBUMIN SERPL-MCNC: 4.5 G/DL (ref 3.5–5.2)
ALBUMIN/GLOB SERPL: 2.8 G/DL
ALP SERPL-CCNC: 86 U/L (ref 39–117)
ALT SERPL W P-5'-P-CCNC: 29 U/L (ref 1–33)
ANION GAP SERPL CALCULATED.3IONS-SCNC: 10 MMOL/L (ref 5–15)
AST SERPL-CCNC: 28 U/L (ref 1–32)
BILIRUB SERPL-MCNC: 0.4 MG/DL (ref 0–1.2)
BILIRUB UR QL STRIP: NEGATIVE
BUN SERPL-MCNC: 18 MG/DL (ref 8–23)
BUN/CREAT SERPL: 22 (ref 7–25)
CALCIUM SPEC-SCNC: 8.9 MG/DL (ref 8.6–10.5)
CHLORIDE SERPL-SCNC: 103 MMOL/L (ref 98–107)
CHOLEST SERPL-MCNC: 138 MG/DL (ref 0–200)
CK SERPL-CCNC: 107 U/L (ref 20–180)
CLARITY UR: CLEAR
CO2 SERPL-SCNC: 25 MMOL/L (ref 22–29)
COLOR UR: YELLOW
CREAT SERPL-MCNC: 0.82 MG/DL (ref 0.57–1)
EGFRCR SERPLBLD CKD-EPI 2021: 75.2 ML/MIN/1.73
FOLATE SERPL-MCNC: >20 NG/ML (ref 4.78–24.2)
GGT SERPL-CCNC: 20 U/L (ref 5–36)
GLOBULIN UR ELPH-MCNC: 1.6 GM/DL
GLUCOSE SERPL-MCNC: 135 MG/DL (ref 65–99)
GLUCOSE UR STRIP-MCNC: NEGATIVE MG/DL
HBA1C MFR BLD: 7.1 % (ref 4.8–5.6)
HDLC SERPL-MCNC: 39 MG/DL (ref 40–60)
HGB UR QL STRIP.AUTO: NEGATIVE
HOLD SPECIMEN: NORMAL
KETONES UR QL STRIP: NEGATIVE
LDLC SERPL CALC-MCNC: 74 MG/DL (ref 0–100)
LDLC/HDLC SERPL: 1.8 {RATIO}
LEUKOCYTE ESTERASE UR QL STRIP.AUTO: NEGATIVE
NITRITE UR QL STRIP: NEGATIVE
PH UR STRIP.AUTO: 6 [PH] (ref 5–8)
POTASSIUM SERPL-SCNC: 4.7 MMOL/L (ref 3.5–5.2)
PROT SERPL-MCNC: 6.1 G/DL (ref 6–8.5)
PROT UR QL STRIP: NEGATIVE
SODIUM SERPL-SCNC: 138 MMOL/L (ref 136–145)
SP GR UR STRIP: 1.02 (ref 1–1.03)
TRIGL SERPL-MCNC: 144 MG/DL (ref 0–150)
UROBILINOGEN UR QL STRIP: NORMAL
VIT B12 BLD-MCNC: 1008 PG/ML (ref 211–946)
VLDLC SERPL-MCNC: 25 MG/DL (ref 5–40)

## 2024-02-22 PROCEDURE — 82550 ASSAY OF CK (CPK): CPT

## 2024-02-22 PROCEDURE — 80061 LIPID PANEL: CPT

## 2024-02-22 PROCEDURE — 83036 HEMOGLOBIN GLYCOSYLATED A1C: CPT

## 2024-02-22 PROCEDURE — 82746 ASSAY OF FOLIC ACID SERUM: CPT

## 2024-02-22 PROCEDURE — 36415 COLL VENOUS BLD VENIPUNCTURE: CPT

## 2024-02-22 PROCEDURE — 80053 COMPREHEN METABOLIC PANEL: CPT

## 2024-02-22 PROCEDURE — 82977 ASSAY OF GGT: CPT

## 2024-02-22 PROCEDURE — 81003 URINALYSIS AUTO W/O SCOPE: CPT

## 2024-02-22 PROCEDURE — 82607 VITAMIN B-12: CPT

## 2024-02-27 NOTE — PROGRESS NOTES
Chief Complaint  Diabetes (Follow up, med mgt, A1C completed 2/22/24, blood sugar log scanned into chart )    Referred By: Ramo Noguera MD    Subjective          Jyoti Alvarez presents to [facility] for diabetes medication management    History of Present Illness    Visit type:  follow-up  Diabetes type:  Type 2  Current diabetes status/concerns/issues:  no specific concerns with her diabetes  Other health concerns:  She had some lymph nodes removed.  Enlarged possibly due to prior covid  Current Diabetes symptoms:    Polyuria: No   Polydipsia: No   Polyphagia: No   Blurred vision: No   Excessive fatigue: No  Known Diabetes complications:  Neuropathy: None; Location: N/A  Renal: Stage II mild (GFR = 60-89mL/min)  Eyes: None; Location: N/A  Amputation/Wounds: None  GI: None  Cardiovascular: Hypertension and Hyperlipidemia  ED: N/A  Other: None  Hypoglycemia:  Level 1 hypoglycemia (54 mg/dL - 70 mg/dL); Frequency - rare glucose in the upper 50s  Hypoglycemia Symptoms:  confusion and dizziness  Current diabetes treatment:  Trulicity 4.5 mg once weekly, Lantus taking 55 units once every morning and glimepiride 2 mg twice a day.    Blood glucose device:  Meter  Blood glucose monitoring frequency:  2 -3  Blood glucose range/average:   120-140  Glucose Source: BG Logs  Diet:  Limits high carb/sweet foods, Avoids sugary drinks  Activity/Exercise:  She has not been going to the gym recently; she is doing PT    Past Medical History:   Diagnosis Date    Anemia     Arthritis     Asthma     INHALERS PRN    Breast cancer     RIGHT    Colon polyp     COVID 09/20/2021    Disease of thyroid gland Hypothyroidism    Fatty liver     ASYMPTOMATIC    Gastroesophageal reflux     HL (hearing loss) Hearing aids 2021    Hyperlipemia     Hypertension     Limb swelling     Seasonal allergies     Skin cancer     Sleep apnea     Strain of latissimus dorsi muscle 09/11/2017    Right    Type 2 diabetes  mellitus     AM  IN AM    Vertigo      Past Surgical History:   Procedure Laterality Date    AXILLARY LYMPH NODE BIOPSY/EXCISION Left 2023    Procedure: LEFT AXILLARY LYMPH NODE BIOPSY/EXCISION, MAGSEED;  Surgeon: Kahty Rausch MD;  Location: [facility] OR [facility] [place];  Service: General;  Laterality: Left;    BREAST AUGMENTATION BILATERAL MASTOPEXY      BREAST RECONSTRUCTION Bilateral 2022    Procedure: BILATERAL BREAST RECONSTRUCTION WITH REMOVAL AND REPLACEMENT OF IMPLANTS, MESH PLACEMENT, CHEST WALL SKIN RESECTION, CHEST LIPOSUCTION;  Surgeon: Romain Quezada MD;  Location: [facility] OR [facility] [place];  Service: Plastics;  Laterality: Bilateral;    BREAST SURGERY      Bilateral mastectomies and reconstruction     SECTION      COLONOSCOPY  2019    COLONOSCOPY N/A 10/18/2022    Procedure: COLONOSCOPY POLYPECTOMIES;  Surgeon: Eduardo Salas MD;  Location: [facility] ENDOSCOPY;  Service: Gastroenterology;  Laterality: N/A;  COLON POLYPS    COSMETIC SURGERY      Bilateral mastectomies and reconstructio    ENDOSCOPY      EYE SURGERY  Not sure    Cateracts    KNEE ARTHROSCOPY W/ LATERAL RELEASE / MEDIAL IMBRICATION Left     TORN CARTIDLGE REPAIR    LIPOSUCTION N/A 2022    Procedure: CHEST WALL LIPOSUCTION AND CHEST WALL RESECTION;  Surgeon: Romain Quezada MD;  Location: [facility] OR [facility] [place];  Service: Plastics;  Laterality: N/A;  TIMES BASED ON PREVIOUS EXPERIENCE    MASTECTOMY COMPLETE / SIMPLE Bilateral     OOPHORECTOMY      OTHER SURGICAL HISTORY      Joint surgery,  RIGHT FOOT SECOND  TOE WITH A PIN    SKIN CANCER EXCISION Right 2020    neck    TOE SURGERY Right     2nd digit pin inserted    TONSILLECTOMY       Family History   Problem Relation Age of Onset    Heart disease Father     Cancer Father         Skin cancer    Heart failure Father         Age 79  cause of death.    Heart disease Mother     Breast cancer Mother         Breast cancer age 45 and 60    Diabetes Mother      Arthritis Mother     Osteoporosis Mother     Heart failure Mother         Heart attack age 60. Age 87. Cause of death.    Hypertension Mother     Osteoarthritis Mother     Deep vein thrombosis Mother     Cancer Brother         Prostate cancer age about 58.    Prostate cancer Brother         unsure age    Breast cancer Sister 27        Breast cancer age 27 and 52. Ovarian cancer age 53 caused death at 56.    Diabetes Sister     Ovarian cancer Sister 53    Arthritis Sister     Breast cancer Daughter 40    Breast cancer Maternal Aunt         mid 40s    Ovarian cancer Maternal Aunt          with ovarian cancer age mid 40s    Malig Hyperthermia Neg Hx     Colon cancer Neg Hx     Uterine cancer Neg Hx     Pulmonary embolism Neg Hx      Social History     Socioeconomic History    Marital status:     Number of children: 3   Tobacco Use    Smoking status: Never    Smokeless tobacco: Never   Vaping Use    Vaping Use: Never used   Substance and Sexual Activity    Alcohol use: Never    Drug use: Never    Sexual activity: Defer     Partners: Male     Birth control/protection: Post-menopausal     Allergies   Allergen Reactions    Exenatide Unknown - High Severity     Weight loss and pain in the right side. Bydereon.    Fluoxetine Hives    Gabapentin Headache     Occular Miaigraines    Jardiance [Empagliflozin] Other (See Comments)     UTI    Levaquin [Levofloxacin] Hives    Nickel Itching and Rash    Sulfa Antibiotics Swelling    Metformin Nausea Only    Hibiclens [Chlorhexidine Gluconate] Rash       Current Outpatient Medications:     acetaminophen (TYLENOL) 650 MG 8 hr tablet, Take 1 tablet by mouth 2 (two) times a day., Disp: , Rfl:     albuterol sulfate  (90 Base) MCG/ACT inhaler, USE 2 INHALATIONS EVERY 6 HOURS AS NEEDED, Disp: 51 g, Rfl: 2    aspirin 81 MG EC tablet, Take 1 tablet by mouth Daily. PT REPORTED PER DR CORONA OFFICE TO STOP 5 DAYS PRIOR TO SURGERY, LAST DOSE TAKEN PER PT WAS 23,  Disp: , Rfl:     atorvastatin (LIPITOR) 40 MG tablet, TAKE 1 TABLET EVERY NIGHT, Disp: 90 tablet, Rfl: 3    BD Pen Needle Ca U/F 32G X 4 MM misc, USE 1 PEN NEEDLE DAILY, Disp: 90 each, Rfl: 3    Cholecalciferol (Vitamin D3) 1.25 MG (81470 UT) capsule, Take 1 capsule by mouth Every 7 (Seven) Days. (Patient taking differently: Take 1 capsule by mouth Every 30 (Thirty) Days.), Disp: 12 capsule, Rfl: 1    Dulaglutide (Trulicity) 4.5 MG/0.5ML solution pen-injector, Inject 0.5 mL under the skin into the appropriate area as directed Every 7 (Seven) Days., Disp: 6 mL, Rfl: 1    ferrous sulfate 325 (65 FE) MG tablet, Take 1 tablet by mouth Daily With Breakfast., Disp: , Rfl:     fluticasone (FLONASE) 50 MCG/ACT nasal spray, USE 1 SPRAY IN EACH NOSTRIL ONCE DAILY, Disp: 48 g, Rfl: 2    Fluticasone-Salmeterol (ADVAIR/WIXELA) 500-50 MCG/ACT DISKUS, USE 1 INHALATION TWICE A DAY, Disp: 180 each, Rfl: 3    glimepiride (AMARYL) 2 MG tablet, TAKE 1 TABLET DAILY WITH BREAKFAST AND DINNER (Patient taking differently: Take 1 tablet by mouth 2 (Two) Times a Day.), Disp: 180 tablet, Rfl: 3    glucose blood (OneTouch Verio) test strip, 1 each by Other route 3 (Three) Times a Day. use to test blood sugar 3 times daily, Disp: 300 each, Rfl: 3    HYDROcodone-acetaminophen (NORCO) 5-325 MG per tablet, Take 1-2 tablets by mouth Every 4 (Four) Hours As Needed (Pain)., Disp: 20 tablet, Rfl: 0    hydrocortisone 2.5 % cream, APPLY TO SCALY AREAS ON THE EARS TWICE DAILY AS NEEDED FLARES., Disp: , Rfl:     insulin glargine (LANTUS, SEMGLEE) 100 UNIT/ML injection, Inject 55 Units under the skin into the appropriate area as directed Daily. INSTRUCTED PT TO TAKE ONLY 1/2 DOSE ON DAY OF SURGERY, Disp: , Rfl:     levothyroxine (SYNTHROID, LEVOTHROID) 25 MCG tablet, TAKE 1 TABLET DAILY IN THE MORNING ON AN EMPTY STOMACH, Disp: 90 tablet, Rfl: 3    losartan (COZAAR) 50 MG tablet, TAKE 1 TABLET DAILY, Disp: 90 tablet, Rfl: 3    metoprolol succinate XL  "(TOPROL-XL) 100 MG 24 hr tablet, TAKE 1 TABLET DAILY, Disp: 90 tablet, Rfl: 3    montelukast (SINGULAIR) 10 MG tablet, TAKE 1 TABLET DAILY, Disp: 90 tablet, Rfl: 3    multivitamin with minerals tablet tablet, Take 1 tablet by mouth Daily., Disp: , Rfl:     neomycin-polymyxin-dexamethamethasone (POLYDEX) 3.5-44524-8.1 ointment ophthalmic ointment, Pt uses as needed, Disp: , Rfl:     OneTouch Delica Lancets 30G misc, 1 each 3 (Three) Times a Day., Disp: 200 each, Rfl: 6    pantoprazole (PROTONIX) 40 MG EC tablet, TAKE 1 TABLET DAILY, Disp: 90 tablet, Rfl: 3    Probiotic Product (PROBIOTIC PO), Take 1 fluid ounce by mouth Daily. Plexus powder, Disp: , Rfl:     vitamin B-12 (CYANOCOBALAMIN) 500 MCG tablet, Take 1 tablet by mouth Daily., Disp: , Rfl:     Objective     Vitals:    02/29/24 1404   BP: 152/84   BP Location: Right arm   Patient Position: Sitting   Cuff Size: Adult   Pulse: 80   SpO2: 96%   Weight: 77.1 kg (170 lb)   Height: 157.5 cm (62\")   PainSc:   2     Body mass index is 31.09 kg/m².    Physical Exam  Constitutional:       Appearance: Normal appearance. She is obese.      Comments: Obesity (BMI 30 - 39.9) Pt Current BMI = 31.09    HENT:      Head: Normocephalic and atraumatic.      Right Ear: External ear normal.      Left Ear: External ear normal.      Nose: Nose normal.   Eyes:      Extraocular Movements: Extraocular movements intact.      Conjunctiva/sclera: Conjunctivae normal.   Pulmonary:      Effort: Pulmonary effort is normal.   Musculoskeletal:         General: Normal range of motion.      Cervical back: Normal range of motion.   Skin:     General: Skin is warm and dry.   Neurological:      General: No focal deficit present.      Mental Status: She is alert and oriented to person, place, and time. Mental status is at baseline.   Psychiatric:         Mood and Affect: Mood normal.         Behavior: Behavior normal.         Thought Content: Thought content normal.         Judgment: Judgment normal. "             Result Review :   The following data was reviewed by: FANY Thakur on 02/29/2024:    Most Recent A1C          2/22/2024    13:08   HGBA1C Most Recent   Hemoglobin A1C 7.10        A1C Last 3 Results          8/10/2023    11:47 11/16/2023    11:06 2/22/2024    13:08   HGBA1C Last 3 Results   Hemoglobin A1C 7.00  7.40  7.10      A1c collected on 2/22/24  is 7.1%, indicating Uncontrolled Type II diabetes.  This result is down from the prior result of 7.4% collected on 11/16/23     Glucose   Date Value Ref Range Status   02/29/2024 205 (H) 70 - 99 mg/dL Final     Comment:     Serial Number: [id number]:  [id number]     Point of care glucose in the office today is  elevated at 205 mg/dL    Creatinine   Date Value Ref Range Status   02/22/2024 0.82 0.57 - 1.00 mg/dL Final   11/16/2023 0.78 0.57 - 1.00 mg/dL Final     eGFR   Date Value Ref Range Status   02/22/2024 75.2 >60.0 mL/min/1.73 Final   11/16/2023 79.8 >60.0 mL/min/1.73 Final     Labs collected on 2/22/24 show Stage II mild (GFR = 60-89mL/min)    Total Cholesterol   Date Value Ref Range Status   02/22/2024 138 0 - 200 mg/dL Final   08/10/2023 140 0 - 200 mg/dL Final     Triglycerides   Date Value Ref Range Status   02/22/2024 144 0 - 150 mg/dL Final   08/10/2023 172 (H) 0 - 150 mg/dL Final     HDL Cholesterol   Date Value Ref Range Status   02/22/2024 39 (L) 40 - 60 mg/dL Final   08/10/2023 40 40 - 60 mg/dL Final     LDL Cholesterol    Date Value Ref Range Status   02/22/2024 74 0 - 100 mg/dL Final   08/10/2023 71 0 - 100 mg/dL Final     Lipid panel collected on 2/22/24 shows low HDL            Assessment: She has had improvement in her A1c and remains just above target.  She has had some health issues since last being seen.  She has increased Lantus insulin since the last appointment.  She is just recently been released from physical therapy and been told she can return to the gym.      Diagnoses and all orders for this visit:    1.  Uncontrolled type 2 diabetes mellitus with hyperglycemia (Primary)  -     Cancel: POC Glycosylated Hemoglobin (Hb A1C)  -     POC Glucose    2. Type 2 diabetes mellitus with stage 2 chronic kidney disease, with long-term current use of insulin    3. Obesity (BMI 30-39.9)        Plan: We elected to make no changes to her current treatment plan.  The patient is to monitor her diet and increase her physical activity as tolerated to help control glucose levels and possibly lose weight.    The patient will monitor her blood glucose levels 2-3 times each day.  If she develops problematic hyperglycemia or hypoglycemia or adverse drug reactions, she will contact the office for further instructions.        Follow Up     Return in about 3 months (around 5/29/2024) for Medication Management.    Patient was given instructions and counseling regarding her condition or for health maintenance advice. Please see specific information pulled into the AVS if appropriate.     Kelly Vicente, FANY  02/29/2024      Dictated Utilizing Dragon Dictation.  Please note that portions of this note were completed with a voice recognition program.  Part of this note may be an electronic transcription/translation of spoken language to printed text using the Dragon Dictation System.

## 2024-02-29 ENCOUNTER — OFFICE VISIT (OUTPATIENT)
Dept: DIABETES SERVICES | Facility: HOSPITAL | Age: 74
End: 2024-02-29
Payer: MEDICARE

## 2024-02-29 ENCOUNTER — HOSPITAL ENCOUNTER (OUTPATIENT)
Dept: OCCUPATIONAL THERAPY | Facility: HOSPITAL | Age: 74
Setting detail: THERAPIES SERIES
Discharge: HOME | End: 2024-02-29
Payer: MEDICARE

## 2024-02-29 VITALS
DIASTOLIC BLOOD PRESSURE: 84 MMHG | OXYGEN SATURATION: 96 % | BODY MASS INDEX: 31.28 KG/M2 | HEIGHT: 62 IN | SYSTOLIC BLOOD PRESSURE: 152 MMHG | WEIGHT: 170 LBS | HEART RATE: 80 BPM

## 2024-02-29 DIAGNOSIS — E11.65 UNCONTROLLED TYPE 2 DIABETES MELLITUS WITH HYPERGLYCEMIA: Primary | ICD-10-CM

## 2024-02-29 DIAGNOSIS — Z79.4 TYPE 2 DIABETES MELLITUS WITH STAGE 2 CHRONIC KIDNEY DISEASE, WITH LONG-TERM CURRENT USE OF INSULIN: ICD-10-CM

## 2024-02-29 DIAGNOSIS — N18.2 TYPE 2 DIABETES MELLITUS WITH STAGE 2 CHRONIC KIDNEY DISEASE, WITH LONG-TERM CURRENT USE OF INSULIN: ICD-10-CM

## 2024-02-29 DIAGNOSIS — E66.9 OBESITY (BMI 30-39.9): ICD-10-CM

## 2024-02-29 DIAGNOSIS — E55.9 VITAMIN D DEFICIENCY: ICD-10-CM

## 2024-02-29 DIAGNOSIS — E11.22 TYPE 2 DIABETES MELLITUS WITH STAGE 2 CHRONIC KIDNEY DISEASE, WITH LONG-TERM CURRENT USE OF INSULIN: ICD-10-CM

## 2024-02-29 DIAGNOSIS — I89.0 LYMPHEDEMA: Primary | ICD-10-CM

## 2024-02-29 DIAGNOSIS — L90.5 SCAR TISSUE: ICD-10-CM

## 2024-02-29 LAB — GLUCOSE BLDC GLUCOMTR-MCNC: 205 MG/DL (ref 70–99)

## 2024-02-29 PROCEDURE — 82948 REAGENT STRIP/BLOOD GLUCOSE: CPT | Performed by: NURSE PRACTITIONER

## 2024-02-29 PROCEDURE — 97140 MANUAL THERAPY 1/> REGIONS: CPT

## 2024-02-29 PROCEDURE — G0463 HOSPITAL OUTPT CLINIC VISIT: HCPCS | Performed by: NURSE PRACTITIONER

## 2024-02-29 RX ORDER — NEOMYCIN SULFATE, POLYMYXIN B SULFATE, AND DEXAMETHASONE 3.5; 10000; 1 MG/G; [USP'U]/G; MG/G
OINTMENT OPHTHALMIC
COMMUNITY
Start: 2024-01-30

## 2024-02-29 NOTE — THERAPY TREATMENT NOTE
Outpatient Occupational Therapy Lymphedema Treatment Note   Yanique     Patient Name: Jyoti Alvarez  : 1950  MRN: [id number]  Today's Date: 2024      Visit Date: 2024    Patient Active Problem List   Diagnosis    Breast cancer    Age related osteoporosis    Colon polyp    Gastroesophageal reflux disease without esophagitis    Essential hypertension    Fatty liver    Vitamin D deficiency    Type 2 diabetes mellitus with hyperglycemia, without long-term current use of insulin    Mixed hyperlipidemia    Atrophy of thyroid    Iron deficiency anemia secondary to inadequate dietary iron intake    Vertigo    Vitamin B12 deficiency    SHALONDA on CPAP    Morbid (severe) obesity due to excess calories    Medicare annual wellness visit, subsequent    Lymphadenopathy        Past Medical History:   Diagnosis Date    Anemia     Arthritis     Asthma     INHALERS PRN    Breast cancer     RIGHT    Colon polyp     COVID 2021    Disease of thyroid gland Hypothyroidism    Fatty liver     ASYMPTOMATIC    Gastroesophageal reflux     HL (hearing loss) Hearing aids     Hyperlipemia     Hypertension     Limb swelling     Seasonal allergies     Skin cancer     Sleep apnea     Strain of latissimus dorsi muscle 2017    Right    Type 2 diabetes mellitus     AM  IN AM    Vertigo         Past Surgical History:   Procedure Laterality Date    AXILLARY LYMPH NODE BIOPSY/EXCISION Left 2023    Procedure: LEFT AXILLARY LYMPH NODE BIOPSY/EXCISION, MAGSEED;  Surgeon: Kathy Rausch MD;  Location: [facility] OR [facility] [place];  Service: General;  Laterality: Left;    BREAST AUGMENTATION BILATERAL MASTOPEXY      BREAST RECONSTRUCTION Bilateral 2022    Procedure: BILATERAL BREAST RECONSTRUCTION WITH REMOVAL AND REPLACEMENT OF IMPLANTS, MESH PLACEMENT, CHEST WALL SKIN RESECTION, CHEST LIPOSUCTION;  Surgeon: Romain Quezada MD;  Location: [facility] OR [facility] [place];  Service: Plastics;  Laterality: Bilateral;     BREAST SURGERY      Bilateral mastectomies and reconstruction     SECTION      COLONOSCOPY  2019    COLONOSCOPY N/A 10/18/2022    Procedure: COLONOSCOPY POLYPECTOMIES;  Surgeon: Eduardo Salas MD;  Location: [facility] END[facility]COPY;  Service: Gastroenterology;  Laterality: N/A;  COLON POLYPS    COSMETIC SURGERY  2004    Bilateral mastectomies and reconstructio    ENDOSCOPY  2019    EYE SURGERY  Not sure    Cateracts    KNEE ARTHROSCOPY W/ LATERAL RELEASE / MEDIAL IMBRICATION Left     TORN CARTIDLGE REPAIR    LIPOSUCTION N/A 2022    Procedure: CHEST WALL LIPOSUCTION AND CHEST WALL RESECTION;  Surgeon: Romain Quezada MD;  Location: [facility] OR [facility] [place];  Service: Plastics;  Laterality: N/A;  TIMES BASED ON PREVIOUS EXPERIENCE    MASTECTOMY COMPLETE / SIMPLE Bilateral 2004    OOPHORECTOMY      OTHER SURGICAL HISTORY      Joint surgery,  RIGHT FOOT SECOND  TOE WITH A PIN    SKIN CANCER EXCISION Right 2020    neck    TOE SURGERY Right     2nd digit pin inserted    TONSILLECTOMY           Visit Dx:      ICD-10-CM ICD-9-CM   1. Lymphedema  I89.0 457.1   2. Scar tissue  L90.5 709.2   3. Vitamin D deficiency  E55.9 268.9        Lymphedema       Row Name 24 1100             Subjective Pain    Able to rate subjective pain? yes  -SC      Pre-Treatment Pain Level 0  -SC      Post-Treatment Pain Level 0  -SC      Subjective Pain Comment Pt denies pain  -SC         Subjective    Subjective Comments Pt states she has felt so much better and hasn't noticed any pain or swelling  -SC         Manual Lymphatic Drainage    Manual Lymphatic Drainage initial sequence;opened regional lymph nodes;opened anastamoses;extremity treatment  -SC      Initial Sequence full neck;cervical;supraclavicular;shoulder collectors;abdomen;diaphragmatic breathing  -SC      Opened Regional Lymph Nodes axillary;inguinal;other lymph nodes  parasternal  -SC      Axillary left;right  -SC      Inguinal right;left  -SC      Opened  Anastamoses axillo-inguinal  -SC      Axillo-Inguinal right;left  -SC      Extremity Treatment MLD to proximal limb only  -SC                User Key  (r) = Recorded By, (t) = Taken By, (c) = Cosigned By      Initials Name Provider Type    Leticia Rutherford COTA Occupational Therapist Assistant                                    Manual Rx (last 36 hours)       Manual Treatments       Row Name 02/29/24 1138             Total Minutes    86310 - OT Manual Therapy Minutes 31  -SC                User Key  (r) = Recorded By, (t) = Taken By, (c) = Cosigned By      Initials Name Provider Type    Leticia Rutherford COTA Occupational Therapist Assistant                                       Time Calculation:   Timed Charges  90612 - OT Manual Therapy Minutes: 31  Total Minutes  Timed Charges Total Minutes: 31   Total Minutes: 31     Therapy Charges for Today       Code Description Service Date Service Provider Modifiers Qty    [id number]  OT MANUAL THERAPY EA 15 MIN 2/29/2024 Leticia Ledbetter COTA GO 2                        ANA Akers  2/29/2024

## 2024-03-05 ENCOUNTER — OFFICE VISIT (OUTPATIENT)
Dept: INTERNAL MEDICINE | Facility: CLINIC | Age: 74
End: 2024-03-05
Payer: MEDICARE

## 2024-03-05 VITALS
WEIGHT: 167.4 LBS | HEIGHT: 62 IN | RESPIRATION RATE: 18 BRPM | OXYGEN SATURATION: 97 % | BODY MASS INDEX: 30.8 KG/M2 | TEMPERATURE: 96 F | HEART RATE: 78 BPM | SYSTOLIC BLOOD PRESSURE: 130 MMHG | DIASTOLIC BLOOD PRESSURE: 82 MMHG

## 2024-03-05 DIAGNOSIS — E03.4 ATROPHY OF THYROID: ICD-10-CM

## 2024-03-05 DIAGNOSIS — E53.8 VITAMIN B12 DEFICIENCY: ICD-10-CM

## 2024-03-05 DIAGNOSIS — K76.0 FATTY LIVER: ICD-10-CM

## 2024-03-05 DIAGNOSIS — E55.9 VITAMIN D DEFICIENCY: ICD-10-CM

## 2024-03-05 DIAGNOSIS — E78.2 MIXED HYPERLIPIDEMIA: ICD-10-CM

## 2024-03-05 DIAGNOSIS — E11.9 TYPE 2 DIABETES MELLITUS WITHOUT COMPLICATION, WITH LONG-TERM CURRENT USE OF INSULIN: ICD-10-CM

## 2024-03-05 DIAGNOSIS — I10 ESSENTIAL HYPERTENSION: ICD-10-CM

## 2024-03-05 DIAGNOSIS — E11.65 TYPE 2 DIABETES MELLITUS WITH HYPERGLYCEMIA, WITHOUT LONG-TERM CURRENT USE OF INSULIN: Primary | ICD-10-CM

## 2024-03-05 DIAGNOSIS — Z79.4 TYPE 2 DIABETES MELLITUS WITHOUT COMPLICATION, WITH LONG-TERM CURRENT USE OF INSULIN: ICD-10-CM

## 2024-03-05 RX ORDER — LANCETS 30 GAUGE
1 EACH MISCELLANEOUS 3 TIMES DAILY
Qty: 200 EACH | Refills: 2 | Status: SHIPPED | OUTPATIENT
Start: 2024-03-05

## 2024-03-05 NOTE — ASSESSMENT & PLAN NOTE
Patient's A1c is down further from 7.4 to 7.1 as of her 3/24 office visit.  This is after she was on the full dose of Trulicity longer.  She is followed by diabetes clinic, they recommend continue current dosing as well.  She is on 45 units of Lantus presently as well as low-dose glimepiride, continue same.

## 2024-03-05 NOTE — ASSESSMENT & PLAN NOTE
Blood pressure remains well-controlled as of her 3/24 office visit.  Her pulse is in the upper 70s.  She gets similar readings at home on average.  She can continue with moderate doses of losartan and metoprolol.
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
## 2024-03-05 NOTE — ASSESSMENT & PLAN NOTE
B12 is fine right around 1000 still as of her 3/24 OV.  She is on just the 500 mcg over-the-counter dosing, continue same.

## 2024-03-05 NOTE — ASSESSMENT & PLAN NOTE
Patient's LFTs have corrected themselves as of her 3/24 office visit.  They were just mildly elevated.  We got a CK as well which is negative presently as is her GGT.  No imaging necessary at this time.

## 2024-03-05 NOTE — ASSESSMENT & PLAN NOTE
LDL of 74 remains at goal for primary prevention as of her 3/24 office visit.  She can continue with moderate dose atorvastatin.

## 2024-03-07 ENCOUNTER — HOSPITAL ENCOUNTER (OUTPATIENT)
Dept: OCCUPATIONAL THERAPY | Facility: HOSPITAL | Age: 74
Setting detail: THERAPIES SERIES
Discharge: HOME | End: 2024-03-07
Payer: MEDICARE

## 2024-03-07 DIAGNOSIS — C50.412 MALIGNANT NEOPLASM OF UPPER-OUTER QUADRANT OF LEFT BREAST IN FEMALE, ESTROGEN RECEPTOR POSITIVE: ICD-10-CM

## 2024-03-07 DIAGNOSIS — L90.5 SCAR CONDITION AND FIBROSIS OF SKIN: ICD-10-CM

## 2024-03-07 DIAGNOSIS — C50.919 MALIGNANT NEOPLASM OF FEMALE BREAST, UNSPECIFIED ESTROGEN RECEPTOR STATUS, UNSPECIFIED LATERALITY, UNSPECIFIED SITE OF BREAST: ICD-10-CM

## 2024-03-07 DIAGNOSIS — C50.411 MALIGNANT NEOPLASM OF UPPER-OUTER QUADRANT OF RIGHT BREAST IN FEMALE, ESTROGEN RECEPTOR POSITIVE: ICD-10-CM

## 2024-03-07 DIAGNOSIS — Z17.0 MALIGNANT NEOPLASM OF UPPER-OUTER QUADRANT OF RIGHT BREAST IN FEMALE, ESTROGEN RECEPTOR POSITIVE: ICD-10-CM

## 2024-03-07 DIAGNOSIS — M25.60 JOINT STIFFNESS: ICD-10-CM

## 2024-03-07 DIAGNOSIS — R52 PAIN: ICD-10-CM

## 2024-03-07 DIAGNOSIS — L90.5 SCAR TISSUE: ICD-10-CM

## 2024-03-07 DIAGNOSIS — I89.0 LYMPHEDEMA: Primary | ICD-10-CM

## 2024-03-07 DIAGNOSIS — Z17.0 MALIGNANT NEOPLASM OF UPPER-OUTER QUADRANT OF LEFT BREAST IN FEMALE, ESTROGEN RECEPTOR POSITIVE: ICD-10-CM

## 2024-03-07 PROCEDURE — 93702 BIS XTRACELL FLUID ANALYSIS: CPT

## 2024-03-07 PROCEDURE — 97535 SELF CARE MNGMENT TRAINING: CPT

## 2024-03-07 NOTE — THERAPY RE-EVALUATION
Outpatient Occupational Therapy Lymphedema Re-Evaluation  RJ Chanel     Patient Name: Jyoti Alvarez  : 1950  MRN: [id number]  Today's Date: 3/7/2024      Visit Date: 2024    Patient Active Problem List   Diagnosis    Breast cancer    Age related osteoporosis    Colon polyp    Gastroesophageal reflux disease without esophagitis    Essential hypertension    Fatty liver    Vitamin D deficiency    Type 2 diabetes mellitus with hyperglycemia, without long-term current use of insulin    Mixed hyperlipidemia    Atrophy of thyroid    Iron deficiency anemia secondary to inadequate dietary iron intake    Vertigo    Vitamin B12 deficiency    SHALONDA on CPAP    Morbid (severe) obesity due to excess calories    Medicare annual wellness visit, subsequent    Lymphadenopathy        Past Medical History:   Diagnosis Date    Anemia     Arthritis     Asthma     INHALERS PRN    Breast cancer     RIGHT    Colon polyp     COVID 2021    Disease of thyroid gland Hypothyroidism    Fatty liver     ASYMPTOMATIC    Gastroesophageal reflux     HL (hearing loss) Hearing aids     Hyperlipemia     Hypertension     Limb swelling     Seasonal allergies     Skin cancer     Sleep apnea     Strain of latissimus dorsi muscle 2017    Right    Type 2 diabetes mellitus     AM  IN AM    Vertigo         Past Surgical History:   Procedure Laterality Date    AXILLARY LYMPH NODE BIOPSY/EXCISION Left 2023    Procedure: LEFT AXILLARY LYMPH NODE BIOPSY/EXCISION, MAGSEED;  Surgeon: Kathy Rausch MD;  Location: [facility] OR [facility];  Service: General;  Laterality: Left;    BREAST AUGMENTATION BILATERAL MASTOPEXY      BREAST RECONSTRUCTION Bilateral 2022    Procedure: BILATERAL BREAST RECONSTRUCTION WITH REMOVAL AND REPLACEMENT OF IMPLANTS, MESH PLACEMENT, CHEST WALL SKIN RESECTION, CHEST LIPOSUCTION;  Surgeon: Romain Quezada MD;  Location: [facility] OR [facility];  Service: Plastics;  Laterality: Bilateral;    BREAST  SURGERY  2004    Bilateral mastectomies and reconstruction     SECTION      COLONOSCOPY  2019    COLONOSCOPY N/A 10/18/2022    Procedure: COLONOSCOPY POLYPECTOMIES;  Surgeon: Eduardo Salsa MD;  Location: [facility] ENDOSCOPY;  Service: Gastroenterology;  Laterality: N/A;  COLON POLYPS    COSMETIC SURGERY  2004    Bilateral mastectomies and reconstructio    ENDOSCOPY  2019    EYE SURGERY  Not sure    Cateracts    KNEE ARTHROSCOPY W/ LATERAL RELEASE / MEDIAL IMBRICATION Left     TORN CARTIDLGE REPAIR    LIPOSUCTION N/A 2022    Procedure: CHEST WALL LIPOSUCTION AND CHEST WALL RESECTION;  Surgeon: Romain Quezada MD;  Location: [facility] OR [facility];  Service: Plastics;  Laterality: N/A;  TIMES BASED ON PREVIOUS EXPERIENCE    MASTECTOMY COMPLETE / SIMPLE Bilateral 2004    OOPHORECTOMY      OTHER SURGICAL HISTORY      Joint surgery,  RIGHT FOOT SECOND  TOE WITH A PIN    SKIN CANCER EXCISION Right 2020    neck    TOE SURGERY Right     2nd digit pin inserted    TONSILLECTOMY           Visit Dx:     ICD-10-CM ICD-9-CM   1. Lymphedema  I89.0 457.1   2. Scar tissue  L90.5 709.2   3. Scar condition and fibrosis of skin  L90.5 709.2   4. Joint stiffness  M25.60 719.50   5. Pain  R52 780.96   6. Malignant neoplasm of upper-outer quadrant of left breast in female, estrogen receptor positive  C50.412 174.4    Z17.0 V86.0   7. Malignant neoplasm of upper-outer quadrant of right breast in female, estrogen receptor positive  C50.411 174.4    Z17.0 V86.0   8. Malignant neoplasm of female breast, unspecified estrogen receptor status, unspecified laterality, unspecified site of breast  C50.919 174.9        Patient History       Row Name 24 1000             History    Chief Complaint --  Lymphedema surveillance program  -SF      Brief Description of Current Complaint Pt had a history of breast cancer in the right breast in  where she had 14 lymphnodes removed at that time. Pt had a surgery on 2023 to  investigate large lymphnodes in the LUE and had 5 removed at that time.  -SF         Fall Risk Assessment    Any falls in the past year: No  -SF      Does patient have a fear of falling No  -SF         Services    Prior Rehab/Home Health Experiences No  -SF      Are you currently receiving Home Health services No  -SF         Daily Activities    Primary Language English  -SF      Are you able to read Yes  -SF      Are you able to write Yes  -SF      How does patient learn best? Demonstration;Pictures/Video  -SF      Pt Participated in POC and Goals Yes  -SF         Safety    Are you being hurt, hit, or frightened by anyone at home or in your life? No  -SF      Are you being neglected by a caregiver No  -SF      Have you had any of the following issues with Anxiety  -SF                User Key  (r) = Recorded By, (t) = Taken By, (c) = Cosigned By      Initials Name Provider Type    Kim Hall OT Occupational Therapist                     Lymphedema       Row Name 03/07/24 1000             Subjective Pain    Able to rate subjective pain? yes  -SF      Pre-Treatment Pain Level 0  -SF      Post-Treatment Pain Level 0  -SF         Subjective    Subjective Comments Patient reports she feels some swelling but is much better. Patient reports she is starting back into the gym and has been walking a lot more.  -SF         Lymphedema Assessment    Lymphedema Classification LUE:;RUE:;at risk/stage 0  -SF      Lymphedema Cancer Related Sx bilateral;simple mastectomy;reconstructive;axillary dissection;sentinel node biopsy  -SF      Lymphedema Surgery Comments RUE 2004; LUE 2023  -SF      Lymph Nodes Removed # --  RUE:14; LUE 5  -SF      Positive Lymph Nodes # 0  -SF         LLIS - Physical Concerns    The amount of pain associated with my lymphedema is: 0  -SF      The amount of limb heaviness associated with my lymphedema is: 0  -SF      The amount of skin tightness associated with my lymphedema is: 1  -SF      The size of  "my swollen limb(s) seems: 1  -SF      Lymphedema affects the movement of my swollen limb(s): 0  -SF      The strength in my swollen limb(s) is: 0  -SF         LLIS - Psychosocial Concerns    Lymphedema affects my body image (i.e., \"how I think I look\"). 0  -SF      Lymphedema affects my socializing with others. 0  -SF      Lymphedema affects my intimate relations with spouse or partner (rate 0 if not applicable 0  -SF      Lymphedema \"gets me down\" (i.e., depression, frustration, or anger) 0  -SF      I must rely on others for help due to my lymphedema. 0  -SF      I know what to do to manage my lymphedema 1  -SF         LLIS - Functional Concerns    Lymphedema affects my ability to perform self-care activities (i.e. eating, dressing, hygiene) 0  -SF      Lymphedema affects my ability to perform routine home or work-related activities. 0  -SF      Lymphedema affects my performance of preferred leisure activities. 0  -SF      Lymphedema affects proper fit of clothing/shoes 0  -SF      Lymphedema affects my sleep 0  -SF         Posture/Observations    Posture- WNL Posture is WNL  -SF         General ROM    GENERAL ROM COMMENTS BUE WFL  -SF         MMT (Manual Muscle Testing)    General MMT Comments BUE WFL  -SF         L-Dex Bioimpedence Screening    L-Dex Measurement Extremity LUE  -SF      L-Dex Patient Position Standing  -SF      L-Dex UE Dominate Side Right  -SF      L-Dex UE At Risk Side Left  -SF      L-Dex UE Pre Surgical Value No  -SF      L-Dex UE Score -3.9  -SF      L-Dex UE Baseline Score -3  -SF      L-Dex UE Value Change -0.9  -SF      $ L-Dex Charge yes  -SF         Lymphedema Life Impact Scale Totals    A.  Total Q1 - Q17 (Do not include Q18) 3  -SF      B.  Total number of questions answered (Q1-Q17) 17  -SF      C. Divide A by B 0.18  -SF      D. Multiple C by 25 4.5  -SF                User Key  (r) = Recorded By, (t) = Taken By, (c) = Cosigned By      Initials Name Provider Type    SF Kim Morales, " OT Occupational Therapist                            Therapy Education  32099 - OT Self Care/Mgmt Minutes: 25         OT Goals       Row Name 03/07/24 1218          Time Calculation    OT Goal Re-Cert Due Date 04/06/24  -SF               User Key  (r) = Recorded By, (t) = Taken By, (c) = Cosigned By      Initials Name Provider Type    Kim Hall OT Occupational Therapist                  1. Post Breast Surgery Care/at risk for Lymphedema  LTG 1: 90 days:  As an indicator of no exacerbation of lymphedema staging, the patient will present with an L-Dex score less than [10] points from preoperative baseline.              STATUS: on going        STG 1a:   30 days: To prevent exacerbation of mixed edema to lymphedema, patient will utilize the 2 postsurgical compression garments daily.                 STATUS: MET, on going  STG 1b: 30 days: Patient will be independent with self-manual lymphatic massage.               STATUS: on going  STG 1c: 30 days:  Patient will be independent with identification of signs and symptoms of lymphedema exasperation per stoplight to recovery education handout.              STATUS: on going  STG 1 d: 30 days: Patient will be independent with HEP to prevent advancement in lymphedema staging.              STATUS: on going  TREATMENT:  Self Care/ADL retraining, Therapeutic Activity, Neuromuscular Re-education, Therapeutic Exercise, Bioimpedence Fluid Analysis, Post-Surgical compression garement [address] [facility]/ Silvia Camisole Kit 2860K, Orthotic Management and training,  and Manual Therapy.         OT Assessment/Plan       Row Name 03/07/24 1158          OT Assessment    Functional Limitations Decreased safety during functional activities  -SF     Impairments Impaired lymphatic circulation;Range of motion;Pain;Integumentary integrity  -SF     Assessment Comments Patient was dx w/ R breast cancer in 2004 where she had 14 lymph nodes removed. Patient had surgery 12/19/2023 to  investigate large lymph nodes in L upper extremity and had 5 lymph nodes removed. Patient has tolerated treatment well and has demonstrated good improvements. Patient is appropriate to transition to the surveillance program at this time. Patient will continue to benefit from skilled occupational therapy to further evaluate ongoing lymphatic functioning to prevent increased staging of lymphedema.  -SF     OT Diagnosis at risk for lymphedema  -SF     OT Rehab Potential Good  -SF     Patient/caregiver participated in establishment of treatment plan and goals Yes  -SF     Patient would benefit from skilled therapy intervention Yes  -SF        OT Plan    OT Frequency Other (comment)  -SF     Predicted Duration of Therapy Intervention (OT) Patient will be re-evaluated every 3 months years 1-3 and every 6 months years 4&5.  -SF     Planned CPT's? OT EVAL MOD COMPLEXITY: 16090;OT THER PROC EA 15 MIN: 12005HA;OT SELF CARE/MGMT/TRAIN 15 MIN: 54468;OT MANUAL THERAPY EA 15 MIN: 46160;OT ORTHOTIC MGMT/TRAIN EA 15 MIN: 24103;OT WC MGMT EA 15 MIN: 68821  -SF     Planned Therapy Interventions (Optional Details) joint mobilization;manual therapy techniques;orthotic fitting/training;patient/family education;ROM (Range of Motion);strengthening;wound care  -SF     OT Plan Comments Continue POC  -SF               User Key  (r) = Recorded By, (t) = Taken By, (c) = Cosigned By      Initials Name Provider Type    SF Kim Morales OT Occupational Therapist                              Time Calculation:   Timed Charges  08458 - OT Self Care/Mgmt Minutes: 25  Total Minutes  Timed Charges Total Minutes: 25   Total Minutes: 25     Therapy Charges for Today       Code Description Service Date Service Provider Modifiers Qty    69037110161 HC PT BIS XTRACELL FLUID ANALYSIS 3/7/2024 Kim Morales OT  1    [id number]  OT SELF CARE/MGMT/TRAIN EA 15 MIN 3/7/2024 Kim Moraels OT GO 2                      Kim Morales OT  3/7/2024

## 2024-03-14 PROCEDURE — 82948 REAGENT STRIP/BLOOD GLUCOSE: CPT

## 2024-03-15 ENCOUNTER — TELEPHONE (OUTPATIENT)
Dept: ORTHOPEDIC SURGERY | Facility: CLINIC | Age: 74
End: 2024-03-15
Payer: MEDICARE

## 2024-03-15 NOTE — TELEPHONE ENCOUNTER
SENDING FOR SCHEDULE REVIEW PT SEEN DR LICONA IN THE PAST BUT DR MORFIN WAS ON CALL - NEW PT - L ELBOW PAIN/SWELLING - PROG NOTE UC 3.14.24 - XR 3.14.24 - NO KNOWN SX

## 2024-03-18 ENCOUNTER — OFFICE VISIT (OUTPATIENT)
Dept: ORTHOPEDIC SURGERY | Facility: CLINIC | Age: 74
End: 2024-03-18
Payer: MEDICARE

## 2024-03-18 VITALS
HEART RATE: 82 BPM | WEIGHT: 168 LBS | OXYGEN SATURATION: 93 % | HEIGHT: 62 IN | DIASTOLIC BLOOD PRESSURE: 73 MMHG | SYSTOLIC BLOOD PRESSURE: 109 MMHG | BODY MASS INDEX: 30.91 KG/M2

## 2024-03-18 DIAGNOSIS — S52.122A CLOSED DISPLACED FRACTURE OF HEAD OF LEFT RADIUS, INITIAL ENCOUNTER: Primary | ICD-10-CM

## 2024-03-18 PROCEDURE — 3078F DIAST BP <80 MM HG: CPT | Performed by: ORTHOPAEDIC SURGERY

## 2024-03-18 PROCEDURE — 3074F SYST BP LT 130 MM HG: CPT | Performed by: ORTHOPAEDIC SURGERY

## 2024-03-18 PROCEDURE — 99203 OFFICE O/P NEW LOW 30 MIN: CPT | Performed by: ORTHOPAEDIC SURGERY

## 2024-03-18 NOTE — PROGRESS NOTES
"Chief Complaint  Initial Evaluation of the Left Elbow     Subjective      Jyoti Alvarez presents to [facility] for initial evaluation of the left elbow.  She had a fall on 3/14/24 and went to  and had X rays and placed in a sling.   She is here today for treatment intervention.     Allergies   Allergen Reactions    Exenatide Unknown - High Severity     Weight loss and pain in the right side. Bydereon.    Fluoxetine Hives    Gabapentin Headache     Occular Miaigraines    Jardiance [Empagliflozin] Other (See Comments)     UTI    Levaquin [Levofloxacin] Hives    Nickel Itching and Rash    Sulfa Antibiotics Swelling    Metformin Nausea Only    Hibiclens [Chlorhexidine Gluconate] Rash        Social History     Socioeconomic History    Marital status:     Number of children: 3   Tobacco Use    Smoking status: Never    Smokeless tobacco: Never   Vaping Use    Vaping status: Never Used   Substance and Sexual Activity    Alcohol use: Never    Drug use: Never    Sexual activity: Not Currently     Partners: Male     Birth control/protection: Post-menopausal     Comment:  Sept. 20, 2021        I reviewed the patient's chief complaint, history of present illness, review of systems, past medical history, surgical history, family history, social history, medications, and allergy list.     Review of Systems     Constitutional: Denies fevers, chills, weight loss  Cardiovascular: Denies chest pain, shortness of breath  Skin: Denies rashes, acute skin changes  Neurologic: Denies headache, loss of consciousness      Vital Signs:   /73   Pulse 82   Ht 157.5 cm (62\")   Wt 76.2 kg (168 lb)   SpO2 93%   BMI 30.73 kg/m²          Physical Exam  General: Alert. No acute distress    Ortho Exam        LEFT ELBOW  Full Full , thumb opposition, MCP flexors, DIP flexors and PIP flexors. . Sensation to light touch median, radial, ulnar nerve. Positive AIN, PIN, ulnar nerve motor " function. Axillary sensation intact. Elbow ROM -5-110.       Procedures        Imaging Results (Most Recent)       None             Result Review :         XR Ankle 3+ View Right    Result Date: 3/14/2024  Narrative: PROCEDURE: XR ANKLE 3+ VW RIGHT  COMPARISON: None.  INDICATIONS:  74-year-old female who fell (today), twisting her right ankle; she c/o pain & swelling of the right lateral ankle; no h/o prior right ankle fracture or surgery.  FINDINGS:  3 nonweightbearing views of the right ankle were obtained.  There is age-indeterminate asymmetric enlargement of the medial right ankle joint space.  No definite acute fracture seen.  No dislocation.  There is asymmetric soft tissue prominence of the lateral right ankle, which may represent an acute contusion.  Extensive enthesopathic changes are seen.  There are also degenerative changes.  No definite retained radiopaque foreign body or subcutaneous emphysema.  If symptoms or clinical concerns persist, consider imaging follow-up.      Impression:   Probably no acute fracture or dislocation is identified.  Please see above comments for further detail.     Please note that portions of this note were completed with a voice recognition program.  SCOTT MARTINEZ JR, MD       Electronically Signed and Approved By: SCOTT MARTINEZ JR, MD on 3/14/2024 at 21:46              XR Elbow 3+ View Left    Result Date: 3/14/2024  Narrative: PROCEDURE: XR ELBOW 3+ VW LEFT  COMPARISON: None.  INDICATIONS: h/o fall today; pain & swelling involving the left elbow; extreme pain w/ extension & supination of the elbow/forearm; no prior h/o left elbow frx or surgery  FINDINGS:  Three views of the left elbow reveal an acute nondisplaced intra-articular closed probably Олег type 1 left radial head fracture.  Fracture extension to the left radial neck cannot be excluded.  A large joint effusion is identified.  Enthesopathic changes versus an age-indeterminate but possibly acute avulsion fracture  of the lateral condyle of the distal left humerus cannot be excluded, measuring about 7 mm or slightly less.  Degenerative changes involve the left elbow also.  No retained radiopaque foreign body.  No subcutaneous emphysema.      Impression:    1. There is an acute nondisplaced left radial head fracture, as described.   2. There is a possible acute slightly displaced 7 mm avulsion fracture of the lateral condyle of the distal left humerus, as well.   3. No other definite acute fractures are seen.  No dislocation.   4. A large left elbow joint effusion is identified.     Please note that portions of this note were completed with a voice recognition program.  SCOTT MARTINEZ JR, MD       Electronically Signed and Approved By: SCOTT MARTINEZ JR, MD on 3/14/2024 at 21:42                      Assessment and Plan     Diagnoses and all orders for this visit:    1. Closed displaced fracture of head of left radius, initial encounter (Primary)        Discussed the treatment plan with the patient. I reviewed the X-rays that were obtained today with the patient.     Wear sling 10 days from injury and come out and move elbow with gentle ROM.     Ice and Tylenol as needed.  No lifting for a few weeks.     Will X ray the left elbow at the next visit.     Call or return if worsening symptoms.    Follow Up     3-4 weeks with new X ray.       Patient was given instructions and counseling regarding her condition or for health maintenance advice. Please see specific information pulled into the AVS if appropriate.     Scribed for Chris Medina MD by Nellie Recinos MA.  03/18/24   08:11 EDT      I have personally performed the services described in this document as scribed by the above individual and it is both accurate and complete. Chris Medina MD 03/18/24

## 2024-03-21 ENCOUNTER — PATIENT ROUNDING (BHMG ONLY) (OUTPATIENT)
Dept: ORTHOPEDIC SURGERY | Facility: CLINIC | Age: 74
End: 2024-03-21
Payer: MEDICARE

## 2024-03-21 NOTE — PROGRESS NOTES
A [employer]xx[employer] message has been sent to the patient for PATIENT ROUNDING with [facility].

## 2024-03-25 DIAGNOSIS — N18.2 CONTROLLED TYPE 2 DIABETES MELLITUS WITH STAGE 2 CHRONIC KIDNEY DISEASE, WITH LONG-TERM CURRENT USE OF INSULIN: ICD-10-CM

## 2024-03-25 DIAGNOSIS — Z79.4 CONTROLLED TYPE 2 DIABETES MELLITUS WITH STAGE 2 CHRONIC KIDNEY DISEASE, WITH LONG-TERM CURRENT USE OF INSULIN: ICD-10-CM

## 2024-03-25 DIAGNOSIS — E11.22 CONTROLLED TYPE 2 DIABETES MELLITUS WITH STAGE 2 CHRONIC KIDNEY DISEASE, WITH LONG-TERM CURRENT USE OF INSULIN: ICD-10-CM

## 2024-03-25 RX ORDER — GLIMEPIRIDE 2 MG/1
TABLET ORAL
Qty: 180 TABLET | Refills: 0 | Status: SHIPPED | OUTPATIENT
Start: 2024-03-25 | End: 2024-06-23

## 2024-04-08 ENCOUNTER — OFFICE VISIT (OUTPATIENT)
Dept: ORTHOPEDIC SURGERY | Facility: CLINIC | Age: 74
End: 2024-04-08
Payer: MEDICARE

## 2024-04-08 VITALS
WEIGHT: 167.4 LBS | BODY MASS INDEX: 30.8 KG/M2 | OXYGEN SATURATION: 97 % | HEART RATE: 79 BPM | HEIGHT: 62 IN | DIASTOLIC BLOOD PRESSURE: 92 MMHG | SYSTOLIC BLOOD PRESSURE: 143 MMHG

## 2024-04-08 DIAGNOSIS — M25.522 LEFT ELBOW PAIN: Primary | ICD-10-CM

## 2024-04-08 DIAGNOSIS — S52.125D CLOSED NONDISPLACED FRACTURE OF HEAD OF LEFT RADIUS WITH ROUTINE HEALING, SUBSEQUENT ENCOUNTER: ICD-10-CM

## 2024-04-08 RX ORDER — ALBUTEROL SULFATE 90 UG/1
AEROSOL, METERED RESPIRATORY (INHALATION)
Qty: 51 G | Refills: 2 | Status: SHIPPED | OUTPATIENT
Start: 2024-04-08

## 2024-04-18 NOTE — PATIENT INSTRUCTIONS
X-rays taken and reviewed with patient.  Discussed beginning physical therapy with patient and she would like to proceed with this.  Order was placed today in office.    Patient may continue sling for comfort, otherwise she may discontinue.  Continue activity modification.    Follow-up in 4 weeks with repeat x-rays.  Call for questions, concerns or worsening symptoms.

## 2024-04-18 NOTE — PROGRESS NOTES
"Chief Complaint  Follow-up of the Left Elbow    Subjective      Jyoti Alvarez presents to [facility] for follow-up of left radial head fracture that occurred on 3/14/2024.  Patient reports that she is out of the sling.  Reports that she is still not lifting anything with the affected arm.  She is only taking Tylenol for pain and reports that her pain stays about a 1 or 2 out of 10.  Overall doing well.    Objective   Allergies   Allergen Reactions    Exenatide Unknown - High Severity     Weight loss and pain in the right side. Bydereon.    Fluoxetine Hives    Gabapentin Headache     Occular Miaigraines    Jardiance [Empagliflozin] Other (See Comments)     UTI    Levaquin [Levofloxacin] Hives    Nickel Itching and Rash    Sulfa Antibiotics Swelling    Metformin Nausea Only    Hibiclens [Chlorhexidine Gluconate] Rash       Vital Signs:   /92   Pulse 79   Ht 157.5 cm (62\")   Wt 75.9 kg (167 lb 6.4 oz)   SpO2 97%   BMI 30.62 kg/m²     Please follow-up with PCP regarding blood pressure.    Physical Exam    Constitutional: Awake, alert. Well nourished appearance.    Integumentary: Warm, dry, intact. No obvious rashes.    HENT: Atraumatic, normocephalic.   Respiratory: Non labored respirations .   Cardiovascular: Intact peripheral pulses.    Psychiatric: Normal mood and affect. A&O X3    Ortho Exam  Left elbow: Mild edema noted at fracture site.  Nontender to palpation of fracture site.  Near full range of motion with elbow flexion and extension, supination pronation.  She is able to make a tight fist.  Thumb to finger opposition is intact.  Capillary refill time is brisk.  Neurovascular intact.  Sensation is intact.    Imaging Results (Most Recent)       Procedure Component Value Units Date/Time    XR Elbow 3+ View Left [[id number]] Resulted: 04/08/24 1537     Updated: 04/08/24 1537    Narrative:      X-Ray Report:  Study: X-rays ordered, taken in the office, and reviewed " today.   Site: Left elbow xray  Indication: Left radial head fracture  View: AP/Lateral view(s)  Findings: Radial head fracture with stable alignment and routine healing.    Possible lateral epicondyle fracture is stable in comparison to previous   x-rays.  Prior studies available for comparison: yes                       Assessment and Plan   Problem List Items Addressed This Visit    None  Visit Diagnoses       Left elbow pain    -  Primary    Relevant Orders    XR Elbow 3+ View Left (Completed)    Closed nondisplaced fracture of head of left radius with routine healing, subsequent encounter        Relevant Orders    Ambulatory Referral to Physical Therapy Evaluate and treat; ROM, Strengthening, Stretching (Completed)            Follow Up   Return in about 4 weeks (around 5/6/2024).    Patient is a non-smoker, did not discuss options for smoking cessation.     Social History     Socioeconomic History    Marital status:     Number of children: 3   Tobacco Use    Smoking status: Never    Smokeless tobacco: Never   Vaping Use    Vaping status: Never Used   Substance and Sexual Activity    Alcohol use: Never    Drug use: Never    Sexual activity: Not Currently     Partners: Male     Birth control/protection: Post-menopausal     Comment:  Sept. 20, 2021       Patient Instructions   X-rays taken and reviewed with patient.  Discussed beginning physical therapy with patient and she would like to proceed with this.  Order was placed today in office.    Patient may continue sling for comfort, otherwise she may discontinue.  Continue activity modification.    Follow-up in 4 weeks with repeat x-rays.  Call for questions, concerns or worsening symptoms.  Patient was given instructions and counseling regarding her condition or for health maintenance advice. Please see specific information pulled into the AVS if appropriate.

## 2024-04-26 RX ORDER — INSULIN GLARGINE 100 [IU]/ML
INJECTION, SOLUTION SUBCUTANEOUS
Qty: 45 ML | Refills: 3 | OUTPATIENT
Start: 2024-04-26

## 2024-04-29 RX ORDER — MONTELUKAST SODIUM 10 MG/1
TABLET ORAL
Qty: 90 TABLET | Refills: 3 | Status: SHIPPED | OUTPATIENT
Start: 2024-04-29

## 2024-04-30 ENCOUNTER — TELEPHONE (OUTPATIENT)
Dept: INTERNAL MEDICINE | Age: 74
End: 2024-04-30
Payer: MEDICARE

## 2024-04-30 NOTE — TELEPHONE ENCOUNTER
Caller: Jyoti Alvarez    Relationship to patient: Self    Best call back number: [phone]     Patient is needing: ADALGISA REQUESTING A CALL BACK FROM GEOVANNA. SHE STATED THAT SHE HAS SOME QUESTIONS ABOUT HER LANTUS. PLEASE CALL AND ADVISE.

## 2024-05-10 ENCOUNTER — TELEPHONE (OUTPATIENT)
Dept: DIABETES SERVICES | Facility: HOSPITAL | Age: 74
End: 2024-05-10
Payer: MEDICARE

## 2024-05-10 ENCOUNTER — OFFICE VISIT (OUTPATIENT)
Dept: ORTHOPEDIC SURGERY | Facility: CLINIC | Age: 74
End: 2024-05-10
Payer: MEDICARE

## 2024-05-10 VITALS
BODY MASS INDEX: 30.79 KG/M2 | WEIGHT: 167.33 LBS | OXYGEN SATURATION: 95 % | DIASTOLIC BLOOD PRESSURE: 85 MMHG | HEART RATE: 83 BPM | SYSTOLIC BLOOD PRESSURE: 129 MMHG | HEIGHT: 62 IN

## 2024-05-10 DIAGNOSIS — M25.522 LEFT ELBOW PAIN: Primary | ICD-10-CM

## 2024-05-10 DIAGNOSIS — S52.125D CLOSED NONDISPLACED FRACTURE OF HEAD OF LEFT RADIUS WITH ROUTINE HEALING, SUBSEQUENT ENCOUNTER: ICD-10-CM

## 2024-05-10 DIAGNOSIS — E11.22 CONTROLLED TYPE 2 DIABETES MELLITUS WITH STAGE 2 CHRONIC KIDNEY DISEASE, WITH LONG-TERM CURRENT USE OF INSULIN: Primary | ICD-10-CM

## 2024-05-10 DIAGNOSIS — N18.2 CONTROLLED TYPE 2 DIABETES MELLITUS WITH STAGE 2 CHRONIC KIDNEY DISEASE, WITH LONG-TERM CURRENT USE OF INSULIN: Primary | ICD-10-CM

## 2024-05-10 DIAGNOSIS — Z79.4 CONTROLLED TYPE 2 DIABETES MELLITUS WITH STAGE 2 CHRONIC KIDNEY DISEASE, WITH LONG-TERM CURRENT USE OF INSULIN: Primary | ICD-10-CM

## 2024-05-10 PROCEDURE — 99213 OFFICE O/P EST LOW 20 MIN: CPT

## 2024-05-10 PROCEDURE — 3079F DIAST BP 80-89 MM HG: CPT

## 2024-05-10 PROCEDURE — 1160F RVW MEDS BY RX/DR IN RCRD: CPT

## 2024-05-10 PROCEDURE — 3074F SYST BP LT 130 MM HG: CPT

## 2024-05-10 PROCEDURE — 1159F MED LIST DOCD IN RCRD: CPT

## 2024-05-10 RX ORDER — DULAGLUTIDE 3 MG/.5ML
3 INJECTION, SOLUTION SUBCUTANEOUS
Qty: 6 ML | Refills: 1 | Status: SHIPPED | OUTPATIENT
Start: 2024-05-10 | End: 2024-05-10 | Stop reason: SDUPTHER

## 2024-05-10 RX ORDER — DULAGLUTIDE 3 MG/.5ML
3 INJECTION, SOLUTION SUBCUTANEOUS
Qty: 6 ML | Refills: 1 | Status: SHIPPED | OUTPATIENT
Start: 2024-05-10 | End: 2024-10-19

## 2024-05-20 NOTE — PROGRESS NOTES
"Chief Complaint  Follow-up of the Left Elbow    Subjective      Jyoti Alvarez presents to [facility] for follow-up of left radial head fracture that occurred on 3/14/2024.  Patient is attending physical therapy with [facility] and would like to continue as she is still having difficulty with getting back to normal activities.  Reports that she has some aching from the elbow and is still having to modify activities.  She is here today for follow-up.    Objective   Allergies   Allergen Reactions    Exenatide Unknown - High Severity     Weight loss and pain in the right side. Bydereon.    Fluoxetine Hives    Gabapentin Headache     Occular Miaigraines    Jardiance [Empagliflozin] Other (See Comments)     UTI    Levaquin [Levofloxacin] Hives    Nickel Itching and Rash    Sulfa Antibiotics Swelling    Metformin Nausea Only    Hibiclens [Chlorhexidine Gluconate] Rash       Vital Signs:   /85   Pulse 83   Ht 157.5 cm (62\")   Wt 75.9 kg (167 lb 5.3 oz)   SpO2 95%   BMI 30.60 kg/m²       Physical Exam    Constitutional: Awake, alert. Well nourished appearance.    Integumentary: Warm, dry, intact. No obvious rashes.    HENT: Atraumatic, normocephalic.   Respiratory: Non labored respirations .   Cardiovascular: Intact peripheral pulses.    Psychiatric: Normal mood and affect. A&O X3    Ortho Exam  Left elbow: Full range of motion with flexion and extension.  Nontender to palpation of fracture site.  Full motion with supination and pronation.  Neurovascular intact.  Sensation is intact.  No edema noted.  She is able to make a tight fist.  Thumb to finger opposition is intact.  Full range of motion of the wrist.    Imaging Results (Most Recent)       Procedure Component Value Units Date/Time    XR ELBOW 2 VIEW LEFT [[id number]] Resulted: 05/13/24 0902     Updated: 05/13/24 0902    Narrative:      X-Ray Report:  Study: X-rays ordered, taken in the office, and " reviewed today.   Site: Left elbow xray  Indication: Left elbow injury  View: AP/Lateral view(s)  Findings: Left radial head fracture with stable alignment of routine   healing noted.  Prior studies available for comparison: yes                       Assessment and Plan   Problem List Items Addressed This Visit    None  Visit Diagnoses       Left elbow pain    -  Primary    Relevant Orders    XR ELBOW 2 VIEW LEFT (Completed)    Closed nondisplaced fracture of head of left radius with routine healing, subsequent encounter        Relevant Orders    Ambulatory Referral to Physical Therapy (Completed)            Follow Up   Return in about 6 weeks (around 6/21/2024).    Patient is a non-smoker, did not discuss options for smoking cessation.     Social History     Socioeconomic History    Marital status:     Number of children: 3   Tobacco Use    Smoking status: Never    Smokeless tobacco: Never   Vaping Use    Vaping status: Never Used   Substance and Sexual Activity    Alcohol use: Never    Drug use: Never    Sexual activity: Not Currently     Partners: Male     Birth control/protection: Post-menopausal     Comment:  Sept. 20, 2021       Patient Instructions   X-rays taken and reviewed with patient.  Fracture stable and healing routinely.    Order updated for patient to continue physical therapy.  May discontinue sling.  Modify activities as needed.  May gradually resume normal activities as tolerated.      Follow-up in 6 weeks to evaluate progress with PT and the need to continue.  X-ray needed.  Call for questions, concerns or worsening symptoms.        Patient was given instructions and counseling regarding her condition or for health maintenance advice. Please see specific information pulled into the AVS if appropriate.

## 2024-05-20 NOTE — PATIENT INSTRUCTIONS
X-rays taken and reviewed with patient.  Fracture stable and healing routinely.    Order updated for patient to continue physical therapy.  May discontinue sling.  Modify activities as needed.  May gradually resume normal activities as tolerated.      Follow-up in 6 weeks to evaluate progress with PT and the need to continue.  X-ray needed.  Call for questions, concerns or worsening symptoms.

## 2024-05-23 ENCOUNTER — TELEPHONE (OUTPATIENT)
Dept: INTERNAL MEDICINE | Age: 74
End: 2024-05-23
Payer: MEDICARE

## 2024-05-23 DIAGNOSIS — R30.0 DYSURIA: Primary | ICD-10-CM

## 2024-05-23 NOTE — TELEPHONE ENCOUNTER
Pt is having some irritation, burning with urination, she is swollen in that area, this has been going on since Monday, she has pushed water and cranberry juice.     She is still having discomfort, she made an appt for Tuesday, would you be willing to order UA and I can watch for it tomorrow?

## 2024-05-24 ENCOUNTER — TELEPHONE (OUTPATIENT)
Dept: INTERNAL MEDICINE | Age: 74
End: 2024-05-24
Payer: MEDICARE

## 2024-05-24 ENCOUNTER — LAB (OUTPATIENT)
Dept: LAB | Facility: HOSPITAL | Age: 74
End: 2024-05-24
Payer: MEDICARE

## 2024-05-24 DIAGNOSIS — R30.0 DYSURIA: ICD-10-CM

## 2024-05-24 LAB
BACTERIA UR QL AUTO: ABNORMAL /HPF
BILIRUB UR QL STRIP: NEGATIVE
CLARITY UR: ABNORMAL
COLOR UR: YELLOW
GLUCOSE UR STRIP-MCNC: NEGATIVE MG/DL
HGB UR QL STRIP.AUTO: ABNORMAL
HOLD SPECIMEN: NORMAL
HYALINE CASTS UR QL AUTO: ABNORMAL /LPF
KETONES UR QL STRIP: NEGATIVE
LEUKOCYTE ESTERASE UR QL STRIP.AUTO: ABNORMAL
NITRITE UR QL STRIP: NEGATIVE
PH UR STRIP.AUTO: 6 [PH] (ref 5–8)
PROT UR QL STRIP: NEGATIVE
RBC # UR STRIP: ABNORMAL /HPF
REF LAB TEST METHOD: ABNORMAL
SP GR UR STRIP: 1.01 (ref 1–1.03)
SQUAMOUS #/AREA URNS HPF: ABNORMAL /HPF
UROBILINOGEN UR QL STRIP: ABNORMAL
WBC # UR STRIP: ABNORMAL /HPF

## 2024-05-24 PROCEDURE — 87088 URINE BACTERIA CULTURE: CPT

## 2024-05-24 PROCEDURE — 87086 URINE CULTURE/COLONY COUNT: CPT

## 2024-05-24 PROCEDURE — 81001 URINALYSIS AUTO W/SCOPE: CPT

## 2024-05-24 PROCEDURE — 87186 SC STD MICRODIL/AGAR DIL: CPT

## 2024-05-24 NOTE — TELEPHONE ENCOUNTER
I have let pt know that these results are not in yet. I let her know that we will call as soon as we get them. She voiced understanding.

## 2024-05-24 NOTE — TELEPHONE ENCOUNTER
Caller: Jyoti Alvarez    Relationship: Self    Best call back number: [phone]     What test was performed: URINE TEST    When was the test performed: 5.24.2024      Additional notes: PLEASE CALL AND DISCUSS.

## 2024-05-26 LAB — BACTERIA SPEC AEROBE CULT: ABNORMAL

## 2024-05-28 ENCOUNTER — OFFICE VISIT (OUTPATIENT)
Dept: INTERNAL MEDICINE | Age: 74
End: 2024-05-28
Payer: MEDICARE

## 2024-05-28 VITALS
TEMPERATURE: 98.6 F | DIASTOLIC BLOOD PRESSURE: 76 MMHG | BODY MASS INDEX: 31.03 KG/M2 | HEIGHT: 62 IN | SYSTOLIC BLOOD PRESSURE: 120 MMHG | OXYGEN SATURATION: 98 % | HEART RATE: 77 BPM | WEIGHT: 168.6 LBS

## 2024-05-28 DIAGNOSIS — N39.0 E. COLI UTI (URINARY TRACT INFECTION): ICD-10-CM

## 2024-05-28 DIAGNOSIS — Z00.00 MEDICARE ANNUAL WELLNESS VISIT, SUBSEQUENT: Primary | ICD-10-CM

## 2024-05-28 DIAGNOSIS — I10 ESSENTIAL HYPERTENSION: ICD-10-CM

## 2024-05-28 DIAGNOSIS — B96.20 E. COLI UTI (URINARY TRACT INFECTION): ICD-10-CM

## 2024-05-28 PROCEDURE — 3074F SYST BP LT 130 MM HG: CPT | Performed by: INTERNAL MEDICINE

## 2024-05-28 PROCEDURE — 1125F AMNT PAIN NOTED PAIN PRSNT: CPT | Performed by: INTERNAL MEDICINE

## 2024-05-28 PROCEDURE — 1170F FXNL STATUS ASSESSED: CPT | Performed by: INTERNAL MEDICINE

## 2024-05-28 PROCEDURE — 99213 OFFICE O/P EST LOW 20 MIN: CPT | Performed by: INTERNAL MEDICINE

## 2024-05-28 PROCEDURE — G0439 PPPS, SUBSEQ VISIT: HCPCS | Performed by: INTERNAL MEDICINE

## 2024-05-28 PROCEDURE — 1159F MED LIST DOCD IN RCRD: CPT | Performed by: INTERNAL MEDICINE

## 2024-05-28 PROCEDURE — 3051F HG A1C>EQUAL 7.0%<8.0%: CPT | Performed by: INTERNAL MEDICINE

## 2024-05-28 PROCEDURE — 1160F RVW MEDS BY RX/DR IN RCRD: CPT | Performed by: INTERNAL MEDICINE

## 2024-05-28 PROCEDURE — 3078F DIAST BP <80 MM HG: CPT | Performed by: INTERNAL MEDICINE

## 2024-05-28 RX ORDER — METOPROLOL SUCCINATE 100 MG/1
TABLET, EXTENDED RELEASE ORAL
Qty: 90 TABLET | Refills: 3 | Status: SHIPPED | OUTPATIENT
Start: 2024-05-28

## 2024-05-28 RX ORDER — FLUCONAZOLE 100 MG/1
100 TABLET ORAL EVERY OTHER DAY
Qty: 3 TABLET | Refills: 1 | Status: SHIPPED | OUTPATIENT
Start: 2024-05-28 | End: 2024-06-08

## 2024-05-28 RX ORDER — NITROFURANTOIN 25; 75 MG/1; MG/1
100 CAPSULE ORAL 2 TIMES DAILY
Qty: 10 CAPSULE | Refills: 0 | Status: SHIPPED | OUTPATIENT
Start: 2024-05-28 | End: 2024-06-02
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
## 2024-05-28 NOTE — PROGRESS NOTES
The ABCs of the Annual Wellness Visit  Subsequent Medicare Wellness Visit    Karen Alvarez is a 74 y.o. female who presents for a Subsequent Medicare Wellness Visit.    The following portions of the patient's history were reviewed and   updated as appropriate: allergies, current medications, past family history, past medical history, past social history, past surgical history, and problem list.    Compared to one year ago, the patient feels her physical   health is the same.    Compared to one year ago, the patient feels her mental   health is the same.    Recent Hospitalizations:  She was not admitted to the hospital during the last year.       Current Medical Providers:  Patient Care Team:  Ramo Noguera MD as PCP - General (Internal Medicine)  Zion Shaw MD as Attending Provider (Plastic Surgery)  Kelly Vicente APRN as Nurse Practitioner (Endocrinology)  Kathy Rausch MD as Consulting Physician (General Surgery)    Outpatient Medications Prior to Visit   Medication Sig Dispense Refill    acetaminophen (TYLENOL) 650 MG 8 hr tablet Take 1 tablet by mouth 2 (two) times a day.      albuterol sulfate  (90 Base) MCG/ACT inhaler USE 2 INHALATIONS EVERY 6 HOURS AS NEEDED 51 g 2    aspirin 81 MG EC tablet Take 1 tablet by mouth Daily. PT REPORTED PER DR RAUSCH OFFICE TO STOP 5 DAYS PRIOR TO SURGERY, LAST DOSE TAKEN PER PT WAS 12-13-23      atorvastatin (LIPITOR) 40 MG tablet TAKE 1 TABLET EVERY NIGHT 90 tablet 3    BD Pen Needle Ca U/F 32G X 4 MM misc USE 1 PEN NEEDLE DAILY 90 each 3    Cholecalciferol (Vitamin D3) 1.25 MG (13659 UT) capsule Take 1 capsule by mouth Every 7 (Seven) Days. (Patient taking differently: Take 1 capsule by mouth Every 30 (Thirty) Days.) 12 capsule 1    Dulaglutide (Trulicity) 3 MG/0.5ML solution pen-injector Inject 0.5 mL under the skin into the appropriate area as directed Every 7 (Seven) Days for 24 doses. 6 mL 1    ferrous  sulfate 325 (65 FE) MG tablet Take 1 tablet by mouth Daily With Breakfast.      fluticasone (FLONASE) 50 MCG/ACT nasal spray USE 1 SPRAY IN EACH NOSTRIL ONCE DAILY 48 g 2    Fluticasone-Salmeterol (ADVAIR/WIXELA) 500-50 MCG/ACT DISKUS USE 1 INHALATION TWICE A  each 3    glimepiride (AMARYL) 2 MG tablet TAKE 1 TABLET DAILY WITH BREAKFAST AND DINNER 180 tablet 0    glucose blood (OneTouch Verio) test strip 1 each by Other route 3 (Three) Times a Day. use to test blood sugar 3 times daily 300 each 3    hydrocortisone 2.5 % cream APPLY TO SCALY AREAS ON THE EARS TWICE DAILY AS NEEDED FLARES.      Insulin Glargine (LANTUS SOLOSTAR) 100 UNIT/ML injection pen Inject 50 Units under the skin into the appropriate area as directed Daily. 45 mL 1    insulin glargine (LANTUS, SEMGLEE) 100 UNIT/ML injection Inject 55 Units under the skin into the appropriate area as directed Daily. INSTRUCTED PT TO TAKE ONLY 1/2 DOSE ON DAY OF SURGERY      levothyroxine (SYNTHROID, LEVOTHROID) 25 MCG tablet TAKE 1 TABLET DAILY IN THE MORNING ON AN EMPTY STOMACH 90 tablet 3    losartan (COZAAR) 50 MG tablet TAKE 1 TABLET DAILY 90 tablet 3    metoprolol succinate XL (TOPROL-XL) 100 MG 24 hr tablet TAKE 1 TABLET DAILY 90 tablet 3    montelukast (SINGULAIR) 10 MG tablet TAKE 1 TABLET DAILY 90 tablet 3    multivitamin with minerals tablet tablet Take 1 tablet by mouth Daily.      neomycin-polymyxin-dexamethamethasone (POLYDEX) 3.5-62437-8.1 ointment ophthalmic ointment Pt uses as needed      OneTouch Delica Lancets 30G misc Use 1 each 3 (Three) Times a Day. 200 each 2    pantoprazole (PROTONIX) 40 MG EC tablet TAKE 1 TABLET DAILY 90 tablet 3    Probiotic Product (PROBIOTIC PO) Take 1 fluid ounce by mouth Daily. Plexus powder      vitamin B-12 (CYANOCOBALAMIN) 500 MCG tablet Take 1 tablet by mouth Daily.       No facility-administered medications prior to visit.       No opioid medication identified on active medication list. I have reviewed  "chart for other potential  high risk medication/s and harmful drug interactions in the elderly.        Aspirin is on active medication list. Aspirin use is indicated based on review of current medical condition/s. Pros and cons of this therapy have been discussed today. Benefits of this medication outweigh potential harm.  Patient has been encouraged to continue taking this medication.  .      Patient Active Problem List   Diagnosis    Breast cancer    Age related osteoporosis    Colon polyp    Gastroesophageal reflux disease without esophagitis    Essential hypertension    Fatty liver    Vitamin D deficiency    Type 2 diabetes mellitus with hyperglycemia, without long-term current use of insulin    Mixed hyperlipidemia    Atrophy of thyroid    Iron deficiency anemia secondary to inadequate dietary iron intake    Vertigo    Vitamin B12 deficiency    SHALONDA on CPAP    Morbid (severe) obesity due to excess calories    Medicare annual wellness visit, subsequent    Lymphadenopathy    E. coli UTI (urinary tract infection)     Advance Care Planning   Advance Care Planning     Advance Directive is not on file.  ACP discussion was held with the patient during this visit. Patient does not have an advance directive, information provided.     Objective    Vitals:    05/28/24 1447   BP: 120/76   Pulse: 77   Temp: 98.6 °F (37 °C)   TempSrc: Skin   SpO2: 98%   Weight: 76.5 kg (168 lb 9.6 oz)   Height: 157.5 cm (62.01\")     Estimated body mass index is 30.83 kg/m² as calculated from the following:    Height as of this encounter: 157.5 cm (62.01\").    Weight as of this encounter: 76.5 kg (168 lb 9.6 oz).           Does the patient have evidence of cognitive impairment?   No            HEALTH RISK ASSESSMENT    Smoking Status:  Social History     Tobacco Use   Smoking Status Never   Smokeless Tobacco Never     Alcohol Consumption:  Social History     Substance and Sexual Activity   Alcohol Use Never     Fall Risk Screen:    STEADI Fall " Risk Assessment was completed, and patient is at MODERATE risk for falls. Assessment completed on:2024    Depression Screenin/28/2024     2:45 PM   PHQ-2/PHQ-9 Depression Screening   Little Interest or Pleasure in Doing Things 0-->not at all   Feeling Down, Depressed or Hopeless 0-->not at all   PHQ-9: Brief Depression Severity Measure Score 0       Health Habits and Functional and Cognitive Screenin/28/2024     2:00 PM   Functional & Cognitive Status   Do you have difficulty preparing food and eating? No   Do you have difficulty bathing yourself, getting dressed or grooming yourself? No   Do you have difficulty using the toilet? No   Do you have difficulty moving around from place to place? No   Do you have trouble with steps or getting out of a bed or a chair? No   Current Diet Well Balanced Diet   Dental Exam Up to date   Eye Exam Up to date   Exercise (times per week) 7 times per week   Current Exercises Include Walking   Do you need help using the phone?  No   Are you deaf or do you have serious difficulty hearing?  Yes   Do you need help to go to places out of walking distance? No   Do you need help shopping? No   Do you need help preparing meals?  No   Do you need help with housework?  No   Do you need help with laundry? No   Do you need help taking your medications? No   Do you need help managing money? No   Do you ever drive or ride in a car without wearing a seat belt? No   Have you felt unusual stress, anger or loneliness in the last month? No   Who do you live with? Child   If you need help, do you have trouble finding someone available to you? No   Have you been bothered in the last four weeks by sexual problems? No   Do you have difficulty concentrating, remembering or making decisions? No       Age-appropriate Screening Schedule:  Refer to the list below for future screening recommendations based on patient's age, sex and/or medical conditions. Orders for these recommended tests  are listed in the plan section. The patient has been provided with a written plan.    Health Maintenance   Topic Date Due    RSV Vaccine - Adults (1 - 1-dose 60+ series) Never done    ANNUAL WELLNESS VISIT  03/15/2024    DXA SCAN  08/08/2024    HEMOGLOBIN A1C  08/22/2024    INFLUENZA VACCINE  08/01/2024    URINE MICROALBUMIN  11/16/2024    DIABETIC FOOT EXAM  11/27/2024    DIABETIC EYE EXAM  11/27/2024    LIPID PANEL  02/22/2025    BMI FOLLOWUP  05/10/2025    COLORECTAL CANCER SCREENING  10/18/2027    TDAP/TD VACCINES (2 - Td or Tdap) 07/25/2032    HEPATITIS C SCREENING  Completed    Pneumococcal Vaccine 65+  Completed    COVID-19 Vaccine  Discontinued    ZOSTER VACCINE  Discontinued                  CMS Preventative Services Quick Reference  Risk Factors Identified During Encounter:    Fall Risk-High or Moderate: Discussed Fall Prevention in the home    The above risks/problems have been discussed with the patient.  Pertinent information has been shared with the patient in the After Visit Summary.    Diagnoses and all orders for this visit:    1. Medicare annual wellness visit, subsequent (Primary)  Assessment & Plan:  AWV completed 5/24.  Patient remains active and independent.  She did have one  accidental fall, misjudged the sidewalk.  Did have a fracture of her radius, no head trauma.  No overnight hospitalizations past year.    She does not have a living will or healthcare surrogate, need for this was reviewed at this office visit last year.      2. E. coli UTI (urinary tract infection)  Assessment & Plan:  This is indication for the patient's 5/24 urgent visit, symptoms started on Friday, she gave a sample, culture has since grown out typical E. coli, it is pansensitive, she is allergic to Bactrim, so we will go with Macrodantin since her renal function is excellent.  Patient to call if symptoms not improving as expected, will give her some fluconazole for frequent yeast infections as well.      3. Essential  hypertension  Assessment & Plan:  Blood pressure is well-controlled and her pulse is in the 70s as of her 5/24 OV.  She is on moderate doses of losartan and metoprolol, stable to continue same.      Other orders  -     nitrofurantoin, macrocrystal-monohydrate, (Macrobid) 100 MG capsule; Take 1 capsule by mouth 2 (Two) Times a Day for 5 days.  Dispense: 10 capsule; Refill: 0  -     fluconazole (DIFLUCAN) 100 MG tablet; Take 1 tablet by mouth Every Other Day for 6 doses.  Dispense: 3 tablet; Refill: 1        Follow Up:   Next Medicare Wellness visit to be scheduled in 1 year.      An After Visit Summary and PPPS were made available to the patient.

## 2024-05-28 NOTE — ASSESSMENT & PLAN NOTE
AFUA completed 5/24.  Patient remains active and independent.  She did have one  accidental fall, misjudged the sidewalk.  Did have a fracture of her radius, no head trauma.  No overnight hospitalizations past year.    She does not have a living will or healthcare surrogate, need for this was reviewed at this office visit last year.

## 2024-05-28 NOTE — ASSESSMENT & PLAN NOTE
Blood pressure is well-controlled and her pulse is in the 70s as of her 5/24 OV.  She is on moderate doses of losartan and metoprolol, stable to continue same.

## 2024-06-03 ENCOUNTER — LAB (OUTPATIENT)
Dept: LAB | Facility: HOSPITAL | Age: 74
End: 2024-06-03
Payer: MEDICARE

## 2024-06-03 DIAGNOSIS — I10 ESSENTIAL HYPERTENSION: ICD-10-CM

## 2024-06-03 DIAGNOSIS — E55.9 VITAMIN D DEFICIENCY: ICD-10-CM

## 2024-06-03 DIAGNOSIS — E11.65 TYPE 2 DIABETES MELLITUS WITH HYPERGLYCEMIA, WITHOUT LONG-TERM CURRENT USE OF INSULIN: ICD-10-CM

## 2024-06-03 DIAGNOSIS — E03.4 ATROPHY OF THYROID: ICD-10-CM

## 2024-06-03 LAB
25(OH)D3 SERPL-MCNC: 46.2 NG/ML (ref 30–100)
ANION GAP SERPL CALCULATED.3IONS-SCNC: 12.2 MMOL/L (ref 5–15)
BUN SERPL-MCNC: 16 MG/DL (ref 8–23)
BUN/CREAT SERPL: 21.1 (ref 7–25)
CALCIUM SPEC-SCNC: 9.4 MG/DL (ref 8.6–10.5)
CHLORIDE SERPL-SCNC: 99 MMOL/L (ref 98–107)
CO2 SERPL-SCNC: 24.8 MMOL/L (ref 22–29)
CREAT SERPL-MCNC: 0.76 MG/DL (ref 0.57–1)
EGFRCR SERPLBLD CKD-EPI 2021: 81.8 ML/MIN/1.73
GLUCOSE SERPL-MCNC: 111 MG/DL (ref 65–99)
HBA1C MFR BLD: 6.8 % (ref 4.8–5.6)
POTASSIUM SERPL-SCNC: 4.6 MMOL/L (ref 3.5–5.2)
SODIUM SERPL-SCNC: 136 MMOL/L (ref 136–145)
TSH SERPL DL<=0.05 MIU/L-ACNC: 1.06 UIU/ML (ref 0.27–4.2)

## 2024-06-03 PROCEDURE — 83036 HEMOGLOBIN GLYCOSYLATED A1C: CPT

## 2024-06-03 PROCEDURE — 36415 COLL VENOUS BLD VENIPUNCTURE: CPT

## 2024-06-03 PROCEDURE — 80048 BASIC METABOLIC PNL TOTAL CA: CPT

## 2024-06-03 PROCEDURE — 82306 VITAMIN D 25 HYDROXY: CPT

## 2024-06-03 PROCEDURE — 84443 ASSAY THYROID STIM HORMONE: CPT

## 2024-06-04 RX ORDER — METHOCARBAMOL 750 MG/1
50000 TABLET ORAL WEEKLY
Qty: 12 CAPSULE | Refills: 1 | Status: SHIPPED | OUTPATIENT
Start: 2024-06-04

## 2024-06-05 NOTE — PROGRESS NOTES
Chief Complaint  Diabetes (Follow up, med mgt, A1c eval )    Referred By: THAD Thakur*    Subjective          Jyoti Alvarez presents to [facility] for diabetes medication management    History of Present Illness    Visit type:  follow-up  Diabetes type:  Type 2  Current diabetes status/concerns/issues:  She is unable to get the 4.5 mg strength of the Trulicity so the 3 mg dose has been sent to her pharmacy.  She has not yet started this dose  Other health concerns:  She fell in March and fractured her left radius; she had a UTI recently; she currently has conjunctivitis in the right eye and has been prescribed antibiotics   Current Diabetes symptoms:    Polyuria: No   Polydipsia: No   Polyphagia: No   Blurred vision: Yes intermittent episodes   Excessive fatigue: No; she is feeling less fatigue  Known Diabetes complications:  Neuropathy: None; Location: N/A  Renal: Stage II mild (GFR = 60-89mL/min)  Eyes: No current eye exam available in record; Location: N/A  Amputation/Wounds: None  GI: None  Cardiovascular: Hypertension and Hyperlipidemia  ED: N/A  Other: None  Hypoglycemia:  Level 1 hypoglycemia (54 mg/dL - 70 mg/dL); Frequency - she has had a couple lows  Hypoglycemia Symptoms:  confusion, shaking/tremors, and dizziness  Current diabetes treatment:  Trulicity 4.5 mg once weekly (she is getting ready to start the 3 mg dose), Lantus taking 50 units once every morning and glimepiride 2 mg twice a day.    Blood glucose device:  Meter  Blood glucose monitoring frequency:  2 -3  Blood glucose range/average:   110-140  Glucose Source: BG Logs  Diet:  Limits high carb/sweet foods, Avoids sugary drinks  Activity/Exercise:   PT and goes to the [facility] twice a week; she walks daily    Past Medical History:   Diagnosis Date    Anemia     Ankle sprain     Arthritis     Asthma     INHALERS PRN    Breast cancer     RIGHT    Colon polyp     COVID 09/20/2021    Disease of thyroid  gland Hypothyroidism    Fatty liver     ASYMPTOMATIC    Gastroesophageal reflux     Hip arthrosis     HL (hearing loss) Hearing aids     Hyperlipemia     Hypertension     Knee swelling     Limb swelling     Seasonal allergies     Skin cancer     Sleep apnea     Strain of latissimus dorsi muscle 2017    Right    Type 2 diabetes mellitus     AM  IN AM    Vertigo      Past Surgical History:   Procedure Laterality Date    AXILLARY LYMPH NODE BIOPSY/EXCISION Left 2023    Procedure: LEFT AXILLARY LYMPH NODE BIOPSY/EXCISION, MAGSEED;  Surgeon: Kathy Rausch MD;  Location: [facility] OR [facility];  Service: General;  Laterality: Left;    BREAST AUGMENTATION BILATERAL MASTOPEXY      BREAST RECONSTRUCTION Bilateral 2022    Procedure: BILATERAL BREAST RECONSTRUCTION WITH REMOVAL AND REPLACEMENT OF IMPLANTS, MESH PLACEMENT, CHEST WALL SKIN RESECTION, CHEST LIPOSUCTION;  Surgeon: Romain Quezada MD;  Location: [facility] OR [facility];  Service: Plastics;  Laterality: Bilateral;    BREAST SURGERY  2004    Bilateral mastectomies and reconstruction     SECTION      COLONOSCOPY  2019    COLONOSCOPY N/A 10/18/2022    Procedure: COLONOSCOPY POLYPECTOMIES;  Surgeon: Eduardo Salas MD;  Location: [facility] ENDOSCOPY;  Service: Gastroenterology;  Laterality: N/A;  COLON POLYPS    COSMETIC SURGERY      Bilateral mastectomies and reconstructio    ENDOSCOPY      EYE SURGERY  Not sure    Cateracts    FOOT SURGERY      KNEE ARTHROSCOPY W/ LATERAL RELEASE / MEDIAL IMBRICATION Left     TORN CARTIDLGE REPAIR    KNEE SURGERY      LIPOSUCTION N/A 2022    Procedure: CHEST WALL LIPOSUCTION AND CHEST WALL RESECTION;  Surgeon: Romain Quezada MD;  Location: [facility] OR [facility];  Service: Plastics;  Laterality: N/A;  TIMES BASED ON PREVIOUS EXPERIENCE    MASTECTOMY COMPLETE / SIMPLE Bilateral 2004    OOPHORECTOMY      OTHER SURGICAL HISTORY      Joint surgery,  RIGHT FOOT SECOND  TOE WITH A PIN    SKIN  CANCER EXCISION Right 2020    neck    TOE SURGERY Right     2nd digit pin inserted    TONSILLECTOMY       Family History   Problem Relation Age of Onset    Heart disease Father     Cancer Father         Skin cancer    Heart failure Father         Age 79  cause of death.    Heart disease Mother     Breast cancer Mother         Breast cancer age 45 and 60    Diabetes Mother     Arthritis Mother     Osteoporosis Mother     Heart failure Mother         Heart attack age 60. Age 87. Cause of death.    Hypertension Mother     Osteoarthritis Mother     Deep vein thrombosis Mother     Cancer Brother         Prostate cancer age about 58.    Prostate cancer Brother         unsure age    Breast cancer Sister 27        Breast cancer age 27 and 52. Ovarian cancer age 53 caused death at 56.    Diabetes Sister     Ovarian cancer Sister 53    Arthritis Sister     Breast cancer Daughter 40    Breast cancer Maternal Aunt         mid 40s    Ovarian cancer Maternal Aunt          with ovarian cancer age mid 40s    Malig Hyperthermia Neg Hx     Colon cancer Neg Hx     Uterine cancer Neg Hx     Pulmonary embolism Neg Hx      Social History     Socioeconomic History    Marital status:     Number of children: 3   Tobacco Use    Smoking status: Never    Smokeless tobacco: Never   Vaping Use    Vaping status: Never Used   Substance and Sexual Activity    Alcohol use: Never    Drug use: Never    Sexual activity: Not Currently     Partners: Male     Birth control/protection: Post-menopausal     Comment:  2021     Allergies   Allergen Reactions    Exenatide Unknown - High Severity     Weight loss and pain in the right side. Bydereon.    Fluoxetine Hives    Gabapentin Headache     Occular Miaigraines    Jardiance [Empagliflozin] Other (See Comments)     UTI    Levaquin [Levofloxacin] Hives    Nickel Itching and Rash    Sulfa Antibiotics Swelling    Metformin Nausea Only    Hibiclens [Chlorhexidine Gluconate] Rash        Current Outpatient Medications:     acetaminophen (TYLENOL) 650 MG 8 hr tablet, Take 1 tablet by mouth 2 (two) times a day., Disp: , Rfl:     albuterol sulfate  (90 Base) MCG/ACT inhaler, USE 2 INHALATIONS EVERY 6 HOURS AS NEEDED, Disp: 51 g, Rfl: 2    aspirin 81 MG EC tablet, Take 1 tablet by mouth Daily. PT REPORTED PER DR CORONA OFFICE TO STOP 5 DAYS PRIOR TO SURGERY, LAST DOSE TAKEN PER PT WAS 12-13-23, Disp: , Rfl:     atorvastatin (LIPITOR) 40 MG tablet, TAKE 1 TABLET EVERY NIGHT, Disp: 90 tablet, Rfl: 3    BD Pen Needle Ca U/F 32G X 4 MM misc, USE 1 PEN NEEDLE DAILY, Disp: 90 each, Rfl: 3    D3-50 1.25 MG (27266 UT) capsule, TAKE ONE CAPSULE BY MOUTH ONCE WEEKLY, Disp: 12 capsule, Rfl: 1    Dulaglutide (Trulicity) 3 MG/0.5ML solution pen-injector, Inject 0.5 mL under the skin into the appropriate area as directed Every 7 (Seven) Days for 24 doses., Disp: 6 mL, Rfl: 1    ferrous sulfate 325 (65 FE) MG tablet, Take 1 tablet by mouth Daily With Breakfast., Disp: , Rfl:     fluconazole (DIFLUCAN) 100 MG tablet, Take 1 tablet by mouth Every Other Day for 6 doses., Disp: 3 tablet, Rfl: 1    fluticasone (FLONASE) 50 MCG/ACT nasal spray, USE 1 SPRAY IN EACH NOSTRIL ONCE DAILY, Disp: 48 g, Rfl: 2    Fluticasone-Salmeterol (ADVAIR/WIXELA) 500-50 MCG/ACT DISKUS, USE 1 INHALATION TWICE A DAY, Disp: 180 each, Rfl: 3    glimepiride (AMARYL) 2 MG tablet, TAKE 1 TABLET DAILY WITH BREAKFAST AND DINNER, Disp: 180 tablet, Rfl: 0    glucose blood (OneTouch Verio) test strip, 1 each by Other route 3 (Three) Times a Day. use to test blood sugar 3 times daily, Disp: 300 each, Rfl: 3    hydrocortisone 2.5 % cream, APPLY TO SCALY AREAS ON THE EARS TWICE DAILY AS NEEDED FLARES., Disp: , Rfl:     Insulin Glargine (LANTUS SOLOSTAR) 100 UNIT/ML injection pen, Inject 50 Units under the skin into the appropriate area as directed Daily., Disp: 45 mL, Rfl: 1    insulin glargine (LANTUS, SEMGLEE) 100 UNIT/ML injection,  "Inject 55 Units under the skin into the appropriate area as directed Daily. INSTRUCTED PT TO TAKE ONLY 1/2 DOSE ON DAY OF SURGERY, Disp: , Rfl:     levothyroxine (SYNTHROID, LEVOTHROID) 25 MCG tablet, TAKE 1 TABLET DAILY IN THE MORNING ON AN EMPTY STOMACH, Disp: 90 tablet, Rfl: 3    losartan (COZAAR) 50 MG tablet, TAKE 1 TABLET DAILY, Disp: 90 tablet, Rfl: 3    metoprolol succinate XL (TOPROL-XL) 100 MG 24 hr tablet, TAKE 1 TABLET DAILY, Disp: 90 tablet, Rfl: 3    montelukast (SINGULAIR) 10 MG tablet, TAKE 1 TABLET DAILY, Disp: 90 tablet, Rfl: 3    multivitamin with minerals tablet tablet, Take 1 tablet by mouth Daily., Disp: , Rfl:     neomycin-polymyxin-dexamethamethasone (POLYDEX) 3.5-56541-3.1 ointment ophthalmic ointment, Pt uses as needed, Disp: , Rfl:     OneTouch Delica Lancets 30G misc, Use 1 each 3 (Three) Times a Day., Disp: 200 each, Rfl: 2    pantoprazole (PROTONIX) 40 MG EC tablet, TAKE 1 TABLET DAILY, Disp: 90 tablet, Rfl: 3    Probiotic Product (PROBIOTIC PO), Take 1 fluid ounce by mouth Daily. Plexus powder, Disp: , Rfl:     vitamin B-12 (CYANOCOBALAMIN) 500 MCG tablet, Take 1 tablet by mouth Daily., Disp: , Rfl:     Objective     Vitals:    06/06/24 1420   BP: 133/77   Pulse: 75   SpO2: 98%   Weight: 75.8 kg (167 lb)   Height: 157.5 cm (62\")   PainSc:   3     Body mass index is 30.54 kg/m².    Physical Exam  Constitutional:       Appearance: Normal appearance. She is obese.      Comments: Obesity (BMI 30 - 39.9) Pt Current BMI = 30.54    HENT:      Head: Normocephalic and atraumatic.      Right Ear: External ear normal.      Left Ear: External ear normal.      Nose: Nose normal.   Eyes:      Extraocular Movements: Extraocular movements intact.      Conjunctiva/sclera: Conjunctivae normal.   Pulmonary:      Effort: Pulmonary effort is normal.   Musculoskeletal:         General: Normal range of motion.      Cervical back: Normal range of motion.   Skin:     General: Skin is warm and dry. "   Neurological:      General: No focal deficit present.      Mental Status: She is alert and oriented to person, place, and time. Mental status is at baseline.   Psychiatric:         Mood and Affect: Mood normal.         Behavior: Behavior normal.         Thought Content: Thought content normal.         Judgment: Judgment normal.             Result Review :   The following data was reviewed by: FANY Thakur on 06/06/2024:    Most Recent A1C          6/3/2024    15:11   HGBA1C Most Recent   Hemoglobin A1C 6.80        A1C Last 3 Results          11/16/2023    11:06 2/22/2024    13:08 6/3/2024    15:11   HGBA1C Last 3 Results   Hemoglobin A1C 7.40  7.10  6.80      A1c collected on 6/3/2024  is 6.8%, indicating Controlled Type II diabetes.  This result is down from the prior result of 7.1% collected on 2/22/2024    Glucose   Date Value Ref Range Status   06/06/2024 116 (H) 70 - 99 mg/dL Final     Comment:     Serial Number: [id number]:  [id number]     Point of care glucose in the office today is within normal limits for nonfasting glucose    Creatinine   Date Value Ref Range Status   06/03/2024 0.76 0.57 - 1.00 mg/dL Final   02/22/2024 0.82 0.57 - 1.00 mg/dL Final     eGFR   Date Value Ref Range Status   06/03/2024 81.8 >60.0 mL/min/1.73 Final   02/22/2024 75.2 >60.0 mL/min/1.73 Final     Labs collected on 6/3/2024 show Stage II mild (GFR = 60-89mL/min)              Assessment: She has had improvement in her A1c and is now in a controlled status.  This is without problematic hypoglycemia.  She does have occasional episodes of hypoglycemia in the mornings.  She is trying to exercise more which may be a contributing factor.  She is unable to get the 4.5 mg strength of the Trulicity from her pharmacy so a prescription has been sent for the 3 mg dose.  She has not yet started the lower dose.  She is concerned about how to respond if her glucose levels go up.  It was explained to the patient that we may  have to adjust her insulin dose or her glimepiride to accommodate.  She is encouraged to exercise to help as well.      Diagnoses and all orders for this visit:    1. Controlled type 2 diabetes mellitus with stage 2 chronic kidney disease, with long-term current use of insulin (Primary)    2. Obesity (BMI 30-39.9)    Other orders  -     POC Glucose        Plan: No changes were made to her treatment plan at this time.  She will contact the office if she starts having hypoglycemia issues on the lower dose of the Trulicity.  She is encouraged to continue with her exercise program for both glucose control and weight loss.    The patient will monitor her blood glucose levels 2-3 times a day.  If she develops problematic hyperglycemia or hypoglycemia or adverse drug reactions, she will contact the office for further instructions.        Follow Up     Return in about 3 months (around 9/6/2024) for Medication Management.    Patient was given instructions and counseling regarding her condition or for health maintenance advice. Please see specific information pulled into the AVS if appropriate.     FANY Thakur  06/06/2024      Dictated Utilizing Dragon Dictation.  Please note that portions of this note were completed with a voice recognition program.  Part of this note may be an electronic transcription/translation of spoken language to printed text using the Dragon Dictation System.

## 2024-06-06 ENCOUNTER — OFFICE VISIT (OUTPATIENT)
Dept: DIABETES SERVICES | Facility: HOSPITAL | Age: 74
End: 2024-06-06
Payer: MEDICARE

## 2024-06-06 ENCOUNTER — HOSPITAL ENCOUNTER (OUTPATIENT)
Dept: OCCUPATIONAL THERAPY | Facility: HOSPITAL | Age: 74
Setting detail: THERAPIES SERIES
Discharge: HOME | End: 2024-06-06
Payer: MEDICARE

## 2024-06-06 VITALS
OXYGEN SATURATION: 98 % | HEIGHT: 62 IN | BODY MASS INDEX: 30.73 KG/M2 | WEIGHT: 167 LBS | HEART RATE: 75 BPM | SYSTOLIC BLOOD PRESSURE: 133 MMHG | DIASTOLIC BLOOD PRESSURE: 77 MMHG

## 2024-06-06 DIAGNOSIS — C50.412 MALIGNANT NEOPLASM OF UPPER-OUTER QUADRANT OF LEFT BREAST IN FEMALE, ESTROGEN RECEPTOR POSITIVE: ICD-10-CM

## 2024-06-06 DIAGNOSIS — C50.411 MALIGNANT NEOPLASM OF UPPER-OUTER QUADRANT OF RIGHT BREAST IN FEMALE, ESTROGEN RECEPTOR POSITIVE: ICD-10-CM

## 2024-06-06 DIAGNOSIS — C50.919 MALIGNANT NEOPLASM OF FEMALE BREAST, UNSPECIFIED ESTROGEN RECEPTOR STATUS, UNSPECIFIED LATERALITY, UNSPECIFIED SITE OF BREAST: ICD-10-CM

## 2024-06-06 DIAGNOSIS — Z79.4 CONTROLLED TYPE 2 DIABETES MELLITUS WITH STAGE 2 CHRONIC KIDNEY DISEASE, WITH LONG-TERM CURRENT USE OF INSULIN: Primary | ICD-10-CM

## 2024-06-06 DIAGNOSIS — Z17.0 MALIGNANT NEOPLASM OF UPPER-OUTER QUADRANT OF LEFT BREAST IN FEMALE, ESTROGEN RECEPTOR POSITIVE: ICD-10-CM

## 2024-06-06 DIAGNOSIS — E66.9 OBESITY (BMI 30-39.9): ICD-10-CM

## 2024-06-06 DIAGNOSIS — N18.2 CONTROLLED TYPE 2 DIABETES MELLITUS WITH STAGE 2 CHRONIC KIDNEY DISEASE, WITH LONG-TERM CURRENT USE OF INSULIN: Primary | ICD-10-CM

## 2024-06-06 DIAGNOSIS — Z17.0 MALIGNANT NEOPLASM OF UPPER-OUTER QUADRANT OF RIGHT BREAST IN FEMALE, ESTROGEN RECEPTOR POSITIVE: ICD-10-CM

## 2024-06-06 DIAGNOSIS — E11.22 CONTROLLED TYPE 2 DIABETES MELLITUS WITH STAGE 2 CHRONIC KIDNEY DISEASE, WITH LONG-TERM CURRENT USE OF INSULIN: Primary | ICD-10-CM

## 2024-06-06 DIAGNOSIS — Z91.89 AT RISK FOR LYMPHEDEMA: Primary | ICD-10-CM

## 2024-06-06 LAB — GLUCOSE BLDC GLUCOMTR-MCNC: 116 MG/DL (ref 70–99)

## 2024-06-06 PROCEDURE — 1159F MED LIST DOCD IN RCRD: CPT | Performed by: NURSE PRACTITIONER

## 2024-06-06 PROCEDURE — 99213 OFFICE O/P EST LOW 20 MIN: CPT | Performed by: NURSE PRACTITIONER

## 2024-06-06 PROCEDURE — 1160F RVW MEDS BY RX/DR IN RCRD: CPT | Performed by: NURSE PRACTITIONER

## 2024-06-06 PROCEDURE — 3075F SYST BP GE 130 - 139MM HG: CPT | Performed by: NURSE PRACTITIONER

## 2024-06-06 PROCEDURE — G0463 HOSPITAL OUTPT CLINIC VISIT: HCPCS | Performed by: NURSE PRACTITIONER

## 2024-06-06 PROCEDURE — 82948 REAGENT STRIP/BLOOD GLUCOSE: CPT | Performed by: NURSE PRACTITIONER

## 2024-06-06 PROCEDURE — 93702 BIS XTRACELL FLUID ANALYSIS: CPT

## 2024-06-06 PROCEDURE — 97535 SELF CARE MNGMENT TRAINING: CPT

## 2024-06-06 PROCEDURE — 3078F DIAST BP <80 MM HG: CPT | Performed by: NURSE PRACTITIONER

## 2024-06-06 PROCEDURE — 3044F HG A1C LEVEL LT 7.0%: CPT | Performed by: NURSE PRACTITIONER

## 2024-06-06 NOTE — THERAPY RE-EVALUATION
Outpatient Occupational Therapy Lymphedema Re-Evaluation   [place]     Patient Name: Jyoti Alvarez  : 1950  MRN: [id number]  Today's Date: 2024      Visit Date: 2024    Patient Active Problem List   Diagnosis    Breast cancer    Age related osteoporosis    Colon polyp    Gastroesophageal reflux disease without esophagitis    Essential hypertension    Fatty liver    Vitamin D deficiency    Type 2 diabetes mellitus with hyperglycemia, without long-term current use of insulin    Mixed hyperlipidemia    Atrophy of thyroid    Iron deficiency anemia secondary to inadequate dietary iron intake    Vertigo    Vitamin B12 deficiency    SHALONDA on CPAP    Morbid (severe) obesity due to excess calories    Medicare annual wellness visit, subsequent    Lymphadenopathy    E. coli UTI (urinary tract infection)        Past Medical History:   Diagnosis Date    Anemia     Ankle sprain     Arthritis     Asthma     INHALERS PRN    Breast cancer     RIGHT    Colon polyp     COVID 2021    Disease of thyroid gland Hypothyroidism    Fatty liver     ASYMPTOMATIC    Gastroesophageal reflux     Hip arthrosis     HL (hearing loss) Hearing aids     Hyperlipemia     Hypertension     Knee swelling     Limb swelling     Seasonal allergies     Skin cancer     Sleep apnea     Strain of latissimus dorsi muscle 2017    Right    Type 2 diabetes mellitus     AM  IN AM    Vertigo         Past Surgical History:   Procedure Laterality Date    AXILLARY LYMPH NODE BIOPSY/EXCISION Left 2023    Procedure: LEFT AXILLARY LYMPH NODE BIOPSY/EXCISION, MAGSEED;  Surgeon: Kathy Rausch MD;  [place] [facility] [place] [facility] [place]  Service: General;  Laterality: Left;    BREAST AUGMENTATION BILATERAL MASTOPEXY      BREAST RECONSTRUCTION Bilateral 2022    Procedure: BILATERAL BREAST RECONSTRUCTION WITH REMOVAL AND REPLACEMENT OF IMPLANTS, MESH PLACEMENT, CHEST WALL SKIN RESECTION, CHEST LIPOSUCTION;  Surgeon: Damien  Romain SEVERINO MD;  [place] [facility] [place] [facility] [place]  Service: Plastics;  Laterality: Bilateral;    BREAST SURGERY      Bilateral mastectomies and reconstruction     SECTION      COLONOSCOPY  2019    COLONOSCOPY N/A 10/18/2022    Procedure: COLONOSCOPY POLYPECTOMIES;  Surgeon: Eduardo Salas MD;  Location: [facility] ENDOSCOPY;  Service: Gastroenterology;  Laterality: N/A;  COLON POLYPS    COSMETIC SURGERY      Bilateral mastectomies and reconstructio    ENDOSCOPY      EYE SURGERY  Not sure    Cateracts    FOOT SURGERY      KNEE ARTHROSCOPY W/ LATERAL RELEASE / MEDIAL IMBRICATION Left     TORN CARTIDLGE REPAIR    KNEE SURGERY      LIPOSUCTION N/A 2022    Procedure: CHEST WALL LIPOSUCTION AND CHEST WALL RESECTION;  Surgeon: Romain Quezada MD;  [place] [facility] [place] [facility] [place]  Service: Plastics;  Laterality: N/A;  TIMES BASED ON PREVIOUS EXPERIENCE    MASTECTOMY COMPLETE / SIMPLE Bilateral     OOPHORECTOMY      OTHER SURGICAL HISTORY      Joint surgery,  RIGHT FOOT SECOND  TOE WITH A PIN    SKIN CANCER EXCISION Right 2020    neck    TOE SURGERY Right     2nd digit pin inserted    TONSILLECTOMY           Visit Dx:     ICD-10-CM ICD-9-CM   1. At risk for lymphedema  Z91.89 V49.89   2. Malignant neoplasm of upper-outer quadrant of left breast in female, estrogen receptor positive  C50.412 174.4    Z17.0 V86.0   3. Malignant neoplasm of upper-outer quadrant of right breast in female, estrogen receptor positive  C50.411 174.4    Z17.0 V86.0   4. Malignant neoplasm of female breast, unspecified estrogen receptor status, unspecified laterality, unspecified site of breast  C50.919 174.9        Patient History       Row Name 24 1200             History    Chief Complaint --  Lymphedema surveillance program  -SF      Brief Description of Current Complaint Pt had a history of breast cancer in the right breast in  where she had 14 lymphnodes removed at that time. Pt had a surgery on 2023  to investigate large lymphnodes in the LUE and had 5 removed at that time.  -SF         Fall Risk Assessment    Any falls in the past year: No  -SF      Does patient have a fear of falling No  -SF         Services    Prior Rehab/Home Health Experiences No  -SF      Are you currently receiving Home Health services No  -SF         Daily Activities    Primary Language English  -SF      Are you able to read Yes  -SF      Are you able to write Yes  -SF      How does patient learn best? Demonstration;Pictures/Video  -SF      Pt Participated in POC and Goals Yes  -SF         Safety    Are you being hurt, hit, or frightened by anyone at home or in your life? No  -SF      Are you being neglected by a caregiver No  -SF      Have you had any of the following issues with Anxiety  -SF                User Key  (r) = Recorded By, (t) = Taken By, (c) = Cosigned By      Initials Name Provider Type    SF Kim Morales OT Occupational Therapist                     Lymphedema       Row Name 06/06/24 1200             Subjective Pain    Able to rate subjective pain? yes  -SF      Pre-Treatment Pain Level 0  -SF      Post-Treatment Pain Level 0  -SF         Subjective    Subjective Comments Patient reports no concerns or complaints. Patient reports she engages in regular exercise at the gym. Patient report she did have a fall and had a hair line fracture in her L elbow but is healing well and seeing physical therapy.  -SF         Lymphedema Assessment    Lymphedema Classification LUE:;RUE:;at risk/stage 0  -SF      Lymphedema Cancer Related Sx bilateral;simple mastectomy;reconstructive;axillary dissection;sentinel node biopsy  -SF      Lymphedema Surgery Comments RUE 2004; LUE 2023  -SF      Lymph Nodes Removed # --  RUE:14; LUE 5  -SF      Positive Lymph Nodes # 0  -SF         LLIS - Physical Concerns    The amount of pain associated with my lymphedema is: 0  -SF      The amount of limb heaviness associated with my lymphedema is: 0  -SF    "   The amount of skin tightness associated with my lymphedema is: 0  -SF      The size of my swollen limb(s) seems: 0  -SF      Lymphedema affects the movement of my swollen limb(s): 0  -SF      The strength in my swollen limb(s) is: 0  -SF         LLIS - Psychosocial Concerns    Lymphedema affects my body image (i.e., \"how I think I look\"). 0  -SF      Lymphedema affects my socializing with others. 0  -SF      Lymphedema affects my intimate relations with spouse or partner (rate 0 if not applicable 0  -SF      Lymphedema \"gets me down\" (i.e., depression, frustration, or anger) 0  -SF      I must rely on others for help due to my lymphedema. 0  -SF      I know what to do to manage my lymphedema 1  -SF         LLIS - Functional Concerns    Lymphedema affects my ability to perform self-care activities (i.e. eating, dressing, hygiene) 0  -SF      Lymphedema affects my ability to perform routine home or work-related activities. 0  -SF      Lymphedema affects my performance of preferred leisure activities. 0  -SF      Lymphedema affects proper fit of clothing/shoes 0  -SF      Lymphedema affects my sleep 0  -SF         Posture/Observations    Posture- WNL Posture is WNL  -SF         General ROM    GENERAL ROM COMMENTS BUE WFL  -SF         MMT (Manual Muscle Testing)    General MMT Comments BUE WFL  -SF         L-Dex Bioimpedence Screening    L-Dex Measurement Extremity LUE  -SF      L-Dex Patient Position Standing  -SF      L-Dex UE Dominate Side Right  -SF      L-Dex UE At Risk Side Left  -SF      L-Dex UE Pre Surgical Value No  -SF      L-Dex UE Score 1  -SF      L-Dex UE Baseline Score -3  -SF      L-Dex UE Value Change 4  -SF      $ L-Dex Charge yes  -SF         Lymphedema Life Impact Scale Totals    A.  Total Q1 - Q17 (Do not include Q18) 1  -SF      B.  Total number of questions answered (Q1-Q17) 17  -SF      C. Divide A by B 0.06  -SF      D. Multiple C by 25 1.5  -SF                User Key  (r) = Recorded By, " (t) = Taken By, (c) = Cosigned By      Initials Name Provider Type    Kim Hall OT Occupational Therapist                            Therapy Education  Education Details: Review of stoplight program and HEP.  Given: Symptoms/condition management, Edema management  Program: Reinforced  How Provided: Verbal, Written  Level of Understanding: Verbalized  90807 - OT Self Care/Mgmt Minutes: 15         OT Goals       Row Name 06/06/24 3566          Time Calculation    OT Goal Re-Cert Due Date 07/06/24  -               User Key  (r) = Recorded By, (t) = Taken By, (c) = Cosigned By      Initials Name Provider Type    Kim Hall, LAUREN Occupational Therapist                  1. Post Breast Surgery Care/at risk for Lymphedema  LTG 1: 90 days:  As an indicator of no exacerbation of lymphedema staging, the patient will present with an L-Dex score less than [10] points from preoperative baseline.              STATUS: on going        STG 1a:   30 days: To prevent exacerbation of mixed edema to lymphedema, patient will utilize the 2 postsurgical compression garments daily.                 STATUS: MET, on going  STG 1b: 30 days: Patient will be independent with self-manual lymphatic massage.               STATUS: on going  STG 1c: 30 days:  Patient will be independent with identification of signs and symptoms of lymphedema exasperation per stoplight to recovery education handout.              STATUS: on going  STG 1 d: 30 days: Patient will be independent with HEP to prevent advancement in lymphedema staging.              STATUS: on going  TREATMENT:  Self Care/ADL retraining, Therapeutic Activity, Neuromuscular Re-education, Therapeutic Exercise, Bioimpedence Fluid Analysis, Post-Surgical compression garement [address] [facility] [place] [employer] Kit 2860K, Orthotic Management and training,  and Manual Therapy.       OT Assessment/Plan       Row Name 06/06/24 8874          OT Assessment    Functional Limitations  Decreased safety during functional activities  -SF     Impairments Impaired lymphatic circulation;Range of motion;Pain;Integumentary integrity  -SF     Assessment Comments Jyoti was diagnosed with right breast cancer in 2004 where she had 14 lymph nodes removed.  She had surgery 12/19/2023 to investigate large lymph node in left upper extremity and had 5 lymph nodes removed.  Patient is demonstrating normalized lymphatic functioning and subjectively reports no concerns.  Patient will continue to benefit from skilled occupational therapy to further evaluate ongoing lymphatic functioning to prevent advancement in lymphedema staging, increased pain and decreased range of motion.  -SF     OT Diagnosis At risk for lymphedema  -SF     OT Rehab Potential Good  -SF     Patient/caregiver participated in establishment of treatment plan and goals Yes  -SF     Patient would benefit from skilled therapy intervention Yes  -SF        OT Plan    OT Frequency Other (comment)  -SF     Predicted Duration of Therapy Intervention (OT) Patient will be reevaluated every 3 months years 1 through 3 and every 6 months years 4 and 5  -SF     Planned CPT's? OT EVAL MOD COMPLEXITY: 27305;OT THER PROC EA 15 MIN: [id number];OT SELF CARE/MGMT/TRAIN 15 MIN: 54952;OT MANUAL THERAPY EA 15 MIN: 37755;OT ORTHOTIC MGMT/TRAIN EA 15 MIN: 17700;OT WC MGMT EA 15 MIN: 38266  -SF     Planned Therapy Interventions (Optional Details) joint mobilization;manual therapy techniques;orthotic fitting/training;patient/family education;ROM (Range of Motion);strengthening;wound care  -SF     OT Plan Comments Continue POC  -SF               User Key  (r) = Recorded By, (t) = Taken By, (c) = Cosigned By      Initials Name Provider Type    Kim Hall OT Occupational Therapist                              Time Calculation:   Timed Charges  69696 - OT Self Care/Mgmt Minutes: 15  Total Minutes  Timed Charges Total Minutes: 15   Total Minutes: 15     Therapy Charges for  Today       Code Description Service Date Service Provider Modifiers Qty    99133070417 HC PT BIS XTRACELL FLUID ANALYSIS 6/6/2024 Kim Morales OT  1    49842546675 HC OT SELF CARE/MGMT/TRAIN EA 15 MIN 6/6/2024 Kim Morales OT GO 1                      Kim Morales OT  6/6/2024

## 2024-06-12 RX ORDER — PEN NEEDLE, DIABETIC 32GX 5/32"
NEEDLE, DISPOSABLE MISCELLANEOUS
Qty: 90 EACH | Refills: 3 | Status: SHIPPED | OUTPATIENT
Start: 2024-06-12

## 2024-06-24 DIAGNOSIS — Z79.4 CONTROLLED TYPE 2 DIABETES MELLITUS WITH STAGE 2 CHRONIC KIDNEY DISEASE, WITH LONG-TERM CURRENT USE OF INSULIN: ICD-10-CM

## 2024-06-24 DIAGNOSIS — N18.2 CONTROLLED TYPE 2 DIABETES MELLITUS WITH STAGE 2 CHRONIC KIDNEY DISEASE, WITH LONG-TERM CURRENT USE OF INSULIN: ICD-10-CM

## 2024-06-24 DIAGNOSIS — E11.22 CONTROLLED TYPE 2 DIABETES MELLITUS WITH STAGE 2 CHRONIC KIDNEY DISEASE, WITH LONG-TERM CURRENT USE OF INSULIN: ICD-10-CM

## 2024-06-24 RX ORDER — GLIMEPIRIDE 2 MG/1
TABLET ORAL
Qty: 180 TABLET | Refills: 3 | Status: SHIPPED | OUTPATIENT
Start: 2024-06-24 | End: 2024-09-22

## 2024-06-25 ENCOUNTER — OFFICE VISIT (OUTPATIENT)
Dept: INTERNAL MEDICINE | Age: 74
End: 2024-06-25
Payer: MEDICARE

## 2024-06-25 VITALS
OXYGEN SATURATION: 97 % | HEART RATE: 90 BPM | TEMPERATURE: 97.6 F | HEIGHT: 62 IN | BODY MASS INDEX: 30.6 KG/M2 | WEIGHT: 166.3 LBS | SYSTOLIC BLOOD PRESSURE: 118 MMHG | DIASTOLIC BLOOD PRESSURE: 76 MMHG

## 2024-06-25 DIAGNOSIS — I10 ESSENTIAL HYPERTENSION: ICD-10-CM

## 2024-06-25 DIAGNOSIS — E03.4 ATROPHY OF THYROID: ICD-10-CM

## 2024-06-25 DIAGNOSIS — Z78.0 POSTMENOPAUSE: ICD-10-CM

## 2024-06-25 DIAGNOSIS — E78.2 MIXED HYPERLIPIDEMIA: ICD-10-CM

## 2024-06-25 DIAGNOSIS — E11.65 TYPE 2 DIABETES MELLITUS WITH HYPERGLYCEMIA, WITHOUT LONG-TERM CURRENT USE OF INSULIN: Primary | ICD-10-CM

## 2024-06-25 DIAGNOSIS — Z00.00 WELL ADULT EXAM: ICD-10-CM

## 2024-06-25 DIAGNOSIS — M81.0 AGE-RELATED OSTEOPOROSIS WITHOUT CURRENT PATHOLOGICAL FRACTURE: ICD-10-CM

## 2024-06-25 DIAGNOSIS — E55.9 VITAMIN D DEFICIENCY: ICD-10-CM

## 2024-06-25 PROBLEM — B96.20 E. COLI UTI (URINARY TRACT INFECTION): Status: RESOLVED | Noted: 2024-05-28 | Resolved: 2024-06-25

## 2024-06-25 PROBLEM — N39.0 E. COLI UTI (URINARY TRACT INFECTION): Status: RESOLVED | Noted: 2024-05-28 | Resolved: 2024-06-25

## 2024-06-25 PROCEDURE — 1159F MED LIST DOCD IN RCRD: CPT | Performed by: INTERNAL MEDICINE

## 2024-06-25 PROCEDURE — 3044F HG A1C LEVEL LT 7.0%: CPT | Performed by: INTERNAL MEDICINE

## 2024-06-25 PROCEDURE — 99214 OFFICE O/P EST MOD 30 MIN: CPT | Performed by: INTERNAL MEDICINE

## 2024-06-25 PROCEDURE — 3078F DIAST BP <80 MM HG: CPT | Performed by: INTERNAL MEDICINE

## 2024-06-25 PROCEDURE — 1125F AMNT PAIN NOTED PAIN PRSNT: CPT | Performed by: INTERNAL MEDICINE

## 2024-06-25 PROCEDURE — 3074F SYST BP LT 130 MM HG: CPT | Performed by: INTERNAL MEDICINE

## 2024-06-25 PROCEDURE — 1160F RVW MEDS BY RX/DR IN RCRD: CPT | Performed by: INTERNAL MEDICINE

## 2024-06-25 NOTE — ASSESSMENT & PLAN NOTE
Patient's blood pressure remains well-controlled, there is no bradycardia, so she is stable to continue with moderate doses of losartan and metoprolol.

## 2024-06-25 NOTE — ASSESSMENT & PLAN NOTE
A1c is down further from 7.4 to 7.1 and now to 6.8 as of her 6/24 OV.  She was on full dose Trulicity, on 3 mg presently due to availability.  She been maintained on 50 units of Lantus and low-dose twice daily glimepiride.

## 2024-06-25 NOTE — ASSESSMENT & PLAN NOTE
Vitamin D is fine at 46 as of 6/24 OV, she has been backed off to just utilizing prescription dose once a month, stable to continue same.
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
## 2024-06-26 RX ORDER — FLUTICASONE PROPIONATE 50 MCG
1 SPRAY, SUSPENSION (ML) NASAL DAILY
Qty: 48 G | Refills: 2 | Status: SHIPPED | OUTPATIENT
Start: 2024-06-26

## 2024-07-01 ENCOUNTER — OFFICE VISIT (OUTPATIENT)
Dept: ORTHOPEDIC SURGERY | Facility: CLINIC | Age: 74
End: 2024-07-01
Payer: MEDICARE

## 2024-07-01 VITALS
SYSTOLIC BLOOD PRESSURE: 125 MMHG | DIASTOLIC BLOOD PRESSURE: 82 MMHG | HEART RATE: 81 BPM | OXYGEN SATURATION: 95 % | BODY MASS INDEX: 30.55 KG/M2 | WEIGHT: 166 LBS | HEIGHT: 62 IN

## 2024-07-01 DIAGNOSIS — M25.522 LEFT ELBOW PAIN: ICD-10-CM

## 2024-07-01 DIAGNOSIS — S52.125D CLOSED NONDISPLACED FRACTURE OF HEAD OF LEFT RADIUS WITH ROUTINE HEALING, SUBSEQUENT ENCOUNTER: Primary | ICD-10-CM

## 2024-07-01 PROCEDURE — 99213 OFFICE O/P EST LOW 20 MIN: CPT

## 2024-07-01 PROCEDURE — 3074F SYST BP LT 130 MM HG: CPT

## 2024-07-01 PROCEDURE — 3079F DIAST BP 80-89 MM HG: CPT

## 2024-07-01 NOTE — PROGRESS NOTES
"Chief Complaint  Follow-up of the Left Elbow    Subjective      Jyoti Alvarez presents to [facility] for follow-up of left radial head fracture that occurred on 3/14/2024. She is still in physical therapy.  She is having more problems with DeQuerveins of the left thumb then her radial head fracture.  She is at [facility] for physical therapy and they have not really been focusing on her elbow because she feels good.  She has full ROM of the elbow and no complaints of pain.  She has mild edema.     Allergies   Allergen Reactions    Exenatide Unknown - High Severity     Weight loss and pain in the right side. Bydereon.    Fluoxetine Hives    Gabapentin Headache     Occular Miaigraines    Jardiance [Empagliflozin] Other (See Comments)     UTI    Levaquin [Levofloxacin] Hives    Nickel Itching and Rash    Sulfa Antibiotics Swelling    Metformin Nausea Only    Hibiclens [Chlorhexidine Gluconate] Rash       Objective     Vital Signs:   Vitals:    07/01/24 1258   BP: 125/82   Pulse: 81   SpO2: 95%   Weight: 75.3 kg (166 lb)   Height: 157.5 cm (62\")     Body mass index is 30.36 kg/m².    I reviewed the patient's chief complaint, history of present illness, review of systems, past medical history, surgical history, family history, social history, medications, and allergy list.     REVIEW OF SYSTEMS    Constitutional: Denies fevers, chills, weight loss  Cardiovascular: Denies chest pain, shortness of breath  Skin: Denies rashes, acute skin changes  Neurologic: Denies headache, loss of consciousness  MSK: Left elbow pain.      Ortho Exam  Left elbow: Full range of motion of the elbow with flexion and extension. Nontender to palpation of fracture site. Full motion with supination and pronation. Neurovascular intact. Sensation is intact. No edema noted. She is able to make a tight fist. Thumb to finger opposition is intact. Full range of motion of the wrist. She has mild edema.       "     Imaging Results (Most Recent)       Procedure Component Value Units Date/Time    XR Elbow 2 View Left [[id number]] Resulted: 07/01/24 1324     Updated: 07/01/24 1325    Narrative:      X-Ray Report:  Study: X-rays ordered, taken in the office, and reviewed today  Site: Left elbow xray  Indication: Left radial neck fracture follow-up  View: AP, lateral left elbow view(s)  Findings: Left radial head fracture with stable alignment and routine   healing noted.  Prior studies available for comparison: yes                 Assessment and Plan   Diagnoses and all orders for this visit:    1. Closed nondisplaced fracture of head of left radius with routine healing, subsequent encounter (Primary)  -     XR Elbow 2 View Left    2. Left elbow pain         Jyoti Alvarez presents to [facility] for follow-up of left radial head fracture that occurred on 3/14/2024. She is still in physical therapy and doing well.  She is having more pain with her DeQuerveins of the left thumb then her elbow.      She is back to most of her daily tasks and is doing well.  Continue HEP exercises and physical therapy as needed.       Discussed injection, therapy and brace for the DeQuerveins.  She is diabetic and injections increase her sugars.     I reviewed the X-rays that were obtained today with the patient.    Patient elected to follow-up as needed for her elbow.    Tobacco Use: Low Risk  (7/1/2024)    Patient History     Smoking Tobacco Use: Never     Smokeless Tobacco Use: Never     Passive Exposure: Never     Patient reports that they are a nonsmoker; cessation education not applicable.            Follow Up   Return if symptoms worsen or fail to improve.  There are no Patient Instructions on file for this visit.  Patient was given instructions and counseling regarding her condition or for health maintenance advice. Please see specific information pulled into the AVS if appropriate.     Scribed by eNllie  GREYSON Recinos      Dictated Utilizing Dragon Dictation. Please note that portions of this note were completed with a voice recognition program. Part of this note may be an electronic transcription/translation of spoken language to printed text using the Dragon Dictation System.

## 2024-07-05 ENCOUNTER — TELEPHONE (OUTPATIENT)
Dept: INTERNAL MEDICINE | Age: 74
End: 2024-07-05
Payer: MEDICARE

## 2024-07-05 RX ORDER — ATORVASTATIN CALCIUM 40 MG/1
TABLET, FILM COATED ORAL
Qty: 90 TABLET | Refills: 3 | Status: SHIPPED | OUTPATIENT
Start: 2024-07-05

## 2024-07-05 NOTE — TELEPHONE ENCOUNTER
Pt is having impacted cerumen in bilateral ears.     She has seen Mayelin in the past and wants to know if this is still okay with you.

## 2024-07-05 NOTE — TELEPHONE ENCOUNTER
Caller: Jyoti Alvarez    Relationship: Self    Best call back number:     [phone]     What was the call regarding: PATIENT STATES THAT SHE CALLED EARLIER AND LEFT A VOICEMAIL FOR GEOVANNA TO CALL HER ABOUT HER EARS.

## 2024-07-18 ENCOUNTER — TELEPHONE (OUTPATIENT)
Dept: INTERNAL MEDICINE | Age: 74
End: 2024-07-18
Payer: MEDICARE

## 2024-07-18 DIAGNOSIS — R26.89 BALANCE DISORDER: ICD-10-CM

## 2024-07-18 DIAGNOSIS — Z91.81 AT RISK FOR FALLS: Primary | ICD-10-CM

## 2024-07-18 NOTE — TELEPHONE ENCOUNTER
PT is needing a referral to [facility], physical therapy for balance and fall risk.     I have referral ready if you agree.

## 2024-07-23 ENCOUNTER — TELEPHONE (OUTPATIENT)
Dept: DIABETES SERVICES | Facility: HOSPITAL | Age: 74
End: 2024-07-23
Payer: MEDICARE

## 2024-07-23 ENCOUNTER — TELEPHONE (OUTPATIENT)
Dept: DIABETES SERVICES | Facility: HOSPITAL | Age: 74
End: 2024-07-23

## 2024-07-23 NOTE — TELEPHONE ENCOUNTER
Caller: Jyoti Alvarez    Relationship: Self    Best call back number: [phone]    Requested Prescriptions: Dulaglutide (Trulicity) 3 MG/0.5ML solution pen-injector [[id number]] (Order [id number]  Requested Prescriptions      No prescriptions requested or ordered in this encounter        Pharmacy where request should be sent:  [facility] - [place], [place] - [address] [place] [address] - [phone] PH - [phone] [place] [address] [phone] [address] [place] [id number]      Last office visit with prescribing clinician: 6/6/2024   Last telemedicine visit with prescribing clinician: Visit date not found   Next office visit with prescribing clinician: 9/6/2024     Additional details provided by patient: PT STATED THERE IS A NATIONWIDE OUTAGE ON Dulaglutide (Trulicity) 3 MG/0.5ML solution pen-injector. AND PT WAS ADVISED TO CALL HER PROVIDER TO CHECK ON WHAT MEDICATION THE PT SHOULD BE TAKING. PT ALSO ADVISED THAT HER TRULICITY SHOULD BE ON AUTOMATIC REFILL    Does the patient have less than a 3 day supply:  [x] Yes  [] No    Would you like a call back once the refill request has been completed: [x] Yes [] No    If the office needs to give you a call back, can they leave a voicemail: [x] Yes [] No    Jennifer Joseph Rep   07/23/24 10:46 EDT

## 2024-07-23 NOTE — TELEPHONE ENCOUNTER
Hub staff attempted to follow warm transfer process and was unsuccessful     Caller: Jyoti Alvarez    Relationship to patient: Self    Best call back number: [phone]    Patient is needing: PT CALLING TO ADVISE HER INSURANCE WILL COVER EITHER ACCU CHEK OR ONE TOUCH PT ADVISED SHE IS IN NEED OF A NEW BLOOD METER. PATIENT ASKED IF A SCRIPT CAN BE CALLED IN FOR EITHER ACCU CHECK OR ONE TOUCH TO HER PHARMACY.  [id number] - [place], [place] - [address] AT [facility] - [phone]  - [phone]  [phone] [address] [place] [address]    PT ASKED FOR A CALLBACK ON WHICH MEDICATION WAS CALLED IN TO HER PHARMACY. OK TO LEAVE VOICEMAIL

## 2024-07-25 DIAGNOSIS — E11.65 TYPE 2 DIABETES MELLITUS WITH HYPERGLYCEMIA, WITHOUT LONG-TERM CURRENT USE OF INSULIN: Primary | ICD-10-CM

## 2024-07-25 NOTE — TELEPHONE ENCOUNTER
PT STATED THERE IS A NATIONWIDE OUTAGE ON Dulaglutide (Trulicity) 3 MG/0.5ML solution pen-injector.     Unsure what she should do or if medication needs to be changed

## 2024-08-09 ENCOUNTER — OFFICE VISIT (OUTPATIENT)
Dept: ORTHOPEDIC SURGERY | Facility: CLINIC | Age: 74
End: 2024-08-09
Payer: MEDICARE

## 2024-08-09 VITALS
SYSTOLIC BLOOD PRESSURE: 152 MMHG | WEIGHT: 166 LBS | HEIGHT: 62 IN | DIASTOLIC BLOOD PRESSURE: 91 MMHG | HEART RATE: 82 BPM | BODY MASS INDEX: 30.55 KG/M2 | OXYGEN SATURATION: 93 %

## 2024-08-09 DIAGNOSIS — M25.511 RIGHT SHOULDER PAIN, UNSPECIFIED CHRONICITY: Primary | ICD-10-CM

## 2024-08-09 DIAGNOSIS — M75.41 IMPINGEMENT SYNDROME OF RIGHT SHOULDER: ICD-10-CM

## 2024-08-09 RX ORDER — LIDOCAINE HYDROCHLORIDE 10 MG/ML
5 INJECTION, SOLUTION INFILTRATION; PERINEURAL
Status: COMPLETED | OUTPATIENT
Start: 2024-08-09 | End: 2024-08-09

## 2024-08-09 RX ORDER — TRIAMCINOLONE ACETONIDE 40 MG/ML
40 INJECTION, SUSPENSION INTRA-ARTICULAR; INTRAMUSCULAR
Status: COMPLETED | OUTPATIENT
Start: 2024-08-09 | End: 2024-08-09

## 2024-08-09 RX ADMIN — LIDOCAINE HYDROCHLORIDE 5 ML: 10 INJECTION, SOLUTION INFILTRATION; PERINEURAL at 15:31

## 2024-08-09 RX ADMIN — TRIAMCINOLONE ACETONIDE 40 MG: 40 INJECTION, SUSPENSION INTRA-ARTICULAR; INTRAMUSCULAR at 15:31

## 2024-08-09 NOTE — PROGRESS NOTES
"Chief Complaint  Initial Evaluation of the Right Shoulder     Subjective      Jyoti Alvarez presents to [facility] for initial evaluation of the right shoulder. She has had pain for 5-6 weeks.  She was overcompensating with the right shoulder for the left shoulder issues.  She has pain that has been lingering and the pain has decreased as the appointment was made a month ago. She has pain with forward and upward reaching.     Allergies   Allergen Reactions    Exenatide Unknown - High Severity     Weight loss and pain in the right side. Bydereon.    Fluoxetine Hives    Gabapentin Headache     Occular Miaigraines    Jardiance [Empagliflozin] Other (See Comments)     UTI    Levaquin [Levofloxacin] Hives    Nickel Itching and Rash    Sulfa Antibiotics Swelling    Metformin Nausea Only    Hibiclens [Chlorhexidine Gluconate] Rash        Social History     Socioeconomic History    Marital status:     Number of children: 3   Tobacco Use    Smoking status: Never     Passive exposure: Never    Smokeless tobacco: Never   Vaping Use    Vaping status: Never Used   Substance and Sexual Activity    Alcohol use: Never    Drug use: Never    Sexual activity: Not Currently     Partners: Male     Birth control/protection: Post-menopausal     Comment:  Sept. 20, 2021        I reviewed the patient's chief complaint, history of present illness, review of systems, past medical history, surgical history, family history, social history, medications, and allergy list.     Review of Systems     Constitutional: Denies fevers, chills, weight loss  Cardiovascular: Denies chest pain, shortness of breath  Skin: Denies rashes, acute skin changes  Neurologic: Denies headache, loss of consciousness        Vital Signs:   /91   Pulse 82   Ht 157.5 cm (62\")   Wt 75.3 kg (166 lb)   SpO2 93%   BMI 30.36 kg/m²          Physical Exam  General: Alert. No acute distress    Ortho Exam        RIGHT " SHOULDER Forward flexion 170. Abduction 100. External rotation 60. Internal rotation L3. Positive Cross body adduction. Supraspinatus strength 4+/5. Infraspinatus Strength 5/5. Infrared subscap 5/5. Positive Pickering. Positive Neer. Negative Apprehension. Negative Lift off. (Negative Obriens. Sensation intact to light touch, median, radial, ulnar nerve. Positive AIN, PIN, ulnar nerve motor. Positive pulses. Positive Impingement signs. Good strength in triceps, biceps, deltoid, wrist extensors and wrist flexors. Tender to palpation to the anterior aspect of the shoulder and down the arm.         Large Joint  Date/Time: 8/9/2024 3:31 PM  Consent given by: patient  Site marked: site marked  Timeout: Immediately prior to procedure a time out was called to verify the correct patient, procedure, equipment, support staff and site/side marked as required   Supporting Documentation  Indications: pain   Procedure Details  Location: shoulder (RIGHT) -   Needle gauge: 21 G.  Medications administered: 5 mL lidocaine 1 %; 40 mg triamcinolone acetonide 40 MG/ML  Patient tolerance: patient tolerated the procedure well with no immediate complications    This injection documentation was Scribed for Chris Medina MD by Danielle Chris MA.  08/09/24   15:31 EDT        Imaging Results (Most Recent)       Procedure Component Value Units Date/Time    XR Scapula Right [[id number]] Resulted: 08/09/24 1452     Updated: 08/09/24 1455             Result Review :     X-Ray Report:  Right scapula X-Ray  Indication: Evaluation of the right scapula  AP/Lateral view(s)  Findings: Mild glenohumeral arthritis.  Moderate AC joint arthritis.   Prior studies available for comparison: no             Assessment and Plan     Diagnoses and all orders for this visit:    1. Right shoulder pain, unspecified chronicity (Primary)  -     XR Scapula Right    2. Impingement syndrome of right shoulder        Discussed the treatment plan with the patient. I reviewed  the X-rays that were obtained today with the patient.     Discussed the risks and benefits of conservative measures. The patient expressed understanding and wished to proceed with a right shoulder steroid injection. She tolerated the injection well.     Educated to watch her blood sugars.        Call or return if worsening symptoms.    Follow Up     PRN      Patient was given instructions and counseling regarding her condition or for health maintenance advice. Please see specific information pulled into the AVS if appropriate.     Scribed for Chris Medina MD by Nellie Recinos MA.  08/09/24   14:57 EDT    I have personally performed the services described in this document as scribed by the above individual and it is both accurate and complete. Chris Medina MD 08/09/24

## 2024-08-14 ENCOUNTER — TELEPHONE (OUTPATIENT)
Dept: OTOLARYNGOLOGY | Facility: CLINIC | Age: 74
End: 2024-08-14
Payer: MEDICARE

## 2024-08-14 NOTE — TELEPHONE ENCOUNTER
Patient aware appointment has been cancelled 08/15/2024 due to Dr Dick not here any longer. She would greatly appreciate appointment ASAP with one of the other providers has a lot of wax in both ears

## 2024-08-15 ENCOUNTER — TELEPHONE (OUTPATIENT)
Dept: OTOLARYNGOLOGY | Facility: CLINIC | Age: 74
End: 2024-08-15
Payer: MEDICARE

## 2024-08-15 DIAGNOSIS — E11.22 CONTROLLED TYPE 2 DIABETES MELLITUS WITH STAGE 2 CHRONIC KIDNEY DISEASE, WITH LONG-TERM CURRENT USE OF INSULIN: ICD-10-CM

## 2024-08-15 DIAGNOSIS — Z79.4 CONTROLLED TYPE 2 DIABETES MELLITUS WITH STAGE 2 CHRONIC KIDNEY DISEASE, WITH LONG-TERM CURRENT USE OF INSULIN: ICD-10-CM

## 2024-08-15 DIAGNOSIS — N18.2 CONTROLLED TYPE 2 DIABETES MELLITUS WITH STAGE 2 CHRONIC KIDNEY DISEASE, WITH LONG-TERM CURRENT USE OF INSULIN: ICD-10-CM

## 2024-08-15 RX ORDER — BLOOD SUGAR DIAGNOSTIC
1 STRIP MISCELLANEOUS 3 TIMES DAILY
Qty: 300 EACH | Refills: 12 | Status: SHIPPED | OUTPATIENT
Start: 2024-08-15

## 2024-08-15 NOTE — TELEPHONE ENCOUNTER
Left message for patient to call our office.    We need to inform patient that Dr. Dick is no longer with [facility]    We want to offer to reschedule Follow-Up appointment with one of our other providers.

## 2024-08-15 NOTE — TELEPHONE ENCOUNTER
PA IS NEEDING A PLAN LIMIT OVERRIDE FOR THE PRESCRIPTION FOR [facility]. PHONE # [phone]

## 2024-08-15 NOTE — TELEPHONE ENCOUNTER
Caller: CORY GUADALUPE    Relationship: SELF    Best call back number: [phone]    Requested Prescriptions: THEY ARE OUT OF THE 3MG. PATIENT IS WANTING TO KNOW IF THE DR WOULD CALL IN 1.5MG AND SHE CAN DOUBLE IT. PLEASE ADVISE  Requested Prescriptions     Pending Prescriptions Disp Refills    Dulaglutide (Trulicity) 3 MG/0.5ML solution pen-injector 6 mL 1     Sig: Inject 0.5 mL under the skin into the appropriate area as directed Every 7 (Seven) Days for 24 doses.        Pharmacy where request should be sent: [facility] [address] [phone] [facility] [phone] FX     Last office visit with prescribing clinician: 6/6/2024   Last telemedicine visit with prescribing clinician: Visit date not found   Next office visit with prescribing clinician: 9/6/2024     Additional details provided by patient:     Does the patient have less than a 3 day supply:  [] Yes  [] No    Would you like a call back once the refill request has been completed: [] Yes [] No    If the office needs to give you a call back, can they leave a voicemail: [] Yes [] No    Jennifer Esteves Rep   08/15/24 15:44 EDT

## 2024-08-16 NOTE — TELEPHONE ENCOUNTER
Hub staff attempted to follow warm transfer process and was unsuccessful     Caller: Jyoti Alvarez    Relationship to patient: Self    Best call back number: [phone]     Patient is needing: PATIENT TRIED TO REACH THE M.A. BACK, BUT UNABLE TO WARM XFER. PLEASE CALL PATIENT AGAIN. THANKS!

## 2024-08-18 RX ORDER — DULAGLUTIDE 3 MG/.5ML
3 INJECTION, SOLUTION SUBCUTANEOUS
Qty: 6 ML | Refills: 1 | Status: SHIPPED | OUTPATIENT
Start: 2024-08-18 | End: 2025-01-27

## 2024-08-19 NOTE — TELEPHONE ENCOUNTER
FANY Piña sent medication in on 8/18/24   Subjective:       Patient ID: Susana Andersen is a 58 y.o. female.    Chief Complaint: Disease Management      Mrs. Andersen is a 58 year old female who presents to clinic for follow up on seronegative rheumatoid arthritis. She has been compliant with SSZ 1000 mg bid. Xeljanz was considered at her last visit, but she has not started this. She is taking prednisone 2.5 mg 2-3 days a week as needed for pain.  Increasing gabapentin 600 mg has been helpful. She continues to have pain and swelling in her hands R>L and wrist. She is prescribed norco for severe pain, which is helpful.    We reviewed her recent labs and ESR remains elevated, but is improving. Vitamin D is low.     Current tx:  1. SSZ  2. Norco  3. Gabapentin        Review of Systems   Constitutional:  Positive for activity change. Negative for appetite change, chills, fatigue and fever.   Eyes:  Negative for visual disturbance.   Respiratory:  Negative for cough and shortness of breath.    Cardiovascular:  Negative for chest pain, palpitations and leg swelling.   Gastrointestinal:  Negative for abdominal pain, constipation, diarrhea, nausea and vomiting.   Genitourinary:  Negative for dysuria.   Musculoskeletal:  Positive for arthralgias, joint swelling and myalgias.   Allergic/Immunologic: Negative for immunocompromised state.   Neurological:  Negative for dizziness, weakness, light-headedness and headaches.         Objective:     Vitals:    09/01/23 1436   BP: (!) 142/84   Pulse: 64       Past Medical History:   Diagnosis Date    Allergy     Arthritis     Colon polyps 07/29/2021    Hypertension      Past Surgical History:   Procedure Laterality Date    COLONOSCOPY N/A 7/29/2021    Procedure: COLONOSCOPY;  Surgeon: Edgar Bolaños MD;  Location: [facility];  Service: Endoscopy;  Laterality: N/A;    COLONOSCOPY W/ BIOPSIES            Physical Exam   Neck: No JVD present. No thyromegaly present.   Cardiovascular: Normal rate and regular rhythm. Exam reveals no  decreased pulses.   Pulmonary/Chest: Effort normal.   Musculoskeletal:      Right shoulder: Normal.      Left shoulder: Normal.      Right elbow: Normal.      Left elbow: Normal.      Right wrist: Swelling and tenderness present.      Left wrist: Swelling and tenderness present.      Right knee: Tenderness present.      Left knee: Tenderness present.   Lymphadenopathy:     She has no cervical adenopathy.   Neurological: Gait normal.   Skin: No rash noted.   Psychiatric: Mood and affect normal.       Right Side Rheumatological Exam     Examination finds the shoulder, elbow, 1st MCP, 4th MCP and 5th MCP normal.    The patient is tender to palpation of the wrist, knee, 1st PIP, 2nd PIP, 2nd MCP, 3rd PIP, 3rd MCP, 4th PIP and 5th PIP    She has swelling of the wrist, 1st PIP, 2nd PIP, 2nd MCP, 3rd PIP, 3rd MCP, 4th PIP and 5th PIP    Left Side Rheumatological Exam     Examination finds the shoulder, elbow, 1st PIP, 1st MCP, 2nd PIP, 2nd MCP, 3rd PIP, 3rd MCP, 4th PIP, 4th MCP, 5th PIP and 5th MCP normal.    The patient is tender to palpation of the wrist and knee.    She has swelling of the wrist            Labs reviewed:  Component      Latest Ref [facility] 8/24/2023   WBC      3.90 - 12.70 K/uL 5.30    RBC      4.00 - 5.40 M/uL 4.26    Hemoglobin      12.0 - 16.0 g/dL 11.8 (L)    Hematocrit      37.0 - 48.5 % 37.1    MCV      82 - 98 fL 87    MCH      27.0 - 31.0 pg 27.7    MCHC      32.0 - 36.0 g/dL 31.8 (L)    RDW      11.5 - 14.5 % 13.6    Platelets      150 - 450 K/uL 293    MPV      9.2 - 12.9 fL 11.1    Immature Granulocytes      0.0 - 0.5 % 0.2    Gran # (ANC)      1.8 - 7.7 K/uL 2.0    Immature Grans (Abs)      0.00 - 0.04 K/uL 0.01    Lymph #      1.0 - 4.8 K/uL 2.7    Mono #      0.3 - 1.0 K/uL 0.4    Eos #      0.0 - 0.5 K/uL 0.1    Baso #      0.00 - 0.20 K/uL 0.03    nRBC      0 /100 WBC 0    Gran %      38.0 - 73.0 % 38.5    Lymph %      18.0 - 48.0 % 51.1 (H)    Mono %      4.0 - 15.0 % 7.5    Eosinophil %       0.0 - 8.0 % 2.1    Basophil %      0.0 - 1.9 % 0.6    Differential Method Automated    Sodium      136 - 145 mmol/L 140    Potassium      3.5 - 5.1 mmol/L 3.7    Chloride      95 - 110 mmol/L 103    CO2      23 - 29 mmol/L 28    Glucose      70 - 110 mg/dL 90    BUN      6 - 20 mg/dL 15    Creatinine      0.5 - 1.4 mg/dL 0.8    Calcium      8.7 - 10.5 mg/dL 10.0    PROTEIN TOTAL      6.0 - 8.4 g/dL 7.1    Albumin      3.5 - 5.2 g/dL 4.0    BILIRUBIN TOTAL      0.1 - 1.0 mg/dL 0.5    Alkaline Phosphatase      55 - 135 U/L 60    AST      10 - 40 U/L 20    ALT      10 - 44 U/L 18    eGFR      >60 mL/min/1.73 m^2 >60.0    Anion Gap      8 - 16 mmol/L 9    Free T4      0.71 - 1.51 ng/dL 0.98    TSH      0.400 - 4.000 uIU/mL 1.129    Vit D, 25-Hydroxy      30 - 96 ng/mL 26 (L)    CCP Antibodies      <5.0 U/mL <0.5    Rheumatoid Factor      0.0 - 15.0 IU/mL <13.0    Sed Rate      0 - 20 mm/Hr 28 (H)    CRP      0.0 - 8.2 mg/L 8.1    T3, Free      2.3 - 4.2 pg/mL 3.0        Assessment:       1. Seronegative rheumatoid arthritis    2. Chronic pain syndrome    3. Neck pain, acute    4. Insomnia, unspecified type    5. Vitamin D deficiency            Plan:       Seronegative rheumatoid arthritis  -     ketorolac injection 60 mg  -     methylPREDNISolone acetate injection 160 mg  -     cyanocobalamin injection 1,000 mcg  -     predniSONE (DELTASONE) 2.5 MG tablet; Take 1 tablet (2.5 mg total) by mouth once daily.  Dispense: 90 tablet; Refill: 1  -     gabapentin (NEURONTIN) 600 MG tablet; Take 1 tablet (600 mg total) by mouth 3 (three) times daily.  Dispense: 270 tablet; Refill: 1  -     CBC Auto Differential; Future; Expected date: 09/01/2023  -     Comprehensive Metabolic Panel; Future; Expected date: 09/01/2023  -     C-Reactive Protein; Future; Expected date: 09/01/2023  -     Sedimentation rate; Future; Expected date: 09/01/2023    Chronic pain syndrome  -     gabapentin (NEURONTIN) 600 MG tablet; Take 1 tablet (600 mg  total) by mouth 3 (three) times daily.  Dispense: 270 tablet; Refill: 1    Neck pain, acute  -     gabapentin (NEURONTIN) 600 MG tablet; Take 1 tablet (600 mg total) by mouth 3 (three) times daily.  Dispense: 270 tablet; Refill: 1    Insomnia, unspecified type  -     amitriptyline (ELAVIL) 10 MG tablet; Take 1 tablet (10 mg total) by mouth nightly as needed for Insomnia.  Dispense: 30 tablet; Refill: 5    Vitamin D deficiency  -     ergocalciferol (ERGOCALCIFEROL) 50,000 unit Cap; Take 1 capsule (50,000 Units total) by mouth every 7 days.  Dispense: 12 capsule; Refill: 1          Assessment:  58 year old female with  Seronegative RA on SSZ  --osteoarthritis  --chronic pain syndrome  --vitamin D deficiency    Plan:  1. toradol 60, depo 160, b12 today  2. Cont SSZ 1000 mg bid  3. Add ergocalciferol 50,000 once weekly  4. Cont. norco prn per MD.  I have checked [place] prescription monitoring program site and no unusual or abnormal behavior has occurred pt understand the risk and benefits of taking opioid medications and has decided to continue the medication. Pended x 3 months.  5. Cont gabapentin 600 mg tid  6. Cont elavil    Follow up:  4 mo Dr. Vania harmon/labs prior

## 2024-08-30 ENCOUNTER — LAB (OUTPATIENT)
Dept: LAB | Facility: HOSPITAL | Age: 74
End: 2024-08-30
Payer: MEDICARE

## 2024-08-30 DIAGNOSIS — E11.65 TYPE 2 DIABETES MELLITUS WITH HYPERGLYCEMIA, WITHOUT LONG-TERM CURRENT USE OF INSULIN: ICD-10-CM

## 2024-08-30 DIAGNOSIS — E78.2 MIXED HYPERLIPIDEMIA: ICD-10-CM

## 2024-08-30 DIAGNOSIS — Z00.00 WELL ADULT EXAM: ICD-10-CM

## 2024-08-30 DIAGNOSIS — I10 ESSENTIAL HYPERTENSION: ICD-10-CM

## 2024-08-30 PROCEDURE — 80061 LIPID PANEL: CPT

## 2024-08-30 PROCEDURE — 85025 COMPLETE CBC W/AUTO DIFF WBC: CPT

## 2024-08-30 PROCEDURE — 83036 HEMOGLOBIN GLYCOSYLATED A1C: CPT

## 2024-08-30 PROCEDURE — 36415 COLL VENOUS BLD VENIPUNCTURE: CPT

## 2024-08-30 PROCEDURE — 80053 COMPREHEN METABOLIC PANEL: CPT

## 2024-08-31 LAB
ALBUMIN SERPL-MCNC: 4.2 G/DL (ref 3.5–5.2)
ALBUMIN/GLOB SERPL: 2 G/DL
ALP SERPL-CCNC: 86 U/L (ref 39–117)
ALT SERPL W P-5'-P-CCNC: 34 U/L (ref 1–33)
ANION GAP SERPL CALCULATED.3IONS-SCNC: 9 MMOL/L (ref 5–15)
AST SERPL-CCNC: 30 U/L (ref 1–32)
BASOPHILS # BLD AUTO: 0.04 10*3/MM3 (ref 0–0.2)
BASOPHILS NFR BLD AUTO: 0.6 % (ref 0–1.5)
BILIRUB SERPL-MCNC: 0.4 MG/DL (ref 0–1.2)
BUN SERPL-MCNC: 14 MG/DL (ref 8–23)
BUN/CREAT SERPL: 18.2 (ref 7–25)
CALCIUM SPEC-SCNC: 9.3 MG/DL (ref 8.6–10.5)
CHLORIDE SERPL-SCNC: 103 MMOL/L (ref 98–107)
CHOLEST SERPL-MCNC: 148 MG/DL (ref 0–200)
CO2 SERPL-SCNC: 24 MMOL/L (ref 22–29)
CREAT SERPL-MCNC: 0.77 MG/DL (ref 0.57–1)
DEPRECATED RDW RBC AUTO: 44.7 FL (ref 37–54)
EGFRCR SERPLBLD CKD-EPI 2021: 80.6 ML/MIN/1.73
EOSINOPHIL # BLD AUTO: 0.13 10*3/MM3 (ref 0–0.4)
EOSINOPHIL NFR BLD AUTO: 1.9 % (ref 0.3–6.2)
ERYTHROCYTE [DISTWIDTH] IN BLOOD BY AUTOMATED COUNT: 12.6 % (ref 12.3–15.4)
GLOBULIN UR ELPH-MCNC: 2.1 GM/DL
GLUCOSE SERPL-MCNC: 133 MG/DL (ref 65–99)
HBA1C MFR BLD: 6.5 % (ref 4.8–5.6)
HCT VFR BLD AUTO: 42.8 % (ref 34–46.6)
HDLC SERPL-MCNC: 43 MG/DL (ref 40–60)
HGB BLD-MCNC: 14 G/DL (ref 12–15.9)
IMM GRANULOCYTES # BLD AUTO: 0.02 10*3/MM3 (ref 0–0.05)
IMM GRANULOCYTES NFR BLD AUTO: 0.3 % (ref 0–0.5)
LDLC SERPL CALC-MCNC: 76 MG/DL (ref 0–100)
LDLC/HDLC SERPL: 1.65 {RATIO}
LYMPHOCYTES # BLD AUTO: 1.73 10*3/MM3 (ref 0.7–3.1)
LYMPHOCYTES NFR BLD AUTO: 25.1 % (ref 19.6–45.3)
MCH RBC QN AUTO: 31.8 PG (ref 26.6–33)
MCHC RBC AUTO-ENTMCNC: 32.7 G/DL (ref 31.5–35.7)
MCV RBC AUTO: 97.3 FL (ref 79–97)
MONOCYTES # BLD AUTO: 0.64 10*3/MM3 (ref 0.1–0.9)
MONOCYTES NFR BLD AUTO: 9.3 % (ref 5–12)
NEUTROPHILS NFR BLD AUTO: 4.32 10*3/MM3 (ref 1.7–7)
NEUTROPHILS NFR BLD AUTO: 62.8 % (ref 42.7–76)
NRBC BLD AUTO-RTO: 0 /100 WBC (ref 0–0.2)
PLATELET # BLD AUTO: 327 10*3/MM3 (ref 140–450)
PMV BLD AUTO: 9.6 FL (ref 6–12)
POTASSIUM SERPL-SCNC: 4.8 MMOL/L (ref 3.5–5.2)
PROT SERPL-MCNC: 6.3 G/DL (ref 6–8.5)
RBC # BLD AUTO: 4.4 10*6/MM3 (ref 3.77–5.28)
SODIUM SERPL-SCNC: 136 MMOL/L (ref 136–145)
TRIGL SERPL-MCNC: 170 MG/DL (ref 0–150)
VLDLC SERPL-MCNC: 29 MG/DL (ref 5–40)
WBC NRBC COR # BLD AUTO: 6.88 10*3/MM3 (ref 3.4–10.8)

## 2024-09-03 NOTE — PROGRESS NOTES
Chief Complaint   Patient presents with    Annual Exam       HPI:   74 y.o. . Presents for well woman exam. Post menopausal, s/p BSO  Menses:   none  Pain:  None  Last pap: normal , no longer medically recommended  Complaints: Pt has no complaints today.    Patient reports  urinary incontinence, does not desire treatment     Past Medical History:   Diagnosis Date    Anemia     Ankle sprain     Arthritis     Asthma     INHALERS PRN    Breast cancer     RIGHT    Colon polyp     COVID 2021    Disease of thyroid gland Hypothyroidism    Fatty liver     ASYMPTOMATIC    Gastroesophageal reflux     Hip arthrosis     HL (hearing loss) Hearing aids     Hyperlipemia     Hypertension     Knee swelling     Limb swelling     Seasonal allergies     Skin cancer     Sleep apnea     Strain of latissimus dorsi muscle 2017    Right    Type 2 diabetes mellitus     AM  IN AM    Vertigo       Past Surgical History:   Procedure Laterality Date    AXILLARY LYMPH NODE BIOPSY/EXCISION Left 2023    Procedure: LEFT AXILLARY LYMPH NODE BIOPSY/EXCISION, MAGSEED;  Surgeon: Kathy Rausch MD;  Location: [facility] OR [facility] [place];  Service: General;  Laterality: Left;    BREAST RECONSTRUCTION Bilateral 2022    Procedure: BILATERAL BREAST RECONSTRUCTION WITH REMOVAL AND REPLACEMENT OF IMPLANTS, MESH PLACEMENT, CHEST WALL SKIN RESECTION, CHEST LIPOSUCTION;  Surgeon: Romain Quezada MD;  Location: [facility] OR [facility];  Service: Plastics;  Laterality: Bilateral;     SECTION      COLONOSCOPY  2019    COLONOSCOPY N/A 10/18/2022    Procedure: COLONOSCOPY POLYPECTOMIES;  Surgeon: Eduardo Salas MD;  Location: [facility] ENDOSCOPY;  Service: Gastroenterology;  Laterality: N/A;  COLON POLYPS    ENDOSCOPY  2019    EYE SURGERY  Not sure    Cateracts    KNEE ARTHROSCOPY W/ LATERAL RELEASE / MEDIAL IMBRICATION Left     TORN CARTIDLGE REPAIR    LIPOSUCTION N/A 2022    Procedure: CHEST WALL LIPOSUCTION AND CHEST  WALL RESECTION;  Surgeon: Romain Quezada MD;  Location: [facility] OR [facility] [place];  Service: Plastics;  Laterality: N/A;  TIMES BASED ON PREVIOUS EXPERIENCE    MASTECTOMY COMPLETE / SIMPLE Bilateral 2004    OOPHORECTOMY      OTHER SURGICAL HISTORY      Joint surgery,  RIGHT FOOT SECOND  TOE WITH A PIN    SKIN CANCER EXCISION Right 2020    neck    TOE SURGERY Right     2nd digit pin inserted    TONSILLECTOMY        Family History   Problem Relation Age of Onset    Heart disease Father     Cancer Father         Skin cancer    Heart failure Father         Age 79  cause of death.    Heart disease Mother     Breast cancer Mother         Breast cancer age 45 and 60    Diabetes Mother     Arthritis Mother     Osteoporosis Mother     Heart failure Mother         Heart attack age 60. Age 87. Cause of death.    Hypertension Mother     Osteoarthritis Mother     Deep vein thrombosis Mother     Cancer Brother         Prostate cancer age about 58.    Prostate cancer Brother         unsure age    Breast cancer Sister 27        Breast cancer age 27 and 52. Ovarian cancer age 53 caused death at 56.    Diabetes Sister     Ovarian cancer Sister 53    Arthritis Sister     Breast cancer Daughter 40    Breast cancer Maternal Aunt         mid 40s    Ovarian cancer Maternal Aunt          with ovarian cancer age mid 40s    Malig Hyperthermia Neg Hx     Colon cancer Neg Hx     Uterine cancer Neg Hx     Pulmonary embolism Neg Hx      Allergies as of 2024 - Reviewed 2024   Allergen Reaction Noted    Exenatide Unknown - High Severity 2021    Fluoxetine Hives 2021    Gabapentin Headache 2021    Jardiance [empagliflozin] Other (See Comments) 2021    Levaquin [levofloxacin] Hives 2021    Nickel Itching and Rash 2022    Sulfa antibiotics Swelling 2021    Metformin Nausea Only 10/27/2022    Hibiclens [chlorhexidine gluconate] Rash 2023        PCP: does manage PMHx and preventative  "labs    /83 Comment: Right arm  Pulse 83   Ht 157.5 cm (62\")   Wt 74.8 kg (165 lb)   Breastfeeding No   BMI 30.18 kg/m²     PHYSICAL EXAM: Chaperone present   General- NAD, alert and oriented, appropriate  Psych- Normal mood, good memory  Neck- No masses, no thyroid enlargement  Lymphatic- No palpable neck, axillary, or groin nodes  CV- Regular rhythm, no murmurs  Resp- CTA to bases, no wheezes  Abdomen- Soft, non distended, non tender, no masses  Breast left-  s/p mastectomy, breast reconstruction, no masses, non tender, no nipple discharge  Breast right- s/p mastectomy, breast reconstruction, no masses, non tender, no nipple discharge  External genitalia- Mild atrophy, no lesions  Urethra/meatus- Normal, no masses, non tender, no prolapse  Bladder- Normal, no masses, non tender, no prolapse  Vagina- Mild atrophy, no lesions, no discharge, no prolapse  Cvx- Normal, no lesions, no discharge, No cervical motion tenderness  Uterus- Normal size, shape & consistency.  Non tender, mobile.  Adnexa- Absent  Anus/Rectum/Perineum- Not performed  Ext- No edema, no cyanosis    Skin- No lesions, no rashes, no acanthosis nigricans       ASSESSMENT and PLAN:    Diagnoses and all orders for this visit:    1. Well woman exam (Primary)        Preventative:   BREAST HEALTH- Monthly self breast exam importance and how to reviewed. MMG and/or MRI (prn) reviewed per society guidelines and her individual history. Screening Imaging: Not medically needed  CERVICAL CANCER Screening- Reviewed current ASCCP guidelines for screening w and wo cotest HPV, age specific.  Screen: Not medically needed  COLON CANCER Screening- Reviewed current medical society guidelines and options.  Screen:  Already up to date  SEXUAL HEALTH: Declines STD screening  BONE HEALTH- Reviewed current medical society guidelines and options for testing, calcium and vit D intake.  Weight bearing exercise.  DEXA: Already up to date  VACCINATIONS Recommended: " COVID and booster PRN, Flu annually  Importance discussed, risk being unvaccinated reviewed.  Questions answered  Genetic testing: Previously screened/positive  Smoking status- NON SMOKER  Follow up PCP/Specialist PMHx and Labs      Follow Up:  Return in about 1 year (around 9/13/2025).        Kassy Marquez, APRN  09/13/2024

## 2024-09-05 ENCOUNTER — HOSPITAL ENCOUNTER (OUTPATIENT)
Dept: OCCUPATIONAL THERAPY | Facility: HOSPITAL | Age: 74
Setting detail: THERAPIES SERIES
Discharge: HOME | End: 2024-09-05
Payer: MEDICARE

## 2024-09-05 DIAGNOSIS — L90.5 SCAR TISSUE: ICD-10-CM

## 2024-09-05 DIAGNOSIS — C50.411 MALIGNANT NEOPLASM OF UPPER-OUTER QUADRANT OF RIGHT BREAST IN FEMALE, ESTROGEN RECEPTOR POSITIVE: ICD-10-CM

## 2024-09-05 DIAGNOSIS — C50.412 MALIGNANT NEOPLASM OF UPPER-OUTER QUADRANT OF LEFT BREAST IN FEMALE, ESTROGEN RECEPTOR POSITIVE: ICD-10-CM

## 2024-09-05 DIAGNOSIS — Z91.89 AT RISK FOR LYMPHEDEMA: Primary | ICD-10-CM

## 2024-09-05 DIAGNOSIS — Z17.0 MALIGNANT NEOPLASM OF UPPER-OUTER QUADRANT OF RIGHT BREAST IN FEMALE, ESTROGEN RECEPTOR POSITIVE: ICD-10-CM

## 2024-09-05 DIAGNOSIS — C50.919 MALIGNANT NEOPLASM OF FEMALE BREAST, UNSPECIFIED ESTROGEN RECEPTOR STATUS, UNSPECIFIED LATERALITY, UNSPECIFIED SITE OF BREAST: ICD-10-CM

## 2024-09-05 DIAGNOSIS — Z17.0 MALIGNANT NEOPLASM OF UPPER-OUTER QUADRANT OF LEFT BREAST IN FEMALE, ESTROGEN RECEPTOR POSITIVE: ICD-10-CM

## 2024-09-05 DIAGNOSIS — L90.5 SCAR CONDITION AND FIBROSIS OF SKIN: ICD-10-CM

## 2024-09-05 PROCEDURE — 93702 BIS XTRACELL FLUID ANALYSIS: CPT

## 2024-09-05 PROCEDURE — 97535 SELF CARE MNGMENT TRAINING: CPT

## 2024-09-05 NOTE — THERAPY RE-EVALUATION
Outpatient Occupational Therapy Lymphedema Re-Evaluation   [place]     Patient Name: Jyoti Alvarez  : 1950  MRN: [id number]  Today's Date: 2024      Visit Date: 2024    Patient Active Problem List   Diagnosis    Breast cancer    Age-related osteoporosis without current pathological fracture    Colon polyp    Gastroesophageal reflux disease without esophagitis    Essential hypertension    Fatty liver    Vitamin D deficiency    Type 2 diabetes mellitus with hyperglycemia, without long-term current use of insulin    Mixed hyperlipidemia    Atrophy of thyroid    Iron deficiency anemia secondary to inadequate dietary iron intake    Vertigo    Vitamin B12 deficiency    SHALONDA on CPAP    Morbid (severe) obesity due to excess calories    Medicare annual wellness visit, subsequent    Lymphadenopathy        Past Medical History:   Diagnosis Date    Anemia     Ankle sprain     Arthritis     Asthma     INHALERS PRN    Breast cancer     RIGHT    Colon polyp     COVID 2021    Disease of thyroid gland Hypothyroidism    Fatty liver     ASYMPTOMATIC    Gastroesophageal reflux     Hip arthrosis     HL (hearing loss) Hearing aids     Hyperlipemia     Hypertension     Knee swelling     Limb swelling     Seasonal allergies     Skin cancer     Sleep apnea     Strain of latissimus dorsi muscle 2017    Right    Type 2 diabetes mellitus     AM  IN AM    Vertigo         Past Surgical History:   Procedure Laterality Date    AXILLARY LYMPH NODE BIOPSY/EXCISION Left 2023    Procedure: LEFT AXILLARY LYMPH NODE BIOPSY/EXCISION, MAGSEED;  Surgeon: Kathy Rausch MD;  Location: [facility] OR [facility] [place];  Service: General;  Laterality: Left;    BREAST AUGMENTATION BILATERAL MASTOPEXY      BREAST RECONSTRUCTION Bilateral 2022    Procedure: BILATERAL BREAST RECONSTRUCTION WITH REMOVAL AND REPLACEMENT OF IMPLANTS, MESH PLACEMENT, CHEST WALL SKIN RESECTION, CHEST LIPOSUCTION;  Surgeon: Damien  Romain SEVERINO MD;  Location: [facility] OR [facility] [place];  Service: Plastics;  Laterality: Bilateral;    BREAST SURGERY      Bilateral mastectomies and reconstruction     SECTION      COLONOSCOPY  2019    COLONOSCOPY N/A 10/18/2022    Procedure: COLONOSCOPY POLYPECTOMIES;  Surgeon: Eduardo Salas MD;  Location: [facility] ENDOSCOPY;  Service: Gastroenterology;  Laterality: N/A;  COLON POLYPS    COSMETIC SURGERY      Bilateral mastectomies and reconstructio    ENDOSCOPY      EYE SURGERY  Not sure    Cateracts    FOOT SURGERY      KNEE ARTHROSCOPY W/ LATERAL RELEASE / MEDIAL IMBRICATION Left     TORN CARTIDLGE REPAIR    KNEE SURGERY      LIPOSUCTION N/A 2022    Procedure: CHEST WALL LIPOSUCTION AND CHEST WALL RESECTION;  Surgeon: Romain Quezada MD;  Location: [facility] OR [facility] [place];  Service: Plastics;  Laterality: N/A;  TIMES BASED ON PREVIOUS EXPERIENCE    MASTECTOMY COMPLETE / SIMPLE Bilateral 2004    OOPHORECTOMY      OTHER SURGICAL HISTORY      Joint surgery,  RIGHT FOOT SECOND  TOE WITH A PIN    SKIN CANCER EXCISION Right 2020    neck    TOE SURGERY Right     2nd digit pin inserted    TONSILLECTOMY           Visit Dx:     ICD-10-CM ICD-9-CM   1. At risk for lymphedema  Z91.89 V49.89   2. Malignant neoplasm of upper-outer quadrant of left breast in female, estrogen receptor positive  C50.412 174.4    Z17.0 V86.0   3. Malignant neoplasm of upper-outer quadrant of right breast in female, estrogen receptor positive  C50.411 174.4    Z17.0 V86.0   4. Malignant neoplasm of female breast, unspecified estrogen receptor status, unspecified laterality, unspecified site of breast  C50.919 174.9   5. Scar tissue  L90.5 709.2   6. Scar condition and fibrosis of skin  L90.5 709.2        Patient History       Row Name 24 1200             History    Chief Complaint --  Lymphedema surveillance program  -SF      Brief Description of Current Complaint Pt had a history of breast cancer in the right breast in  2004 where she had 14 lymphnodes removed at that time. Pt had a surgery on 12/19/2023 to investigate large lymphnodes in the LUE and had 5 removed at that time.  -SF         Fall Risk Assessment    Any falls in the past year: No  -SF      Does patient have a fear of falling No  -SF         Services    Prior Rehab/Home Health Experiences No  -SF      Are you currently receiving Home Health services No  -SF         Daily Activities    Primary Language English  -SF      Are you able to read Yes  -SF      Are you able to write Yes  -SF      How does patient learn best? Demonstration;Pictures/Video  -SF      Pt Participated in POC and Goals Yes  -SF         Safety    Are you being hurt, hit, or frightened by anyone at home or in your life? No  -SF      Are you being neglected by a caregiver No  -SF      Have you had any of the following issues with Anxiety  -SF                User Key  (r) = Recorded By, (t) = Taken By, (c) = Cosigned By      Initials Name Provider Type    SF Kim Morales OT Occupational Therapist                     Lymphedema       Row Name 09/05/24 1200             Subjective Pain    Able to rate subjective pain? yes  -SF      Pre-Treatment Pain Level 2  -SF      Post-Treatment Pain Level 2  -SF         Subjective    Subjective Comments Patient reports soreness in R shoulder. Patient reports she feels she has some swelling currently.  -SF         Lymphedema Assessment    Lymphedema Classification LUE:;RUE:;at risk/stage 0  -SF      Lymphedema Cancer Related Sx bilateral;simple mastectomy;reconstructive;axillary dissection;sentinel node biopsy  -SF      Lymphedema Surgery Comments RUE 2004; LUE 2023  -SF      Lymph Nodes Removed # --  RUE:14; LUE 5  -SF      Positive Lymph Nodes # 0  -SF         LLIS - Physical Concerns    The amount of pain associated with my lymphedema is: 0  -SF      The amount of limb heaviness associated with my lymphedema is: 0  -SF      The amount of skin tightness associated  "with my lymphedema is: 0  -SF      The size of my swollen limb(s) seems: 0  -SF      Lymphedema affects the movement of my swollen limb(s): 0  -SF      The strength in my swollen limb(s) is: 0  -SF         LLIS - Psychosocial Concerns    Lymphedema affects my body image (i.e., \"how I think I look\"). 0  -SF      Lymphedema affects my socializing with others. 0  -SF      Lymphedema affects my intimate relations with spouse or partner (rate 0 if not applicable 0  -SF      Lymphedema \"gets me down\" (i.e., depression, frustration, or anger) 0  -SF      I must rely on others for help due to my lymphedema. 0  -SF      I know what to do to manage my lymphedema 1  -SF         LLIS - Functional Concerns    Lymphedema affects my ability to perform self-care activities (i.e. eating, dressing, hygiene) 0  -SF      Lymphedema affects my ability to perform routine home or work-related activities. 0  -SF      Lymphedema affects my performance of preferred leisure activities. 0  -SF      Lymphedema affects proper fit of clothing/shoes 0  -SF      Lymphedema affects my sleep 0  -SF         Posture/Observations    Posture- WNL Posture is WNL  -SF         General ROM    GENERAL ROM COMMENTS BUE WFL  -SF         MMT (Manual Muscle Testing)    General MMT Comments BUE WFL  -SF         Skin Changes/Observations    Skin Observations Comment Patient presenting with palpable lymphatic cords in both R and L axilla.  -SF         L-Dex Bioimpedence Screening    L-Dex Measurement Extremity LUE  -SF      L-Dex Patient Position Standing  -SF      L-Dex UE Dominate Side Right  -SF      L-Dex UE At Risk Side Left  -SF      L-Dex UE Pre Surgical Value No  -SF      L-Dex UE Score -1.5  -SF      L-Dex UE Baseline Score -3  -SF      L-Dex UE Value Change 1.5  -SF      L-Dex UE Comment RUE 1.7  -SF      $ L-Dex Charge yes  -SF         Lymphedema Life Impact Scale Totals    A.  Total Q1 - Q17 (Do not include Q18) 1  -SF      B.  Total number of questions " "answered (Q1-Q17) 17  -SF      C. Divide A by B 0.06  -SF      D. Multiple C by 25 1.5  -SF                User Key  (r) = Recorded By, (t) = Taken By, (c) = Cosigned By      Initials Name Provider Type    Kim Hall, LAUREN Occupational Therapist                            Therapy Education  Education Details: Therapist reviewing HEP and \"bye-bye\" exercises on this date due to cording.  Given: Symptoms/condition management, Edema management  Program: Reinforced  How Provided: Verbal, Demonstration, Written  Provided to: Patient  Level of Understanding: Verbalized, Demonstrated  21873 - OT Self Care/Mgmt Minutes: 25         OT Goals       Row Name 09/05/24 1331          Time Calculation    OT Goal Re-Cert Due Date 10/05/24  -               User Key  (r) = Recorded By, (t) = Taken By, (c) = Cosigned By      Initials Name Provider Type    Kim Hall OT Occupational Therapist                  1. Post Breast Surgery Care/at risk for Lymphedema  LTG 1: 90 days:  As an indicator of no exacerbation of lymphedema staging, the patient will present with an L-Dex score less than [10] points from preoperative baseline.              STATUS: on going        STG 1a:   30 days: To prevent exacerbation of mixed edema to lymphedema, patient will utilize the 2 postsurgical compression garments daily.                 STATUS: MET, on going  STG 1b: 30 days: Patient will be independent with self-manual lymphatic massage.               STATUS: on going  STG 1c: 30 days:  Patient will be independent with identification of signs and symptoms of lymphedema exasperation per stoplight to recovery education handout.              STATUS: on going  STG 1 d: 30 days: Patient will be independent with HEP to prevent advancement in lymphedema staging.              STATUS: on going  TREATMENT:  Self Care/ADL retraining, Therapeutic Activity, Neuromuscular Re-education, Therapeutic Exercise, Bioimpedence Fluid Analysis, Post-Surgical compression " [facility] [id number] [facility], Orthotic Management and training,  and Manual Therapy.    OT Assessment/Plan       Row Name 09/05/24 1330          OT Assessment    Functional Limitations Decreased safety during functional activities  -SF     Impairments Impaired lymphatic circulation;Range of motion;Pain;Integumentary integrity  -SF     Assessment Comments Jyoti was diagnosed with right breast cancer in 2004 where she had 14 lymph nodes removed.  She had surgery 12/19/2023 to investigate large lymph node in left upper extremity and had 5 lymph nodes removed.  Patient is demonstrating normalized lymphatic functioning however presents with lymphatic cording in left and right axilla.  Patient would benefit from treatment at this time to address lymphatic cording.  Patient will continue to benefit from skilled occupational therapy to further evaluate ongoing lymphatic functioning to prevent advancement in lymphedema staging, increased pain and decreased range of motion.  -SF     OT Diagnosis At risk for lymphedema  -SF     OT Rehab Potential Good  -SF     Patient/caregiver participated in establishment of treatment plan and goals Yes  -SF     Patient would benefit from skilled therapy intervention Yes  -SF        OT Plan    OT Frequency 1x/week;2x/week  -SF     Predicted Duration of Therapy Intervention (OT) 4 weeks  -SF     Planned CPT's? OT EVAL MOD COMPLEXITY: 36102;OT THER PROC EA 15 MIN: [id number];OT SELF CARE/MGMT/TRAIN 15 MIN: 97551;OT MANUAL THERAPY EA 15 MIN: 06928;OT ORTHOTIC MGMT/TRAIN EA 15 MIN: 69589;OT WC MGMT EA 15 MIN: 60576  -SF     Planned Therapy Interventions (Optional Details) joint mobilization;manual therapy techniques;orthotic fitting/training;patient/family education;ROM (Range of Motion);strengthening;wound care  -SF     OT Plan Comments Continue POC  -SF               User Key  (r) = Recorded By, (t) = Taken By, (c) = Cosigned By      Initials Name Provider Type     SF Kim Morales OT Occupational Therapist                              Time Calculation:   Timed Charges  85071 - OT Self Care/Mgmt Minutes: 25  Total Minutes  Timed Charges Total Minutes: 25   Total Minutes: 25     Therapy Charges for Today       Code Description Service Date Service Provider Modifiers Qty    32235165368 HC PT BIS XTRACELL FLUID ANALYSIS 9/5/2024 Kim Morales OT  1    37075451312 HC OT SELF CARE/MGMT/TRAIN EA 15 MIN 9/5/2024 Kim Morales OT GO 2                      Kim Morales OT  9/5/2024

## 2024-09-06 ENCOUNTER — OFFICE VISIT (OUTPATIENT)
Dept: DIABETES SERVICES | Facility: HOSPITAL | Age: 74
End: 2024-09-06
Payer: MEDICARE

## 2024-09-06 VITALS
HEART RATE: 68 BPM | WEIGHT: 164.4 LBS | HEIGHT: 62 IN | BODY MASS INDEX: 30.25 KG/M2 | DIASTOLIC BLOOD PRESSURE: 72 MMHG | OXYGEN SATURATION: 99 % | TEMPERATURE: 98 F | SYSTOLIC BLOOD PRESSURE: 135 MMHG

## 2024-09-06 DIAGNOSIS — E66.9 OBESITY (BMI 30-39.9): Primary | ICD-10-CM

## 2024-09-06 DIAGNOSIS — N18.2 CONTROLLED TYPE 2 DIABETES MELLITUS WITH STAGE 2 CHRONIC KIDNEY DISEASE, WITH LONG-TERM CURRENT USE OF INSULIN: ICD-10-CM

## 2024-09-06 DIAGNOSIS — Z79.4 CONTROLLED TYPE 2 DIABETES MELLITUS WITH STAGE 2 CHRONIC KIDNEY DISEASE, WITH LONG-TERM CURRENT USE OF INSULIN: ICD-10-CM

## 2024-09-06 DIAGNOSIS — E11.22 CONTROLLED TYPE 2 DIABETES MELLITUS WITH STAGE 2 CHRONIC KIDNEY DISEASE, WITH LONG-TERM CURRENT USE OF INSULIN: ICD-10-CM

## 2024-09-06 LAB — GLUCOSE BLDC GLUCOMTR-MCNC: 126 MG/DL (ref 70–99)

## 2024-09-06 PROCEDURE — 82948 REAGENT STRIP/BLOOD GLUCOSE: CPT | Performed by: NURSE PRACTITIONER

## 2024-09-06 PROCEDURE — 99214 OFFICE O/P EST MOD 30 MIN: CPT | Performed by: NURSE PRACTITIONER

## 2024-09-06 PROCEDURE — G0463 HOSPITAL OUTPT CLINIC VISIT: HCPCS | Performed by: NURSE PRACTITIONER

## 2024-09-06 PROCEDURE — 3075F SYST BP GE 130 - 139MM HG: CPT | Performed by: NURSE PRACTITIONER

## 2024-09-06 PROCEDURE — 1159F MED LIST DOCD IN RCRD: CPT | Performed by: NURSE PRACTITIONER

## 2024-09-06 PROCEDURE — 3078F DIAST BP <80 MM HG: CPT | Performed by: NURSE PRACTITIONER

## 2024-09-06 PROCEDURE — 1160F RVW MEDS BY RX/DR IN RCRD: CPT | Performed by: NURSE PRACTITIONER

## 2024-09-06 PROCEDURE — 3044F HG A1C LEVEL LT 7.0%: CPT | Performed by: NURSE PRACTITIONER

## 2024-09-06 RX ORDER — PEN NEEDLE, DIABETIC 32GX 5/32"
1 NEEDLE, DISPOSABLE MISCELLANEOUS DAILY
Qty: 100 EACH | Refills: 1 | Status: SHIPPED | OUTPATIENT
Start: 2024-09-06

## 2024-09-06 RX ORDER — DULAGLUTIDE 3 MG/.5ML
3 INJECTION, SOLUTION SUBCUTANEOUS
Qty: 6 ML | Refills: 1 | Status: SHIPPED | OUTPATIENT
Start: 2024-09-06 | End: 2025-02-21

## 2024-09-06 NOTE — PROGRESS NOTES
Chief Complaint  Diabetes (F/u, med mgt)    Referred By: Ramo Noguera MD    Subjective          Jyoti Alvarez presents to [facility] for diabetes medication management    History of Present Illness    Visit type:  follow-up  Diabetes type:  Type 2  Current diabetes status/concerns/issues:  No major concerns with her diabetes  Other health concerns:  she is going to lymphedema clinic for left arm issues  Current Diabetes symptoms:    Polyuria: No   Polydipsia: No   Polyphagia: No   Blurred vision: No   Excessive fatigue: No  Known Diabetes complications:  Neuropathy: None; Location: N/A  Renal: Stage II mild (GFR = 60-89 mL/min) and Microalbuminuria - NEGATIVE  Eyes: No current eye exam available in record; Location: N/A  Amputation/Wounds: None  GI: None  Cardiovascular: Hypertension and Hyperlipidemia  ED: N/A  Other: None  Hypoglycemia:  None reported at this time  Hypoglycemia Symptoms:  No hypoglycemia at this time  Current diabetes treatment:  Trulicity 3 mg once weekly, Lantus taking 50 units once every morning and glimepiride 2 mg twice a day.    Blood glucose device:  Meter  Blood glucose monitoring frequency:  1 - 2  Blood glucose range/average:     Glucose Source: BG Logs  Diet:  Limits high carb/sweet foods, Avoids sugary drinks  Activity/Exercise: She goes to the gym regularly    Past Medical History:   Diagnosis Date    Anemia     Ankle sprain     Arthritis     Asthma     INHALERS PRN    Breast cancer     RIGHT    Colon polyp     COVID 09/20/2021    Disease of thyroid gland Hypothyroidism    Fatty liver     ASYMPTOMATIC    Gastroesophageal reflux     Hip arthrosis     HL (hearing loss) Hearing aids 2021    Hyperlipemia     Hypertension     Knee swelling     Limb swelling     Seasonal allergies     Skin cancer     Sleep apnea     Strain of latissimus dorsi muscle 09/11/2017    Right    Type 2 diabetes mellitus     AM  IN AM    Vertigo      Past  Surgical History:   Procedure Laterality Date    AXILLARY LYMPH NODE BIOPSY/EXCISION Left 2023    Procedure: LEFT AXILLARY LYMPH NODE BIOPSY/EXCISION, MAGSEED;  Surgeon: Kathy Rausch MD;  [place] [facility] [place] [facility] [place]  Service: General;  Laterality: Left;    BREAST AUGMENTATION BILATERAL MASTOPEXY      BREAST RECONSTRUCTION Bilateral 2022    Procedure: BILATERAL BREAST RECONSTRUCTION WITH REMOVAL AND REPLACEMENT OF IMPLANTS, MESH PLACEMENT, CHEST WALL SKIN RESECTION, CHEST LIPOSUCTION;  Surgeon: Romain Quezada MD;  [place] [facility] [place] [facility] [place]  Service: Plastics;  Laterality: Bilateral;    BREAST SURGERY      Bilateral mastectomies and reconstruction     SECTION      COLONOSCOPY  2019    COLONOSCOPY N/A 10/18/2022    Procedure: COLONOSCOPY POLYPECTOMIES;  Surgeon: Eduardo Salas MD;  Location: [facility] ENDOSCOPY;  Service: Gastroenterology;  Laterality: N/A;  COLON POLYPS    COSMETIC SURGERY      Bilateral mastectomies and reconstructio    ENDOSCOPY      EYE SURGERY  Not sure    Cateracts    FOOT SURGERY      KNEE ARTHROSCOPY W/ LATERAL RELEASE / MEDIAL IMBRICATION Left     TORN CARTIDLGE REPAIR    KNEE SURGERY      LIPOSUCTION N/A 2022    Procedure: CHEST WALL LIPOSUCTION AND CHEST WALL RESECTION;  Surgeon: Romain Quezada MD;  [place] [facility] [place] [facility] [place]  Service: Plastics;  Laterality: N/A;  TIMES BASED ON PREVIOUS EXPERIENCE    MASTECTOMY COMPLETE / SIMPLE Bilateral     OOPHORECTOMY      OTHER SURGICAL HISTORY      Joint surgery,  RIGHT FOOT SECOND  TOE WITH A PIN    SKIN CANCER EXCISION Right 2020    neck    TOE SURGERY Right     2nd digit pin inserted    TONSILLECTOMY       Family History   Problem Relation Age of Onset    Heart disease Father     Cancer Father         Skin cancer    Heart failure Father         Age 79  cause of death.    Heart disease Mother     Breast cancer Mother         Breast cancer age 45 and 60    Diabetes Mother     Arthritis  Mother     Osteoporosis Mother     Heart failure Mother         Heart attack age 60. Age 87. Cause of death.    Hypertension Mother     Osteoarthritis Mother     Deep vein thrombosis Mother     Cancer Brother         Prostate cancer age about 58.    Prostate cancer Brother         unsure age    Breast cancer Sister 27        Breast cancer age 27 and 52. Ovarian cancer age 53 caused death at 56.    Diabetes Sister     Ovarian cancer Sister 53    Arthritis Sister     Breast cancer Daughter 40    Breast cancer Maternal Aunt         mid 40s    Ovarian cancer Maternal Aunt          with ovarian cancer age mid 40s    Malig Hyperthermia Neg Hx     Colon cancer Neg Hx     Uterine cancer Neg Hx     Pulmonary embolism Neg Hx      Social History     Socioeconomic History    Marital status:     Number of children: 3   Tobacco Use    Smoking status: Never     Passive exposure: Never    Smokeless tobacco: Never   Vaping Use    Vaping status: Never Used   Substance and Sexual Activity    Alcohol use: Never    Drug use: Never    Sexual activity: Not Currently     Partners: Male     Birth control/protection: Post-menopausal     Comment:  2021     Allergies   Allergen Reactions    Exenatide Unknown - High Severity     Weight loss and pain in the right side. Bydereon.    Fluoxetine Hives    Gabapentin Headache     Occular Miaigraines    Jardiance [Empagliflozin] Other (See Comments)     UTI    Levaquin [Levofloxacin] Hives    Nickel Itching and Rash    Sulfa Antibiotics Swelling    Metformin Nausea Only    Hibiclens [Chlorhexidine Gluconate] Rash       Current Outpatient Medications:     acetaminophen (TYLENOL) 650 MG 8 hr tablet, Take 1 tablet by mouth 2 (two) times a day., Disp: , Rfl:     albuterol sulfate  (90 Base) MCG/ACT inhaler, USE 2 INHALATIONS EVERY 6 HOURS AS NEEDED, Disp: 51 g, Rfl: 2    aspirin 81 MG EC tablet, Take 1 tablet by mouth Daily. OTC, Disp: , Rfl:     atorvastatin (LIPITOR) 40  MG tablet, TAKE 1 TABLET EVERY NIGHT, Disp: 90 tablet, Rfl: 3    Blood Glucose Monitoring Suppl device, Use as directed for blood glucose monitoring, Disp: 1 each, Rfl: 0    D3-50 1.25 MG (92032 UT) capsule, TAKE ONE CAPSULE BY MOUTH ONCE WEEKLY, Disp: 12 capsule, Rfl: 1    Dulaglutide (Trulicity) 3 MG/0.5ML solution pen-injector, Inject 0.5 mL under the skin into the appropriate area as directed Every 7 (Seven) Days for 168 days., Disp: 6 mL, Rfl: 1    ferrous sulfate 325 (65 FE) MG tablet, Take 1 tablet by mouth Daily With Breakfast. OTC, Disp: , Rfl:     fluticasone (FLONASE) 50 MCG/ACT nasal spray, USE 1 SPRAY IN EACH NOSTRIL ONCE DAILY, Disp: 48 g, Rfl: 2    Fluticasone-Salmeterol (ADVAIR/WIXELA) 500-50 MCG/ACT DISKUS, USE 1 INHALATION TWICE A DAY, Disp: 180 each, Rfl: 3    glimepiride (AMARYL) 2 MG tablet, TAKE 1 TABLET DAILY WITH BREAKFAST AND DINNER, Disp: 180 tablet, Rfl: 3    glucose blood (OneTouch Verio) test strip, USE TO TEST BLOOD SUGAR THREE TIMES DAILY, Disp: 300 each, Rfl: 12    Insulin Glargine (LANTUS SOLOSTAR) 100 UNIT/ML injection pen, Inject 50 Units under the skin into the appropriate area as directed Daily., Disp: 45 mL, Rfl: 1    Insulin Pen Needle (BD Pen Needle Ca 2nd Gen) 32G X 4 MM misc, Use 1 each Daily., Disp: 100 each, Rfl: 1    levothyroxine (SYNTHROID, LEVOTHROID) 25 MCG tablet, TAKE 1 TABLET DAILY IN THE MORNING ON AN EMPTY STOMACH, Disp: 90 tablet, Rfl: 3    losartan (COZAAR) 50 MG tablet, TAKE 1 TABLET DAILY, Disp: 90 tablet, Rfl: 3    metoprolol succinate XL (TOPROL-XL) 100 MG 24 hr tablet, TAKE 1 TABLET DAILY, Disp: 90 tablet, Rfl: 3    montelukast (SINGULAIR) 10 MG tablet, TAKE 1 TABLET DAILY, Disp: 90 tablet, Rfl: 3    multivitamin with minerals tablet tablet, Take 1 tablet by mouth Daily., Disp: , Rfl:     neomycin-polymyxin-dexamethamethasone (POLYDEX) 3.5-54491-0.1 ointment ophthalmic ointment, Pt uses as needed, Disp: , Rfl:     OneTouch Delica Lancets 30G misc, Use 1  "each 3 (Three) Times a Day., Disp: 200 each, Rfl: 2    pantoprazole (PROTONIX) 40 MG EC tablet, TAKE 1 TABLET DAILY, Disp: 90 tablet, Rfl: 3    Probiotic Product (PROBIOTIC PO), Take 1 fluid ounce by mouth Daily. Plexus powder, Disp: , Rfl:     vitamin B-12 (CYANOCOBALAMIN) 500 MCG tablet, Take 1 tablet by mouth Daily., Disp: , Rfl:     Objective     Vitals:    09/06/24 1421   BP: 135/72   BP Location: Right arm   Patient Position: Sitting   Cuff Size: Adult   Pulse: 68   Temp: 98 °F (36.7 °C)   TempSrc: Temporal   SpO2: 99%   Weight: 74.6 kg (164 lb 6.4 oz)   Height: 157.5 cm (62\")     Body mass index is 30.07 kg/m².    Physical Exam  Constitutional:       Appearance: Normal appearance. She is obese.      Comments: Obesity (BMI 30 - 39.9) Pt Current BMI = 30.07    HENT:      Head: Normocephalic and atraumatic.      Right Ear: External ear normal.      Left Ear: External ear normal.      Nose: Nose normal.   Eyes:      Extraocular Movements: Extraocular movements intact.      Conjunctiva/sclera: Conjunctivae normal.   Pulmonary:      Effort: Pulmonary effort is normal.   Musculoskeletal:         General: Normal range of motion.      Cervical back: Normal range of motion.   Skin:     General: Skin is warm and dry.   Neurological:      General: No focal deficit present.      Mental Status: She is alert and oriented to person, place, and time. Mental status is at baseline.   Psychiatric:         Mood and Affect: Mood normal.         Behavior: Behavior normal.         Thought Content: Thought content normal.         Judgment: Judgment normal.             Result Review :   The following data was reviewed by: FANY Thakur on 09/06/2024:    Most Recent A1C          8/30/2024    11:31   HGBA1C Most Recent   Hemoglobin A1C 6.50        A1C Last 3 Results          2/22/2024    13:08 6/3/2024    15:11 8/30/2024    11:31   HGBA1C Last 3 Results   Hemoglobin A1C 7.10  6.80  6.50      A1c collected on 8/30/2024  is " 6.5%, indicating Controlled Type II diabetes.  This result is down from the prior result of 6.8% collected on 6/3/24     Glucose   Date Value Ref Range Status   09/06/2024 126 (H) 70 - 99 mg/dL Final     Comment:     Serial Number: [id number]:  [id number]     Point of care glucose in the office today is within normal limits for nonfasting glucose    Creatinine   Date Value Ref Range Status   08/30/2024 0.77 0.57 - 1.00 mg/dL Final   06/03/2024 0.76 0.57 - 1.00 mg/dL Final     eGFR   Date Value Ref Range Status   08/30/2024 80.6 >60.0 mL/min/1.73 Final   06/03/2024 81.8 >60.0 mL/min/1.73 Final     Labs collected on 8/30/2024 show Stage II mild (GFR = 60-89mL/min)      Total Cholesterol   Date Value Ref Range Status   08/30/2024 148 0 - 200 mg/dL Final   02/22/2024 138 0 - 200 mg/dL Final     Triglycerides   Date Value Ref Range Status   08/30/2024 170 (H) 0 - 150 mg/dL Final   02/22/2024 144 0 - 150 mg/dL Final     HDL Cholesterol   Date Value Ref Range Status   08/30/2024 43 40 - 60 mg/dL Final   02/22/2024 39 (L) 40 - 60 mg/dL Final     LDL Cholesterol    Date Value Ref Range Status   08/30/2024 76 0 - 100 mg/dL Final   02/22/2024 74 0 - 100 mg/dL Final     Lipid panel collected on 8/30/2024 shows Hypercholesterolemia and Hypertriglyceridemia            Assessment: Her A1c remains very well-controlled without problematic hypoglycemia.  She remains very active exercising routinely.  She is also monitoring her diet very closely.      Diagnoses and all orders for this visit:    1. Obesity (BMI 30-39.9) (Primary)    2. Controlled type 2 diabetes mellitus with stage 2 chronic kidney disease, with long-term current use of insulin  -     Dulaglutide (Trulicity) 3 MG/0.5ML solution pen-injector; Inject 0.5 mL under the skin into the appropriate area as directed Every 7 (Seven) Days for 168 days.  Dispense: 6 mL; Refill: 1  -     Insulin Glargine (LANTUS SOLOSTAR) 100 UNIT/ML injection pen; Inject 50 Units under the  skin into the appropriate area as directed Daily.  Dispense: 45 mL; Refill: 1  -     Insulin Pen Needle (BD Pen Needle Ca 2nd Gen) 32G X 4 MM misc; Use 1 each Daily.  Dispense: 100 each; Refill: 1    Other orders  -     POC Glucose        Plan: No changes were made to her treatment plan today.  The patient be scheduled for routine follow-up appointment.    The patient will monitor her blood glucose levels twice a day.  If she develops problematic hyperglycemia or hypoglycemia or adverse drug reactions, she will contact the office for further instructions.        Follow Up     Return in about 6 months (around 3/6/2025) for Medication Management.    Patient was given instructions and counseling regarding her condition or for health maintenance advice. Please see specific information pulled into the AVS if appropriate.     Kelly Vicente, FANY  09/06/2024      Dictated Utilizing Dragon Dictation.  Please note that portions of this note were completed with a voice recognition program.  Part of this note may be an electronic transcription/translation of spoken language to printed text using the Dragon Dictation System.

## 2024-09-09 ENCOUNTER — HOSPITAL ENCOUNTER (OUTPATIENT)
Dept: BONE DENSITY | Facility: HOSPITAL | Age: 74
Discharge: HOME | End: 2024-09-09
Admitting: INTERNAL MEDICINE
Payer: MEDICARE

## 2024-09-09 DIAGNOSIS — Z78.0 POSTMENOPAUSE: ICD-10-CM

## 2024-09-09 PROCEDURE — 77080 DXA BONE DENSITY AXIAL: CPT

## 2024-09-13 ENCOUNTER — OFFICE VISIT (OUTPATIENT)
Dept: OBSTETRICS AND GYNECOLOGY | Facility: CLINIC | Age: 74
End: 2024-09-13
Payer: MEDICARE

## 2024-09-13 VITALS
SYSTOLIC BLOOD PRESSURE: 122 MMHG | BODY MASS INDEX: 30.36 KG/M2 | HEART RATE: 83 BPM | WEIGHT: 165 LBS | DIASTOLIC BLOOD PRESSURE: 83 MMHG | HEIGHT: 62 IN

## 2024-09-13 DIAGNOSIS — Z01.419 WELL WOMAN EXAM: Primary | ICD-10-CM

## 2024-09-24 ENCOUNTER — OFFICE VISIT (OUTPATIENT)
Dept: INTERNAL MEDICINE | Age: 74
End: 2024-09-24
Payer: MEDICARE

## 2024-09-24 VITALS
HEART RATE: 90 BPM | WEIGHT: 164.2 LBS | DIASTOLIC BLOOD PRESSURE: 74 MMHG | OXYGEN SATURATION: 98 % | SYSTOLIC BLOOD PRESSURE: 118 MMHG | TEMPERATURE: 97.7 F | HEIGHT: 62 IN | BODY MASS INDEX: 30.22 KG/M2

## 2024-09-24 DIAGNOSIS — M85.852 OSTEOPENIA OF BOTH HIPS: ICD-10-CM

## 2024-09-24 DIAGNOSIS — R26.81 GAIT INSTABILITY: ICD-10-CM

## 2024-09-24 DIAGNOSIS — E03.4 ATROPHY OF THYROID: ICD-10-CM

## 2024-09-24 DIAGNOSIS — Z79.4 TYPE 2 DIABETES MELLITUS WITHOUT COMPLICATION, WITH LONG-TERM CURRENT USE OF INSULIN: Primary | ICD-10-CM

## 2024-09-24 DIAGNOSIS — M85.851 OSTEOPENIA OF BOTH HIPS: ICD-10-CM

## 2024-09-24 DIAGNOSIS — E53.8 VITAMIN B12 DEFICIENCY: ICD-10-CM

## 2024-09-24 DIAGNOSIS — E11.9 TYPE 2 DIABETES MELLITUS WITHOUT COMPLICATION, WITH LONG-TERM CURRENT USE OF INSULIN: Primary | ICD-10-CM

## 2024-09-24 DIAGNOSIS — E55.9 VITAMIN D DEFICIENCY: ICD-10-CM

## 2024-09-24 DIAGNOSIS — H61.23 BILATERAL IMPACTED CERUMEN: ICD-10-CM

## 2024-09-24 DIAGNOSIS — E78.2 MIXED HYPERLIPIDEMIA: ICD-10-CM

## 2024-09-24 DIAGNOSIS — I10 ESSENTIAL HYPERTENSION: ICD-10-CM

## 2024-09-24 PROCEDURE — 1125F AMNT PAIN NOTED PAIN PRSNT: CPT | Performed by: INTERNAL MEDICINE

## 2024-09-24 PROCEDURE — 3044F HG A1C LEVEL LT 7.0%: CPT | Performed by: INTERNAL MEDICINE

## 2024-09-24 PROCEDURE — 90662 IIV NO PRSV INCREASED AG IM: CPT | Performed by: INTERNAL MEDICINE

## 2024-09-24 PROCEDURE — 1159F MED LIST DOCD IN RCRD: CPT | Performed by: INTERNAL MEDICINE

## 2024-09-24 PROCEDURE — 99215 OFFICE O/P EST HI 40 MIN: CPT | Performed by: INTERNAL MEDICINE

## 2024-09-24 PROCEDURE — 1160F RVW MEDS BY RX/DR IN RCRD: CPT | Performed by: INTERNAL MEDICINE

## 2024-09-24 PROCEDURE — G0008 ADMIN INFLUENZA VIRUS VAC: HCPCS | Performed by: INTERNAL MEDICINE

## 2024-09-24 PROCEDURE — 3074F SYST BP LT 130 MM HG: CPT | Performed by: INTERNAL MEDICINE

## 2024-09-24 PROCEDURE — 3078F DIAST BP <80 MM HG: CPT | Performed by: INTERNAL MEDICINE

## 2024-09-24 RX ORDER — INSULIN GLARGINE 100 [IU]/ML
45 INJECTION, SOLUTION SUBCUTANEOUS DAILY
COMMUNITY

## 2024-09-26 ENCOUNTER — HOSPITAL ENCOUNTER (OUTPATIENT)
Dept: OCCUPATIONAL THERAPY | Facility: HOSPITAL | Age: 74
Setting detail: THERAPIES SERIES
Discharge: HOME | End: 2024-09-26
Payer: MEDICARE

## 2024-09-26 DIAGNOSIS — C50.412 MALIGNANT NEOPLASM OF UPPER-OUTER QUADRANT OF LEFT BREAST IN FEMALE, ESTROGEN RECEPTOR POSITIVE: ICD-10-CM

## 2024-09-26 DIAGNOSIS — Z17.0 MALIGNANT NEOPLASM OF UPPER-OUTER QUADRANT OF RIGHT BREAST IN FEMALE, ESTROGEN RECEPTOR POSITIVE: ICD-10-CM

## 2024-09-26 DIAGNOSIS — Z17.0 MALIGNANT NEOPLASM OF UPPER-OUTER QUADRANT OF LEFT BREAST IN FEMALE, ESTROGEN RECEPTOR POSITIVE: ICD-10-CM

## 2024-09-26 DIAGNOSIS — L90.5 SCAR TISSUE: ICD-10-CM

## 2024-09-26 DIAGNOSIS — C50.919 MALIGNANT NEOPLASM OF FEMALE BREAST, UNSPECIFIED ESTROGEN RECEPTOR STATUS, UNSPECIFIED LATERALITY, UNSPECIFIED SITE OF BREAST: ICD-10-CM

## 2024-09-26 DIAGNOSIS — L90.5 SCAR CONDITION AND FIBROSIS OF SKIN: ICD-10-CM

## 2024-09-26 DIAGNOSIS — M25.60 JOINT STIFFNESS: ICD-10-CM

## 2024-09-26 DIAGNOSIS — R52 PAIN: ICD-10-CM

## 2024-09-26 DIAGNOSIS — Z91.89 AT RISK FOR LYMPHEDEMA: Primary | ICD-10-CM

## 2024-09-26 DIAGNOSIS — C50.411 MALIGNANT NEOPLASM OF UPPER-OUTER QUADRANT OF RIGHT BREAST IN FEMALE, ESTROGEN RECEPTOR POSITIVE: ICD-10-CM

## 2024-09-26 PROCEDURE — 97140 MANUAL THERAPY 1/> REGIONS: CPT

## 2024-10-03 ENCOUNTER — HOSPITAL ENCOUNTER (OUTPATIENT)
Dept: OCCUPATIONAL THERAPY | Facility: HOSPITAL | Age: 74
Setting detail: THERAPIES SERIES
Discharge: HOME | End: 2024-10-03
Payer: MEDICARE

## 2024-10-03 DIAGNOSIS — Z17.0 MALIGNANT NEOPLASM OF UPPER-OUTER QUADRANT OF LEFT BREAST IN FEMALE, ESTROGEN RECEPTOR POSITIVE: ICD-10-CM

## 2024-10-03 DIAGNOSIS — C50.411 MALIGNANT NEOPLASM OF UPPER-OUTER QUADRANT OF RIGHT BREAST IN FEMALE, ESTROGEN RECEPTOR POSITIVE: ICD-10-CM

## 2024-10-03 DIAGNOSIS — Z17.0 MALIGNANT NEOPLASM OF UPPER-OUTER QUADRANT OF RIGHT BREAST IN FEMALE, ESTROGEN RECEPTOR POSITIVE: ICD-10-CM

## 2024-10-03 DIAGNOSIS — C50.412 MALIGNANT NEOPLASM OF UPPER-OUTER QUADRANT OF LEFT BREAST IN FEMALE, ESTROGEN RECEPTOR POSITIVE: ICD-10-CM

## 2024-10-03 DIAGNOSIS — C50.919 MALIGNANT NEOPLASM OF FEMALE BREAST, UNSPECIFIED ESTROGEN RECEPTOR STATUS, UNSPECIFIED LATERALITY, UNSPECIFIED SITE OF BREAST: ICD-10-CM

## 2024-10-03 DIAGNOSIS — Z91.89 AT RISK FOR LYMPHEDEMA: Primary | ICD-10-CM

## 2024-10-03 PROCEDURE — 97140 MANUAL THERAPY 1/> REGIONS: CPT

## 2024-10-03 RX ORDER — PANTOPRAZOLE SODIUM 40 MG/1
TABLET, DELAYED RELEASE ORAL
Qty: 90 TABLET | Refills: 3 | Status: SHIPPED | OUTPATIENT
Start: 2024-10-03

## 2024-10-03 RX ORDER — LEVOTHYROXINE SODIUM 25 UG/1
TABLET ORAL
Qty: 90 TABLET | Refills: 3 | Status: SHIPPED | OUTPATIENT
Start: 2024-10-03

## 2024-10-03 NOTE — THERAPY TREATMENT NOTE
Outpatient Occupational Therapy Lymphedema Treatment Note   Yan[facility]     Patient Name: Jyoti Alvarez  : 1950  MRN: [id number]  Today's Date: 10/3/2024      Visit Date: 10/03/2024    Patient Active Problem List   Diagnosis    Breast cancer    Osteopenia of both hips    Colon polyp    Gastroesophageal reflux disease without esophagitis    Essential hypertension    Fatty liver    Vitamin D deficiency    Type 2 diabetes mellitus without complication, with long-term current use of insulin    Mixed hyperlipidemia    Atrophy of thyroid    Iron deficiency anemia secondary to inadequate dietary iron intake    Vertigo    Vitamin B12 deficiency    SHALONDA on CPAP    Morbid (severe) obesity due to excess calories    Medicare annual wellness visit, subsequent    Lymphadenopathy    Gait instability    Bilateral impacted cerumen        Past Medical History:   Diagnosis Date    Anemia     Ankle sprain     Arthritis     Asthma     INHALERS PRN    Breast cancer     RIGHT    Colon polyp     COVID 2021    Disease of thyroid gland Hypothyroidism    Fatty liver     ASYMPTOMATIC    Gastroesophageal reflux     Hip arthrosis     HL (hearing loss) Hearing aids     Hyperlipemia     Hypertension     Knee swelling     Limb swelling     Seasonal allergies     Skin cancer     Sleep apnea     Strain of latissimus dorsi muscle 2017    Right    Type 2 diabetes mellitus     AM  IN AM    Vertigo         Past Surgical History:   Procedure Laterality Date    AXILLARY LYMPH NODE BIOPSY/EXCISION Left 2023    Procedure: LEFT AXILLARY LYMPH NODE BIOPSY/EXCISION, MAGSEED;  Surgeon: Kathy Rausch MD;  [place] [facility] [place] [facility] [place]  Service: General;  Laterality: Left;    BREAST RECONSTRUCTION Bilateral 2022    Procedure: BILATERAL BREAST RECONSTRUCTION WITH REMOVAL AND REPLACEMENT OF IMPLANTS, MESH PLACEMENT, CHEST WALL SKIN RESECTION, CHEST LIPOSUCTION;  Surgeon: Romain Quezada MD;  [facility];  Service: Plastics;  Laterality: Bilateral;     SECTION      COLONOSCOPY  2019    COLONOSCOPY N/A 10/18/2022    Procedure: COLONOSCOPY POLYPECTOMIES;  Surgeon: Eduardo Salas MD;  Location: [facility] END[facility];  Service: Gastroenterology;  Laterality: N/A;  COLON POLYPS    ENDOSCOPY      EYE SURGERY  Not sure    Cateracts    KNEE ARTHROSCOPY W/ LATERAL RELEASE / MEDIAL IMBRICATION Left     TORN CARTIDLGE REPAIR    LIPOSUCTION N/A 2022    Procedure: CHEST WALL LIPOSUCTION AND CHEST WALL RESECTION;  Surgeon: Romain Quezada MD;  [facility]: [facility];  Service: Plastics;  Laterality: N/A;  TIMES BASED ON PREVIOUS EXPERIENCE    MASTECTOMY COMPLETE / SIMPLE Bilateral 2004    OOPHORECTOMY      OTHER SURGICAL HISTORY      Joint surgery,  RIGHT FOOT SECOND  TOE WITH A PIN    SKIN CANCER EXCISION Right 2020    neck    TOE SURGERY Right     2nd digit pin inserted    TONSILLECTOMY           Visit Dx:      ICD-10-CM ICD-9-CM   1. At risk for lymphedema  Z91.89 V49.89   2. Malignant neoplasm of upper-outer quadrant of left breast in female, estrogen receptor positive  C50.412 174.4    Z17.0 V86.0   3. Malignant neoplasm of upper-outer quadrant of right breast in female, estrogen receptor positive  C50.411 174.4    Z17.0 V86.0   4. Malignant neoplasm of female breast, unspecified estrogen receptor status, unspecified laterality, unspecified site of breast  C50.919 174.9        Lymphedema       Row Name 10/03/24 1200             Subjective Pain    Able to rate subjective pain? yes  -SF      Pre-Treatment Pain Level 0  -SF      Post-Treatment Pain Level 0  -SF         Subjective    Subjective Comments Patient reports she felt the last treatment helped a lot.  -SF         Lymphedema Assessment    Lymphedema Classification LUE:;RUE:;at risk/stage 0  -SF      Lymphedema Cancer Related Sx bilateral;simple mastectomy;reconstructive;axillary dissection;sentinel node biopsy  -SF      Lymphedema  Surgery Comments RUE 2004; LUE 2023  -SF      Lymph Nodes Removed # 14  RUE: 14 LUE: 5  -SF      Positive Lymph Nodes # 0  -SF         Manual Lymphatic Drainage    Manual Lymphatic Drainage opened regional lymph nodes;opened anastamoses;extremity treatment  -SF      Initial Sequence cervical;supraclavicular;shoulder collectors  -SF      Opened Regional Lymph Nodes axillary;other lymph nodes  parasternal  -SF      Opened Anastamoses axillo-inguinal  -SF      Extremity Treatment MLD to full limb  -SF      MLD to Proximal Limb Only LUE  -SF      Manual Lymphatic Drainage Comments PORi protocol utilized - Trigger point release provided throughout RUE with muscle manipulation. Therapist also providing trigger point release to pec minor and major, upper trap, lateral trunk  -SF                User Key  (r) = Recorded By, (t) = Taken By, (c) = Cosigned By      Initials Name Provider Type    Kim Hall OT Occupational Therapist                             OT Assessment/Plan       Row Name 10/03/24 8958          OT Assessment    Functional Limitations Decreased safety during functional activities  -SF     Impairments Impaired lymphatic circulation;Range of motion;Pain;Integumentary integrity  -SF     Assessment Comments Patient tolerated treatment well with no visible swelling noted on this date. Patient will continue to benefit from skilled occupational therapy to further evaluate ongoing lymphatic functioning to prevent advancement in lymphedema staging, increased pain and decreased ROM  -SF     OT Diagnosis At risk for lymphedema  -SF     OT Rehab Potential Good  -SF     Patient/caregiver participated in establishment of treatment plan and goals Yes  -SF     Patient would benefit from skilled therapy intervention Yes  -SF        OT Plan    OT Plan Comments Continue POC  -SF               User Key  (r) = Recorded By, (t) = Taken By, (c) = Cosigned By      Initials Name Provider Type    Kim Hall OT Occupational  Therapist                        Manual Rx (Last 36 Hours)       Manual Treatments       Row Name 10/03/24 1406             Total Minutes    74449 - OT Manual Therapy Minutes 28  -SF                User Key  (r) = Recorded By, (t) = Taken By, (c) = Cosigned By      Initials Name Provider Type    SF Kim Morales OT Occupational Therapist                                       Time Calculation:   Timed Charges  57118 - OT Manual Therapy Minutes: 28  Total Minutes  Timed Charges Total Minutes: 28   Total Minutes: 28     Therapy Charges for Today       Code Description Service Date Service Provider Modifiers Qty    [id number]  OT MANUAL THERAPY EA 15 MIN 10/3/2024 Kim Morales OT GO 2                        Kim Morales OT  10/3/2024

## 2024-10-08 ENCOUNTER — HOSPITAL ENCOUNTER (OUTPATIENT)
Dept: OCCUPATIONAL THERAPY | Facility: HOSPITAL | Age: 74
Setting detail: THERAPIES SERIES
Discharge: HOME | End: 2024-10-08
Payer: MEDICARE

## 2024-10-08 DIAGNOSIS — C50.412 MALIGNANT NEOPLASM OF UPPER-OUTER QUADRANT OF LEFT BREAST IN FEMALE, ESTROGEN RECEPTOR POSITIVE: ICD-10-CM

## 2024-10-08 DIAGNOSIS — C50.919 MALIGNANT NEOPLASM OF FEMALE BREAST, UNSPECIFIED ESTROGEN RECEPTOR STATUS, UNSPECIFIED LATERALITY, UNSPECIFIED SITE OF BREAST: ICD-10-CM

## 2024-10-08 DIAGNOSIS — Z91.89 AT RISK FOR LYMPHEDEMA: Primary | ICD-10-CM

## 2024-10-08 DIAGNOSIS — Z17.0 MALIGNANT NEOPLASM OF UPPER-OUTER QUADRANT OF LEFT BREAST IN FEMALE, ESTROGEN RECEPTOR POSITIVE: ICD-10-CM

## 2024-10-08 DIAGNOSIS — L90.5 SCAR CONDITION AND FIBROSIS OF SKIN: ICD-10-CM

## 2024-10-08 DIAGNOSIS — Z17.0 MALIGNANT NEOPLASM OF UPPER-OUTER QUADRANT OF RIGHT BREAST IN FEMALE, ESTROGEN RECEPTOR POSITIVE: ICD-10-CM

## 2024-10-08 DIAGNOSIS — C50.411 MALIGNANT NEOPLASM OF UPPER-OUTER QUADRANT OF RIGHT BREAST IN FEMALE, ESTROGEN RECEPTOR POSITIVE: ICD-10-CM

## 2024-10-08 PROCEDURE — 93702 BIS XTRACELL FLUID ANALYSIS: CPT

## 2024-10-08 PROCEDURE — 97535 SELF CARE MNGMENT TRAINING: CPT

## 2024-10-08 NOTE — THERAPY RE-EVALUATION
Outpatient Occupational Therapy Lymphedema Re-Evaluation   [place]     Patient Name: Jyoti Alvarez  : 1950  MRN: [id number]  Today's Date: 10/8/2024      Visit Date: 10/08/2024    Patient Active Problem List   Diagnosis    Breast cancer    Osteopenia of both hips    Colon polyp    Gastroesophageal reflux disease without esophagitis    Essential hypertension    Fatty liver    Vitamin D deficiency    Type 2 diabetes mellitus without complication, with long-term current use of insulin    Mixed hyperlipidemia    Atrophy of thyroid    Iron deficiency anemia secondary to inadequate dietary iron intake    Vertigo    Vitamin B12 deficiency    SHALONDA on CPAP    Morbid (severe) obesity due to excess calories    Medicare annual wellness visit, subsequent    Lymphadenopathy    Gait instability    Bilateral impacted cerumen        Past Medical History:   Diagnosis Date    Anemia     Ankle sprain     Arthritis     Asthma     INHALERS PRN    Breast cancer     RIGHT    Colon polyp     COVID 2021    Disease of thyroid gland Hypothyroidism    Fatty liver     ASYMPTOMATIC    Gastroesophageal reflux     Hip arthrosis     HL (hearing loss) Hearing aids     Hyperlipemia     Hypertension     Knee swelling     Limb swelling     Seasonal allergies     Skin cancer     Sleep apnea     Strain of latissimus dorsi muscle 2017    Right    Type 2 diabetes mellitus     AM  IN AM    Vertigo         Past Surgical History:   Procedure Laterality Date    AXILLARY LYMPH NODE BIOPSY/EXCISION Left 2023    Procedure: LEFT AXILLARY LYMPH NODE BIOPSY/EXCISION, MAGSEED;  Surgeon: Kathy Rausch MD;  [place] [facility] [place] [facility] [place]  Service: General;  Laterality: Left;    BREAST RECONSTRUCTION Bilateral 2022    Procedure: BILATERAL BREAST RECONSTRUCTION WITH REMOVAL AND REPLACEMENT OF IMPLANTS, MESH PLACEMENT, CHEST WALL SKIN RESECTION, CHEST LIPOSUCTION;  Surgeon: Romain Quezada MD;  Location: [facility];  Service: Plastics;  Laterality: Bilateral;     SECTION      COLONOSCOPY  2019    COLONOSCOPY N/A 10/18/2022    Procedure: COLONOSCOPY POLYPECTOMIES;  Surgeon: Eduardo Salas MD;  Location: formerly [facility] ENDOSCOPY;  Service: Gastroenterology;  Laterality: N/A;  COLON POLYPS    ENDOSCOPY  2019    EYE SURGERY  Not sure    Cateracts    KNEE ARTHROSCOPY W/ LATERAL RELEASE / MEDIAL IMBRICATION Left     TORN CARTIDLGE REPAIR    LIPOSUCTION N/A 2022    Procedure: CHEST WALL LIPOSUCTION AND CHEST WALL RESECTION;  Surgeon: Romain Quezada MD;  Location: [facility];  Service: Plastics;  Laterality: N/A;  TIMES BASED ON PREVIOUS EXPERIENCE    MASTECTOMY COMPLETE / SIMPLE Bilateral 2004    OOPHORECTOMY      OTHER SURGICAL HISTORY      Joint surgery,  RIGHT FOOT SECOND  TOE WITH A PIN    SKIN CANCER EXCISION Right 2020    neck    TOE SURGERY Right     2nd digit pin inserted    TONSILLECTOMY           Visit Dx:     ICD-10-CM ICD-9-CM   1. At risk for lymphedema  Z91.89 V49.89   2. Malignant neoplasm of upper-outer quadrant of right breast in female, estrogen receptor positive  C50.411 174.4    Z17.0 V86.0   3. Malignant neoplasm of upper-outer quadrant of left breast in female, estrogen receptor positive  C50.412 174.4    Z17.0 V86.0   4. Malignant neoplasm of female breast, unspecified estrogen receptor status, unspecified laterality, unspecified site of breast  C50.919 174.9   5. Scar condition and fibrosis of skin  L90.5 709.2        Patient History       Row Name 10/08/24 1400             History    Chief Complaint Other 1 (comment)  Lymphedema surveillance program  -SF      Brief Description of Current Complaint Pt had a history of breast cancer in the right breast in  where she had 14 lymphnodes removed at that time. Pt had a surgery on 2023 to investigate large lymphnodes in the LUE and had 5 removed at that time.  -SF         Fall Risk Assessment    Any falls in the past year: No   -SF      Does patient have a fear of falling No  -SF         Services    Prior Rehab/Home Health Experiences No  -SF      Are you currently receiving Home Health services No  -SF         Daily Activities    Primary Language English  -SF      Are you able to read Yes  -SF      Are you able to write Yes  -SF      How does patient learn best? Demonstration;Pictures/Video  -SF      Pt Participated in POC and Goals Yes  -SF         Safety    Are you being hurt, hit, or frightened by anyone at home or in your life? No  -SF      Are you being neglected by a caregiver No  -SF      Have you had any of the following issues with Anxiety  -SF                User Key  (r) = Recorded By, (t) = Taken By, (c) = Cosigned By      Initials Name Provider Type    SF Kim Morales OT Occupational Therapist                     Lymphedema       Row Name 10/08/24 1400             Subjective Pain    Able to rate subjective pain? yes  -SF      Pre-Treatment Pain Level 0  -SF      Post-Treatment Pain Level 0  -SF         Subjective    Subjective Comments Patient reports her LUE is feeling better. Patient reports occasional pain in RUE but believes it is more related to arthritis.  -SF         Lymphedema Assessment    Lymphedema Classification LUE:;RUE:;at risk/stage 0  -SF      Lymphedema Cancer Related Sx bilateral;simple mastectomy;reconstructive;axillary dissection;sentinel node biopsy  -SF      Lymphedema Surgery Comments RUE 2004; LUE 2023  -SF      Lymph Nodes Removed # 14  RUE: 14 LUE: 5  -SF      Positive Lymph Nodes # 0  -SF         LLIS - Physical Concerns    The amount of pain associated with my lymphedema is: 0  -SF      The amount of limb heaviness associated with my lymphedema is: 0  -SF      The amount of skin tightness associated with my lymphedema is: 0  -SF      The size of my swollen limb(s) seems: 0  -SF      Lymphedema affects the movement of my swollen limb(s): 0  -SF      The strength in my swollen limb(s) is: 0  -SF       "   LLIS - Psychosocial Concerns    Lymphedema affects my body image (i.e., \"how I think I look\"). 0  -SF      Lymphedema affects my socializing with others. 0  -SF      Lymphedema affects my intimate relations with spouse or partner (rate 0 if not applicable 0  -SF      Lymphedema \"gets me down\" (i.e., depression, frustration, or anger) 0  -SF      I must rely on others for help due to my lymphedema. 0  -SF      I know what to do to manage my lymphedema 1  -SF         LLIS - Functional Concerns    Lymphedema affects my ability to perform self-care activities (i.e. eating, dressing, hygiene) 0  -SF      Lymphedema affects my ability to perform routine home or work-related activities. 0  -SF      Lymphedema affects my performance of preferred leisure activities. 0  -SF      Lymphedema affects proper fit of clothing/shoes 0  -SF      Lymphedema affects my sleep 0  -SF         Posture/Observations    Posture- WNL Posture is WNL  -SF         General ROM    GENERAL ROM COMMENTS BUE WFL  -SF         MMT (Manual Muscle Testing)    General MMT Comments BUE WFL  -SF         L-Dex Bioimpedence Screening    L-Dex Measurement Extremity LUE  -SF      L-Dex Patient Position Standing  -SF      L-Dex UE Dominate Side Right  -SF      L-Dex UE At Risk Side Left  -SF      L-Dex UE Pre Surgical Value No  -SF      L-Dex UE Score -2.1  -SF      L-Dex UE Baseline Score -3  -SF      L-Dex UE Value Change 0.9  -SF      L-Dex UE Comment RUE 2.5  -SF      $ L-Dex Charge yes  -SF         Lymphedema Life Impact Scale Totals    A.  Total Q1 - Q17 (Do not include Q18) 1  -SF      B.  Total number of questions answered (Q1-Q17) 17  -SF      C. Divide A by B 0.06  -SF      D. Multiple C by 25 1.5  -SF                User Key  (r) = Recorded By, (t) = Taken By, (c) = Cosigned By      Initials Name Provider Type    Kim Hall OT Occupational Therapist                            Therapy Education  Education Details: Review of stoplight " program.  Given: Symptoms/condition management  Program: Reinforced  How Provided: Verbal  Provided to: Patient  Level of Understanding: Verbalized  76383 - OT Self Care/Mgmt Minutes: 15         OT Goals       Row Name 10/08/24 1424          Time Calculation    OT Goal Re-Cert Due Date 11/07/24  -SF               User Key  (r) = Recorded By, (t) = Taken By, (c) = Cosigned By      Initials Name Provider Type    Kim Hall, LAUREN Occupational Therapist                  1. Post Breast Surgery Care/at risk for Lymphedema  LTG 1: 90 days:  As an indicator of no exacerbation of lymphedema staging, the patient will present with an L-Dex score less than [10] points from preoperative baseline.              STATUS: met, ongoing     STG 1a:   30 days: To prevent exacerbation of mixed edema to lymphedema, patient will utilize the 2 postsurgical compression garments daily.                 STATUS: MET, on going  STG 1b: 30 days: Patient will be independent with self-manual lymphatic massage.               STATUS: on going  STG 1c: 30 days:  Patient will be independent with identification of signs and symptoms of lymphedema exasperation per stoplight to recovery education handout.              STATUS: on going  STG 1 d: 30 days: Patient will be independent with HEP to prevent advancement in lymphedema staging.              STATUS: on going  TREATMENT:  Self Care/ADL retraining, Therapeutic Activity, Neuromuscular Re-education, Therapeutic Exercise, Bioimpedence Fluid Analysis, Post-Surgical compression garement [address] [facility] [place] [employer] Kit 2860K, Orthotic Management and training,  and Manual Therapy.      OT Assessment/Plan       Row Name 10/08/24 1422          OT Assessment    Functional Limitations Decreased safety during functional activities  -SF     Impairments Impaired lymphatic circulation  -SF     Assessment Comments Jyoti has a history of breast cancer in the right breast in 2004 where she had 14  lymphnodes removed at that time. Pt had a surgery on 12/19/2023 to investigate large lymphnodes in the LUE and had 5 removed at that time. Patient is demonstrating normalized lymphatic functioning and improvements in tightness of LUE. Patient will continue to benefit from skilled occupational therapy to further evaluate ongoing lymphatic functioning to prevent advancement in lymphedema staging, increased pain and decreased ROM  -SF     OT Diagnosis At risk for lymphedema  -SF     OT Rehab Potential Good  -SF     Patient/caregiver participated in establishment of treatment plan and goals Yes  -SF     Patient would benefit from skilled therapy intervention Yes  -SF        OT Plan    OT Frequency Other (comment)  -SF     Predicted Duration of Therapy Intervention (OT) Patient will be reevaluated every 3 months years 1 through 3 and every 6 months years 4 and 5.  -SF     Planned CPT's? OT EVAL MOD COMPLEXITY: 72085;OT THER PROC EA 15 MIN: 74311EX;OT SELF CARE/MGMT/TRAIN 15 MIN: 66356;OT MANUAL THERAPY EA 15 MIN: 19172;OT ORTHOTIC MGMT/TRAIN EA 15 MIN: 47145;OT WC MGMT EA 15 MIN: 86471  -SF     Planned Therapy Interventions (Optional Details) joint mobilization;manual therapy techniques;orthotic fitting/training;patient/family education;ROM (Range of Motion);strengthening;wound care  -SF     OT Plan Comments Continue POC  -SF               User Key  (r) = Recorded By, (t) = Taken By, (c) = Cosigned By      Initials Name Provider Type    SF Kim Morales OT Occupational Therapist                              Time Calculation:   Timed Charges  54258 - OT Self Care/Mgmt Minutes: 15  Total Minutes  Timed Charges Total Minutes: 15   Total Minutes: 15     Therapy Charges for Today       Code Description Service Date Service Provider Modifiers Qty    24631768989 HC PT BIS XTRACELL FLUID ANALYSIS 10/8/2024 Kim Mroales OT  1    54121093761 HC OT SELF CARE/MGMT/TRAIN EA 15 MIN 10/8/2024 Kim Morales OT GO 1                       Kim Morales, OT  10/8/2024

## 2024-10-24 ENCOUNTER — CLINICAL SUPPORT (OUTPATIENT)
Dept: INTERNAL MEDICINE | Age: 74
End: 2024-10-24
Payer: MEDICARE

## 2024-10-24 DIAGNOSIS — H61.23 BILATERAL IMPACTED CERUMEN: ICD-10-CM

## 2024-10-24 DIAGNOSIS — H61.23 IMPACTED CERUMEN OF BOTH EARS: Primary | ICD-10-CM

## 2024-10-24 PROCEDURE — 69210 REMOVE IMPACTED EAR WAX UNI: CPT | Performed by: INTERNAL MEDICINE

## 2024-10-31 DIAGNOSIS — G47.33 OSA ON CPAP: Primary | ICD-10-CM

## 2024-10-31 RX ORDER — FLUTICASONE PROPIONATE AND SALMETEROL 500; 50 UG/1; UG/1
1 POWDER RESPIRATORY (INHALATION) 2 TIMES DAILY
Qty: 180 EACH | Refills: 3 | Status: SHIPPED | OUTPATIENT
Start: 2024-10-31

## 2024-12-06 ENCOUNTER — OFFICE VISIT (OUTPATIENT)
Dept: INTERNAL MEDICINE | Age: 74
End: 2024-12-06
Payer: MEDICARE

## 2024-12-06 VITALS
HEART RATE: 88 BPM | OXYGEN SATURATION: 95 % | WEIGHT: 166 LBS | BODY MASS INDEX: 30.55 KG/M2 | TEMPERATURE: 98.4 F | HEIGHT: 62 IN | SYSTOLIC BLOOD PRESSURE: 144 MMHG | DIASTOLIC BLOOD PRESSURE: 86 MMHG

## 2024-12-06 DIAGNOSIS — J02.9 SORE THROAT: ICD-10-CM

## 2024-12-06 DIAGNOSIS — U07.1 COVID: ICD-10-CM

## 2024-12-06 DIAGNOSIS — R05.1 ACUTE COUGH: Primary | ICD-10-CM

## 2024-12-06 LAB
EXPIRATION DATE: ABNORMAL
EXPIRATION DATE: NORMAL
FLUAV AG UPPER RESP QL IA.RAPID: NOT DETECTED
FLUBV AG UPPER RESP QL IA.RAPID: NOT DETECTED
INTERNAL CONTROL: ABNORMAL
INTERNAL CONTROL: NORMAL
Lab: ABNORMAL
Lab: NORMAL
S PYO AG THROAT QL: NEGATIVE
SARS-COV-2 AG UPPER RESP QL IA.RAPID: DETECTED

## 2024-12-06 RX ORDER — GUAIFENESIN 600 MG/1
1200 TABLET, EXTENDED RELEASE ORAL 2 TIMES DAILY
Qty: 30 TABLET | Refills: 0 | Status: SHIPPED | OUTPATIENT
Start: 2024-12-06 | End: 2024-12-06

## 2024-12-06 RX ORDER — BENZONATATE 200 MG/1
200 CAPSULE ORAL 3 TIMES DAILY PRN
Qty: 30 CAPSULE | Refills: 0 | Status: SHIPPED | OUTPATIENT
Start: 2024-12-06 | End: 2024-12-06

## 2024-12-06 RX ORDER — GUAIFENESIN 600 MG/1
1200 TABLET, EXTENDED RELEASE ORAL 2 TIMES DAILY
Qty: 30 TABLET | Refills: 0 | Status: SHIPPED | OUTPATIENT
Start: 2024-12-06

## 2024-12-06 RX ORDER — TOBRAMYCIN 3 MG/ML
2 SOLUTION/ DROPS OPHTHALMIC
Qty: 5 ML | Refills: 0 | Status: SHIPPED | OUTPATIENT
Start: 2024-12-06

## 2024-12-06 RX ORDER — BENZONATATE 200 MG/1
200 CAPSULE ORAL 3 TIMES DAILY PRN
Qty: 30 CAPSULE | Refills: 0 | Status: SHIPPED | OUTPATIENT
Start: 2024-12-06

## 2024-12-06 RX ORDER — TOBRAMYCIN 3 MG/ML
2 SOLUTION/ DROPS OPHTHALMIC
Qty: 5 ML | Refills: 0 | Status: SHIPPED | OUTPATIENT
Start: 2024-12-06 | End: 2024-12-06

## 2024-12-06 NOTE — PROGRESS NOTES
Chief Complaint  Nasal Congestion (Pt complains of sneezing and congestion.), Cough (Pt complaining of cough starting on Tuesday 12/3./Cough is productive with a yellowish mucus./Treated with Delsym cough and chest congestion and it has helped some. ), and Sore Throat (Pt complains of sore throat starting on Monday 12/2./)    Subjective      Jyoti Alvarez is a 74 y.o. female who presents to [facility]     History of Present Illness  The patient presents for evaluation of cough, congestion, and stye.    She reports experiencing symptoms of cough and congestion, which she attributes to a recent vacation in [place]. She has been tested for COVID-19 and influenza. She is uncertain about potential exposure to sick individuals. She received the influenza vaccine.  She has not had a COVID booster as she feels she may have had an adverse reaction to the last COVID-vaccine had some lymphadenopathy on the same arm.     Symptoms started on Monday with a sore throat.  Despite resting on Tuesday, her symptoms persisted, and including fatigue and coughing.  By Wednesday he was experiencing fatigue and some generalized aches.  She reports mild shortness of breath, has used her rescue inhaler which did help some.    She has a history of asthma but has never smoked.  She is a diabetic but glucose is well-controlled.    She has had some ongoing issues with right eye infections.  Experienced a stye in the summer, which was successfully treated, but has since developed a recurrent stye in the same area. She also had an enterocorneal ulcer, which required a 2-month makeup-free period and a 3-week course of Augmentin, completed on 11/09/2023. Her left eye eye remained unaffected until her recently and on Wednesday she noticed that having some mild redness to the lower left lid.        She believes her immune system has been compromised since her last COVID-19 infection. She takes a daily  "multivitamin, iron, vitamin D once a month, B12, and a prebiotic/probiotic for gut health. She monitors her blood sugar and blood pressure daily, walks daily, and attempts to maintain a healthy diet. 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 She is currently completing physical therapy for unsteady gait, dizziness, and leg weakness, which she attributes to her last COVID-19 infection.        SOCIAL HISTORY  She has never smoked.    MEDICATIONS  Current: Centrum multivitamin, iron, vitamin D (monthly), vitamin B12, prebiotic/probiotic, atorvastatin, rescue inhaler    IMMUNIZATIONS  She has had the influenza vaccine.         Objective   Vital Signs:   Vitals:    12/06/24 1626   BP: 144/86   BP Location: Right arm   Patient Position: Sitting   Cuff Size: Adult   Pulse: 88   Temp: 98.4 °F (36.9 °C)   TempSrc: Skin   SpO2: 95%   Weight: 75.3 kg (166 lb)   Height: 157.5 cm (62.01\")     Body mass index is 30.35 kg/m².    Wt Readings from Last 3 Encounters:   12/06/24 75.3 kg (166 lb)   09/24/24 74.5 kg (164 lb 3.2 oz)   09/13/24 74.8 kg (165 lb)     BP Readings from Last 3 Encounters:   12/06/24 144/86   09/24/24 118/74   09/13/24 122/83       Health Maintenance   Topic Date Due    RSV Vaccine - Adults (1 - 1-dose 75+ series) Never done    DIABETIC EYE EXAM  11/27/2024    HEMOGLOBIN A1C  02/28/2025    ANNUAL WELLNESS VISIT  05/28/2025    LIPID PANEL  08/30/2025    BMI FOLLOWUP  09/24/2025    DXA SCAN  09/09/2026    COLORECTAL CANCER SCREENING  10/18/2027    TDAP/TD VACCINES (2 - Td or Tdap) 07/25/2032    HEPATITIS C SCREENING  Completed    INFLUENZA VACCINE  Completed    Pneumococcal Vaccine 65+  Completed    COVID-19 Vaccine  Discontinued    URINE MICROALBUMIN  Discontinued    ZOSTER VACCINE  Discontinued       Physical Exam  Constitutional:       Appearance: Normal appearance.   HENT:      Head: Normocephalic.      Nose: Nose normal.      Mouth/Throat:      Mouth: Mucous membranes are moist.   Eyes: "      Conjunctiva/sclera: Conjunctivae normal.      Pupils: Pupils are equal, round, and reactive to light.   Cardiovascular:      Rate and Rhythm: Normal rate and regular rhythm.   Pulmonary:      Effort: Pulmonary effort is normal.      Breath sounds: Normal breath sounds.      Comments: Respirations unlabored no wheezing rales or rhonchi  Abdominal:      General: Bowel sounds are normal.      Palpations: Abdomen is soft.   Musculoskeletal:         General: Normal range of motion.      Cervical back: Normal range of motion.   Skin:     General: Skin is warm and dry.   Neurological:      General: No focal deficit present.      Mental Status: She is alert and oriented to person, place, and time.   Psychiatric:         Mood and Affect: Mood normal.         Behavior: Behavior normal.         Thought Content: Thought content normal.          Physical Exam         Result Review :  The following data was reviewed by: FANY Mustafa on 12/06/2024:    Labs  Office Visit on 12/06/2024   Component Date Value Ref Range Status    SARS Antigen 12/06/2024 Detected (A)  Not Detected, Presumptive Negative Final    Influenza A Antigen KIMMY 12/06/2024 Not Detected  Not Detected Final    Influenza B Antigen KIMMY 12/06/2024 Not Detected  Not Detected Final    Internal Control 12/06/2024 Passed  Passed Final    Lot Number 12/06/2024 [id number]   Final    Expiration Date 12/06/2024 10-   Final    Rapid Strep A Screen 12/06/2024 Negative  Negative, VALID, INVALID, Not Performed Final    Internal Control 12/06/2024 Passed  Passed Final    Lot Number 12/06/2024 [id number]   Final    Expiration Date 12/06/2024 07-   Final        Imaging  DEXA Bone Density Axial    Result Date: 9/9/2024  Impression: Osteopenia. Based upon the National Osteoporosis Foundation Guide, patient's FRAX score does not meet criteria for pharmacologic treatment to prevent Osteoporosis.  Individual treatment decisions should be made based upon each  patient's full clinical history and risk factors. Electronically Signed: Josue Ricketts MD  9/9/2024 5:05 PM EDT  Workstation ID: [id number]      Results  Laboratory Studies  COVID-19 test is positive. Strep test is negative. A1c is under 7. Creatinine levels are normal.       Procedures         ASSESSMENT & PLAN  Diagnoses and all orders for this visit:    1. Acute cough (Primary)  -     POCT SARS-CoV-2 Antigen KIMMY + Flu    2. Sore throat  -     POCT rapid strep A    3. COVID  -     Discontinue: Nirmatrelvir & Ritonavir, 300mg/100mg, (PAXLOVID); Take 3 tablets by mouth 2 (Two) Times a Day.  Dispense: 30 tablet; Refill: 0  -     Nirmatrelvir & Ritonavir, 300mg/100mg, (PAXLOVID); Take 3 tablets by mouth 2 (Two) Times a Day.  Dispense: 30 tablet; Refill: 0    Other orders  -     Discontinue: benzonatate (TESSALON) 200 MG capsule; Take 1 capsule by mouth 3 (Three) Times a Day As Needed for Cough.  Dispense: 30 capsule; Refill: 0  -     Discontinue: guaiFENesin (Mucinex) 600 MG 12 hr tablet; Take 2 tablets by mouth 2 (Two) Times a Day.  Dispense: 30 tablet; Refill: 0  -     Discontinue: tobramycin 0.3 % solution ophthalmic solution; Administer 2 drops into the left eye Every 4 (Four) Hours While Awake.  Dispense: 5 mL; Refill: 0  -     benzonatate (TESSALON) 200 MG capsule; Take 1 capsule by mouth 3 (Three) Times a Day As Needed for Cough.  Dispense: 30 capsule; Refill: 0  -     guaiFENesin (Mucinex) 600 MG 12 hr tablet; Take 2 tablets by mouth 2 (Two) Times a Day.  Dispense: 30 tablet; Refill: 0  -     tobramycin 0.3 % solution ophthalmic solution; Administer 2 drops into the left eye Every 4 (Four) Hours While Awake.  Dispense: 5 mL; Refill: 0    Kidney function last noted August normal range    Assessment & Plan  1. COVID-19.  She tested positive for COVID-19. Her oxygen saturation levels are within the normal range. She is not wheezing.  No respiratory distress.  She will be prescribed Paxlovid, to be taken as  instructed.  She has been advised to discontinue the use of her inhaler and atorvastatin for a week while on Paxlovid due to drug interaction.  Tessalon Perles will be prescribed for her cough, and plain Mucinex will be recommended. She has been instructed to seek immediate medical attention at the emergency room if she experiences shortness of breath, fever, or inability to eat or drink. She has been advised to isolate for 5 days and wear a mask for 10 days or anytime she has a fever. 2. Asthma.  She has a history of asthma and uses a rescue inhaler. She has been advised to hold her inhaler for a week while on Paxlovid, except for the rescue inhaler if needed.    3. Stye.  She reports developing a stye in her right eye, which worsened with her current illness. An eyedrop will be prescribed to help manage the stye.    PROCEDURE  The patient had 5 lymph nodes removed from her armpit in December due to a reaction to the COVID-19 vaccine.                  FOLLOW UP  Return if symptoms worsen or fail to improve.  Patient was given instructions and counseling regarding her condition or for health maintenance advice. Please see specific information pulled into the AVS if appropriate.     Patient or patient representative verbalized consent for the use of Ambient Listening during the visit with  FANY Mustafa for chart documentation. 12/6/2024  17:01 FANY Shultz  12/06/24  17:01 EST

## 2024-12-06 NOTE — PATIENT INSTRUCTIONS
Hold Lipitor and your Diskus inhaler until Paxlovid is complete.  Force fluids warm salt gargles due to prior  Increase rest any shortness of air worsening symptoms has to go the emergency room  We will send over Tessalon Perles and Mucinex for congestion

## 2024-12-26 RX ORDER — LOSARTAN POTASSIUM 50 MG/1
TABLET ORAL
Qty: 90 TABLET | Refills: 3 | Status: SHIPPED | OUTPATIENT
Start: 2024-12-26

## 2024-12-30 ENCOUNTER — LAB (OUTPATIENT)
Dept: LAB | Facility: HOSPITAL | Age: 74
End: 2024-12-30
Payer: MEDICARE

## 2024-12-30 DIAGNOSIS — Z79.4 TYPE 2 DIABETES MELLITUS WITHOUT COMPLICATION, WITH LONG-TERM CURRENT USE OF INSULIN: ICD-10-CM

## 2024-12-30 DIAGNOSIS — E55.9 VITAMIN D DEFICIENCY: ICD-10-CM

## 2024-12-30 DIAGNOSIS — E53.8 VITAMIN B12 DEFICIENCY: ICD-10-CM

## 2024-12-30 DIAGNOSIS — E11.9 TYPE 2 DIABETES MELLITUS WITHOUT COMPLICATION, WITH LONG-TERM CURRENT USE OF INSULIN: ICD-10-CM

## 2024-12-30 DIAGNOSIS — I10 ESSENTIAL HYPERTENSION: ICD-10-CM

## 2024-12-30 DIAGNOSIS — E03.4 ATROPHY OF THYROID: ICD-10-CM

## 2024-12-30 LAB
25(OH)D3 SERPL-MCNC: 51.4 NG/ML (ref 30–100)
ANION GAP SERPL CALCULATED.3IONS-SCNC: 9.5 MMOL/L (ref 5–15)
BUN SERPL-MCNC: 19 MG/DL (ref 8–23)
BUN/CREAT SERPL: 23.5 (ref 7–25)
CALCIUM SPEC-SCNC: 9.1 MG/DL (ref 8.6–10.5)
CHLORIDE SERPL-SCNC: 103 MMOL/L (ref 98–107)
CO2 SERPL-SCNC: 25.5 MMOL/L (ref 22–29)
CREAT SERPL-MCNC: 0.81 MG/DL (ref 0.57–1)
EGFRCR SERPLBLD CKD-EPI 2021: 75.8 ML/MIN/1.73
FOLATE SERPL-MCNC: >20 NG/ML (ref 4.78–24.2)
GLUCOSE SERPL-MCNC: 115 MG/DL (ref 65–99)
HBA1C MFR BLD: 6.4 % (ref 4.8–5.6)
POTASSIUM SERPL-SCNC: 4.4 MMOL/L (ref 3.5–5.2)
SODIUM SERPL-SCNC: 138 MMOL/L (ref 136–145)
TSH SERPL DL<=0.05 MIU/L-ACNC: 1.67 UIU/ML (ref 0.27–4.2)
VIT B12 BLD-MCNC: 933 PG/ML (ref 211–946)

## 2024-12-30 PROCEDURE — 84443 ASSAY THYROID STIM HORMONE: CPT

## 2024-12-30 PROCEDURE — 82306 VITAMIN D 25 HYDROXY: CPT

## 2024-12-30 PROCEDURE — 80048 BASIC METABOLIC PNL TOTAL CA: CPT

## 2024-12-30 PROCEDURE — 82746 ASSAY OF FOLIC ACID SERUM: CPT

## 2024-12-30 PROCEDURE — 82607 VITAMIN B-12: CPT

## 2024-12-30 PROCEDURE — 36415 COLL VENOUS BLD VENIPUNCTURE: CPT

## 2024-12-30 PROCEDURE — 83036 HEMOGLOBIN GLYCOSYLATED A1C: CPT

## 2025-02-04 ENCOUNTER — TELEPHONE (OUTPATIENT)
Facility: HOSPITAL | Age: 75
End: 2025-02-04
Payer: MEDICARE

## 2025-02-04 DIAGNOSIS — C50.919 MALIGNANT NEOPLASM OF FEMALE BREAST, UNSPECIFIED ESTROGEN RECEPTOR STATUS, UNSPECIFIED LATERALITY, UNSPECIFIED SITE OF BREAST: Primary | ICD-10-CM

## 2025-02-04 DIAGNOSIS — Z91.89 AT RISK FOR LYMPHEDEMA: ICD-10-CM

## 2025-02-17 ENCOUNTER — TELEPHONE (OUTPATIENT)
Age: 75
End: 2025-02-17

## 2025-02-17 NOTE — TELEPHONE ENCOUNTER
Caller: Jyoti Alvarez    Relationship: Self    Best call back number: [phone]    What is the best time to reach you: ANY    Who are you requesting to speak with (clinical staff, provider,  specific staff member): GEOVANNA LATIF     What was the call regarding: PATIENT WOULD LIKE TO SPEAK TO GEOVANNA REGARDING LAB WORK ORDERS

## 2025-02-18 ENCOUNTER — HOSPITAL ENCOUNTER (OUTPATIENT)
Facility: HOSPITAL | Age: 75
Setting detail: THERAPIES SERIES
Discharge: HOME | End: 2025-02-18
Payer: MEDICARE

## 2025-02-18 DIAGNOSIS — G47.33 OSA ON CPAP: ICD-10-CM

## 2025-02-18 DIAGNOSIS — E78.2 MIXED HYPERLIPIDEMIA: ICD-10-CM

## 2025-02-18 DIAGNOSIS — E11.9 TYPE 2 DIABETES MELLITUS WITHOUT COMPLICATION, WITH LONG-TERM CURRENT USE OF INSULIN: Primary | ICD-10-CM

## 2025-02-18 DIAGNOSIS — Z79.4 TYPE 2 DIABETES MELLITUS WITHOUT COMPLICATION, WITH LONG-TERM CURRENT USE OF INSULIN: Primary | ICD-10-CM

## 2025-02-18 DIAGNOSIS — E03.4 ATROPHY OF THYROID: ICD-10-CM

## 2025-02-18 NOTE — TELEPHONE ENCOUNTER
As noted, she had labs on 12/30/2024, so we cannot do the A1c for 90 days from that date.  I put in for fasting labs to be performed 3/31/2025, so we need to push her appointment back until after this date.

## 2025-02-18 NOTE — TELEPHONE ENCOUNTER
Pt hasn't had labs (A1C, BMP, TSH, Vitamin D and Vitamin B12) since 12/30/24, she has had to reschedule her last two appts, she made an appt for 3/10, do you want her to have any new labs?

## 2025-02-19 ENCOUNTER — TELEPHONE (OUTPATIENT)
Age: 75
End: 2025-02-19
Payer: MEDICARE

## 2025-02-19 NOTE — TELEPHONE ENCOUNTER
Hub staff attempted to follow warm transfer process and was unsuccessful     Caller: Jyoti Alvarez    Relationship to patient: Self    Best call back number:     [phone]        Patient is needing: PATIENT IS RETURNING CALL MISSED BY GEOVANNA. PLEASE CONTACT.

## 2025-03-03 ENCOUNTER — OFFICE VISIT (OUTPATIENT)
Dept: DIABETES SERVICES | Facility: HOSPITAL | Age: 75
End: 2025-03-03
Payer: MEDICARE

## 2025-03-03 VITALS
WEIGHT: 168 LBS | SYSTOLIC BLOOD PRESSURE: 120 MMHG | BODY MASS INDEX: 30.91 KG/M2 | HEART RATE: 80 BPM | HEIGHT: 62 IN | OXYGEN SATURATION: 95 % | DIASTOLIC BLOOD PRESSURE: 63 MMHG

## 2025-03-03 DIAGNOSIS — N18.2 TYPE 2 DIABETES MELLITUS WITH STAGE 2 CHRONIC KIDNEY DISEASE, WITH LONG-TERM CURRENT USE OF INSULIN: ICD-10-CM

## 2025-03-03 DIAGNOSIS — E11.65 UNCONTROLLED TYPE 2 DIABETES MELLITUS WITH HYPERGLYCEMIA: Primary | ICD-10-CM

## 2025-03-03 DIAGNOSIS — E11.22 TYPE 2 DIABETES MELLITUS WITH STAGE 2 CHRONIC KIDNEY DISEASE, WITH LONG-TERM CURRENT USE OF INSULIN: ICD-10-CM

## 2025-03-03 DIAGNOSIS — Z79.4 TYPE 2 DIABETES MELLITUS WITH STAGE 2 CHRONIC KIDNEY DISEASE, WITH LONG-TERM CURRENT USE OF INSULIN: ICD-10-CM

## 2025-03-03 DIAGNOSIS — E66.9 OBESITY (BMI 30-39.9): ICD-10-CM

## 2025-03-03 LAB
ALBUMIN UR-MCNC: 1.6 MG/DL
CREAT UR-MCNC: 245.2 MG/DL
EXPIRATION DATE: ABNORMAL
GLUCOSE BLDC GLUCOMTR-MCNC: 221 MG/DL (ref 70–99)
HBA1C MFR BLD: 7.1 % (ref 4.5–5.7)
Lab: ABNORMAL
MICROALBUMIN/CREAT UR: 6.5 MG/G (ref 0–29)

## 2025-03-03 PROCEDURE — 1160F RVW MEDS BY RX/DR IN RCRD: CPT | Performed by: NURSE PRACTITIONER

## 2025-03-03 PROCEDURE — G0463 HOSPITAL OUTPT CLINIC VISIT: HCPCS | Performed by: NURSE PRACTITIONER

## 2025-03-03 PROCEDURE — 82570 ASSAY OF URINE CREATININE: CPT | Performed by: NURSE PRACTITIONER

## 2025-03-03 PROCEDURE — 99214 OFFICE O/P EST MOD 30 MIN: CPT | Performed by: NURSE PRACTITIONER

## 2025-03-03 PROCEDURE — 3074F SYST BP LT 130 MM HG: CPT | Performed by: NURSE PRACTITIONER

## 2025-03-03 PROCEDURE — 83036 HEMOGLOBIN GLYCOSYLATED A1C: CPT | Performed by: NURSE PRACTITIONER

## 2025-03-03 PROCEDURE — 82948 REAGENT STRIP/BLOOD GLUCOSE: CPT | Performed by: NURSE PRACTITIONER

## 2025-03-03 PROCEDURE — 1159F MED LIST DOCD IN RCRD: CPT | Performed by: NURSE PRACTITIONER

## 2025-03-03 PROCEDURE — 3078F DIAST BP <80 MM HG: CPT | Performed by: NURSE PRACTITIONER

## 2025-03-03 PROCEDURE — 82043 UR ALBUMIN QUANTITATIVE: CPT | Performed by: NURSE PRACTITIONER

## 2025-03-03 RX ORDER — INSULIN GLARGINE 100 [IU]/ML
45 INJECTION, SOLUTION SUBCUTANEOUS DAILY
Qty: 40 ML | Refills: 1 | Status: SHIPPED | OUTPATIENT
Start: 2025-03-03 | End: 2025-03-07 | Stop reason: DRUGHIGH

## 2025-03-03 RX ORDER — LANCETS 30 GAUGE
1 EACH MISCELLANEOUS 3 TIMES DAILY
Qty: 200 EACH | Refills: 2 | Status: SHIPPED | OUTPATIENT
Start: 2025-03-03 | End: 2025-03-03 | Stop reason: SDUPTHER

## 2025-03-03 RX ORDER — BLOOD SUGAR DIAGNOSTIC
1 STRIP MISCELLANEOUS 3 TIMES DAILY
Qty: 300 EACH | Refills: 12 | Status: SHIPPED | OUTPATIENT
Start: 2025-03-03

## 2025-03-03 RX ORDER — PEN NEEDLE, DIABETIC 32GX 5/32"
1 NEEDLE, DISPOSABLE MISCELLANEOUS DAILY
Qty: 100 EACH | Refills: 1 | Status: SHIPPED | OUTPATIENT
Start: 2025-03-03

## 2025-03-03 RX ORDER — GLIMEPIRIDE 2 MG/1
2 TABLET ORAL 2 TIMES DAILY
Qty: 180 TABLET | Refills: 1 | Status: SHIPPED | OUTPATIENT
Start: 2025-03-03 | End: 2025-08-30

## 2025-03-03 RX ORDER — LANCETS 30 GAUGE
1 EACH MISCELLANEOUS 3 TIMES DAILY
Qty: 200 EACH | Refills: 2 | Status: SHIPPED | OUTPATIENT
Start: 2025-03-03

## 2025-03-03 RX ORDER — DULAGLUTIDE 3 MG/.5ML
3 INJECTION, SOLUTION SUBCUTANEOUS
Qty: 6 ML | Refills: 1 | Status: SHIPPED | OUTPATIENT
Start: 2025-03-03

## 2025-03-03 NOTE — PROGRESS NOTES
Chief Complaint  Diabetes (Med management, A1C)    Referred By: Ramo Noguera MD    Subjective     {Problem List  Visit Diagnosis   Encounters  Notes  Medications  Labs  Result Review Imaging  Media :23}     Patient or patient representative verbalized consent for the use of Ambient Listening during the visit with  FANY Thakur for chart documentation. 3/3/2025  15:09 AUGUST Alvarez presents to [facility] for diabetes medication management    History of Present Illness    Visit type:  follow-up  Diabetes type:  Type 2  Current diabetes status/concerns/issues:     History of Present Illness  The patient presents for evaluation of diabetes mellitus, recurrent chalazion, and recent COVID-19 infection.    She has been managing her diabetes with a regimen of Lantus insulin, initially at a dose of 50 units until 09/23/2024. Her A1c level was recorded as 6.4 on 12/30/2024. Despite a recent COVID-19 infection, she maintained good control over her blood glucose levels. She reduced her insulin dose to 45 units, and subsequently to 40 units on 01/02/2025 for a period of 30 days. However, due to an increase in hyperglycemic episodes, she escalated the dose back to 45 units on 02/02/2025 for 11 days. Persistent difficulties, including a morning reading of 200, prompted her to increase the dose to 48 units for 3 days around 02/13/2025 or 02/15/2025. Since 02/16/2025, she has been administering 50 units of Lantus every morning. Her current medication regimen also includes glimepiride 2 mg twice daily and Trulicity 3 mg once weekly. She reports no gastrointestinal distress, attributing her good digestive health to daily probiotic intake. Her blood glucose levels typically range from 120s to 160s, with occasional deviations. Recent readings were 115 and 110, respectively. She monitors her blood glucose levels every morning and does not test further unless she  suspects hypoglycemia, which has not occurred recently. She attributes this to her reduced insulin dose and Trulicity. She is mindful of her diet and hydration, but acknowledges decreased physical activity due to weather conditions and persistent fatigue. She is scheduled for blood work on 03/31/2025 and a follow-up appointment with Dr. Ba in 1 month. She receives her Trulicity and Lantus through [facility], but has to obtain her lancets and test strips locally as they are not covered by [facility].    She has been experiencing ocular issues over the past few months, predominantly affecting her right eye, although she has had some left eye involvement. She initially developed a stye, followed by a chalazion, which became inflamed and enlarged. A few weeks ago, she sought medical attention from an ophthalmologist due to recurrent symptoms. The ophthalmologist explained that these issues are part of the natural aging process, where oil glands may become blocked. She has been managing her symptoms with eyelid wipes twice daily and warm compresses. She has also discontinued the use of eye makeup.    She tested positive for COVID-19 on 12/06/2024, marking her second infection. She did not experience the same symptoms as during her first bout with the virus 3 years ago. Prior to this, she had been faring well, but the onset of cold weather and fatigue led to a decrease in her physical activity, including walking and gym visits. She has received her influenza vaccine, but is concerned about potential coverage gaps for new strains. She had 5 lymph nodes removed from her left axilla in 12/2023, which were believed to be a reaction to the COVID-19 vaccine. Consequently, her physician has advised against further COVID-19 vaccinations.    ALLERGIES  The patient is reactive to COVID-19 shots.    MEDICATIONS  Current: insulin, glimepiride, Trulicity    IMMUNIZATIONS  She has had her influenza vaccine.      Current  Diabetes symptoms:    Polyuria: No   Polydipsia: No   Polyphagia: No   Blurred vision: No   Excessive fatigue: No  Known Diabetes complications:  Neuropathy: None; Location: N/A  Renal: No current urine microalbumin available and Stage II mild (GFR = 60-89 mL/min)  Eyes: No current eye exam available in record; Location: N/A  Amputation/Wounds: None  GI: None  Cardiovascular: Hypertension and Hyperlipidemia  ED: N/A  Other: None  Hypoglycemia:  None reported at this time  Hypoglycemia Symptoms:  No hypoglycemia at this time  Current diabetes treatment:   Trulicity 3 mg once weekly, Lantus taking 50 units once every morning and glimepiride 2 mg twice a day.   Blood glucose device:  Meter  Blood glucose monitoring frequency:  1 - 2  Blood glucose range/average:     120-160 and sometimes higher  Glucose Source: BG Logs  Diet:  Limits high carb/sweet foods, Avoids sugary drinks  Activity/Exercise:   less active due to the weather and having covid    Past Medical History:   Diagnosis Date    Anemia     Ankle sprain     Arthritis     Asthma     INHALERS PRN    Breast cancer     RIGHT    Colon polyp     COVID 09/20/2021    Disease of thyroid gland Hypothyroidism    Fatty liver     ASYMPTOMATIC    Gastroesophageal reflux     Hip arthrosis     HL (hearing loss) Hearing aids 2021    Hyperlipemia     Hypertension     Knee swelling     Limb swelling     Seasonal allergies     Skin cancer     Sleep apnea     Strain of latissimus dorsi muscle 09/11/2017    Right    Type 2 diabetes mellitus     AM  IN AM    Vertigo      Past Surgical History:   Procedure Laterality Date    AXILLARY LYMPH NODE BIOPSY/EXCISION Left 12/19/2023    Procedure: LEFT AXILLARY LYMPH NODE BIOPSY/EXCISION, MAGSEED;  Surgeon: Kathy Rausch MD;  [place] [facility] [place] [facility] [place]  Service: General;  Laterality: Left;    BREAST RECONSTRUCTION Bilateral 02/01/2022    Procedure: BILATERAL BREAST RECONSTRUCTION WITH REMOVAL AND REPLACEMENT OF IMPLANTS, MESH  PLACEMENT, CHEST WALL SKIN RESECTION, CHEST LIPOSUCTION;  Surgeon: Romain Quezada MD;  [place] [facility] [place] [facility] [place];  Service: Plastics;  Laterality: Bilateral;     SECTION      COLONOSCOPY  2019    COLONOSCOPY N/A 10/18/2022    Procedure: COLONOSCOPY POLYPECTOMIES;  Surgeon: Eduardo Salas MD;  Location: [facility];  Service: Gastroenterology;  Laterality: N/A;  COLON POLYPS    ENDOSCOPY      EYE SURGERY  Not sure    Cateracts    KNEE ARTHROSCOPY W/ LATERAL RELEASE / MEDIAL IMBRICATION Left     TORN CARTIDLGE REPAIR    LIPOSUCTION N/A 2022    Procedure: CHEST WALL LIPOSUCTION AND CHEST WALL RESECTION;  Surgeon: Romain Quezada MD;  Location: [facility] [place];  Service: Plastics;  Laterality: N/A;  TIMES BASED ON PREVIOUS EXPERIENCE    MASTECTOMY COMPLETE / SIMPLE Bilateral 2004    OOPHORECTOMY      OTHER SURGICAL HISTORY      Joint surgery,  RIGHT FOOT SECOND  TOE WITH A PIN    SKIN CANCER EXCISION Right 2020    neck    TOE SURGERY Right     2nd digit pin inserted    TONSILLECTOMY       Family History   Problem Relation Age of Onset    Heart disease Father     Cancer Father         Skin cancer    Heart failure Father         Age 79  cause of death.    Heart disease Mother     Breast cancer Mother         Breast cancer age 45 and 60    Diabetes Mother     Arthritis Mother     Osteoporosis Mother     Heart failure Mother         Heart attack age 60. Age 87. Cause of death.    Hypertension Mother     Osteoarthritis Mother     Deep vein thrombosis Mother     Cancer Brother         Prostate cancer age about 58.    Prostate cancer Brother         unsure age    Breast cancer Sister 27        Breast cancer age 27 and 52. Ovarian cancer age 53 caused death at 56.    Diabetes Sister     Ovarian cancer Sister 53    Arthritis Sister     Breast cancer Daughter 40    Breast cancer Maternal Aunt         mid 40s    Ovarian cancer Maternal Aunt          with ovarian cancer age mid 40s     Malig Hyperthermia Neg Hx     Colon cancer Neg Hx     Uterine cancer Neg Hx     Pulmonary embolism Neg Hx      Social History     Socioeconomic History    Marital status:     Number of children: 3   Tobacco Use    Smoking status: Never     Passive exposure: Never    Smokeless tobacco: Never   Vaping Use    Vaping status: Never Used   Substance and Sexual Activity    Alcohol use: Never    Drug use: Never    Sexual activity: Not Currently     Partners: Male     Birth control/protection: Post-menopausal     Comment:  Sept. 20, 2021     Allergies   Allergen Reactions    Exenatide Unknown - High Severity     Weight loss and pain in the right side. Bydereon.    Fluoxetine Hives    Gabapentin Headache     Occular Miaigraines    Jardiance [Empagliflozin] Other (See Comments)     UTI    Levaquin [Levofloxacin] Hives    Nickel Itching and Rash    Sulfa Antibiotics Swelling    Metformin Nausea Only    Hibiclens [Chlorhexidine Gluconate] Rash       Current Outpatient Medications:     acetaminophen (TYLENOL) 650 MG 8 hr tablet, Take 1 tablet by mouth 2 (two) times a day., Disp: , Rfl:     albuterol sulfate  (90 Base) MCG/ACT inhaler, USE 2 INHALATIONS EVERY 6 HOURS AS NEEDED, Disp: 51 g, Rfl: 2    aspirin 81 MG EC tablet, Take 1 tablet by mouth Daily. OTC, Disp: , Rfl:     Blood Glucose Monitoring Suppl device, Use as directed for blood glucose monitoring, Disp: 1 each, Rfl: 0    D3-50 1.25 MG (28910 UT) capsule, TAKE ONE CAPSULE BY MOUTH ONCE WEEKLY (Patient taking differently: Take 1 capsule by mouth Every 30 (Thirty) Days.), Disp: 12 capsule, Rfl: 1    ferrous sulfate 325 (65 FE) MG tablet, Take 1 tablet by mouth Daily With Breakfast. OTC, Disp: , Rfl:     fluticasone (FLONASE) 50 MCG/ACT nasal spray, USE 1 SPRAY IN EACH NOSTRIL ONCE DAILY, Disp: 48 g, Rfl: 2    glimepiride (AMARYL) 2 MG tablet, Take 1 tablet by mouth 2 (Two) Times a Day for 180 days., Disp: 180 tablet, Rfl: 1    glucose blood (OneTouch  "Verio) test strip, 1 each by Other route 3 (Three) Times a Day. use to test blood sugar 3 times daily, Disp: 300 each, Rfl: 12    insulin glargine (LANTUS, SEMGLEE) 100 UNIT/ML injection, Inject 45 Units under the skin into the appropriate area as directed Daily for 180 days., Disp: 40 mL, Rfl: 1    Insulin Pen Needle (BD Pen Needle Ca 2nd Gen) 32G X 4 MM misc, Use 1 each Daily., Disp: 100 each, Rfl: 1    levothyroxine (SYNTHROID, LEVOTHROID) 25 MCG tablet, TAKE 1 TABLET DAILY IN THE MORNING ON AN EMPTY STOMACH, Disp: 90 tablet, Rfl: 3    losartan (COZAAR) 50 MG tablet, TAKE 1 TABLET DAILY, Disp: 90 tablet, Rfl: 3    metoprolol succinate XL (TOPROL-XL) 100 MG 24 hr tablet, TAKE 1 TABLET DAILY, Disp: 90 tablet, Rfl: 3    montelukast (SINGULAIR) 10 MG tablet, TAKE 1 TABLET DAILY, Disp: 90 tablet, Rfl: 3    multivitamin with minerals tablet tablet, Take 1 tablet by mouth Daily., Disp: , Rfl:     OneTouch Delica Lancets 30G misc, Use 1 each 3 (Three) Times a Day., Disp: 200 each, Rfl: 2    pantoprazole (PROTONIX) 40 MG EC tablet, TAKE 1 TABLET DAILY, Disp: 90 tablet, Rfl: 3    Probiotic Product (PROBIOTIC PO), Take 1 fluid ounce by mouth Daily. Plexus powder, Disp: , Rfl:     vitamin B-12 (CYANOCOBALAMIN) 500 MCG tablet, Take 1 tablet by mouth Daily., Disp: , Rfl:     Dulaglutide (Trulicity) 3 MG/0.5ML solution auto-injector, Inject 3 mg under the skin into the appropriate area as directed Every 7 (Seven) Days., Disp: 6 mL, Rfl: 1    Objective     Vitals:    03/03/25 1415   BP: 120/63   BP Location: Right arm   Patient Position: Sitting   Cuff Size: Adult   Pulse: 80   SpO2: 95%   Weight: 76.2 kg (168 lb)   Height: 157.5 cm (62.01\")     Body mass index is 30.72 kg/m².    Physical Exam  Constitutional:       Appearance: Normal appearance. She is obese.      Comments: Obesity (BMI 30 - 39.9) Pt Current BMI = 30.72    HENT:      Head: Normocephalic and atraumatic.      Right Ear: External ear normal.      Left Ear: " "External ear normal.      Nose: Nose normal.   Eyes:      Extraocular Movements: Extraocular movements intact.      Conjunctiva/sclera: Conjunctivae normal.   Pulmonary:      Effort: Pulmonary effort is normal.   Musculoskeletal:         General: Normal range of motion.      Cervical back: Normal range of motion.   Skin:     General: Skin is warm and dry.   Neurological:      General: No focal deficit present.      Mental Status: She is alert and oriented to person, place, and time. Mental status is at baseline.   Psychiatric:         Mood and Affect: Mood normal.         Behavior: Behavior normal.         Thought Content: Thought content normal.         Judgment: Judgment normal.         {DIABETIC FOOT EXAM FOR  (Optional):09699::\"Diabetic Foot Exam Performed\":0}    Result Review :{Labs  Result Review  Imaging  Med Tab  Media :23}   The following data was reviewed by: FANY Thakur on 03/03/2025:    Most Recent A1C          3/3/2025    14:38   HGBA1C Most Recent   Hemoglobin A1C 7.1        A1C Last 3 Results          8/30/2024    11:31 12/30/2024    14:40 3/3/2025    14:38   HGBA1C Last 3 Results   Hemoglobin A1C 6.50  6.40  7.1      A1c collected in the office today is 7.1%, indicating Uncontrolled Type II diabetes.  This result is up from the prior result of 6.4% collected on 12/30/24     Glucose   Date Value Ref Range Status   03/03/2025 221 (H) 70 - 99 mg/dL Final     Comment:     Serial Number: [id number]:  [id number]     Point of care glucose in the office today is  elevated at 221 mg/dL    Creatinine   Date Value Ref Range Status   12/30/2024 0.81 0.57 - 1.00 mg/dL Final   08/30/2024 0.77 0.57 - 1.00 mg/dL Final     eGFR   Date Value Ref Range Status   12/30/2024 75.8 >60.0 mL/min/1.73 Final   08/30/2024 80.6 >60.0 mL/min/1.73 Final     Labs collected on 12/30/24 show Stage II mild (GFR = 60-89mL/min)         {CC Problem List  Visit Diagnosis  ROS  Review (Popup)  Health " Maintenance  Quality  BestPractice  Medications  SmartSets  SnapShot Encounters  Media :23}       Diagnoses and all orders for this visit:    1. Uncontrolled type 2 diabetes mellitus with hyperglycemia (Primary)  -     POC Glycosylated Hemoglobin (Hb A1C)  -     Microalbumin / Creatinine Urine Ratio - Urine, Clean Catch; Future  -     POC Glucose  -     glimepiride (AMARYL) 2 MG tablet; Take 1 tablet by mouth 2 (Two) Times a Day for 180 days.  Dispense: 180 tablet; Refill: 1  -     insulin glargine (LANTUS, SEMGLEE) 100 UNIT/ML injection; Inject 45 Units under the skin into the appropriate area as directed Daily for 180 days.  Dispense: 40 mL; Refill: 1  -     Insulin Pen Needle (BD Pen Needle Ca 2nd Gen) 32G X 4 MM misc; Use 1 each Daily.  Dispense: 100 each; Refill: 1  -     Discontinue: OneTouch Delica Lancets 30G misc; Use 1 each 3 (Three) Times a Day.  Dispense: 200 each; Refill: 2  -     glucose blood (OneTouch Verio) test strip; 1 each by Other route 3 (Three) Times a Day. use to test blood sugar 3 times daily  Dispense: 300 each; Refill: 12  -     Dulaglutide (Trulicity) 3 MG/0.5ML solution auto-injector; Inject 3 mg under the skin into the appropriate area as directed Every 7 (Seven) Days.  Dispense: 6 mL; Refill: 1  -     OneTouch Delica Lancets 30G misc; Use 1 each 3 (Three) Times a Day.  Dispense: 200 each; Refill: 2    2. Type 2 diabetes mellitus with stage 2 chronic kidney disease, with long-term current use of insulin    3. Obesity (BMI 30-39.9)        Assessment & Plan  1. Diabetes Mellitus.  Her A1c level has increased from 6.4 in December 2024 to 7.1 currently. This could be attributed to holiday dietary indulgences, a recent COVID-19 infection, and a reduction in insulin dosage. Her morning blood glucose levels are consistently above 140, necessitating an increase in her Lantus dosage. She will increase her Lantus dosage to 53 units for a few weeks to observe its effect on her blood  glucose levels. If necessary, she may reduce the dosage back to 50 units. A urine microalbumin test will be conducted today to ensure there is no proteinuria. Refills for her glimepiride, Lantus, Trulicity, test strips, and lancets have been provided. Glimepiride, Lantus, and Trulicity will be sent to [facility], while One Touch test strips and lancets will be sent to [facility].    2. Recurrent Chalazion.  She has been experiencing recurrent chalazions, primarily in the right eye. She is advised to continue using warm compresses and eyelid wipes twice daily. She has stopped wearing eye makeup to prevent further irritation.    3. Recent COVID-19 Infection.  She had a mild case of COVID-19 in December 2024. No additional treatment is required at this time.    PROCEDURE  Five lymph nodes were removed from the left axilla in December 2023.        The patient will monitor her blood glucose levels  1 - 2 times daily.  If she develops problematic hyperglycemia or hypoglycemia or adverse drug reactions, she will contact the office for further instructions.    {Time Spent (Optional):57108}    Follow Up {Instructions Charge Capture  Follow-up Communications :23}    No follow-ups on file.    Patient was given instructions and counseling regarding her condition or for health maintenance advice. Please see specific information pulled into the AVS if appropriate.     Kelly Vicente, APRN  03/03/2025        Dictated Utilizing Dragon Dictation.  Please note that portions of this note were completed with a voice recognition program.  Part of this note may be an electronic transcription/translation of spoken language to printed text using the Dragon Dictation System.

## 2025-03-07 ENCOUNTER — OFFICE VISIT (OUTPATIENT)
Dept: OTOLARYNGOLOGY | Facility: CLINIC | Age: 75
End: 2025-03-07
Payer: MEDICARE

## 2025-03-07 VITALS
OXYGEN SATURATION: 97 % | DIASTOLIC BLOOD PRESSURE: 83 MMHG | HEART RATE: 85 BPM | TEMPERATURE: 98.2 F | SYSTOLIC BLOOD PRESSURE: 135 MMHG

## 2025-03-07 DIAGNOSIS — E04.1 THYROID NODULE: Primary | ICD-10-CM

## 2025-03-07 DIAGNOSIS — H61.23 BILATERAL IMPACTED CERUMEN: ICD-10-CM

## 2025-03-07 RX ORDER — INSULIN GLARGINE 100 [IU]/ML
INJECTION, SOLUTION SUBCUTANEOUS
COMMUNITY
Start: 2025-03-03

## 2025-03-07 NOTE — PROGRESS NOTES
Patient Name: Jyoti Alvarez   Visit Date: 03/07/2025   Patient ID: [id number]  Provider: Uday Pinto MD    Sex: female  Location[place] [facility]   YOB: 1950  Location Address: [address] [place] [address], [address] [place] [address],?[place]?[id number]    Primary Care Provider Ramo Noguera MD  Location Phone: [phone]    Referring Provider: No ref. provider found        Chief Complaint  Cerumen Impaction    History of Present Illness  Jyoti Alvarez is a 75 y.o. female who presents to [facility] today as a consult from No ref. provider found for evaluation of her ears.  She was previously seen by Dr. Dick on 12/16/2021 please see that note for further details.  At that time, she had been using hearing aids for 1 year and cerumen impactions were removed.  She tells me that unfortunately she has been affected by COVID several times since her last appointment.  During her initial episode of COVID she experienced some vertigo.  She tells me that she continues to use her hearing aids and feels as though they have been quite helpful.  She also mentions a history of thyroid nodules.  She denies any family history of thyroid cancer or personal history of radiation exposure.  She is not experiencing any compressive symptoms.  Her most recent thyroid ultrasound on 12/12/2022 demonstrated a left 0.2 cm hypoechoic nodule and a right 0.7 cm hypoechoic nodule.  He has had been stable when compared to 11/24/2021 thyroid ultrasound.  Thyroid function testing on 12/30/2024 revealed a normal TSH of 1.670.  Past Medical History:   Diagnosis Date    Anemia     Ankle sprain     Arthritis     Asthma     INHALERS PRN    Breast cancer     RIGHT    Colon polyp     COVID 09/20/2021    Disease of thyroid gland Hypothyroidism    Fatty liver     ASYMPTOMATIC    Gastroesophageal reflux     Hip arthrosis     HL (hearing loss) Hearing aids 2021    Hyperlipemia      Hypertension     Knee swelling     Limb swelling     Seasonal allergies     Skin cancer     Sleep apnea     Strain of latissimus dorsi muscle 2017    Right    Type 2 diabetes mellitus     AM  IN AM    Vertigo        Past Surgical History:   Procedure Laterality Date    AXILLARY LYMPH NODE BIOPSY/EXCISION Left 2023    Procedure: LEFT AXILLARY LYMPH NODE BIOPSY/EXCISION, MAGSEED;  Surgeon: Kathy Rausch MD;  Location: [facility];  Service: General;  Laterality: Left;    BREAST RECONSTRUCTION Bilateral 2022    Procedure: BILATERAL BREAST RECONSTRUCTION WITH REMOVAL AND REPLACEMENT OF IMPLANTS, MESH PLACEMENT, CHEST WALL SKIN RESECTION, CHEST LIPOSUCTION;  Surgeon: Romain Quezada MD;  Location: [facility] OR [facility];  Service: Plastics;  Laterality: Bilateral;     SECTION      COLONOSCOPY      COLONOSCOPY N/A 10/18/2022    Procedure: COLONOSCOPY POLYPECTOMIES;  Surgeon: Eduardo Salas MD;  Location: [facility] ENDOSCOPY;  Service: Gastroenterology;  Laterality: N/A;  COLON POLYPS    ENDOSCOPY      EYE SURGERY  Not sure    Cateracts    KNEE ARTHROSCOPY W/ LATERAL RELEASE / MEDIAL IMBRICATION Left     TORN CARTIDLGE REPAIR    LIPOSUCTION N/A 2022    Procedure: CHEST WALL LIPOSUCTION AND CHEST WALL RESECTION;  Surgeon: Romain Quezada MD;  Location: [facility];  Service: Plastics;  Laterality: N/A;  TIMES BASED ON PREVIOUS EXPERIENCE    MASTECTOMY COMPLETE / SIMPLE Bilateral 2004    OOPHORECTOMY      OTHER SURGICAL HISTORY      Joint surgery,  RIGHT FOOT SECOND  TOE WITH A PIN    SKIN CANCER EXCISION Right 2020    neck    TOE SURGERY Right     2nd digit pin inserted    TONSILLECTOMY           Current Outpatient Medications:     acetaminophen (TYLENOL) 650 MG 8 hr tablet, Take 1 tablet by mouth 2 (two) times a day., Disp: , Rfl:     aspirin 81 MG EC tablet, Take 1 tablet by mouth Daily. OTC, Disp: , Rfl:     D3-50 1.25 MG (02522 UT) capsule, TAKE ONE  CAPSULE BY MOUTH ONCE WEEKLY (Patient taking differently: Take 1 capsule by mouth Every 30 (Thirty) Days.), Disp: 12 capsule, Rfl: 1    Dulaglutide (Trulicity) 3 MG/0.5ML solution auto-injector, Inject 3 mg under the skin into the appropriate area as directed Every 7 (Seven) Days., Disp: 6 mL, Rfl: 1    ferrous sulfate 325 (65 FE) MG tablet, Take 1 tablet by mouth Daily With Breakfast. OTC, Disp: , Rfl:     fluticasone (FLONASE) 50 MCG/ACT nasal spray, USE 1 SPRAY IN EACH NOSTRIL ONCE DAILY, Disp: 48 g, Rfl: 2    glimepiride (AMARYL) 2 MG tablet, Take 1 tablet by mouth 2 (Two) Times a Day for 180 days., Disp: 180 tablet, Rfl: 1    Lantus SoloStar 100 UNIT/ML injection pen, , Disp: , Rfl:     levothyroxine (SYNTHROID, LEVOTHROID) 25 MCG tablet, TAKE 1 TABLET DAILY IN THE MORNING ON AN EMPTY STOMACH, Disp: 90 tablet, Rfl: 3    losartan (COZAAR) 50 MG tablet, TAKE 1 TABLET DAILY, Disp: 90 tablet, Rfl: 3    metoprolol succinate XL (TOPROL-XL) 100 MG 24 hr tablet, TAKE 1 TABLET DAILY, Disp: 90 tablet, Rfl: 3    montelukast (SINGULAIR) 10 MG tablet, TAKE 1 TABLET DAILY, Disp: 90 tablet, Rfl: 3    multivitamin with minerals tablet tablet, Take 1 tablet by mouth Daily., Disp: , Rfl:     pantoprazole (PROTONIX) 40 MG EC tablet, TAKE 1 TABLET DAILY, Disp: 90 tablet, Rfl: 3    vitamin B-12 (CYANOCOBALAMIN) 500 MCG tablet, Take 1 tablet by mouth Daily., Disp: , Rfl:     albuterol sulfate  (90 Base) MCG/ACT inhaler, USE 2 INHALATIONS EVERY 6 HOURS AS NEEDED, Disp: 51 g, Rfl: 2    Blood Glucose Monitoring Suppl device, Use as directed for blood glucose monitoring, Disp: 1 each, Rfl: 0    glucose blood (OneTouch Verio) test strip, 1 each by Other route 3 (Three) Times a Day. use to test blood sugar 3 times daily, Disp: 300 each, Rfl: 12    Insulin Pen Needle (BD Pen Needle Ca 2nd Gen) 32G X 4 MM misc, Use 1 each Daily., Disp: 100 each, Rfl: 1    OneTouch Delica Lancets 30G misc, Use 1 each 3 (Three) Times a Day., Disp:  200 each, Rfl: 2    Probiotic Product (PROBIOTIC PO), Take 1 fluid ounce by mouth Daily. Plexus powder, Disp: , Rfl:      Allergies   Allergen Reactions    Exenatide Unknown - High Severity     Weight loss and pain in the right side. Bydereon.    Fluoxetine Hives    Gabapentin Headache     Occular Miaigraines    Jardiance [Empagliflozin] Other (See Comments)     UTI    Levaquin [Levofloxacin] Hives    Nickel Itching and Rash    Sulfa Antibiotics Swelling    Metformin Nausea Only    Hibiclens [Chlorhexidine Gluconate] Rash       Social History     Tobacco Use    Smoking status: Never     Passive exposure: Never    Smokeless tobacco: Never   Vaping Use    Vaping status: Never Used   Substance Use Topics    Alcohol use: Never    Drug use: Never        Objective     Vital Signs:   /83   Pulse 85   Temp 98.2 °F (36.8 °C)   SpO2 97%       Physical Exam    General: Well developed, well nourished patient of stated age in no acute distress. Voice is strong and clear.   Head: Normocephalic and atraumatic.  Face: No lesions.  Bilateral parotid and submandibular glands are unremarkable.  Stensen's and Warthin's ducts are productive of clear saliva bilaterally.  House-Brackmann I/VI     bilaterally.   muscles and temporomandibular joint nontender to palpation.  No TMJ crepitus.  Eyes: PERRLA, sclerae anicteric, no conjunctival injection. Extraocular movements are intact and full. No nystagmus.   Ears: Auricles are normal in appearance. Bilateral external auditory canals are impacted with cerumen.  This was removed using the operating microscope and alligator forceps. Bilateral tympanic membranes are clear and without effusion. Hearing normal to conversational voice.   Nose: External nose is normal in appearance. Bilateral nares are patent with normal appearing mucosa. Septum midline. Turbinates are unremarkable. No lesions.   Oral Cavity: Lips are normal in appearance. Oral mucosa is unremarkable. Gingiva is  unremarkable. Normal dentition for age. Tongue is unremarkable with good movement. Hard palate is unremarkable.   Oropharynx: Soft palate is unremarkable with full movement. Uvula is unremarkable. Bilateral tonsils are surgically absent. Posterior oropharynx is unremarkable.    Larynx and hypopharynx: Deferred secondary to gag reflex.  Neck: Supple.  No mass.  Nontender to palpation.  Trachea midline. Thyroid is normal in size and without obvious nodules on palpation.   Lymphatic: No lymphadenopathy upon palpation.  Respiratory: Clear to auscultation bilaterally, nonlabored respirations    Cardiovascular: RRR, no murmurs, rubs, or gallops,   Psychiatric: Appropriate affect, cooperative   Neurologic: Oriented x 3, strength symmetric in all extremities, Cranial Nerves II-XII are grossly intact to confrontation   Skin: Warm and dry. No rashes.    Procedures           Result Review :               Assessment and Plan    Diagnoses and all orders for this visit:    1. Thyroid nodule (Primary)  -     US Thyroid; Future    2. Bilateral impacted cerumen      Impressions and findings were discussed at great length.  Currently, she is seen today for evaluation of cerumen impactions in the setting of likely sensorineural hearing loss and thyroid nodules.  Examination today revealed bilateral cerumen impactions which were successfully removed in clinic using the operating microscope and alligator forceps.  She continues to use her hearing aids and feels as though they have been helpful.  In regards to her thyroid nodules we reviewed and discussed the images from her 12/12/2022 thyroid ultrasound.  Options for further evaluation were discussed including a repeat ultrasound versus no further surveillance given its previous appearance.  After a thorough discussion she elected to pursue a repeat ultrasound.  She may follow-up after the ultrasound or sooner if needed.      Follow Up   Return in about 6 months (around  9/7/2025).  Patient was given instructions and counseling regarding her condition or for health maintenance advice. Please see specific information pulled into the AVS if appropriate.

## 2025-03-10 DIAGNOSIS — E04.1 THYROID NODULE: Primary | ICD-10-CM

## 2025-03-13 ENCOUNTER — HOSPITAL ENCOUNTER (OUTPATIENT)
Facility: HOSPITAL | Age: 75
Setting detail: THERAPIES SERIES
Discharge: HOME | End: 2025-03-13
Payer: MEDICARE

## 2025-03-13 DIAGNOSIS — I89.0 LYMPHEDEMA: Primary | ICD-10-CM

## 2025-03-13 DIAGNOSIS — Z17.0 MALIGNANT NEOPLASM OF UPPER-OUTER QUADRANT OF RIGHT BREAST IN FEMALE, ESTROGEN RECEPTOR POSITIVE: ICD-10-CM

## 2025-03-13 DIAGNOSIS — Z17.0 MALIGNANT NEOPLASM OF UPPER-OUTER QUADRANT OF LEFT BREAST IN FEMALE, ESTROGEN RECEPTOR POSITIVE: ICD-10-CM

## 2025-03-13 DIAGNOSIS — C50.411 MALIGNANT NEOPLASM OF UPPER-OUTER QUADRANT OF RIGHT BREAST IN FEMALE, ESTROGEN RECEPTOR POSITIVE: ICD-10-CM

## 2025-03-13 DIAGNOSIS — C50.412 MALIGNANT NEOPLASM OF UPPER-OUTER QUADRANT OF LEFT BREAST IN FEMALE, ESTROGEN RECEPTOR POSITIVE: ICD-10-CM

## 2025-03-13 PROCEDURE — 93702 BIS XTRACELL FLUID ANALYSIS: CPT | Performed by: OCCUPATIONAL THERAPIST

## 2025-03-13 PROCEDURE — 97535 SELF CARE MNGMENT TRAINING: CPT | Performed by: OCCUPATIONAL THERAPIST

## 2025-03-13 NOTE — THERAPY RE-EVALUATION
Outpatient Occupational Therapy Lymphedema Re-Evaluation   Yanique     Patient Name: Jyoti Alvarez  : 1950  MRN: [id number]  Today's Date: 3/13/2025      Visit Date: 2025    Patient Active Problem List   Diagnosis    Breast cancer    Osteopenia of both hips    Colon polyp    Gastroesophageal reflux disease without esophagitis    Essential hypertension    Fatty liver    Vitamin D deficiency    Type 2 diabetes mellitus without complication, with long-term current use of insulin    Mixed hyperlipidemia    Atrophy of thyroid    Iron deficiency anemia secondary to inadequate dietary iron intake    Vertigo    Vitamin B12 deficiency    SHALONDA on CPAP    Morbid (severe) obesity due to excess calories    Medicare annual wellness visit, subsequent    Lymphadenopathy    Gait instability    Bilateral impacted cerumen        Past Medical History:   Diagnosis Date    Anemia     Ankle sprain     Arthritis     Asthma     INHALERS PRN    Breast cancer     RIGHT    Colon polyp     COVID 2021    Disease of thyroid gland Hypothyroidism    Fatty liver     ASYMPTOMATIC    Gastroesophageal reflux     Hip arthrosis     HL (hearing loss) Hearing aids     Hyperlipemia     Hypertension     Knee swelling     Limb swelling     Seasonal allergies     Skin cancer     Sleep apnea     Strain of latissimus dorsi muscle 2017    Right    Type 2 diabetes mellitus     AM  IN AM    Vertigo         Past Surgical History:   Procedure Laterality Date    AXILLARY LYMPH NODE BIOPSY/EXCISION Left 2023    Procedure: LEFT AXILLARY LYMPH NODE BIOPSY/EXCISION, MAGSEED;  Surgeon: Kathy Rausch MD;  [facility];  Service: General;  Laterality: Left;    BREAST RECONSTRUCTION Bilateral 2022    Procedure: BILATERAL BREAST RECONSTRUCTION WITH REMOVAL AND REPLACEMENT OF IMPLANTS, MESH PLACEMENT, CHEST WALL SKIN RESECTION, CHEST LIPOSUCTION;  Surgeon: Romain Quezada MD;  Location: [facility];  Service: Plastics;  Laterality: Bilateral;     SECTION      COLONOSCOPY  2019    COLONOSCOPY N/A 10/18/2022    Procedure: COLONOSCOPY POLYPECTOMIES;  Surgeon: Eduardo Salas MD;  Location: [facility] ENDOSCOPY;  Service: Gastroenterology;  Laterality: N/A;  COLON POLYPS    ENDOSCOPY  2019    EYE SURGERY  Not sure    Cateracts    KNEE ARTHROSCOPY W/ LATERAL RELEASE / MEDIAL IMBRICATION Left     TORN CARTIDLGE REPAIR    LIPOSUCTION N/A 2022    Procedure: CHEST WALL LIPOSUCTION AND CHEST WALL RESECTION;  Surgeon: Romain Quezada MD;  Location: [facility];  Service: Plastics;  Laterality: N/A;  TIMES BASED ON PREVIOUS EXPERIENCE    MASTECTOMY COMPLETE / SIMPLE Bilateral 2004    OOPHORECTOMY      OTHER SURGICAL HISTORY      Joint surgery,  RIGHT FOOT SECOND  TOE WITH A PIN    SKIN CANCER EXCISION Right 2020    neck    TOE SURGERY Right     2nd digit pin inserted    TONSILLECTOMY           Visit Dx:     ICD-10-CM ICD-9-CM   1. Lymphedema  I89.0 457.1   2. Malignant neoplasm of upper-outer quadrant of left breast in female, estrogen receptor positive  C50.412 174.4    Z17.0 V86.0   3. Malignant neoplasm of upper-outer quadrant of right breast in female, estrogen receptor positive  C50.411 174.4    Z17.0 V86.0        Patient History       Row Name 25 1000             History    Chief Complaint Other 1 (comment)  Lymphedema surveillance program  -TD      Brief Description of Current Complaint Pt had a history of breast cancer in the right breast in  where she had 14 lymphnodes removed at that time. Pt had a surgery on 2023 to investigate large lymphnodes in the LUE and had 5 removed at that time.  -TD         Fall Risk Assessment    Any falls in the past year: No  -TD      Does patient have a fear of falling No  -TD         Services    Prior Rehab/Home Health Experiences No  -TD      Are you currently receiving Home Health services No  -TD         Daily Activities    Primary  "Language English  -TD      Are you able to read Yes  -TD      Are you able to write Yes  -TD      How does patient learn best? Demonstration;Pictures/Video  -TD      Pt Participated in POC and Goals Yes  -TD         Safety    Are you being hurt, hit, or frightened by anyone at home or in your life? No  -TD      Are you being neglected by a caregiver No  -TD      Have you had any of the following issues with Anxiety  -TD                User Key  (r) = Recorded By, (t) = Taken By, (c) = Cosigned By      Initials Name Provider Type    TD Kenya Beltran OT Occupational Therapist                     Lymphedema       Row Name 03/13/25 1000             Subjective Pain    Able to rate subjective pain? yes  -TD      Pre-Treatment Pain Level 0  -TD      Post-Treatment Pain Level 0  -TD         Subjective    Subjective Comments Pt has had covid and has caused swelling  -TD         Lymphedema Assessment    Lymphedema Classification LUE:;RUE:;at risk/stage 0  -TD      Lymphedema Cancer Related Sx bilateral;simple mastectomy;reconstructive;axillary dissection;sentinel node biopsy  -TD      Lymphedema Surgery Comments RUE 2004; LUE 2023  -TD      Lymph Nodes Removed # 14  RUE: 14 LUE: 5  -TD      Positive Lymph Nodes # 0  -TD         LLIS - Physical Concerns    The amount of pain associated with my lymphedema is: 0  -TD      The amount of limb heaviness associated with my lymphedema is: 0  -TD      The amount of skin tightness associated with my lymphedema is: 0  -TD      The size of my swollen limb(s) seems: 0  -TD      Lymphedema affects the movement of my swollen limb(s): 0  -TD      The strength in my swollen limb(s) is: 0  -TD         LLIS - Psychosocial Concerns    Lymphedema affects my body image (i.e., \"how I think I look\"). 0  -TD      Lymphedema affects my socializing with others. 0  -TD      Lymphedema affects my intimate relations with spouse or partner (rate 0 if not applicable 0  -TD      Lymphedema \"gets me down\" " (i.e., depression, frustration, or anger) 0  -TD      I must rely on others for help due to my lymphedema. 0  -TD      I know what to do to manage my lymphedema 1  -TD         LLIS - Functional Concerns    Lymphedema affects my ability to perform self-care activities (i.e. eating, dressing, hygiene) 0  -TD      Lymphedema affects my ability to perform routine home or work-related activities. 0  -TD      Lymphedema affects my performance of preferred leisure activities. 0  -TD      Lymphedema affects proper fit of clothing/shoes 0  -TD      Lymphedema affects my sleep 0  -TD         Posture/Observations    Posture- WNL Posture is WNL  -TD         General ROM    GENERAL ROM COMMENTS BUE are WFL  -TD         MMT (Manual Muscle Testing)    General MMT Comments BUE are WFL  -TD         L-Dex Bioimpedence Screening    L-Dex Measurement Extremity LUE  -TD      L-Dex Patient Position Standing  -TD      L-Dex UE Dominate Side Right  -TD      L-Dex UE At Risk Side Left  -TD      L-Dex UE Pre Surgical Value No  -TD      L-Dex UE Score -2.3  -TD      L-Dex UE Baseline Score -3  -TD      L-Dex UE Value Change 0.7  -TD      L-Dex UE Comment RUE 2.3  -TD      $ L-Dex Charge yes  -TD         Lymphedema Life Impact Scale Totals    A.  Total Q1 - Q17 (Do not include Q18) 1  -TD      B.  Total number of questions answered (Q1-Q17) 17  -TD      C. Divide A by B 0.06  -TD      D. Multiple C by 25 1.5  -TD                User Key  (r) = Recorded By, (t) = Taken By, (c) = Cosigned By      Initials Name Provider Type    Kenya Garcia OT Occupational Therapist                    The patient had a follow up SOZO measurement which I reviewed today. The score is   WFL, see scanned to EMR. Bioimpedance spectroscopy helps identify the   onset of lymphedema in an arm or leg before patients experience noticeable swelling. Research has   shown that 92% of patients with early detection of lymphedema using L-Dex combined with   intervention do  not progress to chronic lymphedema through three years. Additionally, as of March 2023, the NCCN Guidelines® for Survivorship recommend proactive screening for lymphedema using   bioimpedance spectroscopy. Whenever possible, patients are tested for baseline L-Dex score before   cancer treatment begins and then are reassessed during regular follow-up visits using the SOZO device.   Otherwise, this can be started postoperatively and continued during regular follow-up visits. If the   patient’s L-Dex score increases above normal levels, that is a sign that lymphedema is developing and a   referral is made to physical therapy for further evaluation and early compression treatment.   Lymphedema assessment with the SOZO L-Dex score is recommended to be done every 3 months for   the first 3 years and then every 6 months for years 4 and 5 followed by annually afterwards          Therapy Education  Education Details: Discussed MLD and compression to decrease swelling in the side chest wall.  Given: Symptoms/condition management  Program: Reinforced  How Provided: Verbal  Provided to: Patient  Level of Understanding: Verbalized  57222 - OT Self Care/Mgmt Minutes: 25         OT Goals       Row Name 03/13/25 1155          Time Calculation    OT Goal Re-Cert Due Date 04/12/25  -TD               User Key  (r) = Recorded By, (t) = Taken By, (c) = Cosigned By      Initials Name Provider Type    Kenya Garcia OT Occupational Therapist                  1. Post Breast Surgery Care/at risk for Lymphedema  LTG 1: 90 days:  As an indicator of no exacerbation of lymphedema staging, the patient will present with an L-Dex score less than [10] points from preoperative baseline.              STATUS: met, ongoing     STG 1a:   30 days: To prevent exacerbation of mixed edema to lymphedema, patient will utilize the 2 postsurgical compression garments daily.                 STATUS: MET, on going  STG 1b: 30 days: Patient will be independent  with self-manual lymphatic massage.               STATUS: on going  STG 1c: 30 days:  Patient will be independent with identification of signs and symptoms of lymphedema exasperation per stoplight to recovery education handout.              STATUS: on going  STG 1 d: 30 days: Patient will be independent with HEP to prevent advancement in lymphedema staging.              STATUS: on going  TREATMENT:  Self Care/ADL retraining, Therapeutic Activity, Neuromuscular Re-education, Therapeutic Exercise, Bioimpedence Fluid Analysis, Post-Surgical compression garement [address] [facility] Silvia Camisole Kit 2860K, Orthotic Management and training,  and Manual Therapy.      OT Assessment/Plan       Row Name 03/13/25 1012          OT Assessment    Functional Limitations Decreased safety during functional activities  -TD     Impairments Impaired lymphatic circulation  -TD     Assessment Comments Jyoti has a history of breast cancer in the right breast in 2004 where she had 14 lymphnodes removed at that time. Pt had a surgery on 12/19/2023 to investigate large lymphnodes in the LUE and had 5 removed at that time. Patient is demonstrating normalized lymphatic functioning but complains that she has had increased fluid on the left side chest wall after her Covid diagnosis.  Discussed compression to assist.  Patient will continue to benefit from skilled occupational therapy to further evaluate ongoing lymphatic functioning to prevent advancement in lymphedema staging, increased pain and decreased ROM  -TD     OT Diagnosis at risk for lymphedema  -TD     OT Rehab Potential Good  -TD     Patient/caregiver participated in establishment of treatment plan and goals Yes  -TD     Patient would benefit from skilled therapy intervention Yes  -TD        OT Plan    OT Frequency --  see duration  -TD     Predicted Duration of Therapy Intervention (OT) Patient will be reevaluated every 3 months years 1 through 3 and every 6 months years  4 and 5.  -TD     Planned CPT's? OT EVAL MOD COMPLEXITY: 77494;OT THER PROC EA 15 MIN: 67064QN;OT SELF CARE/MGMT/TRAIN 15 MIN: 21618;OT MANUAL THERAPY EA 15 MIN: 23770;OT ORTHOTIC MGMT/TRAIN EA 15 MIN: 70174;OT WC MGMT EA 15 MIN: 90163  -TD     Planned Therapy Interventions (Optional Details) joint mobilization;manual therapy techniques;orthotic fitting/training;patient/family education;ROM (Range of Motion);strengthening;wound care  -TD     OT Plan Comments continue POC  -TD               User Key  (r) = Recorded By, (t) = Taken By, (c) = Cosigned By      Initials Name Provider Type    TD Kenya Beltran, OT Occupational Therapist                              Time Calculation:   Timed Charges  63464 - OT Self Care/Mgmt Minutes: 25  Total Minutes  Timed Charges Total Minutes: 25   Total Minutes: 25     Therapy Charges for Today       Code Description Service Date Service Provider Modifiers Qty    69365151091 HC PT BIS XTRACELL FLUID ANALYSIS 3/13/2025 Kenya Beltran OT  1    63705339316 HC OT SELF CARE/MGMT/TRAIN EA 15 MIN 3/13/2025 Kenya Beltran OT GO 2                      Kenya Beltran OT  3/13/2025

## 2025-03-31 ENCOUNTER — LAB (OUTPATIENT)
Dept: LAB | Facility: HOSPITAL | Age: 75
End: 2025-03-31
Payer: MEDICARE

## 2025-03-31 DIAGNOSIS — E03.4 ATROPHY OF THYROID: ICD-10-CM

## 2025-03-31 DIAGNOSIS — E78.2 MIXED HYPERLIPIDEMIA: ICD-10-CM

## 2025-03-31 DIAGNOSIS — Z79.4 TYPE 2 DIABETES MELLITUS WITHOUT COMPLICATION, WITH LONG-TERM CURRENT USE OF INSULIN: ICD-10-CM

## 2025-03-31 DIAGNOSIS — E11.9 TYPE 2 DIABETES MELLITUS WITHOUT COMPLICATION, WITH LONG-TERM CURRENT USE OF INSULIN: ICD-10-CM

## 2025-03-31 DIAGNOSIS — G47.33 OSA ON CPAP: ICD-10-CM

## 2025-03-31 LAB
ALBUMIN SERPL-MCNC: 4.2 G/DL (ref 3.5–5.2)
ALBUMIN/GLOB SERPL: 1.9 G/DL
ALP SERPL-CCNC: 68 U/L (ref 39–117)
ALT SERPL W P-5'-P-CCNC: 26 U/L (ref 1–33)
ANION GAP SERPL CALCULATED.3IONS-SCNC: 13.7 MMOL/L (ref 5–15)
AST SERPL-CCNC: 27 U/L (ref 1–32)
BASOPHILS # BLD AUTO: 0.05 10*3/MM3 (ref 0–0.2)
BASOPHILS NFR BLD AUTO: 0.6 % (ref 0–1.5)
BILIRUB SERPL-MCNC: 0.6 MG/DL (ref 0–1.2)
BUN SERPL-MCNC: 21 MG/DL (ref 8–23)
BUN/CREAT SERPL: 26.9 (ref 7–25)
CALCIUM SPEC-SCNC: 9.4 MG/DL (ref 8.6–10.5)
CHLORIDE SERPL-SCNC: 101 MMOL/L (ref 98–107)
CHOLEST SERPL-MCNC: 146 MG/DL (ref 0–200)
CO2 SERPL-SCNC: 25.3 MMOL/L (ref 22–29)
CREAT SERPL-MCNC: 0.78 MG/DL (ref 0.57–1)
DEPRECATED RDW RBC AUTO: 43.7 FL (ref 37–54)
EGFRCR SERPLBLD CKD-EPI 2021: 78.8 ML/MIN/1.73
EOSINOPHIL # BLD AUTO: 0.16 10*3/MM3 (ref 0–0.4)
EOSINOPHIL NFR BLD AUTO: 2 % (ref 0.3–6.2)
ERYTHROCYTE [DISTWIDTH] IN BLOOD BY AUTOMATED COUNT: 12.6 % (ref 12.3–15.4)
GLOBULIN UR ELPH-MCNC: 2.2 GM/DL
GLUCOSE SERPL-MCNC: 169 MG/DL (ref 65–99)
HBA1C MFR BLD: 7.4 % (ref 4.8–5.6)
HCT VFR BLD AUTO: 43 % (ref 34–46.6)
HDLC SERPL-MCNC: 41 MG/DL (ref 40–60)
HGB BLD-MCNC: 14.6 G/DL (ref 12–15.9)
IMM GRANULOCYTES # BLD AUTO: 0.03 10*3/MM3 (ref 0–0.05)
IMM GRANULOCYTES NFR BLD AUTO: 0.4 % (ref 0–0.5)
LDLC SERPL CALC-MCNC: 69 MG/DL (ref 0–100)
LDLC/HDLC SERPL: 1.51 {RATIO}
LYMPHOCYTES # BLD AUTO: 1.82 10*3/MM3 (ref 0.7–3.1)
LYMPHOCYTES NFR BLD AUTO: 23.3 % (ref 19.6–45.3)
MCH RBC QN AUTO: 31.9 PG (ref 26.6–33)
MCHC RBC AUTO-ENTMCNC: 34 G/DL (ref 31.5–35.7)
MCV RBC AUTO: 94.1 FL (ref 79–97)
MONOCYTES # BLD AUTO: 0.82 10*3/MM3 (ref 0.1–0.9)
MONOCYTES NFR BLD AUTO: 10.5 % (ref 5–12)
NEUTROPHILS NFR BLD AUTO: 4.93 10*3/MM3 (ref 1.7–7)
NEUTROPHILS NFR BLD AUTO: 63.2 % (ref 42.7–76)
NRBC BLD AUTO-RTO: 0 /100 WBC (ref 0–0.2)
PLATELET # BLD AUTO: 341 10*3/MM3 (ref 140–450)
PMV BLD AUTO: 9.5 FL (ref 6–12)
POTASSIUM SERPL-SCNC: 4.6 MMOL/L (ref 3.5–5.2)
PROT SERPL-MCNC: 6.4 G/DL (ref 6–8.5)
RBC # BLD AUTO: 4.57 10*6/MM3 (ref 3.77–5.28)
SODIUM SERPL-SCNC: 140 MMOL/L (ref 136–145)
TRIGL SERPL-MCNC: 216 MG/DL (ref 0–150)
TSH SERPL DL<=0.05 MIU/L-ACNC: 2.46 UIU/ML (ref 0.27–4.2)
VLDLC SERPL-MCNC: 36 MG/DL (ref 5–40)
WBC NRBC COR # BLD AUTO: 7.81 10*3/MM3 (ref 3.4–10.8)

## 2025-03-31 PROCEDURE — 36415 COLL VENOUS BLD VENIPUNCTURE: CPT

## 2025-03-31 PROCEDURE — 80061 LIPID PANEL: CPT

## 2025-03-31 PROCEDURE — 84443 ASSAY THYROID STIM HORMONE: CPT

## 2025-03-31 PROCEDURE — 80053 COMPREHEN METABOLIC PANEL: CPT

## 2025-03-31 PROCEDURE — 85025 COMPLETE CBC W/AUTO DIFF WBC: CPT

## 2025-03-31 PROCEDURE — 83036 HEMOGLOBIN GLYCOSYLATED A1C: CPT

## 2025-04-04 ENCOUNTER — OFFICE VISIT (OUTPATIENT)
Age: 75
End: 2025-04-04
Payer: MEDICARE

## 2025-04-04 VITALS
HEART RATE: 81 BPM | DIASTOLIC BLOOD PRESSURE: 80 MMHG | BODY MASS INDEX: 31.01 KG/M2 | SYSTOLIC BLOOD PRESSURE: 130 MMHG | OXYGEN SATURATION: 96 % | WEIGHT: 169.6 LBS

## 2025-04-04 DIAGNOSIS — J45.40 MODERATE PERSISTENT ASTHMA WITHOUT COMPLICATION: ICD-10-CM

## 2025-04-04 DIAGNOSIS — Z78.0 POSTMENOPAUSAL: ICD-10-CM

## 2025-04-04 DIAGNOSIS — E55.9 VITAMIN D DEFICIENCY: ICD-10-CM

## 2025-04-04 DIAGNOSIS — R06.09 DYSPNEA ON EXERTION: ICD-10-CM

## 2025-04-04 DIAGNOSIS — E78.2 MIXED HYPERLIPIDEMIA: ICD-10-CM

## 2025-04-04 DIAGNOSIS — Z00.00 MEDICARE ANNUAL WELLNESS VISIT, SUBSEQUENT: Primary | ICD-10-CM

## 2025-04-04 DIAGNOSIS — I10 ESSENTIAL HYPERTENSION: ICD-10-CM

## 2025-04-04 DIAGNOSIS — G93.32 POST-COVID CHRONIC FATIGUE: ICD-10-CM

## 2025-04-04 DIAGNOSIS — E03.4 ATROPHY OF THYROID: ICD-10-CM

## 2025-04-04 DIAGNOSIS — Z79.4 TYPE 2 DIABETES MELLITUS WITHOUT COMPLICATION, WITH LONG-TERM CURRENT USE OF INSULIN: ICD-10-CM

## 2025-04-04 DIAGNOSIS — U09.9 POST-COVID CHRONIC FATIGUE: ICD-10-CM

## 2025-04-04 DIAGNOSIS — E11.9 TYPE 2 DIABETES MELLITUS WITHOUT COMPLICATION, WITH LONG-TERM CURRENT USE OF INSULIN: ICD-10-CM

## 2025-04-04 RX ORDER — ATORVASTATIN CALCIUM 40 MG/1
40 TABLET, FILM COATED ORAL NIGHTLY
COMMUNITY
Start: 2025-04-01

## 2025-04-04 RX ORDER — FLUTICASONE PROPIONATE AND SALMETEROL 250; 50 UG/1; UG/1
1 POWDER RESPIRATORY (INHALATION)
Qty: 180 EACH | Refills: 3 | Status: SHIPPED | OUTPATIENT
Start: 2025-04-04

## 2025-04-04 RX ORDER — FLUTICASONE PROPIONATE AND SALMETEROL 250; 50 UG/1; UG/1
1 POWDER RESPIRATORY (INHALATION)
COMMUNITY
End: 2025-04-04 | Stop reason: SDUPTHER

## 2025-04-04 NOTE — PROGRESS NOTES
Subjective   The ABCs of the Annual Wellness Visit  Medicare Wellness Visit      Jyoti Alvarez is a 75 y.o. patient who presents for a Medicare Wellness Visit.    The following portions of the patient's history were reviewed and   updated as appropriate: allergies, current medications, past family history, past medical history, past social history, past surgical history, and problem list.    Compared to one year ago, the patient's physical   health is the same.  Compared to one year ago, the patient's mental   health is the same.    Recent Hospitalizations:  She was not admitted to the hospital during the last year.     Current Medical Providers:  Patient Care Team:  Ramo Noguera MD as PCP - General (Internal Medicine)  Zion Shaw MD as Attending Provider (Plastic Surgery)  Kelly Vicente APRN as Nurse Practitioner (Endocrinology)  Kathy Rausch MD as Consulting Physician (General Surgery)  Kassy Marquez APRN as Nurse Practitioner (Obstetrics and Gynecology)  Uday Pinto MD as Consulting Physician (Otolaryngology)    Outpatient Medications Prior to Visit   Medication Sig Dispense Refill    acetaminophen (TYLENOL) 650 MG 8 hr tablet Take 1 tablet by mouth 2 (two) times a day.      albuterol sulfate  (90 Base) MCG/ACT inhaler USE 2 INHALATIONS EVERY 6 HOURS AS NEEDED 51 g 2    aspirin 81 MG EC tablet Take 1 tablet by mouth Daily. OTC      atorvastatin (LIPITOR) 40 MG tablet Take 1 tablet by mouth Every Night.      Blood Glucose Monitoring Suppl device Use as directed for blood glucose monitoring 1 each 0    D3-50 1.25 MG (61825 UT) capsule TAKE ONE CAPSULE BY MOUTH ONCE WEEKLY (Patient taking differently: Take 1 capsule by mouth Every 30 (Thirty) Days.) 12 capsule 1    Dulaglutide (Trulicity) 3 MG/0.5ML solution auto-injector Inject 3 mg under the skin into the appropriate area as directed Every 7 (Seven) Days. 6 mL 1    ferrous sulfate 325 (65 FE) MG  tablet Take 1 tablet by mouth Daily With Breakfast. OTC      fluticasone (FLONASE) 50 MCG/ACT nasal spray USE 1 SPRAY IN EACH NOSTRIL ONCE DAILY 48 g 2    glimepiride (AMARYL) 2 MG tablet Take 1 tablet by mouth 2 (Two) Times a Day for 180 days. 180 tablet 1    glucose blood (OneTouch Verio) test strip 1 each by Other route 3 (Three) Times a Day. use to test blood sugar 3 times daily 300 each 12    Insulin Pen Needle (BD Pen Needle Ca 2nd Gen) 32G X 4 MM misc Use 1 each Daily. 100 each 1    Lantus SoloStar 100 UNIT/ML injection pen       levothyroxine (SYNTHROID, LEVOTHROID) 25 MCG tablet TAKE 1 TABLET DAILY IN THE MORNING ON AN EMPTY STOMACH 90 tablet 3    losartan (COZAAR) 50 MG tablet TAKE 1 TABLET DAILY 90 tablet 3    metoprolol succinate XL (TOPROL-XL) 100 MG 24 hr tablet TAKE 1 TABLET DAILY 90 tablet 3    montelukast (SINGULAIR) 10 MG tablet TAKE 1 TABLET DAILY 90 tablet 3    multivitamin with minerals tablet tablet Take 1 tablet by mouth Daily.      OneTouch Delica Lancets 30G misc Use 1 each 3 (Three) Times a Day. 200 each 2    pantoprazole (PROTONIX) 40 MG EC tablet TAKE 1 TABLET DAILY 90 tablet 3    Probiotic Product (PROBIOTIC PO) Take 1 fluid ounce by mouth Daily. Plexus powder      vitamin B-12 (CYANOCOBALAMIN) 500 MCG tablet Take 1 tablet by mouth Daily.       No facility-administered medications prior to visit.     No opioid medication identified on active medication list. I have reviewed chart for other potential  high risk medication/s and harmful drug interactions in the elderly.      Aspirin is on active medication list. Aspirin use is indicated based on review of current medical condition/s. Pros and cons of this therapy have been discussed today. Benefits of this medication outweigh potential harm.  Patient has been encouraged to continue taking this medication.  .      Patient Active Problem List   Diagnosis    Breast cancer    Osteopenia of both hips    Colon polyp    Gastroesophageal reflux  "disease without esophagitis    Essential hypertension    Fatty liver    Vitamin D deficiency    Type 2 diabetes mellitus without complication, with long-term current use of insulin    Mixed hyperlipidemia    Atrophy of thyroid    Iron deficiency anemia secondary to inadequate dietary iron intake    Vertigo    Vitamin B12 deficiency    SHALONDA on CPAP    Morbid (severe) obesity due to excess calories    Medicare annual wellness visit, subsequent    Lymphadenopathy    Gait instability    Bilateral impacted cerumen     Advance Care Planning Advance Directive is not on file.  ACP discussion was held with the patient during this visit. Patient does not have an advance directive, information provided.            Objective   Vitals:    04/04/25 1228   BP: 130/80   Pulse: 81   SpO2: 96%   Weight: 76.9 kg (169 lb 9.6 oz)   PainSc: 2    PainLoc: Knee       Estimated body mass index is 31.01 kg/m² as calculated from the following:    Height as of 3/3/25: 157.5 cm (62.01\").    Weight as of this encounter: 76.9 kg (169 lb 9.6 oz).                Does the patient have evidence of cognitive impairment? No  Lab Results   Component Value Date    TRIG 216 (H) 03/31/2025    HDL 41 03/31/2025    LDL 69 03/31/2025    VLDL 36 03/31/2025    HGBA1C 7.40 (H) 03/31/2025                                                                                               Health  Risk Assessment    Smoking Status:  Social History     Tobacco Use   Smoking Status Never    Passive exposure: Never   Smokeless Tobacco Never     Alcohol Consumption:  Social History     Substance and Sexual Activity   Alcohol Use Never       Fall Risk Screen  STEADI Fall Risk Assessment was completed, and patient is at LOW risk for falls.Assessment completed on:4/4/2025    Depression Screening   Little interest or pleasure in doing things? Not at all   Feeling down, depressed, or hopeless? Not at all   PHQ-2 Total Score 0      Health Habits and Functional and Cognitive " Screenin/4/2025    12:26 PM   Functional & Cognitive Status   Do you have difficulty preparing food and eating? No   Do you have difficulty bathing yourself, getting dressed or grooming yourself? No   Do you have difficulty using the toilet? No   Do you have difficulty moving around from place to place? No   Do you have trouble with steps or getting out of a bed or a chair? No   Current Diet Well Balanced Diet   Dental Exam Up to date   Eye Exam Up to date   Exercise (times per week) 0 times per week   Current Exercises Include No Regular Exercise   Do you need help using the phone?  No   Are you deaf or do you have serious difficulty hearing?  No   Do you need help to go to places out of walking distance? No   Do you need help shopping? No   Do you need help preparing meals?  No   Do you need help with housework?  No   Do you need help with laundry? No   Do you need help taking your medications? No   Do you need help managing money? No   Do you ever drive or ride in a car without wearing a seat belt? No   Have you felt unusual stress, anger or loneliness in the last month? No   Who do you live with? Child   If you need help, do you have trouble finding someone available to you? No   Have you been bothered in the last four weeks by sexual problems? No   Do you have difficulty concentrating, remembering or making decisions? No           Age-appropriate Screening Schedule:  Refer to the list below for future screening recommendations based on patient's age, sex and/or medical conditions. Orders for these recommended tests are listed in the plan section. The patient has been provided with a written plan.    Health Maintenance List  Health Maintenance   Topic Date Due    RSV Vaccine - Adults (1 - 1-dose 75+ series) Never done    DIABETIC EYE EXAM  2024    ANNUAL WELLNESS VISIT  2025    INFLUENZA VACCINE  2025    HEMOGLOBIN A1C  2025    URINE MICROALBUMIN-CREATININE RATIO (uACR)   03/03/2026    LIPID PANEL  03/31/2026    DXA SCAN  09/09/2026    TDAP/TD VACCINES (2 - Td or Tdap) 07/25/2032    HEPATITIS C SCREENING  Completed    Pneumococcal Vaccine 50+  Completed    COVID-19 Vaccine  Discontinued    ZOSTER VACCINE  Discontinued    COLORECTAL CANCER SCREENING  Discontinued                                                                                                                                                CMS Preventative Services Quick Reference  Risk Factors Identified During Encounter  None Identified    The above risks/problems have been discussed with the patient.  Pertinent information has been shared with the patient in the After Visit Summary.  An After Visit Summary and PPPS were made available to the patient.    Follow Up:   Next Medicare Wellness visit to be scheduled in 1 year.     Assessment & Plan  Medicare annual wellness visit, subsequent  AWV completed 4/25.  Patient remains active and independent.  No falls in the past year.  No overnight hospitalizations either.  She does not have a living will or healthcare surrogate, need for this was reviewed at previous office visit.              Follow Up:   No follow-ups on file.

## 2025-04-04 NOTE — ASSESSMENT & PLAN NOTE
AWV completed 4/25.  Patient remains active and independent.  No falls in the past year.  No overnight hospitalizations either.  She does not have a living will or healthcare surrogate, need for this was reviewed at previous office visit.
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
## 2025-04-04 NOTE — ASSESSMENT & PLAN NOTE
Patient is stable this regards as of her 4/25 OV.  She is on Advair 500/50, typically able to just use this daily, so we will try backing her down to 250/50 with her next dosing.    Of note she did have COVID back in 12/24, was on Paxlovid, and did not require hospitalization.    She has rescue inhaler available, and is on Singulair as well.

## 2025-04-04 NOTE — ASSESSMENT & PLAN NOTE
Blood sugar remains well-controlled as of her 4/25 OV and her pulse is right around 80.  She is on moderate doses of losartan and metoprolol, stable to continue same.

## 2025-04-04 NOTE — ASSESSMENT & PLAN NOTE
LDL of 69 as of her 4/25 OV remains at goal for primary prevention.  She is on moderate dose atorvastatin and stable to continue same.

## 2025-04-04 NOTE — ASSESSMENT & PLAN NOTE
This is as of her 4/25 OV.  She had COVID back in 12/24 and does not feel like she is fully recovered from this.  She has had COVID previously, she wonders if she has had any cardiac issues secondary to this.    Having some dyspnea with activities etc.  She does have chest pains intermittently, but is hard to distinguish this from her musculoskeletal postop issues.    She had a normal stress test about 4 years ago, but I do not see any recent echocardiograms, so recommend we check one at this time.

## 2025-04-04 NOTE — ASSESSMENT & PLAN NOTE
A1c is up to 7.4 as of her 4/25 OV, but this is still very reasonable over the winter months.  She follows with the diabetes clinic and is on moderate doses of Trulicity and glimepiride.  Additionally she is on Lantus, she has bumped it up from 40 to 55 recently and her morning sugars are in the 130's on average.

## 2025-04-11 ENCOUNTER — HOSPITAL ENCOUNTER (OUTPATIENT)
Dept: ULTRASOUND IMAGING | Facility: HOSPITAL | Age: 75
Discharge: HOME | End: 2025-04-11
Payer: MEDICARE

## 2025-04-11 DIAGNOSIS — E04.1 THYROID NODULE: ICD-10-CM

## 2025-04-11 PROCEDURE — 76536 US EXAM OF HEAD AND NECK: CPT

## 2025-04-21 ENCOUNTER — HOSPITAL ENCOUNTER (OUTPATIENT)
Facility: HOSPITAL | Age: 75
Discharge: HOME | End: 2025-04-21
Admitting: INTERNAL MEDICINE
Payer: MEDICARE

## 2025-04-21 DIAGNOSIS — R06.09 DYSPNEA ON EXERTION: ICD-10-CM

## 2025-04-21 DIAGNOSIS — G93.32 POST-COVID CHRONIC FATIGUE: ICD-10-CM

## 2025-04-21 DIAGNOSIS — U09.9 POST-COVID CHRONIC FATIGUE: ICD-10-CM

## 2025-04-21 LAB
BH CV ECHO MEAS - LAT PEAK E' VEL: 6.2 CM/SEC
BH CV ECHO MEAS - MED PEAK E' VEL: 10 CM/SEC
BH CV ECHO MEAS - MV A MAX VEL: 101.8 CM/SEC
BH CV ECHO MEAS - MV E MAX VEL: 55.4 CM/SEC
BH CV ECHO MEAS - MV E/A: 0.54
BH CV ECHO MEAS - TR MAX PG: 22.9 MMHG
BH CV ECHO MEAS - TR MAX VEL: 239.2 CM/SEC
BH CV ECHO MEASUREMENTS AVERAGE E/E' RATIO: 6.84
IVRT: 119 MS

## 2025-04-21 PROCEDURE — 93306 TTE W/DOPPLER COMPLETE: CPT | Performed by: INTERNAL MEDICINE

## 2025-04-21 PROCEDURE — 93306 TTE W/DOPPLER COMPLETE: CPT

## 2025-04-22 ENCOUNTER — TELEPHONE (OUTPATIENT)
Age: 75
End: 2025-04-22
Payer: MEDICARE

## 2025-04-22 ENCOUNTER — RESULTS FOLLOW-UP (OUTPATIENT)
Age: 75
End: 2025-04-22
Payer: MEDICARE

## 2025-04-22 DIAGNOSIS — R93.1 ABNORMAL ECHOCARDIOGRAM: Primary | ICD-10-CM

## 2025-04-22 DIAGNOSIS — R53.83 OTHER FATIGUE: ICD-10-CM

## 2025-04-22 DIAGNOSIS — R06.09 DYSPNEA ON EXERTION: ICD-10-CM

## 2025-04-22 RX ORDER — MONTELUKAST SODIUM 10 MG/1
10 TABLET ORAL DAILY
Qty: 90 TABLET | Refills: 1 | Status: SHIPPED | OUTPATIENT
Start: 2025-04-22

## 2025-04-22 NOTE — TELEPHONE ENCOUNTER
Caller: Jyoti Alvarez    Relationship: Self    Best call back number: [phone]     Caller requesting test results: Yes     What test was performed: ECHOCARDIOGRAM     When was the test performed: 04/21/2025    Where was the test performed: [facility]    Additional notes: Patient is calling requesting for test results , stated she was able to see results in mychart.

## 2025-04-29 ENCOUNTER — TELEPHONE (OUTPATIENT)
Dept: DIABETES SERVICES | Facility: HOSPITAL | Age: 75
End: 2025-04-29
Payer: MEDICARE

## 2025-04-29 DIAGNOSIS — E11.65 UNCONTROLLED TYPE 2 DIABETES MELLITUS WITH HYPERGLYCEMIA: ICD-10-CM

## 2025-04-29 RX ORDER — BLOOD SUGAR DIAGNOSTIC
STRIP MISCELLANEOUS
Qty: 600 EACH | Refills: 2 | Status: SHIPPED | OUTPATIENT
Start: 2025-04-29

## 2025-04-29 NOTE — TELEPHONE ENCOUNTER
Spoke with patient while patient was in the clinic. States that she is increasing to testing her blood sugar three times daily, AM/ 2 hrs after meal/ PM, needed rx to reflect sufficient amount of test strips.    Increased amount to dispense was sent to pharmacy. No further questions or concerns noted, patient has follow up appointment in 2 months.

## 2025-04-29 NOTE — TELEPHONE ENCOUNTER
Patient stated that her One Touch Verio is going to run out. She doesn't have enough of it to get her through and she is going to be checking her blood sugar three times a day now, so she was needing her refill upped or just needing advice on what to do. Patient requesting a call back.

## 2025-05-09 RX ORDER — ALBUTEROL SULFATE 90 UG/1
INHALANT RESPIRATORY (INHALATION)
Qty: 25.5 G | Refills: 2 | Status: SHIPPED | OUTPATIENT
Start: 2025-05-09

## 2025-05-23 ENCOUNTER — APPOINTMENT (OUTPATIENT)
Dept: CT IMAGING | Facility: HOSPITAL | Age: 75
End: 2025-05-23
Payer: MEDICARE

## 2025-05-23 ENCOUNTER — HOSPITAL ENCOUNTER (OUTPATIENT)
Facility: HOSPITAL | Age: 75
Discharge: HOME | End: 2025-05-25
Attending: EMERGENCY MEDICINE | Admitting: INTERNAL MEDICINE
Payer: MEDICARE

## 2025-05-23 ENCOUNTER — APPOINTMENT (OUTPATIENT)
Dept: GENERAL RADIOLOGY | Facility: HOSPITAL | Age: 75
End: 2025-05-23
Payer: MEDICARE

## 2025-05-23 DIAGNOSIS — I24.9 ACS (ACUTE CORONARY SYNDROME): ICD-10-CM

## 2025-05-23 DIAGNOSIS — R07.9 CHEST PAIN, UNSPECIFIED TYPE: Primary | ICD-10-CM

## 2025-05-23 LAB
ALBUMIN SERPL-MCNC: 4.3 G/DL (ref 3.5–5.2)
ALBUMIN/GLOB SERPL: 2.3 G/DL
ALP SERPL-CCNC: 71 U/L (ref 39–117)
ALT SERPL W P-5'-P-CCNC: 28 U/L (ref 1–33)
ANION GAP SERPL CALCULATED.3IONS-SCNC: 12.2 MMOL/L (ref 5–15)
APTT PPP: 27.3 SECONDS (ref 78–95.9)
AST SERPL-CCNC: 30 U/L (ref 1–32)
BASOPHILS # BLD AUTO: 0.03 10*3/MM3 (ref 0–0.2)
BASOPHILS NFR BLD AUTO: 0.3 % (ref 0–1.5)
BILIRUB SERPL-MCNC: 0.4 MG/DL (ref 0–1.2)
BUN SERPL-MCNC: 25 MG/DL (ref 8–23)
BUN/CREAT SERPL: 32.1 (ref 7–25)
CALCIUM SPEC-SCNC: 9.1 MG/DL (ref 8.6–10.5)
CHLORIDE SERPL-SCNC: 101 MMOL/L (ref 98–107)
CHOLEST SERPL-MCNC: 138 MG/DL (ref 0–200)
CO2 SERPL-SCNC: 23.8 MMOL/L (ref 22–29)
CREAT SERPL-MCNC: 0.78 MG/DL (ref 0.57–1)
DEPRECATED RDW RBC AUTO: 45.4 FL (ref 37–54)
EGFRCR SERPLBLD CKD-EPI 2021: 78.8 ML/MIN/1.73
EOSINOPHIL # BLD AUTO: 0.05 10*3/MM3 (ref 0–0.4)
EOSINOPHIL NFR BLD AUTO: 0.4 % (ref 0.3–6.2)
ERYTHROCYTE [DISTWIDTH] IN BLOOD BY AUTOMATED COUNT: 13 % (ref 12.3–15.4)
GEN 5 1HR TROPONIN T REFLEX: 50 NG/L
GLOBULIN UR ELPH-MCNC: 1.9 GM/DL
GLUCOSE BLDC GLUCOMTR-MCNC: 119 MG/DL (ref 70–99)
GLUCOSE BLDC GLUCOMTR-MCNC: 204 MG/DL (ref 70–99)
GLUCOSE BLDC GLUCOMTR-MCNC: 67 MG/DL (ref 70–99)
GLUCOSE SERPL-MCNC: 123 MG/DL (ref 65–99)
HCT VFR BLD AUTO: 40.6 % (ref 34–46.6)
HDLC SERPL-MCNC: 36 MG/DL (ref 40–60)
HGB BLD-MCNC: 13.5 G/DL (ref 12–15.9)
IMM GRANULOCYTES # BLD AUTO: 0.03 10*3/MM3 (ref 0–0.05)
IMM GRANULOCYTES NFR BLD AUTO: 0.3 % (ref 0–0.5)
INR PPP: 0.98 (ref 0.86–1.15)
LDLC SERPL CALC-MCNC: 69 MG/DL (ref 0–100)
LDLC/HDLC SERPL: 1.73 {RATIO}
LIPASE SERPL-CCNC: 41 U/L (ref 13–60)
LYMPHOCYTES # BLD AUTO: 1.58 10*3/MM3 (ref 0.7–3.1)
LYMPHOCYTES NFR BLD AUTO: 13.8 % (ref 19.6–45.3)
MAGNESIUM SERPL-MCNC: 2 MG/DL (ref 1.6–2.4)
MCH RBC QN AUTO: 31.6 PG (ref 26.6–33)
MCHC RBC AUTO-ENTMCNC: 33.3 G/DL (ref 31.5–35.7)
MCV RBC AUTO: 95.1 FL (ref 79–97)
MONOCYTES # BLD AUTO: 0.72 10*3/MM3 (ref 0.1–0.9)
MONOCYTES NFR BLD AUTO: 6.3 % (ref 5–12)
NEUTROPHILS NFR BLD AUTO: 78.9 % (ref 42.7–76)
NEUTROPHILS NFR BLD AUTO: 9.05 10*3/MM3 (ref 1.7–7)
NRBC BLD AUTO-RTO: 0 /100 WBC (ref 0–0.2)
NT-PROBNP SERPL-MCNC: 136.6 PG/ML (ref 0–1800)
PLATELET # BLD AUTO: 303 10*3/MM3 (ref 140–450)
PMV BLD AUTO: 9.2 FL (ref 6–12)
POTASSIUM SERPL-SCNC: 4.4 MMOL/L (ref 3.5–5.2)
PROT SERPL-MCNC: 6.2 G/DL (ref 6–8.5)
PROTHROMBIN TIME: 13.4 SECONDS (ref 11.8–14.9)
QT INTERVAL: 409 MS
QTC INTERVAL: 444 MS
RBC # BLD AUTO: 4.27 10*6/MM3 (ref 3.77–5.28)
SODIUM SERPL-SCNC: 137 MMOL/L (ref 136–145)
TRIGL SERPL-MCNC: 198 MG/DL (ref 0–150)
TROPONIN T % DELTA: 2
TROPONIN T NUMERIC DELTA: 1 NG/L
TROPONIN T SERPL HS-MCNC: 49 NG/L
TROPONIN T SERPL HS-MCNC: 51 NG/L
TSH SERPL DL<=0.05 MIU/L-ACNC: 0.81 UIU/ML (ref 0.27–4.2)
VLDLC SERPL-MCNC: 33 MG/DL (ref 5–40)
WBC NRBC COR # BLD AUTO: 11.46 10*3/MM3 (ref 3.4–10.8)

## 2025-05-23 PROCEDURE — 96365 THER/PROPH/DIAG IV INF INIT: CPT

## 2025-05-23 PROCEDURE — G0378 HOSPITAL OBSERVATION PER HR: HCPCS

## 2025-05-23 PROCEDURE — 82948 REAGENT STRIP/BLOOD GLUCOSE: CPT | Performed by: STUDENT IN AN ORGANIZED HEALTH CARE EDUCATION/TRAINING PROGRAM

## 2025-05-23 PROCEDURE — 83690 ASSAY OF LIPASE: CPT | Performed by: EMERGENCY MEDICINE

## 2025-05-23 PROCEDURE — 84484 ASSAY OF TROPONIN QUANT: CPT | Performed by: EMERGENCY MEDICINE

## 2025-05-23 PROCEDURE — 80061 LIPID PANEL: CPT | Performed by: STUDENT IN AN ORGANIZED HEALTH CARE EDUCATION/TRAINING PROGRAM

## 2025-05-23 PROCEDURE — 85025 COMPLETE CBC W/AUTO DIFF WBC: CPT | Performed by: EMERGENCY MEDICINE

## 2025-05-23 PROCEDURE — 84484 ASSAY OF TROPONIN QUANT: CPT | Performed by: STUDENT IN AN ORGANIZED HEALTH CARE EDUCATION/TRAINING PROGRAM

## 2025-05-23 PROCEDURE — 85610 PROTHROMBIN TIME: CPT | Performed by: EMERGENCY MEDICINE

## 2025-05-23 PROCEDURE — 71045 X-RAY EXAM CHEST 1 VIEW: CPT

## 2025-05-23 PROCEDURE — 70450 CT HEAD/BRAIN W/O DYE: CPT

## 2025-05-23 PROCEDURE — 82948 REAGENT STRIP/BLOOD GLUCOSE: CPT

## 2025-05-23 PROCEDURE — 83036 HEMOGLOBIN GLYCOSYLATED A1C: CPT | Performed by: STUDENT IN AN ORGANIZED HEALTH CARE EDUCATION/TRAINING PROGRAM

## 2025-05-23 PROCEDURE — 99285 EMERGENCY DEPT VISIT HI MDM: CPT

## 2025-05-23 PROCEDURE — 99223 1ST HOSP IP/OBS HIGH 75: CPT | Performed by: STUDENT IN AN ORGANIZED HEALTH CARE EDUCATION/TRAINING PROGRAM

## 2025-05-23 PROCEDURE — 96366 THER/PROPH/DIAG IV INF ADDON: CPT

## 2025-05-23 PROCEDURE — 83735 ASSAY OF MAGNESIUM: CPT | Performed by: EMERGENCY MEDICINE

## 2025-05-23 PROCEDURE — 80053 COMPREHEN METABOLIC PANEL: CPT | Performed by: EMERGENCY MEDICINE

## 2025-05-23 PROCEDURE — 85730 THROMBOPLASTIN TIME PARTIAL: CPT | Performed by: EMERGENCY MEDICINE

## 2025-05-23 PROCEDURE — 93005 ELECTROCARDIOGRAM TRACING: CPT | Performed by: EMERGENCY MEDICINE

## 2025-05-23 PROCEDURE — 36415 COLL VENOUS BLD VENIPUNCTURE: CPT

## 2025-05-23 PROCEDURE — 25010000002 HEPARIN (PORCINE) 25000-0.45 UT/250ML-% SOLUTION: Performed by: EMERGENCY MEDICINE

## 2025-05-23 PROCEDURE — 83880 ASSAY OF NATRIURETIC PEPTIDE: CPT | Performed by: EMERGENCY MEDICINE

## 2025-05-23 PROCEDURE — 84443 ASSAY THYROID STIM HORMONE: CPT | Performed by: STUDENT IN AN ORGANIZED HEALTH CARE EDUCATION/TRAINING PROGRAM

## 2025-05-23 RX ORDER — SODIUM CHLORIDE 9 MG/ML
40 INJECTION, SOLUTION INTRAVENOUS AS NEEDED
Status: DISCONTINUED | OUTPATIENT
Start: 2025-05-23 | End: 2025-05-25 | Stop reason: HOSPADM

## 2025-05-23 RX ORDER — BUDESONIDE 0.5 MG/2ML
0.5 INHALANT ORAL
Status: DISCONTINUED | OUTPATIENT
Start: 2025-05-24 | End: 2025-05-25 | Stop reason: HOSPADM

## 2025-05-23 RX ORDER — HYDRALAZINE HYDROCHLORIDE 20 MG/ML
10 INJECTION INTRAMUSCULAR; INTRAVENOUS EVERY 6 HOURS PRN
Status: DISCONTINUED | OUTPATIENT
Start: 2025-05-23 | End: 2025-05-25 | Stop reason: HOSPADM

## 2025-05-23 RX ORDER — ACETAMINOPHEN 325 MG/1
650 TABLET ORAL EVERY 6 HOURS PRN
Status: DISCONTINUED | OUTPATIENT
Start: 2025-05-23 | End: 2025-05-24

## 2025-05-23 RX ORDER — ASPIRIN 81 MG/1
324 TABLET, CHEWABLE ORAL ONCE
Status: DISCONTINUED | OUTPATIENT
Start: 2025-05-23 | End: 2025-05-23

## 2025-05-23 RX ORDER — FERROUS SULFATE 325(65) MG
325 TABLET ORAL
Status: DISCONTINUED | OUTPATIENT
Start: 2025-05-24 | End: 2025-05-25 | Stop reason: HOSPADM

## 2025-05-23 RX ORDER — HEPARIN SODIUM 10000 [USP'U]/100ML
12 INJECTION, SOLUTION INTRAVENOUS
Status: DISCONTINUED | OUTPATIENT
Start: 2025-05-23 | End: 2025-05-24

## 2025-05-23 RX ORDER — ASPIRIN 81 MG/1
81 TABLET, CHEWABLE ORAL DAILY
Status: DISCONTINUED | OUTPATIENT
Start: 2025-05-24 | End: 2025-05-23

## 2025-05-23 RX ORDER — SODIUM CHLORIDE 0.9 % (FLUSH) 0.9 %
10 SYRINGE (ML) INJECTION EVERY 12 HOURS SCHEDULED
Status: DISCONTINUED | OUTPATIENT
Start: 2025-05-23 | End: 2025-05-25 | Stop reason: HOSPADM

## 2025-05-23 RX ORDER — SODIUM CHLORIDE 0.9 % (FLUSH) 0.9 %
10 SYRINGE (ML) INJECTION AS NEEDED
Status: DISCONTINUED | OUTPATIENT
Start: 2025-05-23 | End: 2025-05-25 | Stop reason: HOSPADM

## 2025-05-23 RX ORDER — AMOXICILLIN 250 MG
2 CAPSULE ORAL 2 TIMES DAILY PRN
Status: DISCONTINUED | OUTPATIENT
Start: 2025-05-23 | End: 2025-05-25 | Stop reason: HOSPADM

## 2025-05-23 RX ORDER — ONDANSETRON 2 MG/ML
4 INJECTION INTRAMUSCULAR; INTRAVENOUS EVERY 6 HOURS PRN
Status: DISCONTINUED | OUTPATIENT
Start: 2025-05-23 | End: 2025-05-24

## 2025-05-23 RX ORDER — LEVOTHYROXINE SODIUM 25 UG/1
25 TABLET ORAL
Status: DISCONTINUED | OUTPATIENT
Start: 2025-05-24 | End: 2025-05-25 | Stop reason: HOSPADM

## 2025-05-23 RX ORDER — IBUPROFEN 600 MG/1
1 TABLET ORAL
Status: DISCONTINUED | OUTPATIENT
Start: 2025-05-23 | End: 2025-05-25 | Stop reason: HOSPADM

## 2025-05-23 RX ORDER — INSULIN LISPRO 100 [IU]/ML
2-7 INJECTION, SOLUTION INTRAVENOUS; SUBCUTANEOUS
Status: DISCONTINUED | OUTPATIENT
Start: 2025-05-23 | End: 2025-05-25 | Stop reason: HOSPADM

## 2025-05-23 RX ORDER — POLYETHYLENE GLYCOL 3350 17 G/17G
17 POWDER, FOR SOLUTION ORAL DAILY PRN
Status: DISCONTINUED | OUTPATIENT
Start: 2025-05-23 | End: 2025-05-25 | Stop reason: HOSPADM

## 2025-05-23 RX ORDER — SODIUM CHLORIDE 0.9 % (FLUSH) 0.9 %
10 SYRINGE (ML) INJECTION AS NEEDED
Status: CANCELLED | OUTPATIENT
Start: 2025-05-23

## 2025-05-23 RX ORDER — NICOTINE POLACRILEX 4 MG
15 LOZENGE BUCCAL
Status: DISCONTINUED | OUTPATIENT
Start: 2025-05-23 | End: 2025-05-25 | Stop reason: HOSPADM

## 2025-05-23 RX ORDER — PANTOPRAZOLE SODIUM 40 MG/1
40 TABLET, DELAYED RELEASE ORAL DAILY
Status: DISCONTINUED | OUTPATIENT
Start: 2025-05-24 | End: 2025-05-25 | Stop reason: HOSPADM

## 2025-05-23 RX ORDER — ATORVASTATIN CALCIUM 40 MG/1
40 TABLET, FILM COATED ORAL NIGHTLY
Status: DISCONTINUED | OUTPATIENT
Start: 2025-05-24 | End: 2025-05-25 | Stop reason: HOSPADM

## 2025-05-23 RX ORDER — ARFORMOTEROL TARTRATE 15 UG/2ML
15 SOLUTION RESPIRATORY (INHALATION)
Status: DISCONTINUED | OUTPATIENT
Start: 2025-05-24 | End: 2025-05-25 | Stop reason: HOSPADM

## 2025-05-23 RX ORDER — IPRATROPIUM BROMIDE AND ALBUTEROL SULFATE 2.5; .5 MG/3ML; MG/3ML
3 SOLUTION RESPIRATORY (INHALATION) EVERY 6 HOURS PRN
Status: DISCONTINUED | OUTPATIENT
Start: 2025-05-23 | End: 2025-05-25 | Stop reason: HOSPADM

## 2025-05-23 RX ORDER — MONTELUKAST SODIUM 10 MG/1
10 TABLET ORAL DAILY
Status: DISCONTINUED | OUTPATIENT
Start: 2025-05-24 | End: 2025-05-25 | Stop reason: HOSPADM

## 2025-05-23 RX ORDER — DEXTROSE MONOHYDRATE 25 G/50ML
25 INJECTION, SOLUTION INTRAVENOUS
Status: DISCONTINUED | OUTPATIENT
Start: 2025-05-23 | End: 2025-05-25 | Stop reason: HOSPADM

## 2025-05-23 RX ORDER — ASPIRIN 81 MG/1
324 TABLET, CHEWABLE ORAL ONCE
Status: CANCELLED | OUTPATIENT
Start: 2025-05-23 | End: 2025-05-23

## 2025-05-23 RX ORDER — FLUTICASONE PROPIONATE 50 MCG
1 SPRAY, SUSPENSION (ML) NASAL DAILY
Status: DISCONTINUED | OUTPATIENT
Start: 2025-05-24 | End: 2025-05-25 | Stop reason: HOSPADM

## 2025-05-23 RX ORDER — LOSARTAN POTASSIUM 50 MG/1
50 TABLET ORAL DAILY
Status: DISCONTINUED | OUTPATIENT
Start: 2025-05-24 | End: 2025-05-25 | Stop reason: HOSPADM

## 2025-05-23 RX ORDER — BISACODYL 10 MG
10 SUPPOSITORY, RECTAL RECTAL DAILY PRN
Status: DISCONTINUED | OUTPATIENT
Start: 2025-05-23 | End: 2025-05-25 | Stop reason: HOSPADM

## 2025-05-23 RX ORDER — NITROGLYCERIN 0.4 MG/1
0.4 TABLET SUBLINGUAL
Status: DISCONTINUED | OUTPATIENT
Start: 2025-05-23 | End: 2025-05-24

## 2025-05-23 RX ORDER — FLUTICASONE PROPIONATE AND SALMETEROL 500; 50 UG/1; UG/1
1 POWDER RESPIRATORY (INHALATION)
COMMUNITY
Start: 2025-04-29

## 2025-05-23 RX ORDER — METOPROLOL SUCCINATE 100 MG/1
100 TABLET, EXTENDED RELEASE ORAL DAILY
Status: DISCONTINUED | OUTPATIENT
Start: 2025-05-24 | End: 2025-05-25 | Stop reason: HOSPADM

## 2025-05-23 RX ORDER — BISACODYL 5 MG/1
5 TABLET, DELAYED RELEASE ORAL DAILY PRN
Status: DISCONTINUED | OUTPATIENT
Start: 2025-05-23 | End: 2025-05-25 | Stop reason: HOSPADM

## 2025-05-23 RX ADMIN — HEPARIN SODIUM 12 UNITS/KG/HR: 10000 INJECTION, SOLUTION INTRAVENOUS at 22:07

## 2025-05-24 LAB
APTT PPP: 105.7 SECONDS (ref 78–95.9)
APTT PPP: 27.6 SECONDS (ref 78–95.9)
BASOPHILS # BLD AUTO: 0.06 10*3/MM3 (ref 0–0.2)
BASOPHILS NFR BLD AUTO: 0.7 % (ref 0–1.5)
DEPRECATED RDW RBC AUTO: 46 FL (ref 37–54)
EOSINOPHIL # BLD AUTO: 0.13 10*3/MM3 (ref 0–0.4)
EOSINOPHIL NFR BLD AUTO: 1.5 % (ref 0.3–6.2)
ERYTHROCYTE [DISTWIDTH] IN BLOOD BY AUTOMATED COUNT: 13.1 % (ref 12.3–15.4)
GLUCOSE BLDC GLUCOMTR-MCNC: 112 MG/DL (ref 70–99)
GLUCOSE BLDC GLUCOMTR-MCNC: 120 MG/DL (ref 70–99)
GLUCOSE BLDC GLUCOMTR-MCNC: 172 MG/DL (ref 70–99)
GLUCOSE BLDC GLUCOMTR-MCNC: 189 MG/DL (ref 70–99)
HBA1C MFR BLD: 7.3 % (ref 4.8–5.6)
HCT VFR BLD AUTO: 39.8 % (ref 34–46.6)
HGB BLD-MCNC: 13 G/DL (ref 12–15.9)
IMM GRANULOCYTES # BLD AUTO: 0.02 10*3/MM3 (ref 0–0.05)
IMM GRANULOCYTES NFR BLD AUTO: 0.2 % (ref 0–0.5)
LYMPHOCYTES # BLD AUTO: 2.5 10*3/MM3 (ref 0.7–3.1)
LYMPHOCYTES NFR BLD AUTO: 28 % (ref 19.6–45.3)
MCH RBC QN AUTO: 31.3 PG (ref 26.6–33)
MCHC RBC AUTO-ENTMCNC: 32.7 G/DL (ref 31.5–35.7)
MCV RBC AUTO: 95.7 FL (ref 79–97)
MONOCYTES # BLD AUTO: 1.08 10*3/MM3 (ref 0.1–0.9)
MONOCYTES NFR BLD AUTO: 12.1 % (ref 5–12)
NEUTROPHILS NFR BLD AUTO: 5.13 10*3/MM3 (ref 1.7–7)
NEUTROPHILS NFR BLD AUTO: 57.5 % (ref 42.7–76)
NRBC BLD AUTO-RTO: 0 /100 WBC (ref 0–0.2)
PLATELET # BLD AUTO: 300 10*3/MM3 (ref 140–450)
PMV BLD AUTO: 9.7 FL (ref 6–12)
RBC # BLD AUTO: 4.16 10*6/MM3 (ref 3.77–5.28)
TROPONIN T SERPL HS-MCNC: 86 NG/L
WBC NRBC COR # BLD AUTO: 8.92 10*3/MM3 (ref 3.4–10.8)

## 2025-05-24 PROCEDURE — 25010000002 HEPARIN (PORCINE) PER 1000 UNITS: Performed by: INTERNAL MEDICINE

## 2025-05-24 PROCEDURE — G0378 HOSPITAL OBSERVATION PER HR: HCPCS

## 2025-05-24 PROCEDURE — A9270 NON-COVERED ITEM OR SERVICE: HCPCS | Performed by: INTERNAL MEDICINE

## 2025-05-24 PROCEDURE — 25010000002 LIDOCAINE 2% SOLUTION: Performed by: INTERNAL MEDICINE

## 2025-05-24 PROCEDURE — 25510000001 IOPAMIDOL PER 1 ML: Performed by: INTERNAL MEDICINE

## 2025-05-24 PROCEDURE — 82948 REAGENT STRIP/BLOOD GLUCOSE: CPT | Performed by: STUDENT IN AN ORGANIZED HEALTH CARE EDUCATION/TRAINING PROGRAM

## 2025-05-24 PROCEDURE — C1769 GUIDE WIRE: HCPCS | Performed by: INTERNAL MEDICINE

## 2025-05-24 PROCEDURE — 25010000002 FENTANYL CITRATE (PF) 50 MCG/ML SOLUTION: Performed by: INTERNAL MEDICINE

## 2025-05-24 PROCEDURE — 84484 ASSAY OF TROPONIN QUANT: CPT | Performed by: STUDENT IN AN ORGANIZED HEALTH CARE EDUCATION/TRAINING PROGRAM

## 2025-05-24 PROCEDURE — 63710000001 PANTOPRAZOLE 40 MG TABLET DELAYED-RELEASE: Performed by: INTERNAL MEDICINE

## 2025-05-24 PROCEDURE — C1894 INTRO/SHEATH, NON-LASER: HCPCS | Performed by: INTERNAL MEDICINE

## 2025-05-24 PROCEDURE — 85025 COMPLETE CBC W/AUTO DIFF WBC: CPT | Performed by: EMERGENCY MEDICINE

## 2025-05-24 PROCEDURE — 25810000003 SODIUM CHLORIDE 0.9 % SOLUTION: Performed by: INTERNAL MEDICINE

## 2025-05-24 PROCEDURE — 63710000001 LOSARTAN 50 MG TABLET: Performed by: INTERNAL MEDICINE

## 2025-05-24 PROCEDURE — 82948 REAGENT STRIP/BLOOD GLUCOSE: CPT

## 2025-05-24 PROCEDURE — 93458 L HRT ARTERY/VENTRICLE ANGIO: CPT | Performed by: INTERNAL MEDICINE

## 2025-05-24 PROCEDURE — 99204 OFFICE O/P NEW MOD 45 MIN: CPT | Performed by: INTERNAL MEDICINE

## 2025-05-24 PROCEDURE — 96366 THER/PROPH/DIAG IV INF ADDON: CPT

## 2025-05-24 PROCEDURE — 94799 UNLISTED PULMONARY SVC/PX: CPT

## 2025-05-24 PROCEDURE — 85730 THROMBOPLASTIN TIME PARTIAL: CPT | Performed by: STUDENT IN AN ORGANIZED HEALTH CARE EDUCATION/TRAINING PROGRAM

## 2025-05-24 PROCEDURE — 63710000001 MONTELUKAST 10 MG TABLET: Performed by: INTERNAL MEDICINE

## 2025-05-24 PROCEDURE — 36415 COLL VENOUS BLD VENIPUNCTURE: CPT | Performed by: EMERGENCY MEDICINE

## 2025-05-24 PROCEDURE — 94761 N-INVAS EAR/PLS OXIMETRY MLT: CPT

## 2025-05-24 PROCEDURE — 63710000001 LEVOTHYROXINE 25 MCG TABLET: Performed by: INTERNAL MEDICINE

## 2025-05-24 PROCEDURE — 63710000001 INSULIN LISPRO (HUMAN) PER 5 UNITS: Performed by: INTERNAL MEDICINE

## 2025-05-24 PROCEDURE — 63710000001 ATORVASTATIN 40 MG TABLET: Performed by: INTERNAL MEDICINE

## 2025-05-24 PROCEDURE — 94664 DEMO&/EVAL PT USE INHALER: CPT

## 2025-05-24 PROCEDURE — 63710000001 METOPROLOL SUCCINATE XL 100 MG TABLET SUSTAINED-RELEASE 24 HOUR: Performed by: INTERNAL MEDICINE

## 2025-05-24 PROCEDURE — 63710000001 FERROUS SULFATE 325 (65 FE) MG TABLET: Performed by: INTERNAL MEDICINE

## 2025-05-24 PROCEDURE — 25010000002 MIDAZOLAM PER 1MG: Performed by: INTERNAL MEDICINE

## 2025-05-24 PROCEDURE — 63710000001 ASPIRIN 81 MG CHEWABLE TABLET: Performed by: INTERNAL MEDICINE

## 2025-05-24 PROCEDURE — 99232 SBSQ HOSP IP/OBS MODERATE 35: CPT | Performed by: INTERNAL MEDICINE

## 2025-05-24 PROCEDURE — 63710000001 ACETAMINOPHEN 325 MG TABLET: Performed by: INTERNAL MEDICINE

## 2025-05-24 PROCEDURE — 94640 AIRWAY INHALATION TREATMENT: CPT

## 2025-05-24 RX ORDER — LIDOCAINE HYDROCHLORIDE 20 MG/ML
INJECTION, SOLUTION INFILTRATION; PERINEURAL
Status: DISCONTINUED | OUTPATIENT
Start: 2025-05-24 | End: 2025-05-24 | Stop reason: HOSPADM

## 2025-05-24 RX ORDER — ASPIRIN 81 MG/1
TABLET, CHEWABLE ORAL
Status: DISCONTINUED | OUTPATIENT
Start: 2025-05-24 | End: 2025-05-24 | Stop reason: HOSPADM

## 2025-05-24 RX ORDER — ACETAMINOPHEN 325 MG/1
650 TABLET ORAL EVERY 4 HOURS PRN
Status: DISCONTINUED | OUTPATIENT
Start: 2025-05-24 | End: 2025-05-25 | Stop reason: HOSPADM

## 2025-05-24 RX ORDER — VERAPAMIL HYDROCHLORIDE 2.5 MG/ML
INJECTION INTRAVENOUS
Status: DISCONTINUED | OUTPATIENT
Start: 2025-05-24 | End: 2025-05-24 | Stop reason: HOSPADM

## 2025-05-24 RX ORDER — ONDANSETRON 2 MG/ML
4 INJECTION INTRAMUSCULAR; INTRAVENOUS EVERY 6 HOURS PRN
Status: DISCONTINUED | OUTPATIENT
Start: 2025-05-24 | End: 2025-05-25 | Stop reason: HOSPADM

## 2025-05-24 RX ORDER — NITROGLYCERIN 0.4 MG/1
0.4 TABLET SUBLINGUAL
Status: DISCONTINUED | OUTPATIENT
Start: 2025-05-24 | End: 2025-05-25 | Stop reason: HOSPADM

## 2025-05-24 RX ORDER — HEPARIN SODIUM 1000 [USP'U]/ML
INJECTION, SOLUTION INTRAVENOUS; SUBCUTANEOUS
Status: DISCONTINUED | OUTPATIENT
Start: 2025-05-24 | End: 2025-05-24 | Stop reason: HOSPADM

## 2025-05-24 RX ORDER — ONDANSETRON 4 MG/1
4 TABLET, ORALLY DISINTEGRATING ORAL EVERY 6 HOURS PRN
Status: DISCONTINUED | OUTPATIENT
Start: 2025-05-24 | End: 2025-05-25 | Stop reason: HOSPADM

## 2025-05-24 RX ORDER — MORPHINE SULFATE 2 MG/ML
1 INJECTION, SOLUTION INTRAMUSCULAR; INTRAVENOUS EVERY 4 HOURS PRN
Status: DISCONTINUED | OUTPATIENT
Start: 2025-05-24 | End: 2025-05-25 | Stop reason: HOSPADM

## 2025-05-24 RX ORDER — SODIUM CHLORIDE 9 MG/ML
100 INJECTION, SOLUTION INTRAVENOUS CONTINUOUS
Status: ACTIVE | OUTPATIENT
Start: 2025-05-24 | End: 2025-05-24

## 2025-05-24 RX ORDER — IOPAMIDOL 755 MG/ML
INJECTION, SOLUTION INTRAVASCULAR
Status: DISCONTINUED | OUTPATIENT
Start: 2025-05-24 | End: 2025-05-24 | Stop reason: HOSPADM

## 2025-05-24 RX ORDER — MIDAZOLAM HYDROCHLORIDE 2 MG/2ML
INJECTION, SOLUTION INTRAMUSCULAR; INTRAVENOUS
Status: DISCONTINUED | OUTPATIENT
Start: 2025-05-24 | End: 2025-05-24 | Stop reason: HOSPADM

## 2025-05-24 RX ORDER — NALOXONE HCL 0.4 MG/ML
0.4 VIAL (ML) INJECTION
Status: DISCONTINUED | OUTPATIENT
Start: 2025-05-24 | End: 2025-05-25 | Stop reason: HOSPADM

## 2025-05-24 RX ORDER — FENTANYL CITRATE 50 UG/ML
INJECTION, SOLUTION INTRAMUSCULAR; INTRAVENOUS
Status: DISCONTINUED | OUTPATIENT
Start: 2025-05-24 | End: 2025-05-24 | Stop reason: HOSPADM

## 2025-05-24 RX ADMIN — BUDESONIDE 0.5 MG: 0.5 SUSPENSION RESPIRATORY (INHALATION) at 18:12

## 2025-05-24 RX ADMIN — PANTOPRAZOLE SODIUM 40 MG: 40 TABLET, DELAYED RELEASE ORAL at 09:37

## 2025-05-24 RX ADMIN — LEVOTHYROXINE SODIUM 25 MCG: 0.03 TABLET ORAL at 09:38

## 2025-05-24 RX ADMIN — Medication 10 ML: at 00:04

## 2025-05-24 RX ADMIN — ACETAMINOPHEN 650 MG: 325 TABLET ORAL at 23:31

## 2025-05-24 RX ADMIN — SODIUM CHLORIDE 100 ML/HR: 9 INJECTION, SOLUTION INTRAVENOUS at 09:39

## 2025-05-24 RX ADMIN — ARFORMOTEROL TARTRATE 15 MCG: 15 SOLUTION RESPIRATORY (INHALATION) at 18:12

## 2025-05-24 RX ADMIN — ARFORMOTEROL TARTRATE 15 MCG: 15 SOLUTION RESPIRATORY (INHALATION) at 09:47

## 2025-05-24 RX ADMIN — BUDESONIDE 0.5 MG: 0.5 SUSPENSION RESPIRATORY (INHALATION) at 01:27

## 2025-05-24 RX ADMIN — ATORVASTATIN CALCIUM 40 MG: 40 TABLET, FILM COATED ORAL at 21:48

## 2025-05-24 RX ADMIN — ARFORMOTEROL TARTRATE 15 MCG: 15 SOLUTION RESPIRATORY (INHALATION) at 01:27

## 2025-05-24 RX ADMIN — INSULIN LISPRO 2 UNITS: 100 INJECTION, SOLUTION INTRAVENOUS; SUBCUTANEOUS at 21:14

## 2025-05-24 RX ADMIN — METOPROLOL SUCCINATE 100 MG: 100 TABLET, EXTENDED RELEASE ORAL at 09:38

## 2025-05-24 RX ADMIN — FERROUS SULFATE TAB 325 MG (65 MG ELEMENTAL FE) 325 MG: 325 (65 FE) TAB at 09:38

## 2025-05-24 RX ADMIN — ATORVASTATIN CALCIUM 40 MG: 40 TABLET, FILM COATED ORAL at 00:04

## 2025-05-24 RX ADMIN — MONTELUKAST 10 MG: 10 TABLET, FILM COATED ORAL at 09:37

## 2025-05-24 RX ADMIN — Medication 10 ML: at 21:15

## 2025-05-24 RX ADMIN — BUDESONIDE 0.5 MG: 0.5 SUSPENSION RESPIRATORY (INHALATION) at 09:47

## 2025-05-24 RX ADMIN — LOSARTAN POTASSIUM 50 MG: 50 TABLET, FILM COATED ORAL at 09:38

## 2025-05-24 NOTE — ED PROVIDER NOTES
Time: 9:45 PM EDT  Date of encounter:  5/23/2025  Independent Historian/Clinical History and Information was obtained by:   Patient    History is limited by: N/A    Chief Complaint: Chest pain      History of Present Illness:  Patient is a 75 y.o. year old female who presents to the emergency department for evaluation of chest pain that started earlier today.  Patient denies shortness of breath.  Patient has no nausea, vomiting, or diarrhea.  Patient does report chest pain radiating to the left arm.  Patient denies abdominal pain.      Patient Care Team  Primary Care Provider: Ramo Noguera MD    Past Medical History:     Allergies   Allergen Reactions    Exenatide Unknown - High Severity     Weight loss and pain in the right side. Bydereon.    Fluoxetine Hives    Gabapentin Headache     Occular Miaigraines    Jardiance [Empagliflozin] Other (See Comments)     UTI    Levaquin [Levofloxacin] Hives    Nickel Itching and Rash    Sulfa Antibiotics Swelling    Metformin Nausea Only    Hibiclens [Chlorhexidine Gluconate] Rash     Past Medical History:   Diagnosis Date    Allergic     Anemia     Ankle sprain     Arthritis     Asthma Late 1990s    INHALERS PRN    Breast cancer     RIGHT    Cataract     Colon polyp 2016 and 2019 and 2022    COVID 09/20/2021    Disease of thyroid gland Hypothyroidism    Fatty liver     ASYMPTOMATIC    Fracture, radius 3-    Gastroesophageal reflux     Headache Sept 2021    Positive  COVID Sept 20, 2021 reoccurring since then    Hernia 2019    Hiatal    Hip arthrosis     HL (hearing loss) Hearing aids 2021    Hyperlipemia     Hypertension     Hypothyroidism     Knee swelling     Limb swelling     Obesity     Osteopenia     Pneumonia 2008    Seasonal allergies     Shortness of breath     Allergic triggers or over exertion    Skin cancer     Sleep apnea     Strain of latissimus dorsi muscle 09/11/2017    Right    Thyroid nodule     Type 2 diabetes mellitus     AM  IN AM    Urinary  tract infection     Jardiance induced    Vertigo     Vitamin D deficiency      Past Surgical History:   Procedure Laterality Date    ABDOMINAL SURGERY  , ,     C sections  & ,  ovaries removed 2004    AXILLARY LYMPH NODE BIOPSY/EXCISION Left 2023    Procedure: LEFT AXILLARY LYMPH NODE BIOPSY/EXCISION, MAGSEED;  Surgeon: Kathy Rausch MD;  Location: [facility];  Service: General;  Laterality: Left;    BREAST AUGMENTATION  Post bilateral mastectomies    BREAST BIOPSY  Both    BREAST RECONSTRUCTION Bilateral 2022    Procedure: BILATERAL BREAST RECONSTRUCTION WITH REMOVAL AND REPLACEMENT OF IMPLANTS, MESH PLACEMENT, CHEST WALL SKIN RESECTION, CHEST LIPOSUCTION;  Surgeon: Romain Quezada MD;  Location: [facility] [place] [facility];  Service: Plastics;  Laterality: Bilateral;     SECTION      COLONOSCOPY      COLONOSCOPY N/A 10/18/2022    Procedure: COLONOSCOPY POLYPECTOMIES;  Surgeon: Eduardo Salas MD;  Location: [facility] [place] [facility]COPY;  Service: Gastroenterology;  Laterality: N/A;  COLON POLYPS    COSMETIC SURGERY      Breast reconstruction    ENDOSCOPY      EYE SURGERY      Cateracts    FOOT SURGERY      Pin put in toe next to big toe    KNEE ARTHROSCOPY W/ LATERAL RELEASE / MEDIAL IMBRICATION Left     TORN CARTIDLGE REPAIR    KNEE SURGERY  2005    Torn cartlege trimmed    LIPOSUCTION N/A 2022    Procedure: CHEST WALL LIPOSUCTION AND CHEST WALL RESECTION;  Surgeon: Romain Quezada MD;  Location: [facility] [place] [facility];  Service: Plastics;  Laterality: N/A;  TIMES BASED ON PREVIOUS EXPERIENCE    LYMPH NODE BIOPSY  . . 2021 breast   2019 abdominal lymph node. . Lymph node    MASTECTOMY COMPLETE / SIMPLE Bilateral 2004    OOPHORECTOMY      OTHER SURGICAL HISTORY      Joint surgery,  RIGHT FOOT SECOND  TOE WITH A PIN    SKIN CANCER EXCISION Right 2020    neck    SUBTOTAL HYSTERECTOMY  2004    Ovaries only removed.    TOE SURGERY Right     2nd  digit pin inserted    TONSILLECTOMY      TUBAL ABDOMINAL LIGATION      Birth of last child.     Family History   Problem Relation Age of Onset    Heart disease Father         Fatal hear attack age 79    Cancer Father         Skin cancer    Heart failure Father         Age 79  cause of death.    Heart disease Mother         Age 60ish and fatal age 87 heart attacks    Breast cancer Mother         Breast cancer age 45 and 60    Diabetes Mother     Arthritis Mother         After complete hysterectomy in her 40s.    Osteoporosis Mother     Heart failure Mother         Heart attack age 60. Age 87. Cause of death.    Hypertension Mother     Osteoarthritis Mother     Deep vein thrombosis Mother     Cancer Mother         Breast cancer    Cancer Brother         Prostate cancer age about 58.    Prostate cancer Brother         unsure age    Breast cancer Sister 27        Breast cancer age 27 and 52. Ovarian cancer age 53 caused death at 56.    Diabetes Sister     Ovarian cancer Sister 53    Arthritis Sister     Cancer Sister         Breast cancer age 27 and 52. Ovarian cancer age 53 caused death at 56.    Breast cancer Daughter 40    Cancer Daughter         Breast cancer age 40, BRCA 1 gene    Breast cancer Maternal Aunt         mid 40s    Ovarian cancer Maternal Aunt          with ovarian cancer age mid 40s    Cancer Maternal Aunt         Breast cancer    Cancer Maternal Aunt          with ovarian cancer age mid 40s    Malig Hyperthermia Neg Hx     Colon cancer Neg Hx     Uterine cancer Neg Hx     Pulmonary embolism Neg Hx        Home Medications:  Prior to Admission medications    Medication Sig Start Date End Date Taking? Authorizing Provider   acetaminophen (TYLENOL) 650 MG 8 hr tablet Take 1 tablet by mouth 2 (two) times a day.    Provider, MD Hoang   albuterol sulfate  (90 Base) MCG/ACT inhaler USE 2 INHALATIONS EVERY 6 HOURS AS NEEDED 25   Ramo Noguera MD   aspirin 81 MG EC tablet Take  1 tablet by mouth Daily. OTC    Hoang Bacrlay MD   atorvastatin (LIPITOR) 40 MG tablet Take 1 tablet by mouth Every Night. 4/1/25   Hoang Barclay MD   Blood Glucose Monitoring Suppl device Use as directed for blood glucose monitoring 7/25/24   Wolf Chris APRN   D3-50 1.25 MG (83345 UT) capsule TAKE ONE CAPSULE BY MOUTH ONCE WEEKLY  Patient taking differently: Take 1 capsule by mouth Every 30 (Thirty) Days. 6/4/24   Ramo Noguera MD   Dulaglutide (Trulicity) 3 MG/0.5ML solution auto-injector Inject 3 mg under the skin into the appropriate area as directed Every 7 (Seven) Days. 3/3/25   Kelly Vicente APRN   ferrous sulfate 325 (65 FE) MG tablet Take 1 tablet by mouth Daily With Breakfast. OTC    Hoang Barclay MD   fluticasone (FLONASE) 50 MCG/ACT nasal spray USE 1 SPRAY IN EACH NOSTRIL ONCE DAILY 6/26/24   Ramo Noguera MD   Fluticasone-Salmeterol (ADVAIR/WIXELA) 250-50 MCG/ACT DISKUS Inhale 1 puff 2 (Two) Times a Day. 4/4/25   Ramo Noguera MD   glimepiride (AMARYL) 2 MG tablet Take 1 tablet by mouth 2 (Two) Times a Day for 180 days. 3/3/25 8/30/25  Kelly Vicente APRN   glucose blood (OneTouch Verio) test strip use to test blood sugar 3 times daily 4/29/25   Kelly Vicente APRN   Insulin Pen Needle (BD Pen Needle Ca 2nd Gen) 32G X 4 MM misc Use 1 each Daily. 3/3/25   Kelly Vicente APRN   Lantus SoloStar 100 UNIT/ML injection pen  3/3/25   Hoang Barclay MD   levothyroxine (SYNTHROID, LEVOTHROID) 25 MCG tablet TAKE 1 TABLET DAILY IN THE MORNING ON AN EMPTY STOMACH 10/3/24   Ramo Noguera MD   losartan (COZAAR) 50 MG tablet TAKE 1 TABLET DAILY 12/26/24   Ramo Noguera MD   metoprolol succinate XL (TOPROL-XL) 100 MG 24 hr tablet TAKE 1 TABLET DAILY 5/28/24   Ramo Noguera MD   montelukast (SINGULAIR) 10 MG tablet TAKE 1 TABLET DAILY 4/22/25   Ramo Noguera MD   multivitamin with minerals tablet tablet Take 1 tablet by mouth Daily.    Provider,  "MD Hoang   OneTouch Delica Lancets 30G misc Use 1 each 3 (Three) Times a Day. 3/3/25   Kelly Vicente APRN   pantoprazole (PROTONIX) 40 MG EC tablet TAKE 1 TABLET DAILY 10/3/24   Ramo Noguera MD   Probiotic Product (PROBIOTIC PO) Take 1 fluid ounce by mouth Daily. Plexus powder    Hoang Barclay MD   vitamin B-12 (CYANOCOBALAMIN) 500 MCG tablet Take 1 tablet by mouth Daily.    ProviderHoang MD        Social History:   Social History     Tobacco Use    Smoking status: Never     Passive exposure: Never    Smokeless tobacco: Never   Vaping Use    Vaping status: Never Used   Substance Use Topics    Alcohol use: Never    Drug use: Never         Review of Systems:  Review of Systems   Constitutional:  Negative for chills and fever.   HENT:  Negative for congestion, rhinorrhea and sore throat.    Eyes:  Negative for pain and visual disturbance.   Respiratory:  Negative for apnea, cough, chest tightness and shortness of breath.    Cardiovascular:  Positive for chest pain. Negative for palpitations.   Gastrointestinal:  Negative for abdominal pain, diarrhea, nausea and vomiting.   Genitourinary:  Negative for difficulty urinating and dysuria.   Musculoskeletal:  Negative for joint swelling and myalgias.   Skin:  Negative for color change.   Neurological:  Negative for seizures and headaches.   Psychiatric/Behavioral: Negative.     All other systems reviewed and are negative.       Physical Exam:  /74   Pulse 78   Temp 98.1 °F (36.7 °C) (Oral)   Resp 14   Ht 157.5 cm (62\")   Wt 76.9 kg (169 lb 8.5 oz)   SpO2 97%   BMI 31.01 kg/m²     Physical Exam  Vitals and nursing note reviewed.   Constitutional:       General: She is not in acute distress.     Appearance: Normal appearance. She is not toxic-appearing.   HENT:      Head: Normocephalic and atraumatic.      Jaw: There is normal jaw occlusion.   Eyes:      General: Lids are normal.      Extraocular Movements: Extraocular movements " intact.      Conjunctiva/sclera: Conjunctivae normal.      Pupils: Pupils are equal, round, and reactive to light.   Cardiovascular:      Rate and Rhythm: Normal rate and regular rhythm.      Pulses: Normal pulses.      Heart sounds: Normal heart sounds.   Pulmonary:      Effort: Pulmonary effort is normal. No respiratory distress.      Breath sounds: Normal breath sounds. No wheezing or rhonchi.   Abdominal:      General: Abdomen is flat.      Palpations: Abdomen is soft.      Tenderness: There is no abdominal tenderness. There is no guarding or rebound.   Musculoskeletal:         General: Normal range of motion.      Cervical back: Normal range of motion and neck supple.      Right lower leg: No edema.      Left lower leg: No edema.   Skin:     General: Skin is warm and dry.   Neurological:      Mental Status: She is alert and oriented to person, place, and time. Mental status is at baseline.   Psychiatric:         Mood and Affect: Mood normal.                    Medical Decision Making:      Comorbidities that affect care:    Hypertension, diabetes    External Notes reviewed:    Previous Clinic Note: Patient was seen in clinic for diabetes      The following orders were placed and all results were independently analyzed by me:  Orders Placed This Encounter   Procedures    CT Head Without Contrast    XR Chest 1 View    Comprehensive Metabolic Panel    High Sensitivity Troponin T    BNP    Lipase    Magnesium    CBC Auto Differential    High Sensitivity Troponin T 1Hr    Protime-INR    aPTT    aPTT    High Sensitivity Troponin T    High Sensitivity Troponin T    TSH Rfx On Abnormal To Free T4    Hemoglobin A1c    Lipid Panel    NPO Diet NPO Type: Strict NPO    Notify Provider Platelet Count Less Than 83000    Notify Provider if PTT Not in Therapeutic Range After 24 Hours    Stop Infusion & Notify Provider if Bleeding Occurs    RN To Release aPTT Order 6 Hours After Heparin Bolus & 6 Hours After Any Heparin Rate  Change    After 2 Consecutive Therapeutic aPTTs, Obtain aPTT Daily.  If a Rate Adjustment is Necessary, Resume Every 6 Hour aPTT Draws    Vital Signs    Intake & Output    Weigh Patient    Oral Care    Place Venous Foot Pump    Maintain Venous Foot Pump    Maintain IV Access    Telemetry - Place Orders & Notify Provider of Results When Patient Experiences Acute Chest Pain, Dysrhythmia or Respiratory Distress    Code Status and Medical Interventions: CPR (Attempt to Resuscitate); Full Support    Inpatient Cardiology Consult    Inpatient Hospitalist Consult    POC Glucose Once    POC Glucose 4x Daily Before Meals & at Bedtime    ECG 12 Lead Chest Pain    Insert Peripheral IV    Initiate Observation Status    CBC & Differential    CBC & Differential       Medications Given in the Emergency Department:  Medications   heparin bolus from bag solution 4,600 Units (has no administration in time range)   heparin 01986 units/250 mL (100 units/mL) in 0.45 % NaCl infusion (has no administration in time range)   heparin bolus from bag solution 3,900 Units (has no administration in time range)   heparin bolus from bag solution 1,900 Units (has no administration in time range)   aspirin chewable tablet 324 mg (has no administration in time range)   sodium chloride 0.9 % flush 10 mL (has no administration in time range)   sodium chloride 0.9 % flush 10 mL (has no administration in time range)   sodium chloride 0.9 % infusion 40 mL (has no administration in time range)   nitroglycerin (NITROSTAT) SL tablet 0.4 mg (has no administration in time range)   sennosides-docusate (PERICOLACE) 8.6-50 MG per tablet 2 tablet (has no administration in time range)     And   polyethylene glycol (MIRALAX) packet 17 g (has no administration in time range)     And   bisacodyl (DULCOLAX) EC tablet 5 mg (has no administration in time range)     And   bisacodyl (DULCOLAX) suppository 10 mg (has no administration in time range)   ondansetron (ZOFRAN)  injection 4 mg (has no administration in time range)   hydrALAZINE (APRESOLINE) injection 10 mg (has no administration in time range)   acetaminophen (TYLENOL) tablet 650 mg (has no administration in time range)   aspirin chewable tablet 81 mg (has no administration in time range)   dextrose (GLUTOSE) oral gel 15 g (has no administration in time range)   dextrose (D50W) (25 g/50 mL) IV injection 25 g (has no administration in time range)   glucagon (GLUCAGEN) injection 1 mg (has no administration in time range)   Insulin Lispro (humaLOG) injection 2-7 Units (has no administration in time range)   ipratropium-albuterol (DUO-NEB) nebulizer solution 3 mL (has no administration in time range)        ED Course:         Labs:    Lab Results (last 24 hours)       Procedure Component Value Units Date/Time    CBC & Differential [[id number]]  (Abnormal) Collected: 05/23/25 1907    Specimen: Blood Updated: 05/23/25 1911    Narrative:      The following orders were created for panel order CBC & Differential.  Procedure                               Abnormality         Status                     ---------                               -----------         ------                     CBC Auto Differential[[id number]]        Abnormal            Final result                 Please view results for these tests on the individual orders.    Comprehensive Metabolic Panel [[id number]]  (Abnormal) Collected: 05/23/25 1907    Specimen: Blood Updated: 05/23/25 1936     Glucose 123 mg/dL      BUN 25 mg/dL      Creatinine 0.78 mg/dL      Sodium 137 mmol/L      Potassium 4.4 mmol/L      Chloride 101 mmol/L      CO2 23.8 mmol/L      Calcium 9.1 mg/dL      Total Protein 6.2 g/dL      Albumin 4.3 g/dL      ALT (SGPT) 28 U/L      AST (SGOT) 30 U/L      Alkaline Phosphatase 71 U/L      Total Bilirubin 0.4 mg/dL      Globulin 1.9 gm/dL      A/G Ratio 2.3 g/dL      BUN/Creatinine Ratio 32.1     Anion Gap 12.2 mmol/L      eGFR 78.8 mL/min/1.73      Narrative:      GFR Categories in Chronic Kidney Disease (CKD)              GFR Category          GFR (mL/min/1.73)    Interpretation  G1                    90 or greater        Normal or high (1)  G2                    60-89                Mild decrease (1)  G3a                   45-59                Mild to moderate decrease  G3b                   30-44                Moderate to severe decrease  G4                    15-29                Severe decrease  G5                    14 or less           Kidney failure    (1)In the absence of evidence of kidney disease, neither GFR category G1 or G2 fulfill the criteria for CKD.    eGFR calculation 2021 CKD-EPI creatinine equation, which does not include race as a factor    High Sensitivity Troponin T [[id number]]  (Abnormal) Collected: 05/23/25 1907    Specimen: Blood Updated: 05/23/25 1936     HS Troponin T 49 ng/L     Narrative:      High Sensitive Troponin T Reference Range:  <14.0 ng/L- Negative Female for AMI  <22.0 ng/L- Negative Male for AMI  >=14 - Abnormal Female indicating possible myocardial injury.  >=22 - Abnormal Male indicating possible myocardial injury.   Clinicians would have to utilize clinical acumen, EKG, Troponin, and serial changes to determine if it is an Acute Myocardial Infarction or myocardial injury due to an underlying chronic condition.         BNP [[id number]]  (Normal) Collected: 05/23/25 1907    Specimen: Blood Updated: 05/23/25 1934     proBNP 136.6 pg/mL     Narrative:      This assay is used as an aid in the diagnosis of individuals suspected of having heart failure. It can be used as an aid in the diagnosis of acute decompensated heart failure (ADHF) in patients presenting with signs and symptoms of ADHF to the emergency department (ED). In addition, NT-proBNP of <300 pg/mL indicates ADHF is not likely.    Age Range Result Interpretation  NT-proBNP Concentration (pg/mL:      <50             Positive            >450                    Gray                 300-450                    Negative             <300    50-75           Positive            >900                  Gray                300-900                  Negative            <300      >75             Positive            >1800                  Gray                300-1800                  Negative            <300    Lipase [[id number]]  (Normal) Collected: 05/23/25 1907    Specimen: Blood Updated: 05/23/25 1936     Lipase 41 U/L     Magnesium [[id number]]  (Normal) Collected: 05/23/25 1907    Specimen: Blood Updated: 05/23/25 1936     Magnesium 2.0 mg/dL     CBC Auto Differential [[id number]]  (Abnormal) Collected: 05/23/25 1907    Specimen: Blood Updated: 05/23/25 1911     WBC 11.46 10*3/mm3      RBC 4.27 10*6/mm3      Hemoglobin 13.5 g/dL      Hematocrit 40.6 %      MCV 95.1 fL      MCH 31.6 pg      MCHC 33.3 g/dL      RDW 13.0 %      RDW-SD 45.4 fl      MPV 9.2 fL      Platelets 303 10*3/mm3      Neutrophil % 78.9 %      Lymphocyte % 13.8 %      Monocyte % 6.3 %      Eosinophil % 0.4 %      Basophil % 0.3 %      Immature Grans % 0.3 %      Neutrophils, Absolute 9.05 10*3/mm3      Lymphocytes, Absolute 1.58 10*3/mm3      Monocytes, Absolute 0.72 10*3/mm3      Eosinophils, Absolute 0.05 10*3/mm3      Basophils, Absolute 0.03 10*3/mm3      Immature Grans, Absolute 0.03 10*3/mm3      nRBC 0.0 /100 WBC     Hemoglobin A1c [[id number]] Collected: 05/23/25 1907    Specimen: Blood Updated: 05/23/25 2150    High Sensitivity Troponin T 1Hr [[id number]]  (Abnormal) Collected: 05/23/25 2015    Specimen: Blood from Arm, Right Updated: 05/23/25 2040     HS Troponin T 50 ng/L      Troponin T Numeric Delta 1 ng/L      Troponin T % Delta 2    Narrative:      High Sensitive Troponin T Reference Range:  <14.0 ng/L- Negative Female for AMI  <22.0 ng/L- Negative Male for AMI  >=14 - Abnormal Female indicating possible myocardial injury.  >=22 - Abnormal Male indicating possible myocardial injury.   Clinicians would  have to utilize clinical acumen, EKG, Troponin, and serial changes to determine if it is an Acute Myocardial Infarction or myocardial injury due to an underlying chronic condition.         TSH Rfx On Abnormal To Free T4 [[id number]] Collected: 05/23/25 2015    Specimen: Blood from Arm, Right Updated: 05/23/25 2151    Lipid Panel [[id number]] Collected: 05/23/25 2015    Specimen: Blood from Arm, Right Updated: 05/23/25 2150    POC Glucose Once [[id number]]  (Abnormal) Collected: 05/23/25 2123    Specimen: Blood Updated: 05/23/25 2125     Glucose 67 mg/dL      Comment: Serial Number: [id number]:  [id number]                Imaging:    CT Head Without Contrast  Result Date: 5/23/2025  CT HEAD WO CONTRAST Date of Exam: 5/23/2025 7:24 PM EDT Indication: Dizziness, vision change. Comparison: 11/4/2016 Technique: Axial CT images were obtained of the head without contrast administration.  Reconstructed coronal and sagittal images were also obtained. Automated exposure control and iterative construction methods were used. Findings: No large territory infarct. There is no evidence of hemorrhage. No mass effect, edema or midline shift Mild periventricular and subcortical white matter hypodensities, nonspecific but most likely represents chronic small vessel ischemic changes. No extra-axial fluid collection. The ventricles are normal in size and configuration. The midline structures are unremarkable for age. Status post bilateral lens extraction. Otherwise the orbits unremarkable. The visualized paranasal sinuses and mastoid air cells are clear. The visualized soft tissues are unremarkable. No acute osseous abnormality.     Impression: 1.No acute intracranial findings. 2.Chronic findings as above. Electronically Signed: Judson Watkins DO  5/23/2025 7:34 PM EDT  Workstation ID: [id number]    XR Chest 1 View  Result Date: 5/23/2025  XR CHEST 1 VW Date of Exam: 5/23/2025 6:49 PM EDT Indication: Cough, persistent Cough cough  Comparison: 1/3/2018 Findings: Lines: None Lungs: Poor respiratory effort accentuates the pulmonary vasculature and cardiomediastinal silhouette. No definite consolidation. Pleura: No pleural effusion or pneumothorax. Cardiomediastinum: The cardiomediastinal silhouette is normal Soft Tissues: Unremarkable. Bones: No acute osseous abnormality.     Impression: Poor respiratory effort without definite consolidation. Electronically Signed: Judson Baezaconcepcionalicemiesha,   5/23/2025 7:03 PM EDT  Workstation ID: [id number]        Differential Diagnosis and Discussion:    Chest Pain:  Based on the patient's signs and symptoms, I considered aortic dissection, myocardial infaction, pulmonary embolism, cardiac tamponade, pericarditis, pneumothorax, musculoskeletal chest pain and other differential diagnosis as an etiology of the patient's chest pain.     PROCEDURES:    Labs were collected in the emergency department and all labs were reviewed and interpreted by me.  X-ray were performed in the emergency department and all X-ray impressions were independently interpreted by me.  An EKG was performed and the EKG was interpreted by me.    ECG 12 Lead Chest Pain   Preliminary Result   HEART RATE=71  bpm   RR Jhzdycvq=027  ms   RI Culxtzqe=150  ms   P Horizontal Axis=-6  deg   P Front Axis=22  deg   QRSD Interval=90  ms   QT Yyclwqhl=290  ms   SIgU=627  ms   QRS Axis=-3  deg   T Wave Axis=27  deg   - BORDERLINE ECG -   Sinus rhythm   Borderline T abnormalities, anterior leads   Date and Time of Study:2025-05-23 18:59:14          Procedures    MDM       The patient´s CBC that was reviewed and interpreted by me shows no abnormalities of critical concern. Of note, there is no anemia requiring a blood transfusion and the platelet count is acceptable.  The patient´s CMP that was reviewed and interpretted by me shows no abnormalities of critical concern. Of note, the patient´s sodium and potassium are acceptable. The patient´s liver enzymes are  unremarkable. The patient´s renal function (creatinine) is preserved. The patient has a normal anion gap.  Troponin is 50.    Patient was placed on the cardiac monitor after being given heparin.  They were monitored for ventricular ectopy, arrhythmia, tachycardia, hypoxia, and changes in blood pressure.  Patient was rechecked several times throughout their stay for mental status decline and for reassessment of worsening changes in vital signs.     Total Critical Care time of 40 minutes. Total critical care time documented does not include time spent on separately billed procedures for services of nurses or physician assistants. I personally saw and examined the patient. I have reviewed all diagnostic interpretations and treatment plans as written. I was present for the key portions of any procedures performed and the inclusive time noted in any critical care statement. Critical care time includes patient management by me, time spent at the patients bedside,  time to review lab and imaging results, discussing patient care, documentation in the medical record, and time spent with family or caregiver.          Patient Care Considerations:    PERC: I used the PERC score to risk stratify the patient for PE and a CT of the chest was considered but ultimately not indicated in today's visit.      Consultants/Shared Management Plan:    Case was discussed with Dr. Beatriz Nagy who agrees to consult.  Case was discussed with Dr. Mar who agrees to admit the patient.    Social Determinants of Health:    Patient is independent, reliable, and has access to care.       Disposition and Care Coordination:    Admit:   Through independent evaluation of the patient's history, physical, and imperical data, the patient meets criteria for inpatient admission to the hospital.        Final diagnoses:   Chest pain, unspecified type        ED Disposition       ED Disposition   Decision to Admit    Condition   --    Comment   Level of  Care: Telemetry [5]   Diagnosis: Chest pain [[id number]]   Admitting Physician: MARIOLA CONDE [[id number]]                 This medical record created using voice recognition software.             Last Alan MD  05/23/25 1231

## 2025-05-24 NOTE — H&P
Progress Note      Patient Name: Annetta Malagon   Patient ID: [id number]   Sex: Female   YOB: 1956    Primary Care Provider: Sonia HENDRICKS   Referring Provider: Matt Hoover MD    Visit Date: February 4, 2021    Provider: Bernice Salmeron PA-C   Location: [facility]   Location Address: [address] [place] [address]  [id number]   Location Phone: [phone]          Chief Complaint  · Follow up right hip pain      History Of Present Illness  Annetta Malagon is a 64 year old /White female who presents today to [facility].      She states bilateral hip pain and two recent falls due to her hips giving out. She states bilateral groin pain and pain with rotation. She is s/p left ORIF intertrochanteric femur fracture 9/28/15 by Dr. Villarreal. She has had several inter articular injections of her hips, but relief is short lived. She has discussed the option of hip replacement and is ready to proceed in the next few months.       Past Medical History  Anxiety; Arthritis; Bipolar disorder; Bladder Disorder; Broken Bones; Chronic pain; Congestive heart failure; Depression; Diabetes Mellitus, Type II; GERD (gastroesophageal reflux disease); Hemorrhoids; Hyperlipemia; Hyperlipidemia; Hypertension; Hypothyroid; Insomnia; Insomnia, unspecified; Kidney calculus; Kidney Disease; Limb Swelling; Lung disease; Major depressive disorder; Osteoarthritis; Osteopenia; Reflux; Reflux Disease; Thyroid Problems; Varicose veins of both lower extremities; Vitamin D deficiency         Past Surgical History  Ankle repair; Appendectomy; Colonoscopy; Gallbladder; Hemorrhoidectomy; Hip Surgery; Hysterectomy; Kidney; Surgical Clips; Tonsillectomy; Tubal ligation         Medication List  baclofen 10 mg oral tablet; cimetidine 400 mg oral tablet; Crestor 5 mg oral tablet; gabapentin 800 mg oral tablet; hydroxyzine HCl 50 mg oral tablet; Lasix 20 mg oral tablet; Lexapro 20 mg oral tablet;   [facility]  [facility]   Patient Name: Jyoti Alvarez  : 1950  MRN: [id number]  Primary Care Physician:  Ramo Noguera MD  Date of admission: 2025    Subjective   Subjective     Chief Complaint: Chest pain    HPI:    Jyoti Alvarez is a 75 y.o. female with past medical history of hyperlipidemia, diabetes and hypertension presents to the ED due to chest pain.  Patient states dealing with chest pain that started earlier today that she describes as pressure-like with numbness towards her left arm.  Denies shortness of breath, abdominal pain, headaches, palpitations or dysuria.  In the ED, patient's vitals showed temperature 98.1, heart rate 78, blood pressure 119/74 and satting 97% room air.  Labs show troponin 40 9 repeat 50, , sodium 137, creatinine 0.78, glucose 123, white blood cell 11.4 and hemoglobin 13.5.  Cardiology was consulted and recommend heparin drip and possible cath in the morning.  Patient admitted to floors for further management.    Personal History     Past Medical History:  Past Medical History:   Diagnosis Date    Allergic     Anemia     Ankle sprain     Arthritis     Asthma Late     INHALERS PRN    Breast cancer     RIGHT    Cataract     Colon polyp  and  and     COVID 2021    Disease of thyroid gland Hypothyroidism    Fatty liver     ASYMPTOMATIC    Fracture, radius 3-    Gastroesophageal reflux     Headache 2021    Positive  COVID 2021 reoccurring since then    Hernia 2019    Hiatal    Hip arthrosis     HL (hearing loss) Hearing aids     Hyperlipemia     Hypertension     Hypothyroidism     Knee swelling     Limb swelling     Obesity     Osteopenia     Pneumonia 2008    Seasonal allergies     Shortness of breath     Allergic triggers or over exertion    Skin cancer     Sleep apnea     Strain of latissimus dorsi muscle 2017    Right    Thyroid nodule     Type 2  "Mobic 15 mg oral tablet; Nexium 40 mg oral capsule,delayed release(DR/EC); Percocet  mg oral tablet; trazodone 150 mg oral tablet; Vitamin D3 5,000 unit oral tablet; Voltaren 1 % topical gel         Allergy List  Cymbalta         Family Medical History  Liver Neoplasm, Malignant; Lung Neoplasm, Malignant; Ovary Neoplasm, Malignant; Brain Neoplasm, Benign; Stroke; Heart Disease; Cancer, Unspecified; Diabetes, unspecified type; Colon Cancer; Alzheimer's Disease; Bone Neoplasm, Malignant; Family history of certain chronic disabling diseases; arthritis; Osteoporosis; Family history of Arthritis         Social History  Alcohol Use; Claustophobic (Unknown); .; lives alone; Other Substance Use (Never); Recreational Drug Use (Never); Retired.; Tobacco (Former)         Review of Systems  · Constitutional  o Denies  o : fever, chills, weight loss  · Cardiovascular  o Denies  o : chest pain, shortness of breath  · Gastrointestinal  o Denies  o : liver disease, heartburn, nausea, blood in stools  · Genitourinary  o Denies  o : painful urination, blood in urine  · Integument  o Denies  o : rash, itching  · Neurologic  o Denies  o : headache, weakness, loss of consciousness  · Musculoskeletal  o Admits  o : painful, swollen joints  · Psychiatric  o Denies  o : drug/alcohol addiction, anxiety, depression      Vitals  Date Time BP Position Site L\R Cuff Size HR RR TEMP (F) WT  HT  BMI kg/m2 BSA m2 O2 Sat FR L/min FiO2 HC       02/04/2021 01:22 PM      73 - R   170lbs 0oz 5'  6\" 27.44 1.89 97 %            Physical Examination  · Constitutional  o Appearance  o : well developed, well-nourished, no obvious deformities present  · Head and Face  o Head  o :   § Inspection  § : normocephalic  o Face  o :   § Inspection  § : no facial lesions  · Eyes  o Conjunctivae  o : conjunctivae normal  o Sclerae  o : sclerae white  · Ears, Nose, Mouth and Throat  o Ears  o :   § External Ears  § : appearance within normal " diabetes mellitus     AM  IN AM    Urinary tract infection     Jardiance induced    Vertigo     Vitamin D deficiency        Past Surgical History:  Past Surgical History:   Procedure Laterality Date    ABDOMINAL SURGERY  , ,     C sections  & ,  ovaries removed 2004    AXILLARY LYMPH NODE BIOPSY/EXCISION Left 2023    Procedure: LEFT AXILLARY LYMPH NODE BIOPSY/EXCISION, MAGSEED;  Surgeon: Kathy Rausch MD;  [place] [facility] [place]  Service: General;  Laterality: Left;    BREAST AUGMENTATION  Post bilateral mastectomies    BREAST BIOPSY  Both    BREAST RECONSTRUCTION Bilateral 2022    Procedure: BILATERAL BREAST RECONSTRUCTION WITH REMOVAL AND REPLACEMENT OF IMPLANTS, MESH PLACEMENT, CHEST WALL SKIN RESECTION, CHEST LIPOSUCTION;  Surgeon: Romain Quezada MD;  [place] [facility] [place]  Service: Plastics;  Laterality: Bilateral;     SECTION      COLONOSCOPY  2019    COLONOSCOPY N/A 10/18/2022    Procedure: COLONOSCOPY POLYPECTOMIES;  Surgeon: Eduardo Salas MD;  Location: [facility];  Service: Gastroenterology;  Laterality: N/A;  COLON POLYPS    COSMETIC SURGERY      Breast reconstruction    ENDOSCOPY  2019    EYE SURGERY      Cateracts    FOOT SURGERY      Pin put in toe next to big toe    KNEE ARTHROSCOPY W/ LATERAL RELEASE / MEDIAL IMBRICATION Left     TORN CARTIDLGE REPAIR    KNEE SURGERY  2005    Torn cartlege trimmed    LIPOSUCTION N/A 2022    Procedure: CHEST WALL LIPOSUCTION AND CHEST WALL RESECTION;  Surgeon: Romain Quezada MD;  [place] [facility] [place]  Service: Plastics;  Laterality: N/A;  TIMES BASED ON PREVIOUS EXPERIENCE    LYMPH NODE BIOPSY  2004. 2019.     2004 breast   2019 abdominal lymph node. . Lymph node    MASTECTOMY COMPLETE / SIMPLE Bilateral 2004    OOPHORECTOMY      OTHER SURGICAL HISTORY      Joint surgery,  RIGHT FOOT SECOND  TOE WITH A PIN    SKIN CANCER EXCISION Right 2020    neck     SUBTOTAL HYSTERECTOMY      Ovaries only removed.    TOE SURGERY Right     2nd digit pin inserted    TONSILLECTOMY      TUBAL ABDOMINAL LIGATION      Birth of last child.       Family History:   Family History   Problem Relation Age of Onset    Heart disease Father         Fatal hear attack age 79    Cancer Father         Skin cancer    Heart failure Father         Age 79  cause of death.    Heart disease Mother         Age 60ish and fatal age 87 heart attacks    Breast cancer Mother         Breast cancer age 45 and 60    Diabetes Mother     Arthritis Mother         After complete hysterectomy in her 40s.    Osteoporosis Mother     Heart failure Mother         Heart attack age 60. Age 87. Cause of death.    Hypertension Mother     Osteoarthritis Mother     Deep vein thrombosis Mother     Cancer Mother         Breast cancer    Cancer Brother         Prostate cancer age about 58.    Prostate cancer Brother         unsure age    Breast cancer Sister 27        Breast cancer age 27 and 52. Ovarian cancer age 53 caused death at 56.    Diabetes Sister     Ovarian cancer Sister 53    Arthritis Sister     Cancer Sister         Breast cancer age 27 and 52. Ovarian cancer age 53 caused death at 56.    Breast cancer Daughter 40    Cancer Daughter         Breast cancer age 40, BRCA 1 gene    Breast cancer Maternal Aunt         mid 40s    Ovarian cancer Maternal Aunt          with ovarian cancer age mid 40s    Cancer Maternal Aunt         Breast cancer    Cancer Maternal Aunt          with ovarian cancer age mid 40s    Malig Hyperthermia Neg Hx     Colon cancer Neg Hx     Uterine cancer Neg Hx     Pulmonary embolism Neg Hx        Social History:   Social History     Socioeconomic History    Marital status:     Number of children: 3   Tobacco Use    Smoking status: Never     Passive exposure: Never    Smokeless tobacco: Never   Vaping Use    Vaping status: Never Used   Substance and Sexual Activity     limits  § Hearing  § : intact  o Nose  o :   § External Nose  § : appearance normal  · Neck  o Inspection/Palpation  o : normal appearance  o Range of Motion  o : full range of motion  · Respiratory  o Respiratory Effort  o : breathing unlabored  o Inspection of Chest  o : normal appearance  o Auscultation of Lungs  o : no audible wheezing or rales  · Cardiovascular  o Heart  o : regular rate  · Gastrointestinal  o Abdominal Examination  o : soft and non-tender  · Skin and Subcutaneous Tissue  o General Inspection  o : intact, no rashes  · Psychiatric  o General  o : Alert and oriented x3  o Judgement and Insight  o : judgment and insight intact  o Mood and Affect  o : mood normal, affect appropriate  · Extremities  o Extremities  o : BILATERAL HIPS: No deformity or discoloration. Tender bilateral greater trochanters. Full ROM. Strength decreased. Limping gait. Neurovascularly intact.   · Injection Note/Aspiration Note  o Site  o : bilateral hips   o Procedure  o : Procedure: After educating the patient, patient gave consent for procedure. After using Chloraprep, the joint space was injected. The patient tolerated the procedure well.   o Medication  o : 80 mg of DepoMedrol with 9cc of 1% Lidocaine  · In Office Procedures  o View  o : AP/LATERAL  o Site  o : bilateral, hip   o Indication  o : Left / right hip pain  o Study  o : X-rays ordered, taken in the office, and reviewed today.  o Xray  o : Advanced degenerative changes bilateral hips.   o Comparative Data  o : Comparative Data found and reviewed today           Assessment  · Primary osteoarthritis of both hips     715.15/M16.0  · Trochanteric Bursitis     726.5/M70.60  · Bilateral Pain: Hip     719.45/M25.559      Plan  · Orders  o 2 - Depo-Medrol injection 80mg () - - 02/04/2021   Lot [id number] exp 11 2021 Robbie HESTER  o 2 - Hip Intra-articular Injection without US Guidance [facility] ([id number]) - - 02/04/2021   Lot [id number] exp 03 2022 Jose Salmeron  Alcohol use: Never    Drug use: Never    Sexual activity: Not Currently     Partners: Male     Birth control/protection: Post-menopausal     Comment:  Sept. 20, 2021       Home Medications:  Blood Glucose Monitoring Suppl, Dulaglutide, Fluticasone-Salmeterol, Insulin Glargine, Insulin Pen Needle, OneTouch Delica Lancets 30G, Probiotic Product, acetaminophen, albuterol sulfate HFA, aspirin, atorvastatin, cholecalciferol, ferrous sulfate, fluticasone, glimepiride, glucose blood, levothyroxine, losartan, metoprolol succinate XL, montelukast, multivitamin with minerals, pantoprazole, and vitamin B-12    Allergies:  Allergies   Allergen Reactions    Exenatide Unknown - High Severity     Weight loss and pain in the right side. Bydereon.    Fluoxetine Hives    Gabapentin Headache     Occular Miaigraines    Jardiance [Empagliflozin] Other (See Comments)     UTI    Levaquin [Levofloxacin] Hives    Nickel Itching and Rash    Sulfa Antibiotics Swelling    Metformin Nausea Only    Hibiclens [Chlorhexidine Gluconate] Rash       Review of Systems   All systems were reviewed and negative except for: Above    Objective   Objective     Vitals:   Temp:  [98.1 °F (36.7 °C)-98.4 °F (36.9 °C)] 98.1 °F (36.7 °C)  Heart Rate:  [71-78] 78  Resp:  [13-16] 14  BP: (119-144)/(69-74) 119/74    Physical Exam    Constitutional: Awake, alert, no acute distress   Eyes: Pupils equal, sclerae anicteric, no conjunctival injection   HENT: NCAT, mucous membranes moist   Neck: Supple, no thyromegaly, no lymphadenopathy, trachea midline   Respiratory: Clear to auscultation bilaterally, nonlabored respirations    Cardiovascular: RRR, no murmurs, rubs, or gallops, palpable pedal pulses bilaterally   Gastrointestinal: Positive bowel sounds, soft, nontender, nondistended   Musculoskeletal: No bilateral ankle edema, no clubbing or cyanosis to extremities   Psychiatric: Appropriate affect, cooperative   Neurologic: Oriented x 3, strength symmetric in all  PA  o Hip (Left) 2 or more views (includes AP Pelvis) [facility] ([id number]) - 719.45/M25.559 - 02/04/2021  o Hip (Right) 2 or more views (includes AP Pelvis) [facility]. ([id number]) - 719.45/M25.559 - 02/04/2021  · Medications  o Medications have been Reconciled  o Transition of Care or Provider Policy  · Instructions  o Dr. Mendoza saw and examined the patient and agrees with plan.   o X-rays reviewed by Dr. Mendoza.  o Reviewed the patient's Past Medical, Social, and Family history as well as the ROS at today's visit, no changes.  o Call or return if worsening symptoms.  o She will have bilateral hip injections today for bursitis. She will schedule right KALE for 6-8 weeks out.   o Electronically Identified Patient Education Materials Provided Electronically            Electronically Signed by: MIR Figueroa-C -Author on February 4, 2021 02:39:05 PM  Electronically Co-signed by: Merna Mendoza MD -Reviewer on February 4, 2021 06:16:35 PM   extremities, Cranial Nerves grossly intact to confrontation, speech clear   Skin: No rashes     Result Review    Result Review:  I have personally reviewed the results from the time of this admission to 5/23/2025 21:43 EDT and agree with these findings:  [x]  Laboratory  []  Microbiology  [x]  Radiology  []  EKG/Telemetry   []  Cardiology/Vascular   []  Pathology  [x]  Old records  []  Other:      Assessment & Plan   Assessment / Plan     Assessment/Plan:     Assessment  Atypical chest pain  HFmrEF  Essential hypertension  Type 2 diabetes on insulin  Hyperlipidemia  Hypothyroidism  Post COVID chronic fatigue    Plan  Ad[facility]  Telemetry  Monitor vitals  CBC BMP  Troponin trend  TSH, hemoglobin A1c, lipid panel  Hold long acting insulin due to hypoglycemia in ED   Start home meds  Aspirin  Started on heparin drip as per cardiology  Cardiology consulted      VTE Prophylaxis:  Pharmacologic & mechanical VTE prophylaxis orders are present.        CODE STATUS:    Code Status (Patient has no pulse and is not breathing): CPR (Attempt to Resuscitate)  Medical Interventions (Patient has pulse or is breathing): Full Support      Admission Status:  I believe this patient meets obs status.    Electronically signed by Juanjo Mar MD, 05/23/25, 9:43 PM EDT.

## 2025-05-24 NOTE — PLAN OF CARE
Goal Outcome Evaluation:  Plan of Care Reviewed With: patient        Progress: no change  Outcome Evaluation: VSS. Patient has not complained of chest pain throughout shift. No significant changes this shift. Call light within reach at all times.

## 2025-05-24 NOTE — PROGRESS NOTES
[facility]   Hospitalist Progress Note  Date: 2025  Patient Name: Jyoti Alvarez  : 1950  MRN: [id number]  Date of admission: 2025  Room/Bed: 204/2      Subjective   Subjective     Chief Complaint: Chest pain     Summary:Jyoti Alvarez is a 75 y.o. female with hypertension, diabetes, lipidemia presented to the ED with chest pain, pressure-like sensation going to her left arm.  Admitted to the medicine service, seen by cardiology, underwent cardiac cath.    Interval Followup: Got heart cath today, medical management recommended, no chest pain at this time.        Objective   Objective     Vitals:   Temp:  [98.1 °F (36.7 °C)-98.4 °F (36.9 °C)] 98.2 °F (36.8 °C)  Heart Rate:  [68-85] 70  Resp:  [13-20] 20  BP: ()/(43-80) 103/56    Physical Exam   General: Awake, alert, NAD  HENT: NCAT, MMM  Eyes: pupils equal, no scleral icterus  Cardiovascular: RRR, no murmurs   Pulmonary: CTA bilaterally; no wheezes; no conversational dyspnea  Gastrointestinal: S/ND/NT, +BS  Musculoskeletal: No gross deformities      Result Review    Result Review:  I have personally reviewed these results:  [x]  Laboratory      Lab 25  04225   WBC 8.92  --  11.46*   HEMOGLOBIN 13.0  --  13.5   HEMATOCRIT 39.8  --  40.6   PLATELETS 300  --  303   NEUTROS ABS 5.13  --  9.05*   IMMATURE GRANS (ABS) 0.02  --  0.03   LYMPHS ABS 2.50  --  1.58   MONOS ABS 1.08*  --  0.72   EOS ABS 0.13  --  0.05   MCV 95.7  --  95.1   PROTIME  --  13.4  --    APTT 105.7* 27.3*  --          Lab 25   SODIUM  --  137   POTASSIUM  --  4.4   CHLORIDE  --  101   CO2  --  23.8   ANION GAP  --  12.2   BUN  --  25*   CREATININE  --  0.78   EGFR  --  78.8   GLUCOSE  --  123*   CALCIUM  --  9.1   MAGNESIUM  --  2.0   TSH 0.807  --          Lab 25  1907   TOTAL PROTEIN 6.2   ALBUMIN 4.3   GLOBULIN 1.9   ALT (SGPT) 28   AST (SGOT) 30   BILIRUBIN 0.4   ALK PHOS 71   LIPASE 41          Lab 05/24/25  0421 05/23/25  2201 05/23/25 2015 05/23/25  1907   PROBNP  --   --   --  136.6   HSTROP T 86* 51* 50* 49*   PROTIME  --  13.4  --   --    INR  --  0.98  --   --          Lab 05/23/25 2015   CHOLESTEROL 138   LDL CHOL 69   HDL CHOL 36*   TRIGLYCERIDES 198*             Brief Urine Lab Results  (Last result in the past 365 days)        Color   Clarity   Blood   Leuk Est   Nitrite   Protein   CREAT   Urine HCG        03/03/25 1641             245.2               [x]  Microbiology   Microbiology Results (last 10 days)       ** No results found for the last 240 hours. **          [x]  Radiology  CT Head Without Contrast  Result Date: 5/23/2025  Impression: 1.No acute intracranial findings. 2.Chronic findings as above. Electronically Signed: Judson Watkins DO  5/23/2025 7:34 PM EDT  Workstation ID: [id number]    XR Chest 1 View  Result Date: 5/23/2025  Impression: Poor respiratory effort without definite consolidation. Electronically Signed: Judson Watkins DO  5/23/2025 7:03 PM EDT  Workstation ID: [id number]    []  EKG/Telemetry   []  Cardiology/Vascular   []  Pathology  []  Old records  []  Other:    Assessment & Plan   Assessment / Plan     Assessment:  NSTEMI  CAD  Hypertension  Hyperlipidemia    Plan:  Admitted to medicine  Status post cardiac cath  Cardiac medications per cardiology, appreciate the assistance  Anticipate DC tomorrow if doing well       Discussed with RN.    VTE Prophylaxis:  Pharmacologic & mechanical VTE prophylaxis orders are present.        CODE STATUS:   Code Status (Patient has no pulse and is not breathing): CPR (Attempt to Resuscitate)  Medical Interventions (Patient has pulse or is breathing): Full Support      Electronically signed by Micha Akers MD, 5/24/2025, 12:36 EDT.

## 2025-05-24 NOTE — CONSULTS
[facility]   Cardiology Consult Note    Patient Name: Jyoti Alvarez  : 1950  MRN: [id number]  Primary Care Physician:  Ramo Noguera MD  Referring Physician: No ref. provider found    Date of admission: 2025    Subjective   Subjective     Reason for Consultation : Non-STEMI    Chief Complaint : Chest pain    HPI:  Jyoti Alvarez is a 75 y.o. female past medical history of type 2 diabetes, hypertension and mixed hyperlipidemia.  She also has history of right-sided breast cancer s/p resection and resection lymph nodes radiation therapy years ago.  She developed chest discomfort described as a pressure-like and radiated to the left arm.  Abdominal pain or shortness of breath.  Her EKG showed sinus rhythm with repolarization abnormalities.  The troponins were elevated suggestive of a non-STEMI.    Review of Systems   All systems were reviewed and negative except for: Chest pain    Personal History     Past Medical History:   Diagnosis Date    Allergic     Anemia     Ankle sprain     Arthritis     Asthma Late     INHALERS PRN    Breast cancer     RIGHT    Cataract     Colon polyp  and  and     COVID 2021    Disease of thyroid gland Hypothyroidism    Fatty liver     ASYMPTOMATIC    Fracture, radius 3-    Gastroesophageal reflux     Headache 2021    Positive  COVID 2021 reoccurring since then    Hernia 2019    Hiatal    Hip arthrosis     HL (hearing loss) Hearing aids     Hyperlipemia     Hypertension     Hypothyroidism     Knee swelling     Limb swelling     Obesity     Osteopenia     Pneumonia     Seasonal allergies     Shortness of breath     Allergic triggers or over exertion    Skin cancer     Sleep apnea     Strain of latissimus dorsi muscle 2017    Right    Thyroid nodule     Type 2 diabetes mellitus     AM  IN AM    Urinary tract infection     Jardiance induced    Vertigo     Vitamin D deficiency               Family  History: family history includes Arthritis in her mother and sister; Breast cancer in her maternal aunt and mother; Breast cancer (age of onset: 27) in her sister; Breast cancer (age of onset: 40) in her daughter; Cancer in her brother, daughter, father, maternal aunt, maternal aunt, mother, and sister; Deep vein thrombosis in her mother; Diabetes in her mother and sister; Heart disease in her father and mother; Heart failure in her father and mother; Hypertension in her mother; Osteoarthritis in her mother; Osteoporosis in her mother; Ovarian cancer in her maternal aunt; Ovarian cancer (age of onset: 53) in her sister; Prostate cancer in her brother. Otherwise pertinent FHx was reviewed and not pertinent to current issue.    Social History:  reports that she has never smoked. She has never been exposed to tobacco smoke. She has never used smokeless tobacco. She reports that she does not drink alcohol and does not use drugs.    Home Medications:  Dulaglutide, Fluticasone-Salmeterol, Insulin Glargine, Probiotic Product, acetaminophen, albuterol sulfate HFA, aspirin, atorvastatin, cholecalciferol, ferrous sulfate, fluticasone, glimepiride, levothyroxine, losartan, metoprolol succinate XL, montelukast, multivitamin with minerals, pantoprazole, and vitamin B-12    Allergies:  Allergies   Allergen Reactions    Exenatide Unknown - High Severity     Weight loss and pain in the right side. Bydereon.    Fluoxetine Hives    Gabapentin Headache     Occular Miaigraines    Jardiance [Empagliflozin] Other (See Comments)     UTI    Levaquin [Levofloxacin] Hives    Nickel Itching and Rash    Sulfa Antibiotics Swelling    Metformin Nausea Only    Hibiclens [Chlorhexidine Gluconate] Rash       Objective    Objective     Vitals:   Temp:  [98.1 °F (36.7 °C)-98.4 °F (36.9 °C)] 98.2 °F (36.8 °C)  Heart Rate:  [68-85] 68  Resp:  [13-18] 18  BP: ()/(62-74) 121/67      Physical Exam:   Constitutional: Awake, alert, No acute distress     Eyes: PERRLA, sclerae anicteric, no conjunctival injection   HENT: NCAT, mucous membranes moist   Neck: Supple, no thyromegaly, no lymphadenopathy, trachea midline   Respiratory: Clear to auscultation bilaterally, nonlabored respirations    Cardiovascular: RRR, no murmurs, rubs, or gallops, palpable pedal pulses bilaterally   Gastrointestinal: Positive bowel sounds, soft, nontender, nondistended   Musculoskeletal: No bilateral ankle edema, no clubbing or cyanosis to extremities   Psychiatric: Appropriate affect, cooperative   Neurologic: Oriented x 3, strength symmetric in all extremities, Cranial Nerves grossly intact to confrontation, speech clear   Skin: No rashes     Result Review    Result Review:  I have personally reviewed the results from the time of this admission to 5/24/2025 08:53 EDT and agree with these findings:  [x]  Laboratory  []  Microbiology  [x]  Radiology  [x]  EKG/Telemetry   [x]  Cardiology/Vascular   []  Pathology  [x]  Old records  []  Other:  Most notable findings include:     CMP          12/30/2024    14:40 3/31/2025    11:10 5/23/2025    19:07   CMP   Glucose 115  169  123    BUN 19  21  25    Creatinine 0.81  0.78  0.78    EGFR 75.8  78.8  78.8    Sodium 138  140  137    Potassium 4.4  4.6  4.4    Chloride 103  101  101    Calcium 9.1  9.4  9.1    Total Protein  6.4  6.2    Albumin  4.2  4.3    Globulin  2.2  1.9    Total Bilirubin  0.6  0.4    Alkaline Phosphatase  68  71    AST (SGOT)  27  30    ALT (SGPT)  26  28    Albumin/Globulin Ratio  1.9  2.3    BUN/Creatinine Ratio 23.5  26.9  32.1    Anion Gap 9.5  13.7  12.2       CBC          3/31/2025    11:10 5/23/2025    19:07 5/24/2025    04:21   CBC   WBC 7.81  11.46  8.92    RBC 4.57  4.27  4.16    Hemoglobin 14.6  13.5  13.0    Hematocrit 43.0  40.6  39.8    MCV 94.1  95.1  95.7    MCH 31.9  31.6  31.3    MCHC 34.0  33.3  32.7    RDW 12.6  13.0  13.1    Platelets 341  303  300       Lab Results   Component Value Date    TROPONINT  86 (C) 05/24/2025         Assessment & Plan   Assessment / Plan     Brief Patient Summary:  Jyoti Alvarez is a 75 y.o. female who has major risk factors for coronary artery disease now presenting with chest discomfort and elevated troponin levels suggestive of a non-STEMI.  She had an urgent left heart catheterization which showed calcified vessels.  Right coronary artery With 30% mid segment stenosis and diffuse disease.  The left main was normal.  Left anterior descending have 30% mid segment stenosis and is calcified.  Complex artery Mild diffuse disease.No Culprit lesion was found.  The ejection fraction is normal with no wall motion abnormalities and the EF is over 60%.  LVEDP was 23 mmHg suggestive of diastolic dysfunction    Active Hospital Problems:  Active Hospital Problems    Diagnosis     **Chest pain     ACS (acute coronary syndrome)          Plan:     I would optimize medical therapy with blood pressure control, goal less than 130/80 mmHg.  Continue with high-dose statin therapy, beta-blockers and angiotensin receptor blockers. Consider dual antiplatelet therapy with low dose aspirin and Plavix 75 mg po daily for at least 9 months after recent event. Consider long acting nitrates, ie, Isosorbide ER 30 mg po daily for possible coronary vasospasm. Watch for bleeding, hematoma.     Electronically signed by Pablo Vazquez MD, 05/24/25, 8:53 AM EDT.

## 2025-05-24 NOTE — PLAN OF CARE
Goal Outcome Evaluation:   Daughter at bedside today. Right Radial TR band. No stents. Medical management per MD. Up ad oren after bedrest. No issues to report. NAOMIE fried rn

## 2025-05-25 ENCOUNTER — READMISSION MANAGEMENT (OUTPATIENT)
Dept: CALL CENTER | Facility: HOSPITAL | Age: 75
End: 2025-05-25
Payer: MEDICARE

## 2025-05-25 VITALS
SYSTOLIC BLOOD PRESSURE: 125 MMHG | WEIGHT: 169.53 LBS | OXYGEN SATURATION: 95 % | BODY MASS INDEX: 31.2 KG/M2 | TEMPERATURE: 98.2 F | DIASTOLIC BLOOD PRESSURE: 66 MMHG | RESPIRATION RATE: 18 BRPM | HEIGHT: 62 IN | HEART RATE: 74 BPM

## 2025-05-25 LAB
ALBUMIN SERPL-MCNC: 4 G/DL (ref 3.5–5.2)
ANION GAP SERPL CALCULATED.3IONS-SCNC: 10.8 MMOL/L (ref 5–15)
BASOPHILS # BLD AUTO: 0.05 10*3/MM3 (ref 0–0.2)
BASOPHILS NFR BLD AUTO: 0.6 % (ref 0–1.5)
BUN SERPL-MCNC: 19 MG/DL (ref 8–23)
BUN/CREAT SERPL: 22.1 (ref 7–25)
CALCIUM SPEC-SCNC: 9 MG/DL (ref 8.6–10.5)
CHLORIDE SERPL-SCNC: 102 MMOL/L (ref 98–107)
CO2 SERPL-SCNC: 22.2 MMOL/L (ref 22–29)
CREAT SERPL-MCNC: 0.86 MG/DL (ref 0.57–1)
DEPRECATED RDW RBC AUTO: 48.1 FL (ref 37–54)
EGFRCR SERPLBLD CKD-EPI 2021: 70.1 ML/MIN/1.73
EOSINOPHIL # BLD AUTO: 0.2 10*3/MM3 (ref 0–0.4)
EOSINOPHIL NFR BLD AUTO: 2.3 % (ref 0.3–6.2)
ERYTHROCYTE [DISTWIDTH] IN BLOOD BY AUTOMATED COUNT: 13.5 % (ref 12.3–15.4)
GLUCOSE BLDC GLUCOMTR-MCNC: 123 MG/DL (ref 70–99)
GLUCOSE BLDC GLUCOMTR-MCNC: 138 MG/DL (ref 70–99)
GLUCOSE SERPL-MCNC: 127 MG/DL (ref 65–99)
HCT VFR BLD AUTO: 39.7 % (ref 34–46.6)
HGB BLD-MCNC: 12.9 G/DL (ref 12–15.9)
IMM GRANULOCYTES # BLD AUTO: 0.02 10*3/MM3 (ref 0–0.05)
IMM GRANULOCYTES NFR BLD AUTO: 0.2 % (ref 0–0.5)
LYMPHOCYTES # BLD AUTO: 2.1 10*3/MM3 (ref 0.7–3.1)
LYMPHOCYTES NFR BLD AUTO: 23.8 % (ref 19.6–45.3)
MAGNESIUM SERPL-MCNC: 1.8 MG/DL (ref 1.6–2.4)
MCH RBC QN AUTO: 31.2 PG (ref 26.6–33)
MCHC RBC AUTO-ENTMCNC: 32.5 G/DL (ref 31.5–35.7)
MCV RBC AUTO: 95.9 FL (ref 79–97)
MONOCYTES # BLD AUTO: 1.06 10*3/MM3 (ref 0.1–0.9)
MONOCYTES NFR BLD AUTO: 12 % (ref 5–12)
NEUTROPHILS NFR BLD AUTO: 5.39 10*3/MM3 (ref 1.7–7)
NEUTROPHILS NFR BLD AUTO: 61.1 % (ref 42.7–76)
NRBC BLD AUTO-RTO: 0 /100 WBC (ref 0–0.2)
PHOSPHATE SERPL-MCNC: 4.1 MG/DL (ref 2.5–4.5)
PLATELET # BLD AUTO: 284 10*3/MM3 (ref 140–450)
PMV BLD AUTO: 9.4 FL (ref 6–12)
POTASSIUM SERPL-SCNC: 4.3 MMOL/L (ref 3.5–5.2)
RBC # BLD AUTO: 4.14 10*6/MM3 (ref 3.77–5.28)
SODIUM SERPL-SCNC: 135 MMOL/L (ref 136–145)
WBC NRBC COR # BLD AUTO: 8.82 10*3/MM3 (ref 3.4–10.8)

## 2025-05-25 PROCEDURE — 82948 REAGENT STRIP/BLOOD GLUCOSE: CPT | Performed by: INTERNAL MEDICINE

## 2025-05-25 PROCEDURE — A9270 NON-COVERED ITEM OR SERVICE: HCPCS | Performed by: INTERNAL MEDICINE

## 2025-05-25 PROCEDURE — 99239 HOSP IP/OBS DSCHRG MGMT >30: CPT | Performed by: INTERNAL MEDICINE

## 2025-05-25 PROCEDURE — 63710000001 METOPROLOL SUCCINATE XL 100 MG TABLET SUSTAINED-RELEASE 24 HOUR: Performed by: INTERNAL MEDICINE

## 2025-05-25 PROCEDURE — 63710000001 LEVOTHYROXINE 25 MCG TABLET: Performed by: INTERNAL MEDICINE

## 2025-05-25 PROCEDURE — 94799 UNLISTED PULMONARY SVC/PX: CPT

## 2025-05-25 PROCEDURE — 99214 OFFICE O/P EST MOD 30 MIN: CPT | Performed by: INTERNAL MEDICINE

## 2025-05-25 PROCEDURE — 63710000001 FERROUS SULFATE 325 (65 FE) MG TABLET: Performed by: INTERNAL MEDICINE

## 2025-05-25 PROCEDURE — 80069 RENAL FUNCTION PANEL: CPT | Performed by: INTERNAL MEDICINE

## 2025-05-25 PROCEDURE — 63710000001 MONTELUKAST 10 MG TABLET: Performed by: INTERNAL MEDICINE

## 2025-05-25 PROCEDURE — 63710000001 PANTOPRAZOLE 40 MG TABLET DELAYED-RELEASE: Performed by: INTERNAL MEDICINE

## 2025-05-25 PROCEDURE — 83735 ASSAY OF MAGNESIUM: CPT | Performed by: INTERNAL MEDICINE

## 2025-05-25 PROCEDURE — 63710000001 LOSARTAN 50 MG TABLET: Performed by: INTERNAL MEDICINE

## 2025-05-25 PROCEDURE — G0378 HOSPITAL OBSERVATION PER HR: HCPCS

## 2025-05-25 PROCEDURE — 85025 COMPLETE CBC W/AUTO DIFF WBC: CPT | Performed by: INTERNAL MEDICINE

## 2025-05-25 PROCEDURE — 94664 DEMO&/EVAL PT USE INHALER: CPT

## 2025-05-25 RX ORDER — ISOSORBIDE MONONITRATE 30 MG/1
30 TABLET, EXTENDED RELEASE ORAL DAILY
Qty: 30 TABLET | Refills: 0 | Status: SHIPPED | OUTPATIENT
Start: 2025-05-25 | End: 2025-05-25

## 2025-05-25 RX ORDER — ISOSORBIDE MONONITRATE 30 MG/1
30 TABLET, EXTENDED RELEASE ORAL DAILY
Qty: 30 TABLET | Refills: 0 | Status: SHIPPED | OUTPATIENT
Start: 2025-05-25 | End: 2025-06-24

## 2025-05-25 RX ORDER — CLOPIDOGREL BISULFATE 75 MG/1
75 TABLET ORAL DAILY
Qty: 30 TABLET | Refills: 0 | Status: SHIPPED | OUTPATIENT
Start: 2025-05-25 | End: 2025-05-25

## 2025-05-25 RX ORDER — CLOPIDOGREL BISULFATE 75 MG/1
75 TABLET ORAL DAILY
Qty: 30 TABLET | Refills: 0 | Status: SHIPPED | OUTPATIENT
Start: 2025-05-25 | End: 2025-06-24

## 2025-05-25 RX ADMIN — PANTOPRAZOLE SODIUM 40 MG: 40 TABLET, DELAYED RELEASE ORAL at 08:48

## 2025-05-25 RX ADMIN — MONTELUKAST 10 MG: 10 TABLET, FILM COATED ORAL at 08:48

## 2025-05-25 RX ADMIN — LOSARTAN POTASSIUM 50 MG: 50 TABLET, FILM COATED ORAL at 08:48

## 2025-05-25 RX ADMIN — FERROUS SULFATE TAB 325 MG (65 MG ELEMENTAL FE) 325 MG: 325 (65 FE) TAB at 08:48

## 2025-05-25 RX ADMIN — ARFORMOTEROL TARTRATE 15 MCG: 15 SOLUTION RESPIRATORY (INHALATION) at 06:17

## 2025-05-25 RX ADMIN — LEVOTHYROXINE SODIUM 25 MCG: 0.03 TABLET ORAL at 05:39

## 2025-05-25 RX ADMIN — Medication 10 ML: at 08:49

## 2025-05-25 RX ADMIN — METOPROLOL SUCCINATE 100 MG: 100 TABLET, EXTENDED RELEASE ORAL at 08:48

## 2025-05-25 RX ADMIN — BUDESONIDE 0.5 MG: 0.5 SUSPENSION RESPIRATORY (INHALATION) at 06:17

## 2025-05-25 NOTE — PROGRESS NOTES
[facility]     Cardiology Progress Note    Patient Name: Jyoti Alvarez  : 1950  MRN: [id number]  Primary Care Physician:  Ramo Noguera MD  Date of admission: 2025    Subjective   Subjective     Chief Complaint: The patient is cardiac wise stable. No events overnight.     Interval HPI:    Patient remains in sinus rhythm. No recurrent symptoms.     Review of Systems   All systems were reviewed and negative except for: right wrist discomfort.     Objective   Objective     Vitals:   Temp:  [97.9 °F (36.6 °C)-98.6 °F (37 °C)] 98.2 °F (36.8 °C)  Heart Rate:  [70-84] 74  Resp:  [18-20] 18  BP: (102-131)/(53-71) 125/66  Physical Exam      Alert  Heart. Regular, no gallops  Lungs; No rales, no wheezing  LE; no edema  Right wrist; no hematoma. Palpable radial pulse.   Neurologic. No motor deficits.     Scheduled Meds:budesonide, 0.5 mg, Nebulization, BID - RT   And  arformoterol, 15 mcg, Nebulization, BID - RT  atorvastatin, 40 mg, Oral, Nightly  ferrous sulfate, 325 mg, Oral, Daily With Breakfast  fluticasone, 1 spray, Each Nare, Daily  insulin lispro, 2-7 Units, Subcutaneous, 4x Daily AC & at Bedtime  levothyroxine, 25 mcg, Oral, Q AM  losartan, 50 mg, Oral, Daily  metoprolol succinate XL, 100 mg, Oral, Daily  montelukast, 10 mg, Oral, Daily  pantoprazole, 40 mg, Oral, Daily  sodium chloride, 10 mL, Intravenous, Q12H      Continuous Infusions:        Result Review    Result Review:  I have personally reviewed the results from the time of this admission to 2025 13:21 EDT and agree with these findings:  [x]  Laboratory  []  Microbiology  [x]  Radiology  [x]  EKG/Telemetry   [x]  Cardiology/Vascular   []  Pathology  []  Old records  []  Other:  Most notable findings include:     CBC          2025    19:07 2025    04:21 2025    06:22   CBC   WBC 11.46  8.92  8.82    RBC 4.27  4.16  4.14    Hemoglobin 13.5  13.0  12.9    Hematocrit 40.6  39.8  39.7    MCV 95.1  95.7  95.9    MCH  31.6  31.3  31.2    MCHC 33.3  32.7  32.5    RDW 13.0  13.1  13.5    Platelets 303  300  284      CMP          3/31/2025    11:10 5/23/2025    19:07 5/25/2025    06:22   CMP   Glucose 169  123  127    BUN 21  25  19    Creatinine 0.78  0.78  0.86    EGFR 78.8  78.8  70.1    Sodium 140  137  135    Potassium 4.6  4.4  4.3    Chloride 101  101  102    Calcium 9.4  9.1  9.0    Total Protein 6.4  6.2     Albumin 4.2  4.3  4.0    Globulin 2.2  1.9     Total Bilirubin 0.6  0.4     Alkaline Phosphatase 68  71     AST (SGOT) 27  30     ALT (SGPT) 26  28     Albumin/Globulin Ratio 1.9  2.3     BUN/Creatinine Ratio 26.9  32.1  22.1    Anion Gap 13.7  12.2  10.8       CARDIAC LABS:      Lab 05/24/25  0421 05/23/25 2201 05/23/25 2015 05/23/25  1907   PROBNP  --   --   --  136.6   HSTROP T 86* 51* 50* 49*   PROTIME  --  13.4  --   --    INR  --  0.98  --   --         Assessment & Plan   Assessment / Plan     Brief Patient Summary:  Jyoti Alvarez is a 75 y.o. female with recent admission with ACS with elevated troponins.Her coronary angiography showed mild CAD, no culprit was found. The EF is normal.     Active Hospital Problems:  Active Hospital Problems    Diagnosis     **Chest pain     ACS (acute coronary syndrome)            Plan:     I would continue with BP control and consider high dose statins, beta-blockers and long acting nitrates for possible coronary vasospasm. No objection for discharge home today.        CODE STATUS:   Code Status (Patient has no pulse and is not breathing): CPR (Attempt to Resuscitate)  Medical Interventions (Patient has pulse or is breathing): Full Support      Electronically signed by Pablo Vazquez MD, 05/25/25, 1:21 PM EDT.

## 2025-05-25 NOTE — PLAN OF CARE
Goal Outcome Evaluation:  Plan of Care Reviewed With: patient        Progress: improving  Outcome Evaluation: Patient has been sleeping throughout the night with no complaints or signs of distress, Will continue with plan care.

## 2025-05-25 NOTE — DISCHARGE SUMMARY
[facility]         HOSPITALIST  DISCHARGE SUMMARY    Patient Name: Jyoti Alvarez  : 1950  MRN: [id number]    Date of Admission: 2025  Date of Discharge: 2025  Primary Care Physician: Ramo Noguera MD  Reason for admission:  Chest pain    Final diagnosis:  NSTEMI  CAD  Hypertension  Hyperlipidemia    Consults       Date and Time Order Name Status Description    2025  9:10 PM Inpatient Hospitalist Consult      2025  8:59 PM Inpatient Cardiology Consult              Active and Resolved Hospital Problems:  Active Hospital Problems    Diagnosis POA    **Chest pain [R07.9] Yes    ACS (acute coronary syndrome) [I24.9] Unknown      Resolved Hospital Problems   No resolved problems to display.       Hospital Course     Hospital Course:  Jyoti Alvarez is a 75 y.o. female  with hypertension, diabetes, lipidemia presented to the ED with chest pain, pressure-like sensation going to her left arm.  Admitted to the medicine service, seen by cardiology, underwent cardiac cath no intervention needed brought back to the floor remained hemodynamically stable no chest pain.  Cardiology recommending dual antiplatelet therapy with aspirin and Plavix recommended along acting nitrate with Imdur ER 30 mg daily.  Seen and examined 2025 hemodynamically clinically stable for discharge patient will follow-up with her primary care provider in 1 week follow-up with cardiology as directed      DISCHARGE Follow Up Recommendations for labs and diagnostics: As above      Day of Discharge     Vital Signs:  Temp:  [97.9 °F (36.6 °C)-98.6 °F (37 °C)] 98.2 °F (36.8 °C)  Heart Rate:  [70-84] 74  Resp:  [18-20] 18  BP: (103-131)/(60-69) 125/66  Physical Exam:   Constitutional: Awake alert  Respiratory clear  Cardiovascular RRR  GI: Abdomen soft nontender      Discharge Details        Discharge Medications        New Medications        Instructions Start Date   clopidogrel 75 MG  tablet  Commonly known as: Plavix   75 mg, Oral, Daily      isosorbide mononitrate 30 MG 24 hr tablet  Commonly known as: IMDUR   30 mg, Oral, Daily             Changes to Medications        Instructions Start Date   albuterol sulfate  (90 Base) MCG/ACT inhaler  Commonly known as: PROVENTIL HFA;VENTOLIN HFA;PROAIR HFA  What changed: See the new instructions.   USE 2 INHALATIONS EVERY 6 HOURS AS NEEDED      levothyroxine 25 MCG tablet  Commonly known as: SYNTHROID, LEVOTHROID  What changed: See the new instructions.   TAKE 1 TABLET DAILY IN THE MORNING ON AN EMPTY STOMACH             Continue These Medications        Instructions Start Date   acetaminophen 650 MG 8 hr tablet  Commonly known as: TYLENOL   650 mg, Oral, 2 times daily      aspirin 81 MG EC tablet   81 mg, Oral, Daily, OTC      atorvastatin 40 MG tablet  Commonly known as: LIPITOR   40 mg, Oral, Nightly      D3-50 1.25 MG (00134 UT) capsule  Generic drug: cholecalciferol   50,000 Units, Oral, Weekly      ferrous sulfate 325 (65 FE) MG tablet   325 mg, Daily With Breakfast      fluticasone 50 MCG/ACT nasal spray  Commonly known as: FLONASE   1 spray, Each Nare, Daily      Fluticasone-Salmeterol 500-50 MCG/ACT DISKUS  Commonly known as: ADVAIR/WIXELA   1 puff, Inhalation, 2 Times Daily - RT      glimepiride 2 MG tablet  Commonly known as: AMARYL   2 mg, Oral, 2 Times Daily      Lantus SoloStar 100 UNIT/ML injection pen  Generic drug: Insulin Glargine   Inject 50 Units under the skin into the appropriate area as directed Daily.      losartan 50 MG tablet  Commonly known as: COZAAR   TAKE 1 TABLET DAILY      metoprolol succinate  MG 24 hr tablet  Commonly known as: TOPROL-XL   TAKE 1 TABLET DAILY      montelukast 10 MG tablet  Commonly known as: SINGULAIR   10 mg, Oral, Daily      multivitamin with minerals tablet tablet   1 tablet, Daily      pantoprazole 40 MG EC tablet  Commonly known as: PROTONIX   TAKE 1 TABLET DAILY      PROBIOTIC PO   1  fluid ounce, Daily      Trulicity 3 MG/0.5ML solution auto-injector  Generic drug: Dulaglutide   3 mg, Subcutaneous, Every 7 Days      vitamin B-12 500 MCG tablet  Commonly known as: CYANOCOBALAMIN   500 mcg, Daily               Allergies   Allergen Reactions    Exenatide Unknown - High Severity     Weight loss and pain in the right side. Bydereon.    Fluoxetine Hives    Gabapentin Headache     Occular Miaigraines    Jardiance [Empagliflozin] Other (See Comments)     UTI    Levaquin [Levofloxacin] Hives    Nickel Itching and Rash    Sulfa Antibiotics Swelling    Metformin Nausea Only    Hibiclens [Chlorhexidine Gluconate] Rash       Discharge Disposition:  Home or Self Care    Diet:  Hospital:  Diet Order   Procedures    Diet: Cardiac; Healthy Heart (2-3 Na+); Fluid Consistency: Thin (IDDSI 0)       Discharge Activity:       CODE STATUS:  Code Status and Medical Interventions: CPR (Attempt to Resuscitate); Full Support   Ordered at: 05/23/25 2140     Code Status (Patient has no pulse and is not breathing):    CPR (Attempt to Resuscitate)     Medical Interventions (Patient has pulse or is breathing):    Full Support         Future Appointments   Date Time Provider Department Center   5/29/2025 11:30 AM Pablo Haro MD [facility] [place] [facility]   7/8/2025  2:00 PM Kelly Vicente APRN [facility] DIAB ET [facility]   8/4/2025  2:00 PM Ramo Noguera MD [facility] PC W LUIZ [facility]   9/9/2025  1:00 PM Kenya Beltran OT [facility] OPOPV [facility]   9/15/2025  1:45 PM Kassy Marquez APRN [facility] OBG 1324 [facility]   9/22/2025  2:00 PM Uday Pinto MD [facility] ENT ETWN [facility]       Additional Instructions for the Follow-ups that You Need to Schedule       Discharge Follow-up with PCP   As directed       Currently Documented PCP:    Ramo Noguera MD    PCP Phone Number:    [phone]     Follow Up Details: one week                Pertinent  and/or Most Recent Results     PROCEDURES:   Cardiac catheterization  Indication:  NSTEMI        Clinical  Presentation:  75 year old female with major risk factors for CAD , admitted with new onset chest pain and elevated hs Troponins suggestive of NSTEMI     Procedure performed:  Left heart catheterization  Selective coronary angiography  Left ventriculography     Access Sites:  Right Radial Artery     Findings:  1. Coronary Artery Anatomy:  Dominance: Co-dominant  Left Main: Calcified, no stenosis.   Left Anterior Descending: Calcified with moderate tortuosity. The mid LAD had 30 % stenosis. The distal LAD had minimal disease. The first and second diagonal branches had mild disease.   Circumflex Artery: Large with mild diffuse disease. The marginal branches had mild disease.   Right Coronary Artery:  Calcified with 30 % mid segment stenosis. The mid to distal had mild diffuse disease. The PDA had mild disease.      2. Hemodynamics:  The opening aortic pressure is 130/90 mmHg.   The left ventricular end-diastolic pressure is 23 mmHg.  There was no gradient across aortic valve on pullback     3. Left Ventriculogram:  Ejection Fraction: 60 %  Wall Motion: no abnormalities.   Mitral Regurgitation: None     Conclusions:  Mild to Moderate CAD.  Preserved EF of 60% with elevated LVEDP suggestive of Diastolic Dysfunction.         Recommendations:   Optimize medical therapy with BP control, statins, antiplatelet therapy.        LAB RESULTS:      Lab 05/25/25  0622 05/24/25  1224 05/24/25  0421 05/23/25 2201 05/23/25  1907   WBC 8.82  --  8.92  --  11.46*   HEMOGLOBIN 12.9  --  13.0  --  13.5   HEMATOCRIT 39.7  --  39.8  --  40.6   PLATELETS 284  --  300  --  303   NEUTROS ABS 5.39  --  5.13  --  9.05*   IMMATURE GRANS (ABS) 0.02  --  0.02  --  0.03   LYMPHS ABS 2.10  --  2.50  --  1.58   MONOS ABS 1.06*  --  1.08*  --  0.72   EOS ABS 0.20  --  0.13  --  0.05   MCV 95.9  --  95.7  --  95.1   PROTIME  --   --   --  13.4  --    APTT  --  27.6* 105.7* 27.3*  --          Lab 05/25/25  0622 05/23/25 2015 05/23/25  1907   SODIUM  135*  --  137   POTASSIUM 4.3  --  4.4   CHLORIDE 102  --  101   CO2 22.2  --  23.8   ANION GAP 10.8  --  12.2   BUN 19  --  25*   CREATININE 0.86  --  0.78   EGFR 70.1  --  78.8   GLUCOSE 127*  --  123*   CALCIUM 9.0  --  9.1   MAGNESIUM 1.8  --  2.0   PHOSPHORUS 4.1  --   --    HEMOGLOBIN A1C  --   --  7.30*   TSH  --  0.807  --          Lab 05/25/25  0622 05/23/25  1907   TOTAL PROTEIN  --  6.2   ALBUMIN 4.0 4.3   GLOBULIN  --  1.9   ALT (SGPT)  --  28   AST (SGOT)  --  30   BILIRUBIN  --  0.4   ALK PHOS  --  71   LIPASE  --  41         Lab 05/24/25  0421 05/23/25 2201 05/23/25 2015 05/23/25  1907   PROBNP  --   --   --  136.6   HSTROP T 86* 51* 50* 49*   PROTIME  --  13.4  --   --    INR  --  0.98  --   --          Lab 05/23/25 2015   CHOLESTEROL 138   LDL CHOL 69   HDL CHOL 36*   TRIGLYCERIDES 198*             Brief Urine Lab Results  (Last result in the past 365 days)        Color   Clarity   Blood   Leuk Est   Nitrite   Protein   CREAT   Urine HCG        03/03/25 1641             245.2               Microbiology Results (last 10 days)       ** No results found for the last 240 hours. **            CT Head Without Contrast  Result Date: 5/23/2025  Impression: Impression: 1.No acute intracranial findings. 2.Chronic findings as above. Electronically Signed: Judson Watkins DO  5/23/2025 7:34 PM EDT  Workstation ID: [id number]    XR Chest 1 View  Result Date: 5/23/2025  Impression: Impression: Poor respiratory effort without definite consolidation. Electronically Signed: Judson Watkins DO  5/23/2025 7:03 PM EDT  Workstation ID: [id number]              Results for orders placed during the hospital encounter of 04/21/25    Adult Transthoracic Echo Complete W/ Cont if Necessary Per Protocol    Interpretation Summary    Left ventricular ejection fraction appears to be 46 - 50%.    Left ventricular diastolic function was indeterminate.    Technically Difficult Study with poor resolution.      Labs Pending at  Discharge:        Time spent on Discharge including face to face service: 35 minutes    Electronically signed by UCHE Vogel, 05/25/25, 2:03 PM EDT.    Attending Documentation:  Patient independently seen and evaluated, above documentation reflects plan put forth during bedside rounds.  More than 51% of the time of this patient encounter was performed by me. I discussed the care plan with ADELSO Luis PA-C, I agree with his findings and plan as documented, what I have added to the care plan and modified is as follows in my documentation and my medical decision making; 75-year-old female presented with chest pain, went to the Cath Lab, medical management recommended.  Stable for discharge today.  Electronically signed by Micha Akers MD, 05/25/25, 2:32 PM EDT.

## 2025-05-26 NOTE — OUTREACH NOTE
Prep Survey      Flowsheet Row Responses   [facility] patient discharged from? [facility]   Is LACE score < 7 ? No   Eligibility [facility]   Date of Admission 05/23/25   Date of Discharge 05/25/25   Discharge Disposition Home or Self Care   Discharge diagnosis Chest pain, heart cath - no intervention needed   Does the patient have one of the following disease processes/diagnoses(primary or secondary)? Other   Does the patient have Home health ordered? No   Is there a DME ordered? No   Prep survey completed? Yes            Luna SARABIA - Registered Nurse

## 2025-05-27 ENCOUNTER — TRANSITIONAL CARE MANAGEMENT TELEPHONE ENCOUNTER (OUTPATIENT)
Dept: CALL CENTER | Facility: HOSPITAL | Age: 75
End: 2025-05-27
Payer: MEDICARE

## 2025-05-27 ENCOUNTER — TELEPHONE (OUTPATIENT)
Age: 75
End: 2025-05-27
Payer: MEDICARE

## 2025-05-27 NOTE — TELEPHONE ENCOUNTER
PATIENT CALLED TO CHECK THE STATUS OF THIS REQUEST SHE DOES NEED A HOSPITAL FOLLOW UP AND THERE IS NOT AN APPOINTMENT AVAILABLE IN MY 7 DAY TIME FRAME PLEASE SCHEDULE AN APPOINTMENT AND CONTACT HER

## 2025-05-27 NOTE — OUTREACH NOTE
Call Center TCM Note      Flowsheet Row Responses   [facility] patient discharged from? [facility]   Does the patient have one of the following disease processes/diagnoses(primary or secondary)? Other   TCM attempt successful? Yes   Call start time 1346   Call end time 1350   Discharge diagnosis Chest pain, heart cath - no intervention needed   Meds reviewed with patient/caregiver? Yes   Is the patient having any side effects they believe may be caused by any medication additions or changes? No   Does the patient have all medications ordered at discharge? Yes   Is the patient taking all medications as directed (includes completed medication regime)? Yes   Comments Attempted to make TCM FU appt , however Pt states she has call out to office and wishes to wait for FU call to make the appt. TCM call complete.   Does the patient have an appointment with their PCP within 7-14 days of discharge? No   Nursing Interventions Patient declined scheduling/rescheduling appointment at this time, Routed TCM call to PCP office   Has home health visited the patient within 72 hours of discharge? N/A   Psychosocial issues? No   Did the patient receive a copy of their discharge instructions? Yes   Nursing interventions Reviewed instructions with patient   What is the patient's perception of their health status since discharge? Improving   Is the patient/caregiver able to teach back signs and symptoms related to disease process for when to call PCP? Yes   Is the patient/caregiver able to teach back signs and symptoms related to disease process for when to call 911? Yes   Is the patient/caregiver able to teach back the hierarchy of who to call/visit for symptoms/problems? PCP, Specialist, Home health nurse, Urgent Care, ED, 911 Yes   TCM call completed? Yes   Wrap up additional comments Pt reports she is doing ok. Cath site with no issues. Attempted to make TCM FU appt , however Pt states she has call out to office and wishes to wait  for FU call to make the appt. TCM call complete.   Call end time 1350            DIONY CRAVEN - Registered Nurse    5/27/2025, 13:50 EDT

## 2025-05-27 NOTE — TELEPHONE ENCOUNTER
Yes, I do need to see her within 14 days of discharge, but would rather her be seen by cardiology first so I can review their note.  We could see her the end of the week or next week, thanks.

## 2025-05-29 ENCOUNTER — OFFICE VISIT (OUTPATIENT)
Dept: CARDIOLOGY | Facility: CLINIC | Age: 75
End: 2025-05-29
Payer: MEDICARE

## 2025-05-29 VITALS
HEIGHT: 62 IN | SYSTOLIC BLOOD PRESSURE: 152 MMHG | WEIGHT: 163 LBS | HEART RATE: 82 BPM | BODY MASS INDEX: 30 KG/M2 | DIASTOLIC BLOOD PRESSURE: 90 MMHG

## 2025-05-29 DIAGNOSIS — E78.2 MIXED HYPERLIPIDEMIA: ICD-10-CM

## 2025-05-29 DIAGNOSIS — I10 ESSENTIAL HYPERTENSION: ICD-10-CM

## 2025-05-29 DIAGNOSIS — I25.810 CORONARY ARTERY DISEASE INVOLVING CORONARY BYPASS GRAFT OF NATIVE HEART WITHOUT ANGINA PECTORIS: Primary | ICD-10-CM

## 2025-05-29 NOTE — PROGRESS NOTES
CARDIOLOGY FOLLOW-UP PROGRESS NOTE        Chief Complaint  Establish Care, Shortness of Breath, and Abnormal ECG    Subjective      Mild headaches.          Jyoti Alvarez presents to [facility] CARDIOLOGY  History of Present Illness      The patient comes for a follow up after recent in-hospital admission due to chest pain. She had a cardiac catheterization which showed calcified arteries with mild to moderate CAD. The EF was borderline normal and LVEDP was 20 mmHg suggestive of diastolic dysfunction. She feels better and denies recurrent chest pain or dyspnea but reports mild headaches which may be due to the Isosorbide ER. She has Ischemia with Non Obstructive CAD.        Past History:    Medical History:  Past Medical History:   Diagnosis Date    Allergic     Anemia     Ankle sprain     Arthritis     Asthma Late     INHALERS PRN    Breast cancer     RIGHT    Cataract     Colon polyp  and  and     COVID 2021    Disease of thyroid gland Hypothyroidism    Fatty liver     ASYMPTOMATIC    Fracture, radius 3-    Gastroesophageal reflux     Headache 2021    Positive  COVID 2021 reoccurring since then    Hernia 2019    Hiatal    Hip arthrosis     HL (hearing loss) Hearing aids     Hyperlipemia     Hypertension     Hypothyroidism     Knee swelling     Limb swelling     Obesity     Osteopenia     Pneumonia 2008    Seasonal allergies     Shortness of breath     Allergic triggers or over exertion    Skin cancer     Sleep apnea     Strain of latissimus dorsi muscle 2017    Right    Thyroid nodule     Type 2 diabetes mellitus     AM  IN AM    Urinary tract infection     Jardiance induced    Vertigo     Vitamin D deficiency        Surgical History: has a past surgical history that includes  section; Colonoscopy (); Esophagogastroduodenoscopy (); Other surgical history; Oophorectomy; Tonsillectomy (); Skin cancer excision  (Right, 07/2020); Eye surgery; Mastectomy complete / simple (Bilateral, 2004); Toe Surgery (Right); Breast reconstruction (Bilateral, 02/01/2022); Liposuction (N/A, 02/01/2022); Colonoscopy (N/A, 10/18/2022); Knee arthroscopy w/ lateral release / medial imbrication (Left); Axillary Lymph Node Biopsy/Excision (Left, 12/19/2023); Breast Augmentation (Post bilateral mastectomies); Lymph node biopsy (2004. 2019. 2021); Breast biopsy (Both); Tubal ligation (1980); Subtotal Hysterectomy (2004); Cosmetic surgery (2004); Abdominal surgery (1976, 1980, 2004); Foot surgery; Knee surgery (2005); and Cardiac catheterization (N/A, 5/24/2025).     Family History: family history includes Arthritis in her mother and sister; Breast cancer in her maternal aunt and mother; Breast cancer (age of onset: 27) in her sister; Breast cancer (age of onset: 40) in her daughter; Cancer in her brother, daughter, father, maternal aunt, maternal aunt, mother, and sister; Deep vein thrombosis in her mother; Diabetes in her mother and sister; Heart disease in her father and mother; Heart failure in her father and mother; Hypertension in her mother; Osteoarthritis in her mother; Osteoporosis in her mother; Ovarian cancer in her maternal aunt; Ovarian cancer (age of onset: 53) in her sister; Prostate cancer in her brother.     Social History: reports that she has never smoked. She has never been exposed to tobacco smoke. She has never used smokeless tobacco. She reports that she does not drink alcohol and does not use drugs.    Allergies: Exenatide, Fluoxetine, Gabapentin, Jardiance [empagliflozin], Levaquin [levofloxacin], Nickel, Sulfa antibiotics, Metformin, and Hibiclens [chlorhexidine gluconate]    Current Outpatient Medications on File Prior to Visit   Medication Sig    acetaminophen (TYLENOL) 650 MG 8 hr tablet Take 1 tablet by mouth 2 (two) times a day.    albuterol sulfate  (90 Base) MCG/ACT inhaler USE 2 INHALATIONS EVERY 6 HOURS AS  NEEDED (Patient taking differently: Inhale 2 puffs Every 6 (Six) Hours As Needed.)    aspirin 81 MG EC tablet Take 1 tablet by mouth Daily. OTC    atorvastatin (LIPITOR) 40 MG tablet Take 1 tablet by mouth Every Night.    clopidogrel (Plavix) 75 MG tablet Take 1 tablet by mouth Daily for 30 days.    D3-50 1.25 MG (69672 UT) capsule TAKE ONE CAPSULE BY MOUTH ONCE WEEKLY (Patient taking differently: Take 1 capsule by mouth Every 30 (Thirty) Days.)    Dulaglutide (Trulicity) 3 MG/0.5ML solution auto-injector Inject 3 mg under the skin into the appropriate area as directed Every 7 (Seven) Days.    ferrous sulfate 325 (65 FE) MG tablet Take 1 tablet by mouth Daily With Breakfast. OTC    fluticasone (FLONASE) 50 MCG/ACT nasal spray USE 1 SPRAY IN EACH NOSTRIL ONCE DAILY    Fluticasone-Salmeterol (ADVAIR/WIXELA) 500-50 MCG/ACT DISKUS Inhale 1 puff 2 (Two) Times a Day.    glimepiride (AMARYL) 2 MG tablet Take 1 tablet by mouth 2 (Two) Times a Day for 180 days.    isosorbide mononitrate (IMDUR) 30 MG 24 hr tablet Take 1 tablet by mouth Daily for 30 days.    Lantus SoloStar 100 UNIT/ML injection pen Inject 50 Units under the skin into the appropriate area as directed Daily.    levothyroxine (SYNTHROID, LEVOTHROID) 25 MCG tablet TAKE 1 TABLET DAILY IN THE MORNING ON AN EMPTY STOMACH (Patient taking differently: Take 1 tablet by mouth Every Morning.)    losartan (COZAAR) 50 MG tablet TAKE 1 TABLET DAILY (Patient taking differently: Take 1 tablet by mouth Daily.)    metoprolol succinate XL (TOPROL-XL) 100 MG 24 hr tablet TAKE 1 TABLET DAILY (Patient taking differently: Take 1 tablet by mouth Daily.)    montelukast (SINGULAIR) 10 MG tablet TAKE 1 TABLET DAILY    multivitamin with minerals tablet tablet Take 1 tablet by mouth Daily.    Probiotic Product (PROBIOTIC PO) Take 1 fluid ounce by mouth Daily. Plexus powder    vitamin B-12 (CYANOCOBALAMIN) 500 MCG tablet Take 1 tablet by mouth Daily.    [DISCONTINUED] pantoprazole  "(PROTONIX) 40 MG EC tablet TAKE 1 TABLET DAILY (Patient taking differently: Take 1 tablet by mouth Daily.)     No current facility-administered medications on file prior to visit.          Review of Systems : All systems were reviewed and negative except mild headaches.    Objective     /90 (BP Location: Left arm)   Pulse 82   Ht 157.5 cm (62\")   Wt 73.9 kg (163 lb)   BMI 29.81 kg/m²       Physical Exam    General : Alert, awake, no acute distress  Neck : Supple, no carotid bruit, no jugular venous distention  CVS : Regular rate and rhythm, no murmur, rubs or gallops  Lungs: Clear to auscultation bilaterally, no crackles or rhonchi  Abdomen: Soft, nontender, bowel sounds heard in all 4 quadrants  Extremities: Warm, well-perfused, no pedal edema  Neurologic. No apparent motor deficits.     Result Review :     The following data was reviewed by: Pablo Vazquez MD on 05/29/2025:    CMP          3/31/2025    11:10 5/23/2025    19:07 5/25/2025    06:22   CMP   Glucose 169  123  127    BUN 21  25  19    Creatinine 0.78  0.78  0.86    EGFR 78.8  78.8  70.1    Sodium 140  137  135    Potassium 4.6  4.4  4.3    Chloride 101  101  102    Calcium 9.4  9.1  9.0    Total Protein 6.4  6.2     Albumin 4.2  4.3  4.0    Globulin 2.2  1.9     Total Bilirubin 0.6  0.4     Alkaline Phosphatase 68  71     AST (SGOT) 27  30     ALT (SGPT) 26  28     Albumin/Globulin Ratio 1.9  2.3     BUN/Creatinine Ratio 26.9  32.1  22.1    Anion Gap 13.7  12.2  10.8      CBC          5/23/2025    19:07 5/24/2025    04:21 5/25/2025    06:22   CBC   WBC 11.46  8.92  8.82    RBC 4.27  4.16  4.14    Hemoglobin 13.5  13.0  12.9    Hematocrit 40.6  39.8  39.7    MCV 95.1  95.7  95.9    MCH 31.6  31.3  31.2    MCHC 33.3  32.7  32.5    RDW 13.0  13.1  13.5    Platelets 303  300  284      TSH          12/30/2024    14:40 3/31/2025    11:10 5/23/2025    20:15   TSH   TSH 1.670  2.460  0.807      Lipid Panel          8/30/2024    11:31 3/31/2025    " 11:10 5/23/2025    20:15   Lipid Panel   Total Cholesterol 148  146  138    Triglycerides 170  216  198    HDL Cholesterol 43  41  36    VLDL Cholesterol 29  36  33    LDL Cholesterol  76  69  69    LDL/HDL Ratio 1.65  1.51  1.73           Data reviewed: Cardiology studies        Results for orders placed during the hospital encounter of 04/21/25    Adult Transthoracic Echo Complete W/ Cont if Necessary Per Protocol    Interpretation Summary    Left ventricular ejection fraction appears to be 46 - 50%.    Left ventricular diastolic function was indeterminate.    Technically Difficult Study with poor resolution.      Results for orders placed during the hospital encounter of 12/03/21    Stress Test With Myocardial Perfusion One Day    Interpretation Summary  · Left ventricular ejection fraction is hyperdynamic (Calculated EF > 70%). .  · Myocardial perfusion imaging indicates a normal myocardial perfusion study with no evidence of ischemia.  · Impressions are consistent with a low risk study.  · Findings consistent with a normal ECG stress test.  · Frequent APCs with exercise               Assessment and Plan        Diagnoses and all orders for this visit:    1. Coronary artery disease involving coronary bypass graft of native heart without angina pectoris (Primary)    2. Essential hypertension    3. Mixed hyperlipidemia        I would continue with optimal guideline directed medical therapy including statins, long acting nitrates, BB. Continue with dual antiplatelet therapy as tolerated to complete at least 9 months to 1 year. If persistent headache, would consider to switch to Low dose amlodipine. Continue with lifestyle modification, heart healthy diet and regular exercise. Would repeat a limited ECHO in 3 months.       Follow Up     Return in about 6 weeks (around 7/10/2025).    Patient was given instructions and counseling regarding her condition or for health maintenance advice. Please see specific information  pulled into the AVS if appropriate.

## 2025-06-02 ENCOUNTER — TELEPHONE (OUTPATIENT)
Dept: CARDIOLOGY | Facility: CLINIC | Age: 75
End: 2025-06-02
Payer: MEDICARE

## 2025-06-02 NOTE — TELEPHONE ENCOUNTER
Caller: Jyoti Alvarez    Relationship: Self    Best call back number: [phone]     Which medication are you concerned about: IMDUR & PLAVIX    Who prescribed you this medication: VIGNESH HARRISON    When did you start taking this medication: 5.25.25    What are your concerns: PATIENT STATES THEY HAVE HAD A HEADACHE EVERYDAY AFTER TAKING THE MEDICATION FOR HOURS. PATIENT CALLING IN TO SEE IF ANYTHING ELSE CAN BE TAKEN. PLEASE CALL PATIENT TO ADVISE. THANK YOU!    IF SCRIPT IS DONE PLEASE SEND TO [facility]    How long have you had these concerns: SINCE STARTING MEDICATION

## 2025-06-03 ENCOUNTER — READMISSION MANAGEMENT (OUTPATIENT)
Dept: CALL CENTER | Facility: HOSPITAL | Age: 75
End: 2025-06-03
Payer: MEDICARE

## 2025-06-03 ENCOUNTER — OFFICE VISIT (OUTPATIENT)
Age: 75
End: 2025-06-03
Payer: MEDICARE

## 2025-06-03 VITALS
WEIGHT: 165.8 LBS | DIASTOLIC BLOOD PRESSURE: 100 MMHG | OXYGEN SATURATION: 97 % | SYSTOLIC BLOOD PRESSURE: 140 MMHG | HEIGHT: 62 IN | HEART RATE: 85 BPM | BODY MASS INDEX: 30.51 KG/M2

## 2025-06-03 DIAGNOSIS — Z09 HOSPITAL DISCHARGE FOLLOW-UP: Primary | ICD-10-CM

## 2025-06-03 DIAGNOSIS — E11.9 TYPE 2 DIABETES MELLITUS WITHOUT COMPLICATION, WITH LONG-TERM CURRENT USE OF INSULIN: ICD-10-CM

## 2025-06-03 DIAGNOSIS — E03.4 ATROPHY OF THYROID: ICD-10-CM

## 2025-06-03 DIAGNOSIS — Z79.4 TYPE 2 DIABETES MELLITUS WITHOUT COMPLICATION, WITH LONG-TERM CURRENT USE OF INSULIN: ICD-10-CM

## 2025-06-03 DIAGNOSIS — I10 ESSENTIAL HYPERTENSION: ICD-10-CM

## 2025-06-03 DIAGNOSIS — I24.9 ACS (ACUTE CORONARY SYNDROME): ICD-10-CM

## 2025-06-03 PROBLEM — R07.9 CHEST PAIN: Status: RESOLVED | Noted: 2025-05-23 | Resolved: 2025-06-03

## 2025-06-03 RX ORDER — AMLODIPINE BESYLATE 5 MG/1
5 TABLET ORAL DAILY
Qty: 90 TABLET | Refills: 3 | Status: SHIPPED | OUTPATIENT
Start: 2025-06-03

## 2025-06-03 RX ORDER — BLOOD SUGAR DIAGNOSTIC
STRIP MISCELLANEOUS
COMMUNITY
Start: 2025-06-02

## 2025-06-03 RX ORDER — METOPROLOL SUCCINATE 100 MG/1
100 TABLET, EXTENDED RELEASE ORAL DAILY
Qty: 90 TABLET | Refills: 3 | Status: SHIPPED | OUTPATIENT
Start: 2025-06-03

## 2025-06-03 NOTE — ASSESSMENT & PLAN NOTE
This is indication for her 6/25 OV.  H&P and discharge summaries were reviewed, will review appropriate lab and imaging studies now as well.  Please see the other headings for further details.

## 2025-06-03 NOTE — ASSESSMENT & PLAN NOTE
A1c was down slightly from 7.4 to 7.3 as per her 5/25 hospitalization.  That was just over about a month timeframe.  Patient is keeping a close eye on her sugars, her weight is trending down gradually, do not recommend any additional changes at this time.

## 2025-06-03 NOTE — ASSESSMENT & PLAN NOTE
This was a occasion for the patient's 5/25 hospitalization.  She fortunately does not have severe CAD, her LDL is been read around 70, and we will stick with 40 of atorvastatin and less cardiology feels strongly otherwise.    She was tried on isosorbide for coronary spasm, but is not tolerating this secondary to headache.  She is to start on moderate dose amlodipine.    She is following with Dr. Vahe Vazquez.

## 2025-06-03 NOTE — ASSESSMENT & PLAN NOTE
States was down from 2.5 to 0.8 during her recent hospitalization, she is just on 25 mcg daily, weight is down some, so we will make sure we check this again at her August appointment.

## 2025-06-03 NOTE — ASSESSMENT & PLAN NOTE
She did have a evidence of diastolic dysfunction on the catheterization, so we do want tighter blood pressure control on average, she is just getting started on amlodipine, she will discontinue isosorbide at that time.    She is also maintained on moderate dose losartan so we have room to titrate this if needed as well as moderate dose metoprolol.  Her pulse is in the mid 80s presently and typically in the mid 60s to mid 70s at home.
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
## 2025-06-03 NOTE — OUTREACH NOTE
Medical Week 2 Survey      Flowsheet Row Responses   [facility] patient discharged from? [facility]   Does the patient have one of the following disease processes/diagnoses(primary or secondary)? Other   Week 2 attempt successful? No   Unsuccessful attempts Attempt 1   Revoke [facility] - Registered Nurse

## 2025-06-10 RX ORDER — CLOPIDOGREL BISULFATE 75 MG/1
75 TABLET ORAL DAILY
Qty: 90 TABLET | Refills: 3 | Status: SHIPPED | OUTPATIENT
Start: 2025-06-10 | End: 2026-06-05

## 2025-06-10 NOTE — TELEPHONE ENCOUNTER
Caller: AlvarezJyoti    Relationship: Self    Best call back number: [phone]     Requested Prescriptions:   Requested Prescriptions     Pending Prescriptions Disp Refills    clopidogrel (Plavix) 75 MG tablet 30 tablet 0     Sig: Take 1 tablet by mouth Daily for 30 days.        Pharmacy where request should be sent: [facility] - [address] [phone] [facility] [phone]      Last office visit with prescribing clinician: 5/29/2025   Last telemedicine visit with prescribing clinician: Visit date not found   Next office visit with prescribing clinician: 7/10/2025     Additional details provided by patient: PATIENT IS REQUESTING A 90 DAY SUPPLY PER INSURANCE WANTING PATIENT TO GET MEDICATION THROUGH [facility].     Does the patient have less than a 3 day supply:  [] Yes  [x] No    Would you like a call back once the refill request has been completed: [x] Yes [] No    If the office needs to give you a call back, can they leave a voicemail: [x] Yes [] No    Jennifer Ritter Rep   06/10/25 10:23 EDT

## 2025-06-11 LAB
QT INTERVAL: 409 MS
QTC INTERVAL: 444 MS

## 2025-06-20 ENCOUNTER — TELEPHONE (OUTPATIENT)
Age: 75
End: 2025-06-20
Payer: MEDICARE

## 2025-06-27 RX ORDER — CHOLECALCIFEROL (VITAMIN D3) 1250 MCG
50000 CAPSULE ORAL WEEKLY
Qty: 12 CAPSULE | Refills: 1 | Status: SHIPPED | OUTPATIENT
Start: 2025-06-27

## 2025-06-30 RX ORDER — ATORVASTATIN CALCIUM 40 MG/1
40 TABLET, FILM COATED ORAL NIGHTLY
Qty: 90 TABLET | Refills: 3 | Status: SHIPPED | OUTPATIENT
Start: 2025-06-30

## 2025-07-07 NOTE — PROGRESS NOTES
Chief Complaint  Diabetes (Medication management, A1C Eval)    Referred By: Ramo Noguera MD    Subjective     {Problem List  Visit Diagnosis   Encounters  Notes  Medications  Labs  Result Review Imaging  Media :23}     Patient or patient representative verbalized consent for the use of Ambient Listening during the visit with  FANY Thakur for chart documentation. 7/8/2025  14:56 EDT    Jyoti Alvarez presents to [facility] for diabetes medication management    History of Present Illness    Visit type:  follow-up  Diabetes type:  Type 2  Current diabetes status/concerns/issues:     History of Present Illness        Current Diabetes symptoms:    Polyuria: No   Polydipsia: No   Polyphagia: No   Blurred vision: No   Excessive fatigue: No  Known Diabetes complications:  Neuropathy: None; Location: N/A  Renal: Stage II mild (GFR = 60-89 mL/min) and Microalbuminuria - NEGATIVE  Eyes: No current eye exam available in record; Location: N/A  Amputation/Wounds: None  GI: None  Cardiovascular: Hypertension and Hyperlipidemia  ED: N/A  Other: None  Hypoglycemia:  Level 1 hypoglycemia (54 mg/dL - 70 mg/dL); Frequency - she has had a couple of lows in the 60s  Hypoglycemia Symptoms:  shaking/tremors  Current diabetes treatment:  Trulicity 3 mg once weekly, Lantus taking 50 units once every morning and glimepiride 2 mg twice a day.   Blood glucose device:  Meter  Blood glucose monitoring frequency:  2 -3  Blood glucose range/average:  most recently staying in the low 100s below 140  Glucose Source: BG Logs  Diet:  Limits high carb/sweet foods, Avoids sugary drinks  Activity/Exercise:  gradually increasing her activity    Past Medical History:   Diagnosis Date    Allergic     Anemia     Ankle sprain     Arthritis     Asthma Late 1990s    INHALERS PRN    Breast cancer     RIGHT    Cataract     Colon polyp 2016 and 2019 and 2022    COVID 09/20/2021    Disease of thyroid  gland Hypothyroidism    Fatty liver     ASYMPTOMATIC    Fracture, radius 3-    Gastroesophageal reflux     Headache 2021    Positive  COVID 2021 reoccurring since then    Hernia     Hiatal    Hip arthrosis     HL (hearing loss) Hearing aids     Hyperlipemia     Hypertension     Hypothyroidism     Knee swelling     Limb swelling     Obesity     Osteopenia     Pneumonia     Seasonal allergies     Shortness of breath     Allergic triggers or over exertion    Skin cancer     Sleep apnea     Strain of latissimus dorsi muscle 2017    Right    Thyroid nodule     Type 2 diabetes mellitus     AM  IN AM    Urinary tract infection     Jardiance induced    Vertigo     Vitamin D deficiency      Past Surgical History:   Procedure Laterality Date    ABDOMINAL SURGERY  , ,     C sections  & ,  ovaries removed     AXILLARY LYMPH NODE BIOPSY/EXCISION Left 2023    Procedure: LEFT AXILLARY LYMPH NODE BIOPSY/EXCISION, MAGSEED;  Surgeon: Kathy Rausch MD;  [place] [facility] [place] [facility] [place]  Service: General;  Laterality: Left;    BREAST AUGMENTATION  Post bilateral mastectomies    BREAST BIOPSY  Both    BREAST RECONSTRUCTION Bilateral 2022    Procedure: BILATERAL BREAST RECONSTRUCTION WITH REMOVAL AND REPLACEMENT OF IMPLANTS, MESH PLACEMENT, CHEST WALL SKIN RESECTION, CHEST LIPOSUCTION;  Surgeon: Romain Quezada MD;  Location: [facility];  Service: Plastics;  Laterality: Bilateral;    CARDIAC CATHETERIZATION N/A 2025    Procedure: Left Heart Cath/ possible PCI;  Surgeon: Pablo Haro MD;  Location: [facility] CATH INVASIVE LOCATION;  Service: Cardiovascular;  Laterality: N/A;     SECTION      COLONOSCOPY      COLONOSCOPY N/A 10/18/2022    Procedure: COLONOSCOPY POLYPECTOMIES;  Surgeon: Eduardo Salas MD;  Location: [facility] ENDOSCOPY;  Service: Gastroenterology;  Laterality: N/A;  COLON POLYPS    COSMETIC SURGERY      Breast  reconstruction    ENDOSCOPY  2019    EYE SURGERY      Cateracts    FOOT SURGERY      Pin put in toe next to big toe    KNEE ARTHROSCOPY W/ LATERAL RELEASE / MEDIAL IMBRICATION Left     TORN CARTIDLGE REPAIR    KNEE SURGERY  2005    Torn cartlege trimmed    LIPOSUCTION N/A 02/01/2022    Procedure: CHEST WALL LIPOSUCTION AND CHEST WALL RESECTION;  Surgeon: Romain Quezada MD;  [place] [facility] [place] [facility] [place]  Service: Plastics;  Laterality: N/A;  TIMES BASED ON PREVIOUS EXPERIENCE    LYMPH NODE BIOPSY  2004. 2019. 2021 2004 breast   2019 abdominal lymph node. 2021. Lymph node    MASTECTOMY COMPLETE / SIMPLE Bilateral 2004    OOPHORECTOMY      OTHER SURGICAL HISTORY      Joint surgery,  RIGHT FOOT SECOND  TOE WITH A PIN    SKIN CANCER EXCISION Right 07/2020    neck    SUBTOTAL HYSTERECTOMY  2004    Ovaries only removed.    TOE SURGERY Right     2nd digit pin inserted    TONSILLECTOMY  1957    TUBAL ABDOMINAL LIGATION  1980    Birth of last child.     Family History   Problem Relation Age of Onset    Heart disease Father         Fatal hear attack age 79    Cancer Father         Skin cancer    Heart failure Father         Age 79  cause of death.    Heart disease Mother         Age 60ish and fatal age 87 heart attacks    Breast cancer Mother         Breast cancer age 45 and 60    Diabetes Mother     Arthritis Mother         After complete hysterectomy in her 40s.    Osteoporosis Mother     Heart failure Mother         Heart attack age 60. Age 87. Cause of death.    Hypertension Mother     Osteoarthritis Mother     Deep vein thrombosis Mother     Cancer Mother         Breast cancer    Hearing loss Mother     Cancer Brother         Prostate cancer age about 58.    Prostate cancer Brother         unsure age    Breast cancer Sister 27        Breast cancer age 27 and 52. Ovarian cancer age 53 caused death at 56.    Diabetes Sister     Ovarian cancer Sister 53    Arthritis Sister     Cancer Sister         Breast cancer age  27 and 52. Ovarian cancer age 53 caused death at 56.    Breast cancer Daughter 40    Cancer Daughter         Breast cancer age 40, BRCA 1 gene    Breast cancer Maternal Aunt         mid 40s    Ovarian cancer Maternal Aunt          with ovarian cancer age mid 40s    Cancer Maternal Aunt     Cancer Maternal Aunt         Breast cancer    Cancer Maternal Aunt          with ovarian cancer age mid 40s    Malig Hyperthermia Neg Hx     Colon cancer Neg Hx     Uterine cancer Neg Hx     Pulmonary embolism Neg Hx      Social History     Socioeconomic History    Marital status:     Number of children: 3   Tobacco Use    Smoking status: Never     Passive exposure: Never    Smokeless tobacco: Never   Vaping Use    Vaping status: Never Used   Substance and Sexual Activity    Alcohol use: Never    Drug use: Never    Sexual activity: Not Currently     Partners: Male     Birth control/protection: Post-menopausal     Comment:  2021     Allergies   Allergen Reactions    Exenatide Unknown - High Severity     Weight loss and pain in the right side. Bydereon.    Fluoxetine Hives    Gabapentin Headache     Occular Miaigraines    Jardiance [Empagliflozin] Other (See Comments)     UTI    Levaquin [Levofloxacin] Hives    Nickel Itching and Rash    Sulfa Antibiotics Swelling    Metformin Nausea Only    Hibiclens [Chlorhexidine Gluconate] Rash       Current Outpatient Medications:     acetaminophen (TYLENOL) 650 MG 8 hr tablet, Take 1 tablet by mouth 2 (two) times a day., Disp: , Rfl:     albuterol sulfate  (90 Base) MCG/ACT inhaler, USE 2 INHALATIONS EVERY 6 HOURS AS NEEDED (Patient taking differently: Inhale 2 puffs Every 6 (Six) Hours As Needed.), Disp: 25.5 g, Rfl: 2    amLODIPine (NORVASC) 5 MG tablet, Take 1 tablet by mouth Daily., Disp: 90 tablet, Rfl: 3    aspirin 81 MG EC tablet, Take 1 tablet by mouth Daily. OTC, Disp: , Rfl:     atorvastatin (LIPITOR) 40 MG tablet, TAKE 1 TABLET EVERY NIGHT, Disp:  90 tablet, Rfl: 3    Cholecalciferol (Vitamin D3) 1.25 MG (65301 UT) capsule, TAKE 1 CAPSULE BY MOUTH ONCE WEEKLY, Disp: 12 capsule, Rfl: 1    clopidogrel (Plavix) 75 MG tablet, Take 1 tablet by mouth Daily for 360 days., Disp: 90 tablet, Rfl: 3    Dulaglutide (Trulicity) 3 MG/0.5ML solution auto-injector, Inject 3 mg under the skin into the appropriate area as directed Every 7 (Seven) Days., Disp: 6 mL, Rfl: 1    ferrous sulfate 325 (65 FE) MG tablet, Take 1 tablet by mouth Daily With Breakfast. OTC, Disp: , Rfl:     fluticasone (FLONASE) 50 MCG/ACT nasal spray, USE 1 SPRAY IN EACH NOSTRIL ONCE DAILY, Disp: 48 g, Rfl: 2    Fluticasone-Salmeterol (ADVAIR/WIXELA) 500-50 MCG/ACT DISKUS, Inhale 1 puff 2 (Two) Times a Day., Disp: , Rfl:     glimepiride (AMARYL) 2 MG tablet, Take 1 tablet by mouth 2 (Two) Times a Day for 180 days., Disp: 180 tablet, Rfl: 1    Lantus SoloStar 100 UNIT/ML injection pen, Inject 50 Units under the skin into the appropriate area as directed Daily for 180 days., Disp: 45 mL, Rfl: 1    levothyroxine (SYNTHROID, LEVOTHROID) 25 MCG tablet, TAKE 1 TABLET DAILY IN THE MORNING ON AN EMPTY STOMACH (Patient taking differently: Take 1 tablet by mouth Every Morning.), Disp: 90 tablet, Rfl: 3    losartan (COZAAR) 50 MG tablet, TAKE 1 TABLET DAILY (Patient taking differently: Take 1 tablet by mouth Daily.), Disp: 90 tablet, Rfl: 3    metoprolol succinate XL (TOPROL-XL) 100 MG 24 hr tablet, TAKE 1 TABLET DAILY, Disp: 90 tablet, Rfl: 3    montelukast (SINGULAIR) 10 MG tablet, TAKE 1 TABLET DAILY, Disp: 90 tablet, Rfl: 1    multivitamin with minerals tablet tablet, Take 1 tablet by mouth Daily., Disp: , Rfl:     OneTouch Verio test strip, 1 each by Other route 3 times a day. Use as instructed, Disp: 300 each, Rfl: 1    Probiotic Product (PROBIOTIC PO), Take 1 fluid ounce by mouth Daily. Plexus powder, Disp: , Rfl:     vitamin B-12 (CYANOCOBALAMIN) 500 MCG tablet, Take 1 tablet by mouth Daily., Disp: , Rfl:  "    Insulin Pen Needle (BD Pen Needle Ca Ultrafine) 32G X 4 MM misc, Use 1 each Daily., Disp: 100 each, Rfl: 1    Lancets misc, Test blood glucose 3 times each day, Disp: 300 each, Rfl: 1    Objective     Vitals:    07/08/25 1408   BP: 122/65   BP Location: Right arm   Patient Position: Sitting   Cuff Size: Adult   Pulse: 83   SpO2: 96%   Weight: 73.9 kg (163 lb)   Height: 157.5 cm (62.01\")     Body mass index is 29.8 kg/m².    Physical Exam  Constitutional:       Appearance: Normal appearance.      Comments: Overweight (BMI 25 - 29.9) Pt Current BMI = 29.8    HENT:      Head: Normocephalic and atraumatic.      Right Ear: External ear normal.      Left Ear: External ear normal.      Nose: Nose normal.   Eyes:      Extraocular Movements: Extraocular movements intact.      Conjunctiva/sclera: Conjunctivae normal.   Pulmonary:      Effort: Pulmonary effort is normal.   Musculoskeletal:         General: Normal range of motion.      Cervical back: Normal range of motion.   Skin:     General: Skin is warm and dry.   Neurological:      General: No focal deficit present.      Mental Status: She is alert and oriented to person, place, and time. Mental status is at baseline.   Psychiatric:         Mood and Affect: Mood normal.         Behavior: Behavior normal.         Thought Content: Thought content normal.         Judgment: Judgment normal.         {DIABETIC FOOT EXAM FOR  (Optional):95844::\"Diabetic Foot Exam Performed\":0}    Result Review :{Labs  Result Review  Imaging  Med Tab  Media :23}   The following data was reviewed by: AFNY Thakur on 07/08/2025:    Most Recent A1C          7/8/2025    14:17   HGBA1C Most Recent   Hemoglobin A1C 6.5        A1C Last 3 Results          3/31/2025    11:10 5/23/2025    19:07 7/8/2025    14:17   HGBA1C Last 3 Results   Hemoglobin A1C 7.40  7.30  6.5      A1c collected in the office today is 6.5%, indicating Controlled Type II diabetes.  This result is down from " the prior result of 7.3% collected on 5/23/25     Glucose   Date Value Ref Range Status   07/08/2025 177 (H) 70 - 99 mg/dL Final     Comment:     Serial Number: [id number]:  [id number]     Point of care glucose in the office today is within normal limits for nonfasting glucose    Creatinine   Date Value Ref Range Status   05/25/2025 0.86 0.57 - 1.00 mg/dL Final   05/23/2025 0.78 0.57 - 1.00 mg/dL Final     eGFR   Date Value Ref Range Status   05/25/2025 70.1 >60.0 mL/min/1.73 Final   05/23/2025 78.8 >60.0 mL/min/1.73 Final     Labs collected on 5/25/25 show Stage II mild (GFR = 60-89mL/min)    Microalbumin, Urine   Date Value Ref Range Status   03/03/2025 1.6 mg/dL Final   11/16/2023 <1.2 mg/dL Final     Creatinine, Urine   Date Value Ref Range Status   03/03/2025 245.2 mg/dL Final   11/16/2023 27.5 mg/dL Final     Microalbumin/Creatinine Ratio   Date Value Ref Range Status   03/03/2025 6.5 0.0 - 29.0 mg/g Final   11/16/2023   Final     Comment:     Unable to calculate     Urine microalbuminuria collected on 3/3/25 is negative for microalbuminuria    Total Cholesterol   Date Value Ref Range Status   05/23/2025 138 0 - 200 mg/dL Final   03/31/2025 146 0 - 200 mg/dL Final     Triglycerides   Date Value Ref Range Status   05/23/2025 198 (H) 0 - 150 mg/dL Final   03/31/2025 216 (H) 0 - 150 mg/dL Final     HDL Cholesterol   Date Value Ref Range Status   05/23/2025 36 (L) 40 - 60 mg/dL Final   03/31/2025 41 40 - 60 mg/dL Final     LDL Cholesterol    Date Value Ref Range Status   05/23/2025 69 0 - 100 mg/dL Final   03/31/2025 69 0 - 100 mg/dL Final     Lipid panel collected on 5/23/25 shows Hypertriglyceridemia and low HDL       {CC Problem List  Visit Diagnosis  ROS  Review (Popup)  Health Maintenance  Quality  BestPractice  Medications  SmartSets  SnapShot Encounters  Media :23}       Diagnoses and all orders for this visit:    1. Controlled type 2 diabetes mellitus with stage 2 chronic kidney disease,  with long-term current use of insulin (Primary)  -     POC Glycosylated Hemoglobin (Hb A1C)  -     POC Glucose  -     Dulaglutide (Trulicity) 3 MG/0.5ML solution auto-injector; Inject 3 mg under the skin into the appropriate area as directed Every 7 (Seven) Days.  Dispense: 6 mL; Refill: 1  -     glimepiride (AMARYL) 2 MG tablet; Take 1 tablet by mouth 2 (Two) Times a Day for 180 days.  Dispense: 180 tablet; Refill: 1  -     Lantus SoloStar 100 UNIT/ML injection pen; Inject 50 Units under the skin into the appropriate area as directed Daily for 180 days.  Dispense: 45 mL; Refill: 1  -     OneTouch Verio test strip; 1 each by Other route 3 times a day. Use as instructed  Dispense: 300 each; Refill: 1  -     Lancets misc; Test blood glucose 3 times each day  Dispense: 300 each; Refill: 1  -     Insulin Pen Needle (BD Pen Needle Ca Ultrafine) 32G X 4 MM misc; Use 1 each Daily.  Dispense: 100 each; Refill: 1    2. Overweight (BMI 25.0-29.9)    3. Uncontrolled type 2 diabetes mellitus with hyperglycemia        Assessment & Plan          The patient will monitor her blood glucose levels 2 -3 times daily.  If she develops problematic hyperglycemia or hypoglycemia or adverse drug reactions, she will contact the office for further instructions.    {Time Spent (Optional):76242}    Follow Up {Instructions Charge Capture  Follow-up Communications :23}    Return in about 3 months (around 10/8/2025) for Medication Management.    Patient was given instructions and counseling regarding her condition or for health maintenance advice. Please see specific information pulled into the AVS if appropriate.     Kelly Vicente, APRN  07/08/2025        Dictated Utilizing Dragon Dictation.  Please note that portions of this note were completed with a voice recognition program.  Part of this note may be an electronic transcription/translation of spoken language to printed text using the Dragon Dictation System.   POC Glucose  -     Dulaglutide (Trulicity) 3 MG/0.5ML solution auto-injector; Inject 3 mg under the skin into the appropriate area as directed Every 7 (Seven) Days.  Dispense: 6 mL; Refill: 1  -     glimepiride (AMARYL) 2 MG tablet; Take 1 tablet by mouth 2 (Two) Times a Day for 180 days.  Dispense: 180 tablet; Refill: 1  -     Lantus SoloStar 100 UNIT/ML injection pen; Inject 50 Units under the skin into the appropriate area as directed Daily for 180 days.  Dispense: 45 mL; Refill: 1  -     OneTouch Verio test strip; 1 each by Other route 3 times a day. Use as instructed  Dispense: 300 each; Refill: 1  -     Lancets misc; Test blood glucose 3 times each day  Dispense: 300 each; Refill: 1  -     Insulin Pen Needle (BD Pen Needle Ca Ultrafine) 32G X 4 MM misc; Use 1 each Daily.  Dispense: 100 each; Refill: 1    2. Overweight (BMI 25.0-29.9)        Assessment & Plan  1. Diabetes mellitus: Well-controlled.  - Her fasting glucose levels were consistently between 130 and 170 in March and April 2025. Following lifestyle modifications implemented in late April 2025, her fasting blood sugars decreased to a range of 100 to 120, with occasional spikes above this range, but typically remaining below 140.  - Her A1c level was 7.3 in May 2025, 7.4 in March 2025, and is currently at a controlled level of 6.5. She has lost approximately 5 pounds since her last visit.  - She is advised to maintain regular meals to prevent hypoglycemia. She is encouraged to continue her current regimen, which includes ongoing weight loss and increased physical activity as recommended by her cardiologist. If she experiences more frequent hypoglycemic episodes, discontinuation of glimepiride will be considered. If she continues with her lifestyle changes, gradual weaning off insulin may be possible, potentially maintaining her on just Trulicity or a reduced dose of Lantus.  - Prescriptions for Trulicity 3 mg once weekly, glimepiride 2 mg twice a  day, pen needles, Lantus insulin 50 units every morning, and One Touch Verio strips have been provided.    2. Non-ST elevated myocardial infarction (non-STEMI): Stable.  - She experienced a non-STEMI myocardial infarction about six weeks ago and was hospitalized from 05/23/2025 to 05/25/2025.  - A heart catheterization revealed 30% stenosis of the mid LAD and 30% mid-segment stenosis of the right coronary artery, indicating mild to moderate coronary artery disease. Her ejection fraction remains at 60%, which is within the normal range, suggesting no significant damage to her heart's pumping capacity.  - She is currently managed with blood pressure medications and blood thinners.          The patient will monitor her blood glucose levels 2 -3 times daily.  If she develops problematic hyperglycemia or hypoglycemia or adverse drug reactions, she will contact the office for further instructions.        Follow Up     Return in about 3 months (around 10/8/2025) for Medication Management.    Patient was given instructions and counseling regarding her condition or for health maintenance advice. Please see specific information pulled into the AVS if appropriate.     Kelly Vicente, FANY  07/08/2025        Dictated Utilizing Dragon Dictation.  Please note that portions of this note were completed with a voice recognition program.  Part of this note may be an electronic transcription/translation of spoken language to printed text using the Dragon Dictation System.

## 2025-07-08 ENCOUNTER — OFFICE VISIT (OUTPATIENT)
Dept: DIABETES SERVICES | Facility: HOSPITAL | Age: 75
End: 2025-07-08
Payer: MEDICARE

## 2025-07-08 VITALS
HEIGHT: 62 IN | DIASTOLIC BLOOD PRESSURE: 65 MMHG | OXYGEN SATURATION: 96 % | SYSTOLIC BLOOD PRESSURE: 122 MMHG | WEIGHT: 163 LBS | BODY MASS INDEX: 30 KG/M2 | HEART RATE: 83 BPM

## 2025-07-08 DIAGNOSIS — E11.22 CONTROLLED TYPE 2 DIABETES MELLITUS WITH STAGE 2 CHRONIC KIDNEY DISEASE, WITH LONG-TERM CURRENT USE OF INSULIN: Primary | ICD-10-CM

## 2025-07-08 DIAGNOSIS — N18.2 CONTROLLED TYPE 2 DIABETES MELLITUS WITH STAGE 2 CHRONIC KIDNEY DISEASE, WITH LONG-TERM CURRENT USE OF INSULIN: Primary | ICD-10-CM

## 2025-07-08 DIAGNOSIS — E66.3 OVERWEIGHT (BMI 25.0-29.9): ICD-10-CM

## 2025-07-08 DIAGNOSIS — Z79.4 CONTROLLED TYPE 2 DIABETES MELLITUS WITH STAGE 2 CHRONIC KIDNEY DISEASE, WITH LONG-TERM CURRENT USE OF INSULIN: Primary | ICD-10-CM

## 2025-07-08 DIAGNOSIS — E11.65 UNCONTROLLED TYPE 2 DIABETES MELLITUS WITH HYPERGLYCEMIA: ICD-10-CM

## 2025-07-08 LAB
EXPIRATION DATE: ABNORMAL
GLUCOSE BLDC GLUCOMTR-MCNC: 177 MG/DL (ref 70–99)
HBA1C MFR BLD: 6.5 % (ref 4.5–5.7)
Lab: ABNORMAL

## 2025-07-08 PROCEDURE — G0463 HOSPITAL OUTPT CLINIC VISIT: HCPCS | Performed by: NURSE PRACTITIONER

## 2025-07-08 PROCEDURE — 83036 HEMOGLOBIN GLYCOSYLATED A1C: CPT | Performed by: NURSE PRACTITIONER

## 2025-07-08 PROCEDURE — 82948 REAGENT STRIP/BLOOD GLUCOSE: CPT | Performed by: NURSE PRACTITIONER

## 2025-07-08 RX ORDER — BLOOD SUGAR DIAGNOSTIC
1 STRIP MISCELLANEOUS 3 TIMES DAILY
Qty: 300 EACH | Refills: 1 | Status: SHIPPED | OUTPATIENT
Start: 2025-07-08

## 2025-07-08 RX ORDER — GLIMEPIRIDE 2 MG/1
2 TABLET ORAL 2 TIMES DAILY
Qty: 180 TABLET | Refills: 1 | Status: SHIPPED | OUTPATIENT
Start: 2025-07-08 | End: 2026-01-04

## 2025-07-08 RX ORDER — PEN NEEDLE, DIABETIC 31 GX5/16"
1 NEEDLE, DISPOSABLE MISCELLANEOUS DAILY
Qty: 100 EACH | Refills: 1 | Status: SHIPPED | OUTPATIENT
Start: 2025-07-08

## 2025-07-08 RX ORDER — DULAGLUTIDE 3 MG/.5ML
3 INJECTION, SOLUTION SUBCUTANEOUS
Qty: 6 ML | Refills: 1 | Status: SHIPPED | OUTPATIENT
Start: 2025-07-08

## 2025-07-08 RX ORDER — AVOBENZONE, HOMOSALATE, OCTISALATE, OCTOCRYLENE 30; 40; 45; 26 MG/ML; MG/ML; MG/ML; MG/ML
CREAM TOPICAL
Qty: 300 EACH | Refills: 1 | Status: SHIPPED | OUTPATIENT
Start: 2025-07-08

## 2025-07-08 RX ORDER — INSULIN GLARGINE 100 [IU]/ML
50 INJECTION, SOLUTION SUBCUTANEOUS DAILY
Qty: 45 ML | Refills: 1 | Status: SHIPPED | OUTPATIENT
Start: 2025-07-08 | End: 2026-01-04

## 2025-07-10 ENCOUNTER — OFFICE VISIT (OUTPATIENT)
Dept: CARDIOLOGY | Facility: CLINIC | Age: 75
End: 2025-07-10
Payer: MEDICARE

## 2025-07-10 VITALS
WEIGHT: 164.5 LBS | BODY MASS INDEX: 30.27 KG/M2 | HEART RATE: 76 BPM | SYSTOLIC BLOOD PRESSURE: 116 MMHG | HEIGHT: 62 IN | DIASTOLIC BLOOD PRESSURE: 72 MMHG

## 2025-07-10 DIAGNOSIS — I25.10 CORONARY ARTERY DISEASE INVOLVING NATIVE CORONARY ARTERY OF NATIVE HEART WITHOUT ANGINA PECTORIS: ICD-10-CM

## 2025-07-10 DIAGNOSIS — I42.9 CARDIOMYOPATHY, UNSPECIFIED TYPE: ICD-10-CM

## 2025-07-10 DIAGNOSIS — R94.31 ABNORMAL ELECTROCARDIOGRAM (ECG) (EKG): ICD-10-CM

## 2025-07-10 DIAGNOSIS — G93.32 POST-COVID CHRONIC FATIGUE: ICD-10-CM

## 2025-07-10 DIAGNOSIS — E78.2 MIXED HYPERLIPIDEMIA: ICD-10-CM

## 2025-07-10 DIAGNOSIS — U09.9 POST-COVID CHRONIC FATIGUE: ICD-10-CM

## 2025-07-10 DIAGNOSIS — I10 ESSENTIAL HYPERTENSION: Primary | ICD-10-CM

## 2025-07-10 PROCEDURE — 99214 OFFICE O/P EST MOD 30 MIN: CPT | Performed by: INTERNAL MEDICINE

## 2025-07-10 PROCEDURE — 3078F DIAST BP <80 MM HG: CPT | Performed by: INTERNAL MEDICINE

## 2025-07-10 PROCEDURE — 3074F SYST BP LT 130 MM HG: CPT | Performed by: INTERNAL MEDICINE

## 2025-07-10 RX ORDER — PANTOPRAZOLE SODIUM 40 MG/1
TABLET, DELAYED RELEASE ORAL
COMMUNITY
Start: 2025-06-30

## 2025-07-10 RX ORDER — SPIRONOLACTONE 25 MG/1
25 TABLET ORAL DAILY
Qty: 90 TABLET | Refills: 3 | Status: SHIPPED | OUTPATIENT
Start: 2025-07-10

## 2025-07-10 NOTE — PROGRESS NOTES
CARDIOLOGY FOLLOW-UP PROGRESS NOTE        Chief Complaint  Coronary artery disease involving coronary bypass graft of  and Follow-up    Subjective            Jyoti Alvarez presents to [facility] CARDIOLOGY  History of Present Illness      The patient comes for a cardiovascular follow up after recent in-hospital admission with ACS with elevated Troponins. He had CP and dyspnea with troponin up to 80 with abnormal delta. The patient had an urgent coronary angiography which showed mild to moderate CAD , LVEDP of 23 mmHg. Her previous ECHO showed EF of 45-50 %. She denies dyspnea or angina. Her BP is well controlled.        Past History:    Medical History:  Past Medical History:   Diagnosis Date    Allergic     Anemia     Ankle sprain     Arthritis     Asthma Late     INHALERS PRN    Breast cancer     RIGHT    Cataract     Colon polyp  and  and     COVID 2021    Disease of thyroid gland Hypothyroidism    Fatty liver     ASYMPTOMATIC    Fracture, radius 3-    Gastroesophageal reflux     Headache 2021    Positive  COVID 2021 reoccurring since then    Hernia     Hiatal    Hip arthrosis     HL (hearing loss) Hearing aids     Hyperlipemia     Hypertension     Hypothyroidism     Knee swelling     Limb swelling     Obesity     Osteopenia     Pneumonia     Seasonal allergies     Shortness of breath     Allergic triggers or over exertion    Skin cancer     Sleep apnea     Strain of latissimus dorsi muscle 2017    Right    Thyroid nodule     Type 2 diabetes mellitus     AM  IN AM    Urinary tract infection     Jardiance induced    Vertigo     Vitamin D deficiency        Surgical History: has a past surgical history that includes  section; Colonoscopy (); Esophagogastroduodenoscopy (); Other surgical history; Oophorectomy; Tonsillectomy (); Skin cancer excision (Right, 2020); Eye surgery; Mastectomy complete /  simple (Bilateral, 2004); Toe Surgery (Right); Breast reconstruction (Bilateral, 02/01/2022); Liposuction (N/A, 02/01/2022); Colonoscopy (N/A, 10/18/2022); Knee arthroscopy w/ lateral release / medial imbrication (Left); Axillary Lymph Node Biopsy/Excision (Left, 12/19/2023); Breast Augmentation (Post bilateral mastectomies); Lymph node biopsy (2004. 2019. 2021); Breast biopsy (Both); Tubal ligation (1980); Subtotal Hysterectomy (2004); Cosmetic surgery (2004); Abdominal surgery (1976, 1980, 2004); Foot surgery; Knee surgery (2005); and Cardiac catheterization (N/A, 5/24/2025).     Family History: family history includes Arthritis in her mother and sister; Breast cancer in her maternal aunt and mother; Breast cancer (age of onset: 27) in her sister; Breast cancer (age of onset: 40) in her daughter; Cancer in her brother, daughter, father, maternal aunt, maternal aunt, maternal aunt, mother, and sister; Deep vein thrombosis in her mother; Diabetes in her mother and sister; Hearing loss in her mother; Heart disease in her father and mother; Heart failure in her father and mother; Hypertension in her mother; Osteoarthritis in her mother; Osteoporosis in her mother; Ovarian cancer in her maternal aunt; Ovarian cancer (age of onset: 53) in her sister; Prostate cancer in her brother.     Social History: reports that she has never smoked. She has never been exposed to tobacco smoke. She has never used smokeless tobacco. She reports that she does not drink alcohol and does not use drugs.    Allergies: Exenatide, Fluoxetine, Gabapentin, Jardiance [empagliflozin], Levaquin [levofloxacin], Nickel, Sulfa antibiotics, Metformin, and Hibiclens [chlorhexidine gluconate]    Current Outpatient Medications on File Prior to Visit   Medication Sig    acetaminophen (TYLENOL) 650 MG 8 hr tablet Take 1 tablet by mouth 2 (two) times a day.    albuterol sulfate  (90 Base) MCG/ACT inhaler USE 2 INHALATIONS EVERY 6 HOURS AS NEEDED  (Patient taking differently: Inhale 2 puffs Every 6 (Six) Hours As Needed.)    amLODIPine (NORVASC) 5 MG tablet Take 1 tablet by mouth Daily.    aspirin 81 MG EC tablet Take 1 tablet by mouth Daily. OTC    atorvastatin (LIPITOR) 40 MG tablet TAKE 1 TABLET EVERY NIGHT    Cholecalciferol (Vitamin D3) 1.25 MG (50894 UT) capsule TAKE 1 CAPSULE BY MOUTH ONCE WEEKLY    clopidogrel (Plavix) 75 MG tablet Take 1 tablet by mouth Daily for 360 days.    Dulaglutide (Trulicity) 3 MG/0.5ML solution auto-injector Inject 3 mg under the skin into the appropriate area as directed Every 7 (Seven) Days.    ferrous sulfate 325 (65 FE) MG tablet Take 1 tablet by mouth Daily With Breakfast. OTC    fluticasone (FLONASE) 50 MCG/ACT nasal spray USE 1 SPRAY IN EACH NOSTRIL ONCE DAILY    Fluticasone-Salmeterol (ADVAIR/WIXELA) 500-50 MCG/ACT DISKUS Inhale 1 puff 2 (Two) Times a Day.    glimepiride (AMARYL) 2 MG tablet Take 1 tablet by mouth 2 (Two) Times a Day for 180 days.    Insulin Pen Needle (BD Pen Needle Ca Ultrafine) 32G X 4 MM misc Use 1 each Daily.    Lancets misc Test blood glucose 3 times each day    Lantus SoloStar 100 UNIT/ML injection pen Inject 50 Units under the skin into the appropriate area as directed Daily for 180 days.    levothyroxine (SYNTHROID, LEVOTHROID) 25 MCG tablet TAKE 1 TABLET DAILY IN THE MORNING ON AN EMPTY STOMACH (Patient taking differently: Take 1 tablet by mouth Every Morning.)    losartan (COZAAR) 50 MG tablet TAKE 1 TABLET DAILY (Patient taking differently: Take 1 tablet by mouth Daily.)    metoprolol succinate XL (TOPROL-XL) 100 MG 24 hr tablet TAKE 1 TABLET DAILY    montelukast (SINGULAIR) 10 MG tablet TAKE 1 TABLET DAILY    multivitamin with minerals tablet tablet Take 1 tablet by mouth Daily.    OneTouch Verio test strip 1 each by Other route 3 times a day. Use as instructed    pantoprazole (PROTONIX) 40 MG EC tablet     Probiotic Product (PROBIOTIC PO) Take 1 fluid ounce by mouth Daily. Plexus  "powder    vitamin B-12 (CYANOCOBALAMIN) 500 MCG tablet Take 1 tablet by mouth Daily.     No current facility-administered medications on file prior to visit.          Review of Systems   All systems were reviewed and negative    Objective     /72 (BP Location: Left arm, Patient Position: Sitting, Cuff Size: Adult)   Pulse 76   Ht 157.5 cm (62.01\")   Wt 74.6 kg (164 lb 8 oz)   BMI 30.08 kg/m²       Physical Exam    General : Alert, awake, no acute distress  Neck : Supple, no carotid bruit, no jugular venous distention  CVS : Regular rate and rhythm, no murmur, rubs or gallops  Lungs: Clear to auscultation bilaterally, no crackles or rhonchi  Abdomen: Soft, nontender, bowel sounds heard in all 4 quadrants  Extremities: Warm, well-perfused, no pedal edema  Neurologic. No motor deficits.     Result Review :     The following data was reviewed by: Pablo Vazquez MD on 07/10/2025:    CMP          3/31/2025    11:10 5/23/2025    19:07 5/25/2025    06:22   CMP   Glucose 169  123  127    BUN 21  25  19    Creatinine 0.78  0.78  0.86    EGFR 78.8  78.8  70.1    Sodium 140  137  135    Potassium 4.6  4.4  4.3    Chloride 101  101  102    Calcium 9.4  9.1  9.0    Total Protein 6.4  6.2     Albumin 4.2  4.3  4.0    Globulin 2.2  1.9     Total Bilirubin 0.6  0.4     Alkaline Phosphatase 68  71     AST (SGOT) 27  30     ALT (SGPT) 26  28     Albumin/Globulin Ratio 1.9  2.3     BUN/Creatinine Ratio 26.9  32.1  22.1    Anion Gap 13.7  12.2  10.8      CBC          5/23/2025    19:07 5/24/2025    04:21 5/25/2025    06:22   CBC   WBC 11.46  8.92  8.82    RBC 4.27  4.16  4.14    Hemoglobin 13.5  13.0  12.9    Hematocrit 40.6  39.8  39.7    MCV 95.1  95.7  95.9    MCH 31.6  31.3  31.2    MCHC 33.3  32.7  32.5    RDW 13.0  13.1  13.5    Platelets 303  300  284      TSH          12/30/2024    14:40 3/31/2025    11:10 5/23/2025    20:15   TSH   TSH 1.670  2.460  0.807      Lipid Panel          8/30/2024    11:31 3/31/2025    " 11:10 5/23/2025    20:15   Lipid Panel   Total Cholesterol 148  146  138    Triglycerides 170  216  198    HDL Cholesterol 43  41  36    VLDL Cholesterol 29  36  33    LDL Cholesterol  76  69  69    LDL/HDL Ratio 1.65  1.51  1.73           Data reviewed: Cardiology studies        Results for orders placed during the hospital encounter of 04/21/25    Adult Transthoracic Echo Complete W/ Cont if Necessary Per Protocol    Interpretation Summary    Left ventricular ejection fraction appears to be 46 - 50%.    Left ventricular diastolic function was indeterminate.    Technically Difficult Study with poor resolution.      Results for orders placed during the hospital encounter of 12/03/21    Stress Test With Myocardial Perfusion One Day 12/04/2021  6:54 PM    Interpretation Summary  · Left ventricular ejection fraction is hyperdynamic (Calculated EF > 70%). .  · Myocardial perfusion imaging indicates a normal myocardial perfusion study with no evidence of ischemia.  · Impressions are consistent with a low risk study.  · Findings consistent with a normal ECG stress test.  · Frequent APCs with exercise               Assessment and Plan        Diagnoses and all orders for this visit:    1. Essential hypertension (Primary)  -     Basic Metabolic Panel; Future    2. Coronary artery disease involving native coronary artery of native heart without angina pectoris    3. Mixed hyperlipidemia    4. Post-COVID chronic fatigue    5. Cardiomyopathy, unspecified type  -     Adult Transthoracic Echo Limited W/ Cont if Necessary Per Protocol; Future    6. Abnormal electrocardiogram (ECG) (EKG)  -     Adult Transthoracic Echo Limited W/ Cont if Necessary Per Protocol; Future    Other orders  -     spironolactone (ALDACTONE) 25 MG tablet; Take 1 tablet by mouth Daily.  Dispense: 90 tablet; Refill: 3        I would continue with optimal guideline directed medical therapy with dual antiplatelet therapy for at least 9 months to 1 year.  Continue with beta-blockers, Losartan as tolerated. I would add Aldactone 25 mg po daily. Would check BMP and K levels in 4 weeks. Will obtain a limited ECHO in 3 months. Continue with lifestyle modification, diabetes control, heart healthy diet and regular exercise. At least walk 45 minutes per day 5 times per weeks.       Follow Up     Return in about 3 months (around 10/10/2025) for After testing.    Patient was given instructions and counseling regarding her condition or for health maintenance advice. Please see specific information pulled into the AVS if appropriate.

## 2025-07-28 ENCOUNTER — LAB (OUTPATIENT)
Dept: LAB | Facility: HOSPITAL | Age: 75
End: 2025-07-28
Payer: MEDICARE

## 2025-07-28 DIAGNOSIS — E55.9 VITAMIN D DEFICIENCY: ICD-10-CM

## 2025-07-28 DIAGNOSIS — Z79.4 TYPE 2 DIABETES MELLITUS WITHOUT COMPLICATION, WITH LONG-TERM CURRENT USE OF INSULIN: ICD-10-CM

## 2025-07-28 DIAGNOSIS — R06.09 DYSPNEA ON EXERTION: ICD-10-CM

## 2025-07-28 DIAGNOSIS — E03.4 ATROPHY OF THYROID: ICD-10-CM

## 2025-07-28 DIAGNOSIS — E78.2 MIXED HYPERLIPIDEMIA: ICD-10-CM

## 2025-07-28 DIAGNOSIS — E11.9 TYPE 2 DIABETES MELLITUS WITHOUT COMPLICATION, WITH LONG-TERM CURRENT USE OF INSULIN: ICD-10-CM

## 2025-07-28 DIAGNOSIS — I10 ESSENTIAL HYPERTENSION: ICD-10-CM

## 2025-07-28 LAB
25(OH)D3 SERPL-MCNC: 53.7 NG/ML (ref 30–100)
ALBUMIN SERPL-MCNC: 4.3 G/DL (ref 3.5–5.2)
ALBUMIN/GLOB SERPL: 1.8 G/DL
ALP SERPL-CCNC: 75 U/L (ref 39–117)
ALT SERPL W P-5'-P-CCNC: 22 U/L (ref 1–33)
ANION GAP SERPL CALCULATED.3IONS-SCNC: 12.3 MMOL/L (ref 5–15)
AST SERPL-CCNC: 17 U/L (ref 1–32)
BASOPHILS # BLD AUTO: 0.06 10*3/MM3 (ref 0–0.2)
BASOPHILS NFR BLD AUTO: 0.8 % (ref 0–1.5)
BILIRUB SERPL-MCNC: 0.5 MG/DL (ref 0–1.2)
BUN SERPL-MCNC: 23 MG/DL (ref 8–23)
BUN/CREAT SERPL: 20.7 (ref 7–25)
CALCIUM SPEC-SCNC: 9.5 MG/DL (ref 8.6–10.5)
CHLORIDE SERPL-SCNC: 102 MMOL/L (ref 98–107)
CHOLEST SERPL-MCNC: 133 MG/DL (ref 0–200)
CO2 SERPL-SCNC: 23.7 MMOL/L (ref 22–29)
CREAT SERPL-MCNC: 1.11 MG/DL (ref 0.57–1)
DEPRECATED RDW RBC AUTO: 43.2 FL (ref 37–54)
EGFRCR SERPLBLD CKD-EPI 2021: 51.6 ML/MIN/1.73
EOSINOPHIL # BLD AUTO: 0.14 10*3/MM3 (ref 0–0.4)
EOSINOPHIL NFR BLD AUTO: 1.9 % (ref 0.3–6.2)
ERYTHROCYTE [DISTWIDTH] IN BLOOD BY AUTOMATED COUNT: 12.7 % (ref 12.3–15.4)
GLOBULIN UR ELPH-MCNC: 2.4 GM/DL
GLUCOSE SERPL-MCNC: 116 MG/DL (ref 65–99)
HBA1C MFR BLD: 6.7 % (ref 4.8–5.6)
HCT VFR BLD AUTO: 40.7 % (ref 34–46.6)
HDLC SERPL-MCNC: 34 MG/DL (ref 40–60)
HGB BLD-MCNC: 13.8 G/DL (ref 12–15.9)
IMM GRANULOCYTES # BLD AUTO: 0.03 10*3/MM3 (ref 0–0.05)
IMM GRANULOCYTES NFR BLD AUTO: 0.4 % (ref 0–0.5)
LDLC SERPL CALC-MCNC: 59 MG/DL (ref 0–100)
LDLC/HDLC SERPL: 1.44 {RATIO}
LYMPHOCYTES # BLD AUTO: 2.02 10*3/MM3 (ref 0.7–3.1)
LYMPHOCYTES NFR BLD AUTO: 27.9 % (ref 19.6–45.3)
MCH RBC QN AUTO: 32.1 PG (ref 26.6–33)
MCHC RBC AUTO-ENTMCNC: 33.9 G/DL (ref 31.5–35.7)
MCV RBC AUTO: 94.7 FL (ref 79–97)
MONOCYTES # BLD AUTO: 0.91 10*3/MM3 (ref 0.1–0.9)
MONOCYTES NFR BLD AUTO: 12.6 % (ref 5–12)
NEUTROPHILS NFR BLD AUTO: 4.08 10*3/MM3 (ref 1.7–7)
NEUTROPHILS NFR BLD AUTO: 56.4 % (ref 42.7–76)
NRBC BLD AUTO-RTO: 0 /100 WBC (ref 0–0.2)
PLATELET # BLD AUTO: 332 10*3/MM3 (ref 140–450)
PMV BLD AUTO: 9 FL (ref 6–12)
POTASSIUM SERPL-SCNC: 4.9 MMOL/L (ref 3.5–5.2)
PROT SERPL-MCNC: 6.7 G/DL (ref 6–8.5)
RBC # BLD AUTO: 4.3 10*6/MM3 (ref 3.77–5.28)
SODIUM SERPL-SCNC: 138 MMOL/L (ref 136–145)
TRIGL SERPL-MCNC: 250 MG/DL (ref 0–150)
TSH SERPL DL<=0.05 MIU/L-ACNC: 2.45 UIU/ML (ref 0.27–4.2)
VLDLC SERPL-MCNC: 40 MG/DL (ref 5–40)
WBC NRBC COR # BLD AUTO: 7.24 10*3/MM3 (ref 3.4–10.8)

## 2025-07-28 PROCEDURE — 36415 COLL VENOUS BLD VENIPUNCTURE: CPT

## 2025-07-28 PROCEDURE — 80053 COMPREHEN METABOLIC PANEL: CPT

## 2025-07-28 PROCEDURE — 82306 VITAMIN D 25 HYDROXY: CPT

## 2025-07-28 PROCEDURE — 80061 LIPID PANEL: CPT

## 2025-07-28 PROCEDURE — 85025 COMPLETE CBC W/AUTO DIFF WBC: CPT

## 2025-07-28 PROCEDURE — 84443 ASSAY THYROID STIM HORMONE: CPT

## 2025-07-28 PROCEDURE — 83036 HEMOGLOBIN GLYCOSYLATED A1C: CPT

## 2025-08-05 ENCOUNTER — OFFICE VISIT (OUTPATIENT)
Age: 75
End: 2025-08-05
Payer: MEDICARE

## 2025-08-05 VITALS
BODY MASS INDEX: 30.33 KG/M2 | HEIGHT: 62 IN | WEIGHT: 164.8 LBS | OXYGEN SATURATION: 97 % | DIASTOLIC BLOOD PRESSURE: 74 MMHG | SYSTOLIC BLOOD PRESSURE: 120 MMHG | HEART RATE: 82 BPM

## 2025-08-05 DIAGNOSIS — E78.2 MIXED HYPERLIPIDEMIA: ICD-10-CM

## 2025-08-05 DIAGNOSIS — E55.9 VITAMIN D DEFICIENCY: ICD-10-CM

## 2025-08-05 DIAGNOSIS — Z79.4 TYPE 2 DIABETES MELLITUS WITHOUT COMPLICATION, WITH LONG-TERM CURRENT USE OF INSULIN: ICD-10-CM

## 2025-08-05 DIAGNOSIS — R53.83 OTHER FATIGUE: ICD-10-CM

## 2025-08-05 DIAGNOSIS — E11.9 TYPE 2 DIABETES MELLITUS WITHOUT COMPLICATION, WITH LONG-TERM CURRENT USE OF INSULIN: ICD-10-CM

## 2025-08-05 DIAGNOSIS — N18.31 STAGE 3A CHRONIC KIDNEY DISEASE: ICD-10-CM

## 2025-08-05 DIAGNOSIS — E03.4 ATROPHY OF THYROID: ICD-10-CM

## 2025-08-05 DIAGNOSIS — I10 ESSENTIAL HYPERTENSION: Primary | ICD-10-CM

## 2025-08-05 PROBLEM — Z09 HOSPITAL DISCHARGE FOLLOW-UP: Status: RESOLVED | Noted: 2022-02-10 | Resolved: 2025-08-05

## (undated) DEVICE — PLASTIC MAJOR-LF: Brand: MEDLINE INDUSTRIES, INC.

## (undated) DEVICE — SUT PERMAHAND SILK 0 FSL 30IN BLK

## (undated) DEVICE — GLV SURG SENSICARE PI ORTHO SZ8 LF STRL

## (undated) DEVICE — CATH F6 ST JL 3.5 100CM: Brand: SUPERTORQUE

## (undated) DEVICE — MAJOR-LF: Brand: MEDLINE INDUSTRIES, INC.

## (undated) DEVICE — STERILE POLYISOPRENE POWDER-FREE SURGICAL GLOVES WITH EMOLLIENT COATING: Brand: PROTEXIS

## (undated) DEVICE — GW FC FLOP/TP .035 260CM 3MM

## (undated) DEVICE — SOL IRR NACL 0.9PCT BT 1000ML

## (undated) DEVICE — STRIP CLS WND SUTURESTRIP/PLS 0.5X4IN TP1103

## (undated) DEVICE — PENCL E/S SMOKEEVAC W/TELESCP CANN

## (undated) DEVICE — THE SINGLE USE ETRAP – POLYP TRAP IS USED FOR SUCTION RETRIEVAL OF ENDOSCOPICALLY REMOVED POLYPS.: Brand: ETRAP

## (undated) DEVICE — RADIFOCUS OPTITORQUE ANGIOGRAPHIC CATHETER: Brand: OPTITORQUE

## (undated) DEVICE — SLV SCD KN/LEN ADJ EXPRSS BLENDED MD 1P/U

## (undated) DEVICE — INTENDED FOR TISSUE SEPARATION, AND OTHER PROCEDURES THAT REQUIRE A SHARP SURGICAL BLADE TO PUNCTURE OR CUT.: Brand: BARD-PARKER ® CARBON RIB-BACK BLADES

## (undated) DEVICE — SOLIDIFIER LIQLOC PLS 1500CC BT

## (undated) DEVICE — GOWN,REINFORCE,POLY,SIRUS,BREATH SLV,XLG: Brand: MEDLINE

## (undated) DEVICE — GOWN,REINFRCE,POLY,SIRUS,BREATH SLV,XXLG: Brand: MEDLINE

## (undated) DEVICE — BRA COMPR SURG STL119 V/CLOSE/FRNT SZQ WHT

## (undated) DEVICE — CATH F5INF TLPIGST145 110CM6SH: Brand: INFINITI

## (undated) DEVICE — CATH F6 ST JR 3.5 100CM: Brand: SUPERTORQUE

## (undated) DEVICE — Device

## (undated) DEVICE — Device: Brand: DEFENDO AIR/WATER/SUCTION AND BIOPSY VALVE

## (undated) DEVICE — SOL IRRG H2O PL/BG 1000ML STRL

## (undated) DEVICE — DRSNG SURG AQUACEL AG/ADVNTGE 9X15CM 3.5X6IN

## (undated) DEVICE — ELECTRD BLD EDGE COAT 3IN

## (undated) DEVICE — ADHS SKIN PREMIERPRO EXOFIN TOPICAL HI/VISC .5ML

## (undated) DEVICE — SNAR E/S POLYP SNAREMASTER OVL/10MM 2.8X2300MM YEL

## (undated) DEVICE — ANTIBACTERIAL UNDYED BRAIDED (POLYGLACTIN 910), SYNTHETIC ABSORBABLE SUTURE: Brand: COATED VICRYL

## (undated) DEVICE — CONTAINER,SPECIMEN,O.R.STRL,4.5OZ: Brand: MEDLINE

## (undated) DEVICE — SUT VIC 3/0 SH 27IN J416H

## (undated) DEVICE — DEV COMP RAD PRELUDESYNC 24CM

## (undated) DEVICE — GLIDESHEATH SLENDER STAINLESS STEEL KIT: Brand: GLIDESHEATH SLENDER

## (undated) DEVICE — PROXIMATE RH ROTATING HEAD SKIN STAPLERS (35 WIDE) CONTAINS 35 STAINLESS STEEL STAPLES: Brand: PROXIMATE

## (undated) DEVICE — ADHS LIQ MASTISOL 2/3ML